# Patient Record
Sex: FEMALE | Race: WHITE | HISPANIC OR LATINO | Employment: UNEMPLOYED | URBAN - METROPOLITAN AREA
[De-identification: names, ages, dates, MRNs, and addresses within clinical notes are randomized per-mention and may not be internally consistent; named-entity substitution may affect disease eponyms.]

---

## 2017-12-07 ENCOUNTER — LAB VISIT (OUTPATIENT)
Dept: LAB | Facility: HOSPITAL | Age: 37
End: 2017-12-07
Attending: FAMILY MEDICINE
Payer: COMMERCIAL

## 2017-12-07 ENCOUNTER — OFFICE VISIT (OUTPATIENT)
Dept: FAMILY MEDICINE | Facility: CLINIC | Age: 37
End: 2017-12-07
Payer: COMMERCIAL

## 2017-12-07 VITALS
SYSTOLIC BLOOD PRESSURE: 122 MMHG | DIASTOLIC BLOOD PRESSURE: 78 MMHG | RESPIRATION RATE: 16 BRPM | BODY MASS INDEX: 23.65 KG/M2 | HEART RATE: 101 BPM | HEIGHT: 59 IN | WEIGHT: 117.31 LBS | OXYGEN SATURATION: 97 % | TEMPERATURE: 98 F

## 2017-12-07 DIAGNOSIS — Z87.19 HISTORY OF GASTRITIS: ICD-10-CM

## 2017-12-07 DIAGNOSIS — Z79.899 LONG-TERM USE OF PLAQUENIL: ICD-10-CM

## 2017-12-07 DIAGNOSIS — Z00.00 ENCOUNTER FOR ROUTINE HISTORY AND PHYSICAL EXAM IN FEMALE PATIENT: ICD-10-CM

## 2017-12-07 DIAGNOSIS — M12.9 ARTHRITIS, MULTIPLE JOINT INVOLVEMENT: ICD-10-CM

## 2017-12-07 DIAGNOSIS — L70.9 ACNE, MILD: ICD-10-CM

## 2017-12-07 DIAGNOSIS — G47.01 INSOMNIA DUE TO MEDICAL CONDITION: ICD-10-CM

## 2017-12-07 DIAGNOSIS — Z00.00 ENCOUNTER FOR ROUTINE HISTORY AND PHYSICAL EXAM IN FEMALE PATIENT: Primary | ICD-10-CM

## 2017-12-07 LAB
ALBUMIN SERPL BCP-MCNC: 4 G/DL
ALP SERPL-CCNC: 63 U/L
ALT SERPL W/O P-5'-P-CCNC: 24 U/L
ANION GAP SERPL CALC-SCNC: 8 MMOL/L
AST SERPL-CCNC: 20 U/L
BASOPHILS # BLD AUTO: 0.02 K/UL
BASOPHILS NFR BLD: 0.4 %
BILIRUB SERPL-MCNC: 0.3 MG/DL
BUN SERPL-MCNC: 9 MG/DL
CALCIUM SERPL-MCNC: 9.7 MG/DL
CHLORIDE SERPL-SCNC: 108 MMOL/L
CHOLEST SERPL-MCNC: 169 MG/DL
CHOLEST/HDLC SERPL: 3.3 {RATIO}
CO2 SERPL-SCNC: 26 MMOL/L
CREAT SERPL-MCNC: 0.8 MG/DL
DIFFERENTIAL METHOD: NORMAL
EOSINOPHIL # BLD AUTO: 0.1 K/UL
EOSINOPHIL NFR BLD: 1.6 %
ERYTHROCYTE [DISTWIDTH] IN BLOOD BY AUTOMATED COUNT: 12.3 %
EST. GFR  (AFRICAN AMERICAN): >60 ML/MIN/1.73 M^2
EST. GFR  (NON AFRICAN AMERICAN): >60 ML/MIN/1.73 M^2
GLUCOSE SERPL-MCNC: 81 MG/DL
HCT VFR BLD AUTO: 37.2 %
HDLC SERPL-MCNC: 51 MG/DL
HDLC SERPL: 30.2 %
HGB BLD-MCNC: 12.4 G/DL
LDLC SERPL CALC-MCNC: 105.6 MG/DL
LYMPHOCYTES # BLD AUTO: 1.6 K/UL
LYMPHOCYTES NFR BLD: 28.9 %
MCH RBC QN AUTO: 30.3 PG
MCHC RBC AUTO-ENTMCNC: 33.3 G/DL
MCV RBC AUTO: 91 FL
MONOCYTES # BLD AUTO: 0.3 K/UL
MONOCYTES NFR BLD: 4.9 %
NEUTROPHILS # BLD AUTO: 3.6 K/UL
NEUTROPHILS NFR BLD: 64 %
NONHDLC SERPL-MCNC: 118 MG/DL
PLATELET # BLD AUTO: 331 K/UL
PMV BLD AUTO: 10.4 FL
POTASSIUM SERPL-SCNC: 4.9 MMOL/L
PROT SERPL-MCNC: 7.8 G/DL
RBC # BLD AUTO: 4.09 M/UL
SODIUM SERPL-SCNC: 142 MMOL/L
TRIGL SERPL-MCNC: 62 MG/DL
TSH SERPL DL<=0.005 MIU/L-ACNC: 2.24 UIU/ML
WBC # BLD AUTO: 5.54 K/UL

## 2017-12-07 PROCEDURE — 80053 COMPREHEN METABOLIC PANEL: CPT

## 2017-12-07 PROCEDURE — 99204 OFFICE O/P NEW MOD 45 MIN: CPT | Mod: S$GLB,,, | Performed by: FAMILY MEDICINE

## 2017-12-07 PROCEDURE — 85025 COMPLETE CBC W/AUTO DIFF WBC: CPT

## 2017-12-07 PROCEDURE — 80061 LIPID PANEL: CPT

## 2017-12-07 PROCEDURE — 99999 PR PBB SHADOW E&M-NEW PATIENT-LVL V: CPT | Mod: PBBFAC,,, | Performed by: FAMILY MEDICINE

## 2017-12-07 PROCEDURE — 84443 ASSAY THYROID STIM HORMONE: CPT

## 2017-12-07 PROCEDURE — 36415 COLL VENOUS BLD VENIPUNCTURE: CPT | Mod: PN

## 2017-12-07 RX ORDER — MELOXICAM 7.5 MG/1
7.5 TABLET ORAL DAILY
COMMUNITY
End: 2017-12-07 | Stop reason: SDUPTHER

## 2017-12-07 RX ORDER — ZOLPIDEM TARTRATE 10 MG/1
10 TABLET ORAL NIGHTLY PRN
Qty: 30 TABLET | Refills: 1 | Status: SHIPPED | OUTPATIENT
Start: 2017-12-07 | End: 2018-05-30 | Stop reason: SDUPTHER

## 2017-12-07 RX ORDER — ZOLPIDEM TARTRATE 10 MG/1
TABLET ORAL
COMMUNITY
Start: 2017-12-01 | End: 2017-12-07 | Stop reason: SDUPTHER

## 2017-12-07 RX ORDER — TRAMADOL HYDROCHLORIDE 50 MG/1
TABLET ORAL
COMMUNITY
Start: 2017-12-01 | End: 2018-04-02

## 2017-12-07 RX ORDER — LEVONORGESTREL AND ETHINYL ESTRADIOL 0.1-0.02MG
1 KIT ORAL DAILY
COMMUNITY
End: 2018-07-27

## 2017-12-07 RX ORDER — AMITRIPTYLINE HYDROCHLORIDE 10 MG/1
TABLET, FILM COATED ORAL
COMMUNITY
Start: 2017-12-04 | End: 2017-12-20 | Stop reason: SDUPTHER

## 2017-12-07 RX ORDER — HYDROXYCHLOROQUINE SULFATE 200 MG/1
1 TABLET, FILM COATED ORAL 2 TIMES DAILY
COMMUNITY
Start: 2017-12-01 | End: 2017-12-20 | Stop reason: SDUPTHER

## 2017-12-07 RX ORDER — MELOXICAM 7.5 MG/1
7.5 TABLET ORAL 2 TIMES DAILY PRN
Qty: 60 TABLET | Refills: 2 | Status: SHIPPED | OUTPATIENT
Start: 2017-12-07 | End: 2018-03-10 | Stop reason: SDUPTHER

## 2017-12-07 NOTE — PATIENT INSTRUCTIONS
Treating Insomnia     Learning to relax before bedtime can improve your sleep.     Good sleeping habits are a key part of treatment. If needed, some medications may help you sleep better at first. Making healthy lifestyle changes and learning to relax can improve your sleep. Treating insomnia takes commitment, but trust that your efforts will pay off. Talk to your health care provider before taking any medication.  Healthy lifestyle  Your lifestyle affects your health and your sleep. Here are some healthy habits:  · Keep a regular sleep schedule. Go to bed and get up at the same time each day.  · Exercise regularly. It may help you reduce stress. Avoid strenuous exercise for 2 to 4 hours before bedtime.  · Avoid or limit naps, especially in the late afternoon.  · Use your bed only for sleep and sex.  · Dont spend too much time in bed trying to fall asleep. If you cant fall asleep, get up and do something (no electronics) until you become tired and drowsy.  · Avoid or limit caffeine and nicotine for up to 6 hours before bedtime. They can keep you awake at night.   · Also avoid alcohol for at least 4 to 6 hours before bedtime. It may help you fall asleep at first, but you will have more awakenings throughout the night, and your sleep will not be restful.  Before bedtime  To sleep better every night, try these tips:  · Have a bedtime routine to let your body and mind know when its time to sleep.  · Think of going to bed as relaxing and enjoyable. Sleep will come sooner.  · If your worries dont let you sleep, write them down in a diary. Then close it, and go to bed.  · Make sure the room is not too hot or too cold. If its not dark enough, an eye mask can help. If its noisy, try using earplugs.  Learn to relax  Stress, anxiety, and body tension may keep you awake at night. To unwind before bedtime, try a warm bath, meditation, or yoga. Also, try the following:  · Deep breathing. Sit or lie back in a chair. Take a  slow, deep breath. Hold it for 5 counts. Then breathe out slowly through your mouth. Keep doing this until you feel relaxed.  · Progressive muscle relaxation. Tense and then relax the muscles in your body as you breathe deeply. Start with your feet and work up your body to your neck and face.  Date Last Reviewed: 7/18/2015  © 3287-8142 [employer]. [address] [place] [address], [place] [id number]. All rights reserved. This information is not intended as a substitute for professional medical care. Always follow your healthcare professional's instructions.

## 2017-12-07 NOTE — PROGRESS NOTES
"  Chief Complaint   Patient presents with    Annual Exam       HPI    Roopa Rivas is 37 y.o. female. The primary encounter diagnosis was Encounter for routine history and physical exam in female patient. Diagnoses of Arthritis, multiple joint involvement, Insomnia due to medical condition, History of gastritis, Acne, mild, and Long-term use of Plaquenil were also pertinent to this visit.     37 year old female with diffuse Osteoarthritis comes to clinic to establish care.  Patient reports relocating from [place] 3 months ago.     She reports being seen by Rheumatology in [place] for arthritis.  She was placed on Plaquenil and Tramadol for her arthritis. She denies rheumatoid arthritis but only recalls being told it was "M19." She requests follow up.    Patient also requests referral to Dermatology.  Since being on Plaquenil she has had normal retinal exam but reports increase in acne lesions.  Patient also admits to insomnia due to pain.  She currently uses Ambien for this issue.      She denies continued issues with Gastritis.  She reports simple dietary changes that resolved her symptoms.      Review of Systems   Constitutional: Negative for activity change.   HENT: Negative for congestion.    Respiratory: Negative for shortness of breath.    Cardiovascular: Negative for chest pain.   Gastrointestinal: Negative for abdominal pain.   Genitourinary: Negative for dysuria.   Musculoskeletal: Negative for gait problem.   Skin: Positive for rash. Negative for wound.   Neurological: Negative for dizziness.   Psychiatric/Behavioral: Negative for suicidal ideas.       Past Medical History:   Diagnosis Date    Arthritis     Gastroenteritis      History reviewed. No pertinent surgical history.  Family History   Problem Relation Age of Onset    Arthritis Mother     No Known Problems Father     Arthritis Maternal Grandmother     Arthritis Maternal Grandfather     Cancer Paternal Grandfather       reports that she has " "never smoked. She has never used smokeless tobacco. She reports that she does not drink alcohol or use drugs.  Review of patient's allergies indicates:  No Known Allergies  July 6, 2017 - up to date    Current Outpatient Prescriptions:     amitriptyline (ELAVIL) 10 MG tablet, , Disp: , Rfl:     hydroxychloroquine (PLAQUENIL) 200 mg tablet, Take 1 tablet by mouth 2 (two) times daily., Disp: , Rfl:     levonorgestrel-ethinyl estradiol (AUBRA) 0.1-20 mg-mcg per tablet, Take 1 tablet by mouth once daily., Disp: , Rfl:     meloxicam (MOBIC) 7.5 MG tablet, Take 1 tablet (7.5 mg total) by mouth 2 (two) times daily as needed for Pain., Disp: 60 tablet, Rfl: 2    traMADol (ULTRAM) 50 mg tablet, , Disp: , Rfl:     zolpidem (AMBIEN) 10 mg Tab, Take 1 tablet (10 mg total) by mouth nightly as needed., Disp: 30 tablet, Rfl: 1        Blood pressure 122/78, pulse 101, temperature 98 °F (36.7 °C), temperature source Oral, resp. rate 16, height 4' 11" (1.499 m), weight 53.2 kg (117 lb 4.6 oz), SpO2 97 %.    Physical Exam   Constitutional: She is oriented to person, place, and time. Vital signs are normal. She appears well-developed.   HENT:   Right Ear: Hearing normal.   Left Ear: Hearing normal.   Mouth/Throat: Normal dentition.   Cardiovascular: Normal heart sounds and intact distal pulses.    Pulmonary/Chest: Effort normal and breath sounds normal.   Abdominal: Soft. Bowel sounds are normal. There is no tenderness.   Musculoskeletal:   Normal gait. No decreased ROM at all 4 major joints.   Neurological: She is oriented to person, place, and time. She has normal strength. No sensory deficit.   Skin: Skin is intact. Lesion (multiple closed comedones over the cheeks and chin bilaterally) noted. No rash noted.   Psychiatric: She has a normal mood and affect. Her speech is normal.       Lab Visit on 12/07/2017   Component Date Value Ref Range Status    WBC 12/07/2017 5.54  3.90 - 12.70 K/uL Final    RBC 12/07/2017 4.09  4.00 - " 5.40 M/uL Final    Hemoglobin 12/07/2017 12.4  12.0 - 16.0 g/dL Final    Hematocrit 12/07/2017 37.2  37.0 - 48.5 % Final    MCV 12/07/2017 91  82 - 98 fL Final    MCH 12/07/2017 30.3  27.0 - 31.0 pg Final    MCHC 12/07/2017 33.3  32.0 - 36.0 g/dL Final    RDW 12/07/2017 12.3  11.5 - 14.5 % Final    Platelets 12/07/2017 331  150 - 350 K/uL Final    MPV 12/07/2017 10.4  9.2 - 12.9 fL Final    Gran # 12/07/2017 3.6  1.8 - 7.7 K/uL Final    Lymph # 12/07/2017 1.6  1.0 - 4.8 K/uL Final    Mono # 12/07/2017 0.3  0.3 - 1.0 K/uL Final    Eos # 12/07/2017 0.1  0.0 - 0.5 K/uL Final    Baso # 12/07/2017 0.02  0.00 - 0.20 K/uL Final    Gran% 12/07/2017 64.0  38.0 - 73.0 % Final    Lymph% 12/07/2017 28.9  18.0 - 48.0 % Final    Mono% 12/07/2017 4.9  4.0 - 15.0 % Final    Eosinophil% 12/07/2017 1.6  0.0 - 8.0 % Final    Basophil% 12/07/2017 0.4  0.0 - 1.9 % Final    Differential Method 12/07/2017 Automated   Final    Sodium 12/07/2017 142  136 - 145 mmol/L Final    Potassium 12/07/2017 4.9  3.5 - 5.1 mmol/L Final    Chloride 12/07/2017 108  95 - 110 mmol/L Final    CO2 12/07/2017 26  23 - 29 mmol/L Final    Glucose 12/07/2017 81  70 - 110 mg/dL Final    BUN, Bld 12/07/2017 9  6 - 20 mg/dL Final    Creatinine 12/07/2017 0.8  0.5 - 1.4 mg/dL Final    Calcium 12/07/2017 9.7  8.7 - 10.5 mg/dL Final    Total Protein 12/07/2017 7.8  6.0 - 8.4 g/dL Final    Albumin 12/07/2017 4.0  3.5 - 5.2 g/dL Final    Total Bilirubin 12/07/2017 0.3  0.1 - 1.0 mg/dL Final    Alkaline Phosphatase 12/07/2017 63  55 - 135 U/L Final    AST 12/07/2017 20  10 - 40 U/L Final    ALT 12/07/2017 24  10 - 44 U/L Final    Anion Gap 12/07/2017 8  8 - 16 mmol/L Final    eGFR if African American 12/07/2017 >60  >60 mL/min/1.73 m^2 Final    eGFR if non African American 12/07/2017 >60  >60 mL/min/1.73 m^2 Final    Cholesterol 12/07/2017 169  120 - 199 mg/dL Final    Triglycerides 12/07/2017 62  30 - 150 mg/dL Final    HDL  12/07/2017 51  40 - 75 mg/dL Final    LDL Cholesterol 12/07/2017 105.6  63.0 - 159.0 mg/dL Final    HDL/Chol Ratio 12/07/2017 30.2  20.0 - 50.0 % Final    Total Cholesterol/HDL Ratio 12/07/2017 3.3  2.0 - 5.0 Final    Non-HDL Cholesterol 12/07/2017 118  mg/dL Final    TSH 12/07/2017 2.237  0.400 - 4.000 uIU/mL Final   ]    ASSESSMENT:    1. Encounter for routine history and physical exam in female patient    2. Arthritis, multiple joint involvement    3. Insomnia due to medical condition    4. History of gastritis    5. Acne, mild    6. Long-term use of Plaquenil        Roopa was seen today for annual exam.    Diagnoses and all orders for this visit:    Encounter for routine history and physical exam in female patient  -     CBC auto differential; Future  -     Comprehensive metabolic panel; Future  -     Lipid panel; Future  -     TSH; Future  -     Urinalysis  - Annual exam completed today.  Annual labs obtained. Risk of narcotics discussed.  Vaccinations reviewed.    Arthritis, multiple joint involvement  -     Ambulatory referral to Rheumatology  -     meloxicam (MOBIC) 7.5 MG tablet; Take 1 tablet (7.5 mg total) by mouth 2 (two) times daily as needed for Pain.  - Stable. Referral to Rheumatology placed. ASHLEE signed to obtain records of diagnosis and treatment plan from previous Rheumatologist.    Insomnia due to medical condition  -     zolpidem (AMBIEN) 10 mg Tab; Take 1 tablet (10 mg total) by mouth nightly as needed.  - Stable. Medication refilled.    History of gastritis   -Stable. Maintain dietary changes.  If symptoms worsen review diet. Okay to use Zantac.    Acne, mild  -     Ambulatory referral to Dermatology  - New problem. Referral placed to Dermatology for acne causes by pharmacotherapy.    Long-term use of Plaquenil  -     Ambulatory referral to Dermatology  - Stable. Discussed regular follow up with Rheumatology and Ophthalmology.          FOLLOW UP: Return in about 1 year (around 12/7/2018)  for Physical.

## 2017-12-13 ENCOUNTER — TELEPHONE (OUTPATIENT)
Dept: FAMILY MEDICINE | Facility: CLINIC | Age: 37
End: 2017-12-13

## 2017-12-13 NOTE — LETTER
December 13, 2017    Roopa Rivas  [address] [address]             [facility]  [address] [place] [address]  Phone: [phone]  Fax: [phone] Dear Mrs. Rivas:    Sorry we were unable to contact you to schedule your Rheumatology and Dermatology appointment. Please give the referral department a call at [phone].      If you have any questions or concerns, please don't hesitate to call.    Sincerely,        Gilbert Sherman MA

## 2017-12-15 ENCOUNTER — TELEPHONE (OUTPATIENT)
Dept: FAMILY MEDICINE | Facility: CLINIC | Age: 37
End: 2017-12-15

## 2017-12-15 NOTE — PROGRESS NOTES
Please contact patient and inform that I have reviewed the lab results. Blood counts were all normal.  Testing indicates normal thyroid, kidney and liver function.  Electrolyte levels are within normal ranges.  Cholesterol levels are also normal.

## 2017-12-15 NOTE — TELEPHONE ENCOUNTER
----- Message from Louisa Gonzalez MD sent at 12/15/2017  5:02 AM CST -----  Please contact patient and inform that I have reviewed the lab results. Blood counts were all normal.  Testing indicates normal thyroid, kidney and liver function.  Electrolyte levels are within normal ranges.  Cholesterol levels are also normal.

## 2017-12-20 ENCOUNTER — INITIAL CONSULT (OUTPATIENT)
Dept: RHEUMATOLOGY | Facility: CLINIC | Age: 37
End: 2017-12-20
Payer: COMMERCIAL

## 2017-12-20 VITALS
DIASTOLIC BLOOD PRESSURE: 78 MMHG | WEIGHT: 115.19 LBS | HEIGHT: 59 IN | HEART RATE: 89 BPM | BODY MASS INDEX: 23.22 KG/M2 | SYSTOLIC BLOOD PRESSURE: 113 MMHG

## 2017-12-20 DIAGNOSIS — Z55.9 EDUCATIONAL CIRCUMSTANCES: ICD-10-CM

## 2017-12-20 DIAGNOSIS — M12.9 ARTHRITIS, MULTIPLE JOINT INVOLVEMENT: ICD-10-CM

## 2017-12-20 DIAGNOSIS — Z79.899 LONG-TERM USE OF PLAQUENIL: ICD-10-CM

## 2017-12-20 DIAGNOSIS — M79.7 FIBROMYALGIA: Primary | ICD-10-CM

## 2017-12-20 PROCEDURE — 99205 OFFICE O/P NEW HI 60 MIN: CPT | Mod: S$GLB,,, | Performed by: INTERNAL MEDICINE

## 2017-12-20 PROCEDURE — 99999 PR PBB SHADOW E&M-EST. PATIENT-LVL IV: CPT | Mod: PBBFAC,,, | Performed by: INTERNAL MEDICINE

## 2017-12-20 RX ORDER — HYDROXYCHLOROQUINE SULFATE 200 MG/1
300 TABLET, FILM COATED ORAL DAILY
Qty: 135 TABLET | Refills: 3 | Status: SHIPPED | OUTPATIENT
Start: 2017-12-20 | End: 2018-04-18 | Stop reason: SDUPTHER

## 2017-12-20 RX ORDER — AMITRIPTYLINE HYDROCHLORIDE 25 MG/1
25 TABLET, FILM COATED ORAL NIGHTLY
Qty: 30 TABLET | Refills: 3 | Status: SHIPPED | OUTPATIENT
Start: 2017-12-20 | End: 2017-12-27 | Stop reason: SDUPTHER

## 2017-12-20 SDOH — SOCIAL DETERMINANTS OF HEALTH (SDOH): PROBLEMS RELATED TO EDUCATION AND LITERACY, UNSPECIFIED: Z55.9

## 2017-12-20 ASSESSMENT — ROUTINE ASSESSMENT OF PATIENT INDEX DATA (RAPID3)
AM STIFFNESS SCORE: 1, YES
PATIENT GLOBAL ASSESSMENT SCORE: 4.5
MDHAQ FUNCTION SCORE: .2
FATIGUE SCORE: 9.5
TOTAL RAPID3 SCORE: 3.06
PAIN SCORE: 4
PSYCHOLOGICAL DISTRESS SCORE: 6.6
WHEN YOU AWAKENED IN THE MORNING OVER THE LAST WEEK, PLEASE INDICATE THE AMOUNT OF TIME IT TAKES UNTIL YOU ARE AS LIMBER AS YOU WILL BE FOR THE DAY: 20 MINS

## 2017-12-20 NOTE — PROGRESS NOTES
Subjective:       Patient ID: Roopa Rivas is a 37 y.o. female.    Chief Complaint: Disease Management    HPI   Pt is a 38 yo with h/o gastritis, irritable bowel syndrome presenting to [facility]. Pt relocated from [place] Nov 2017 and was followed by rheumatology for a possible inflammatory arthritis per the patient. She was last seen by her rheumatologist, Dr. Sana Belle in Sept. Pt sts she developed diffuse joint pains in the hands, arms, shoulders, and knees in Feb or March 2016. She sts that after starting a combination of  mg/d, Tramadol 50 mg bid, Elavil 10 mg/d, mobic 7.5 mg bid her symptoms improved. She is unsure of her diagnosis at this time. Pt reports 30 minutes of am stiffness, intermittent joint swelling/pain, and severe fatigue. She is no longer working due to her fatigue. Pt recounts getting a massage in the past and crying due to being diffusely tender. She denies fevers, chills, Raynaud's, photosensitivity, rashes, alopecia, mucosal ulcers, pleurisy, vision changes, . Pt does report an increase in acne. Pt has poor sleep and uses Ambien.   No known autoimmune family history.   Pt denies a personal or family history of psoriasis.   No blood clots or miscarriages.        Widespread pain index 11    Note the areas which the patient has had pain over the last week:                   Shoulder-girdle, left 1               Shoulder-girdle, right1                         Upper arm left1                       Upper arm right1                         Lower arm left1                       Lower arm right1    Hip (buttock, trochanter) left  Hip (buttock, trochanter) right                           Upper leg, left                         Upper leg, right                           Lower leg, left1                         Lower leg, right1                                     Jaw, left                                   Jaw, right1                                         Chest                                  Abdomen                               Upper back1                              Lower back1                                        Neck  Score will be from 0-19:                                         Symptom severity score7  Fatigue 3  Waking Unrefreshed3  Cognitive Symptoms   0 = no problem, 1=slight or mild problem 2= moderate; considerable problems often present and/or at a moderate level, 3 = severe, pervasive, continuous, life disturbing problem  For each of the 3 symptoms, indicate the level of severity over the past week using the Scale.  The symptom severity score is the sum of the severity of the 3 symptoms (fatigue, waking unrefreshed, and cognitive symptoms) plus the number of the following symptoms occurring during the previous 6 months:   Headaches 1   Pain or cramps in the lower abdomen  Depression  The final score is between 0 and 12                                          Criteria  Patient has fibromyalgia if the following 3 conditions are met:  1.  Widespread pain index greater than or equal to 7 and symptom severity score greater than or equal to 5 or widespread pain index between 3- 6, and symptom severity score greater than or equal to 9.    2.  Symptoms have been present in a similar level for at least 3 months  3.  The patient does not have a disorder that would otherwise sufficiently explain the pain    Review of Systems   Constitutional: Positive for activity change and fatigue. Negative for appetite change, chills and fever.   HENT: Negative for mouth sores, nosebleeds, sore throat and trouble swallowing.         Dry mouth     Eyes: Negative for pain, redness and visual disturbance.   Respiratory: Negative for cough, shortness of breath and wheezing.    Cardiovascular: Negative for chest pain, palpitations and leg swelling.   Gastrointestinal: Positive for constipation. Negative for diarrhea, nausea and vomiting.   Genitourinary: Negative for  "difficulty urinating.   Musculoskeletal: Positive for arthralgias, back pain, joint swelling and neck pain.   Skin: Positive for rash (acne).   Neurological: Positive for headaches. Negative for tremors, weakness, light-headedness and numbness.   Hematological: Does not bruise/bleed easily.   Psychiatric/Behavioral: Positive for sleep disturbance.         Objective:   /78   Pulse 89   Ht 4' 11" (1.499 m)   Wt 52.3 kg (115 lb 3.2 oz)   BMI 23.27 kg/m²      Physical Exam   Constitutional: She is oriented to person, place, and time and well-developed, well-nourished, and in no distress.   HENT:   Mouth/Throat: Oropharynx is clear and moist.   No ulcers     Eyes: No scleral icterus.   Neck: Normal range of motion. Neck supple.   Cardiovascular: Normal rate, regular rhythm, normal heart sounds and intact distal pulses.    No murmur heard.  Pulmonary/Chest: Effort normal. No respiratory distress. She has no wheezes. She exhibits no tenderness.   Abdominal: Soft. Bowel sounds are normal.       Right Side Rheumatological Exam     Examination finds the shoulder, elbow, wrist, knee, 1st PIP, 1st MCP, 2nd PIP, 2nd MCP, 3rd PIP, 3rd MCP, 4th PIP, 4th MCP, 5th PIP and 5th MCP normal.    Joint Exam Comments   Knee: Crepitus        Muscle Strength (0-5 scale):  Neck Flexion:  5  Neck Extension: 5  Deltoid:  5  Biceps: 5/5   Triceps:  5  : 5/5   Iliopsoas: 5  Quadriceps:  5   Distal Lower Extremity: 5    Left Side Rheumatological Exam     Examination finds the shoulder, elbow, wrist, knee, 1st PIP, 1st MCP, 2nd PIP, 2nd MCP, 3rd PIP, 3rd MCP, 4th PIP, 4th MCP, 5th PIP and 5th MCP normal.    Joint Exam Comments   Knee: Crepitus        Muscle Strength (0-5 scale):  Neck Flexion:  5  Neck Extension: 5  Deltoid:  5  Biceps: 5/5   Triceps:  5  :  5/5   Iliopsoas: 5  Quadriceps:  5   Distal Lower Extremity: 5      Lymphadenopathy:     She has no cervical adenopathy.   Neurological: She is alert and oriented to person, " place, and time. Gait normal.   Skin: Skin is warm and dry. No rash noted. No erythema. No pallor.     Mild upper extremity livedo reticularis    Musculoskeletal: Normal range of motion. She exhibits deformity (prominent CMC squaring on the right hand-- s/p trauma). She exhibits no edema or tenderness.   Normal lumbar flexion and extension.  Hips normal ROM           Results for orders placed or performed in visit on 12/07/17   CBC auto differential   Result Value Ref Range    WBC 5.54 3.90 - 12.70 K/uL    RBC 4.09 4.00 - 5.40 M/uL    Hemoglobin 12.4 12.0 - 16.0 g/dL    Hematocrit 37.2 37.0 - 48.5 %    MCV 91 82 - 98 fL    MCH 30.3 27.0 - 31.0 pg    MCHC 33.3 32.0 - 36.0 g/dL    RDW 12.3 11.5 - 14.5 %    Platelets 331 150 - 350 K/uL    MPV 10.4 9.2 - 12.9 fL    Gran # 3.6 1.8 - 7.7 K/uL    Lymph # 1.6 1.0 - 4.8 K/uL    Mono # 0.3 0.3 - 1.0 K/uL    Eos # 0.1 0.0 - 0.5 K/uL    Baso # 0.02 0.00 - 0.20 K/uL    Gran% 64.0 38.0 - 73.0 %    Lymph% 28.9 18.0 - 48.0 %    Mono% 4.9 4.0 - 15.0 %    Eosinophil% 1.6 0.0 - 8.0 %    Basophil% 0.4 0.0 - 1.9 %    Differential Method Automated      Results for orders placed or performed in visit on 12/07/17   Comprehensive metabolic panel   Result Value Ref Range    Sodium 142 136 - 145 mmol/L    Potassium 4.9 3.5 - 5.1 mmol/L    Chloride 108 95 - 110 mmol/L    CO2 26 23 - 29 mmol/L    Glucose 81 70 - 110 mg/dL    BUN, Bld 9 6 - 20 mg/dL    Creatinine 0.8 0.5 - 1.4 mg/dL    Calcium 9.7 8.7 - 10.5 mg/dL    Total Protein 7.8 6.0 - 8.4 g/dL    Albumin 4.0 3.5 - 5.2 g/dL    Total Bilirubin 0.3 0.1 - 1.0 mg/dL    Alkaline Phosphatase 63 55 - 135 U/L    AST 20 10 - 40 U/L    ALT 24 10 - 44 U/L    Anion Gap 8 8 - 16 mmol/L    eGFR if African American >60 >60 mL/min/1.73 m^2    eGFR if non African American >60 >60 mL/min/1.73 m^2          Assessment:       1. Fibromyalgia    2. Arthritis, multiple joint involvement    3. Long-term use of Plaquenil    4. Educational circumstances        Pt is  a 36 yo with h/o gastritis, irritable bowel syndrome presenting to [facility].   Based on exam and WPI/SSS and history pt has fibromyalgia which fits treatment except for the HCQ  She does not have records of diagnosis and if she had an previous inflammatory arthritis.   Would like to wean tramadol as it is a narcotic.   Discussed fibromyalgia and aerobic exercise.   Up to date on HCQ eye exam.  Plan:   - Consent signed, will Call Dr. Terry Belle to get records and xrays  - Labs today: sed rate, CRP, RF, CCP ab, APL ab, EDUARDO, complements.   - Increase Elavil to 25 mg q hs for fibromyalgia and sleep. Wean tramadol to 50 mg daily then taper off.   - Decrease Plaquenil to 300 mg daily. Refill given. Continue yearly eye exams.   - Future therapy: maximize Elavil and she may need Duloxetine.  - Fibromyalgia handout given to patient.    - Pt will notify me of tramadol wean and increased Elavil dose.   - RTC in 3 months    Signed,  Ariel Stoner  [id number]

## 2017-12-27 DIAGNOSIS — M12.9 ARTHRITIS, MULTIPLE JOINT INVOLVEMENT: ICD-10-CM

## 2017-12-27 RX ORDER — AMITRIPTYLINE HYDROCHLORIDE 25 MG/1
25 TABLET, FILM COATED ORAL NIGHTLY
Qty: 90 TABLET | Refills: 2 | Status: SHIPPED | OUTPATIENT
Start: 2017-12-27 | End: 2017-12-29 | Stop reason: SDUPTHER

## 2017-12-29 DIAGNOSIS — M12.9 ARTHRITIS, MULTIPLE JOINT INVOLVEMENT: ICD-10-CM

## 2017-12-29 RX ORDER — AMITRIPTYLINE HYDROCHLORIDE 25 MG/1
25 TABLET, FILM COATED ORAL NIGHTLY
Qty: 90 TABLET | Refills: 3 | Status: SHIPPED | OUTPATIENT
Start: 2017-12-29 | End: 2018-04-02

## 2018-01-03 ENCOUNTER — PATIENT MESSAGE (OUTPATIENT)
Dept: RHEUMATOLOGY | Facility: CLINIC | Age: 38
End: 2018-01-03

## 2018-01-17 ENCOUNTER — PATIENT MESSAGE (OUTPATIENT)
Dept: RHEUMATOLOGY | Facility: CLINIC | Age: 38
End: 2018-01-17

## 2018-03-10 DIAGNOSIS — M12.9 ARTHRITIS, MULTIPLE JOINT INVOLVEMENT: ICD-10-CM

## 2018-03-11 RX ORDER — MELOXICAM 7.5 MG/1
TABLET ORAL
Qty: 60 TABLET | Refills: 0 | Status: SHIPPED | OUTPATIENT
Start: 2018-03-11 | End: 2018-04-14 | Stop reason: SDUPTHER

## 2018-04-02 ENCOUNTER — OFFICE VISIT (OUTPATIENT)
Dept: RHEUMATOLOGY | Facility: CLINIC | Age: 38
End: 2018-04-02
Payer: COMMERCIAL

## 2018-04-02 VITALS
WEIGHT: 114 LBS | DIASTOLIC BLOOD PRESSURE: 75 MMHG | HEART RATE: 89 BPM | HEIGHT: 59 IN | SYSTOLIC BLOOD PRESSURE: 102 MMHG | BODY MASS INDEX: 22.98 KG/M2

## 2018-04-02 DIAGNOSIS — M25.542 ARTHRALGIA OF HANDS, BILATERAL: Primary | ICD-10-CM

## 2018-04-02 DIAGNOSIS — M79.7 FIBROMYALGIA: ICD-10-CM

## 2018-04-02 DIAGNOSIS — M25.541 ARTHRALGIA OF HANDS, BILATERAL: Primary | ICD-10-CM

## 2018-04-02 PROCEDURE — 99214 OFFICE O/P EST MOD 30 MIN: CPT | Mod: S$GLB,,, | Performed by: INTERNAL MEDICINE

## 2018-04-02 PROCEDURE — 99999 PR PBB SHADOW E&M-EST. PATIENT-LVL III: CPT | Mod: PBBFAC,,, | Performed by: INTERNAL MEDICINE

## 2018-04-02 RX ORDER — DULOXETIN HYDROCHLORIDE 30 MG/1
CAPSULE, DELAYED RELEASE ORAL
Qty: 60 CAPSULE | Refills: 3 | Status: SHIPPED | OUTPATIENT
Start: 2018-04-02 | End: 2018-04-02 | Stop reason: SDUPTHER

## 2018-04-02 RX ORDER — DULOXETIN HYDROCHLORIDE 30 MG/1
CAPSULE, DELAYED RELEASE ORAL
Qty: 30 CAPSULE | Refills: 3 | Status: SHIPPED | OUTPATIENT
Start: 2018-04-02 | End: 2018-04-03 | Stop reason: SDUPTHER

## 2018-04-02 ASSESSMENT — ROUTINE ASSESSMENT OF PATIENT INDEX DATA (RAPID3)
WHEN YOU AWAKENED IN THE MORNING OVER THE LAST WEEK, PLEASE INDICATE THE AMOUNT OF TIME IT TAKES UNTIL YOU ARE AS LIMBER AS YOU WILL BE FOR THE DAY: 35 MINUTES
MDHAQ FUNCTION SCORE: .3
PSYCHOLOGICAL DISTRESS SCORE: .7
FATIGUE SCORE: 10
AM STIFFNESS SCORE: 1, YES
PATIENT GLOBAL ASSESSMENT SCORE: 8.5
PAIN SCORE: 9.5
TOTAL RAPID3 SCORE: 6.33

## 2018-04-02 NOTE — PROGRESS NOTES
Chief Complaint   Patient presents with    Appointment       Patient with a diagnosis of fibromyalgia syndrome for a follow up    History of presenting illness    We did the autoimmune panel    Normal CBC,CMP  Normal lipid panel  Normal TSH  Normal RF,CCP  EDUARDO positive,1: 160 homogenous,titre negative  Normal complements  Normal ESR,CRP  Negative APLAS panel    Symptoms today :     Uses ambien and yet cant sleep  Off tramadol  Increased amitryptilline to 50 mg  Cant take amitryptilline 50 mg : can only take 25 mg : feel hungry a lot  Takes meloxicam 7.5 mg daily  Takes prn tramadol    Anxiety + worse than before   Used to see psychiatry in [place] : took many meds,she had side effects  Will see psychiatry soon    Cant concentrate  Cant remember certain things    Dry eyes and mouth+  Opthalmology : gave tear drops    Hair fall got better with biotin    Ear aches  Windy ear pain gets worse  Cant wear earrings,gets infections  Inside of the ears hurt and throb    Fatigue +    Allergies have gotten worse    Used to exercise 6 times a week,cant do it anymore    Pt is a 37 yo with h/o gastritis, irritable bowel syndrome    She relocated from [place] November 2017    She was followed by rheumatology for a possible inflammatory arthritis per the patient     She was last seen by her rheumatologist, Dr. Sana Belle sep 2017  We still dont have records    She says US hands showed changes on inflammatory arthritis  She was offered NSAIDs like nalfon and sulindac and she didn't do well    She has seen us in December 2017,we didn't have any data to suggest inflammatory arthritis    We diagnosed her as fibromyalgia syndrome    Her complaints : diffuse pains in the hands,arms,shoulders and knees  Started February/march 2016  She states that after starting a combination of  mg/d, Tramadol 50 mg bid, Elavil 10 mg/d, mobic 7.5 mg bid her symptoms improved. She is unsure of her diagnosis at this time. Pt reports 30 minutes of  am stiffness, intermittent joint swelling/pain, and severe fatigue. She is no longer working due to her fatigue. Pt recounts getting a massage in the past and crying due to being diffusely tender. She denies fevers, chills, Raynaud's, photosensitivity, rashes, alopecia, mucosal ulcers, pleurisy, vision changes, . Pt does report an increase in acne.     No known autoimmune family history    Pt denies a personal or family history of psoriasis.     No blood clots or miscarriages.         Widespread pain index 11     Note the areas which the patient has had pain over the last week:                    Shoulder-girdle, left 1               Shoulder-girdle, right1                         Upper arm left1                       Upper arm right1                         Lower arm left1                       Lower arm right1    Hip (buttock, trochanter) left  Hip (buttock, trochanter) right                           Upper leg, left                         Upper leg, right                           Lower leg, left1                         Lower leg, right1                                     Jaw, left                                   Jaw, right1                                        Chest                                  Abdomen                               Upper back1                              Lower back1                                        Neck  Score will be from 0-19:                                          Symptom severity score7  Fatigue 3  Waking Unrefreshed3  Cognitive Symptoms     0 = no problem, 1=slight or mild problem 2= moderate; considerable problems often present and/or at a moderate level, 3 = severe, pervasive, continuous, life disturbing problem  For each of the 3 symptoms, indicate the level of severity over the past week using the Scale.  The symptom severity score is the sum of the severity of the 3 symptoms (fatigue, waking unrefreshed, and cognitive symptoms) plus the number of the following  symptoms occurring during the previous 6 months:   Headaches 1   Pain or cramps in the lower abdomen  Depression  The final score is between 0 and 12     Review of Systems   Constitutional: Positive for activity change and fatigue. Negative for appetite change, chills and fever.   HENT: Negative for mouth sores, nosebleeds, sore throat and trouble swallowing.         Dry mouth     Eyes: Negative for pain, redness and visual disturbance.   Respiratory: Negative for cough, shortness of breath and wheezing.    Cardiovascular: Negative for chest pain, palpitations and leg swelling.   Gastrointestinal: Positive for constipation. Negative for diarrhea, nausea and vomiting.   Genitourinary: Negative for difficulty urinating.   Musculoskeletal: Positive for arthralgias, back pain, joint swelling and neck pain.   Skin: Positive for rash (acne).   Neurological: Positive for headaches. Negative for tremors, weakness, light-headedness and numbness.   Hematological: Does not bruise/bleed easily.   Psychiatric/Behavioral: Positive for sleep disturbance.      Objective:     Physical Exam     Constitutional: She is oriented to person, place, and time and well-developed, well-nourished, and in no distress.   HENT:   Mouth/Throat: Oropharynx is clear and moist.   No ulcers  Eyes: No scleral icterus.   Neck: Normal range of motion. Neck supple.   Cardiovascular: Normal rate, regular rhythm, normal heart sounds and intact distal pulses.    No murmur heard.  Pulmonary/Chest: Effort normal. No respiratory distress. She has no wheezes. She exhibits no tenderness.   Abdominal: Soft. Bowel sounds are normal.         Right Side Rheumatological Exam     Examination finds the shoulder, elbow, wrist, knee, 1st PIP, 1st MCP, 2nd PIP, 2nd MCP, 3rd PIP, 3rd MCP, 4th PIP, 4th MCP, 5th PIP and 5th MCP normal.     Joint Exam Comments   Knee: Crepitus     Muscle Strength (0-5 scale):  Neck Flexion:  5  Neck Extension: 5  Deltoid:  5  Biceps: 5/5    Triceps:  5  : 5/5   Iliopsoas: 5  Quadriceps:  5   Distal Lower Extremity: 5     Left Side Rheumatological Exam     Examination finds the shoulder, elbow, wrist, knee, 1st PIP, 1st MCP, 2nd PIP, 2nd MCP, 3rd PIP, 3rd MCP, 4th PIP, 4th MCP, 5th PIP and 5th MCP normal.     Joint Exam Comments   Knee: Crepitus     Muscle Strength (0-5 scale):  Neck Flexion:  5  Neck Extension: 5  Deltoid:  5  Biceps: 5/5   Triceps:  5  :  5/5   Iliopsoas: 5  Quadriceps:  5   Distal Lower Extremity: 5    Lymphadenopathy:     She has no cervical adenopathy.   Neurological: She is alert and oriented to person, place, and time. Gait normal.   Skin: Skin is warm and dry. No rash noted. No erythema. No pallor.     Mild upper extremity livedo reticularis    Musculoskeletal: Normal range of motion. She exhibits deformity (prominent CMC squaring on the right hand-- s/p trauma). She exhibits no edema or tenderness.   Normal lumbar flexion and extension.  Hips normal ROM         Review of Systems   As above    Physical Exam     As above    Physical Exam  As above    Assessment     Pt is a 37 yo with h/o gastritis, irritable bowel syndrome being followed here for fibromyalgia    We diagnosed her with FMS since she met the criteria     1.  Widespread pain index greater than or equal to 7 and symptom severity score greater than or equal to 5 or widespread pain index between 3- 6, and symptom severity score greater than or equal to 9  2.  Symptoms have been present in a similar level for at least 3 months  3.  The patient does not have a disorder that would otherwise sufficiently explain the pain    She does not have records of diagnosis and if she had an previous inflammatory arthritis.   She claims that she had an ultrasound showing evidence of inflammatory arthritis   We have tried to obtain records and we have been unsuccessful    She also mentions getting better by combination of tramadol + meloxicam  She cannot take higher dose of  amitryptilline  She is anxious and cannot sleep with ambien  She is home bound and doesn't exercise    She also mentions sicca symptoms but she thinks it might be s/p ambien use but definitely prior to initiation of amitryptilline     1. Arthralgia of hands, bilateral    2. Fibromyalgia        Plan    Suggested MRI both hands to make sure she has no inflammatory arthritis    Suggested lip biopsy and corneal staining at some point to see if she has Sjogren's  Not sure if her sicca symptoms are drug induced yet,but they are definitely bothersome    Management of sicca symptoms    -preservative free artifical tears 3 to 4 times a day  Lubricating ointments at night  Wraparound sun glasses/moisture shields which attach to glasses help  Opthalmology evaluation,management : she will need split lamp,schirmer's test and ocular surface staining    -biotene and ACT preparations  Dental evaluations  Periodic cleaning  Use fluoride products  Brush and floss teeth regularly especially after meals     Avoid sugar containing foods and drinks    Use of vaginal lubricants/estrogen creams   Seeing Gyn    Use hypoallergenic soaps/lotions for the skin  Avoid drafts from air conditioners/heaters/radiators  Avoid detergents,deodorant soaps,very hot water  Use humidifiers    She would like to go off amitryptilline and initiate cymbalta  Suggested to start at 30 mg daily,titrate to 120 mg if necessary    Continue plaquenil 300 mg daily   Once we rule out autoimmune disease,we can stop the medication    Water aerobics  Yoga and aleah chi    See psychiatry/counsellor  Sleep clinic evaluation    Roopa was seen today for appointment.    Diagnoses and all orders for this visit:    Arthralgia of hands, bilateral  -     MRI Hand Fingers W WO Contrast Left; Future  -     MRI Hand Fingers W WO Contrast Right; Future    Fibromyalgia    Other orders  -     DULoxetine (CYMBALTA) 30 MG capsule; Start with 30 mg daily,titrate to 60 mg or 90 mg or 120 mg  if necessary

## 2018-04-02 NOTE — LETTER
April 2, 2018      Louisa Gonzalez MD  [address]  [place] [address]           [facility]  [address]  [place] [address]  Phone: [phone]  Fax: [phone]          Patient: Roopa Rivas   MR Number: [id number]   YOB: 1980   Date of Visit: 4/2/2018       Dear Dr. Louisa Gonzalez:    Thank you for referring Roopa Rivas to me for evaluation. Attached you will find relevant portions of my assessment and plan of care.    If you have questions, please do not hesitate to call me. I look forward to following Roopa Rivas along with you.    Sincerely,    Khang Moss MD    Enclosure  CC:  No Recipients    If you would like to receive this communication electronically, please contact [email or web address] or [phone] to request more information on [facility] access.    For providers and/or their staff who would like to refer a patient to [facility], please contact us through our one-stop-shop provider referral line, [facility], at [phone].    If you feel you have received this communication in error or would no longer like to receive these types of communications, please e-mail [email or web address]

## 2018-04-03 RX ORDER — DULOXETIN HYDROCHLORIDE 30 MG/1
CAPSULE, DELAYED RELEASE ORAL
Qty: 90 CAPSULE | Refills: 0 | Status: SHIPPED | OUTPATIENT
Start: 2018-04-03 | End: 2018-04-18 | Stop reason: SDUPTHER

## 2018-04-13 ENCOUNTER — HOSPITAL ENCOUNTER (OUTPATIENT)
Dept: RADIOLOGY | Facility: HOSPITAL | Age: 38
Discharge: HOME | End: 2018-04-13
Attending: INTERNAL MEDICINE
Payer: COMMERCIAL

## 2018-04-13 DIAGNOSIS — M25.542 ARTHRALGIA OF HANDS, BILATERAL: ICD-10-CM

## 2018-04-13 DIAGNOSIS — M25.541 ARTHRALGIA OF HANDS, BILATERAL: ICD-10-CM

## 2018-04-13 PROCEDURE — 73221 MRI JOINT UPR EXTREM W/O DYE: CPT | Mod: TC,LT

## 2018-04-13 PROCEDURE — 73220 MRI UPPR EXTREMITY W/O&W/DYE: CPT | Mod: TC,RT

## 2018-04-13 PROCEDURE — 73220 MRI UPPR EXTREMITY W/O&W/DYE: CPT | Mod: 26,LT,, | Performed by: RADIOLOGY

## 2018-04-13 PROCEDURE — A9585 GADOBUTROL INJECTION: HCPCS | Performed by: INTERNAL MEDICINE

## 2018-04-13 PROCEDURE — 25500020 PHARM REV CODE 255: Performed by: INTERNAL MEDICINE

## 2018-04-13 PROCEDURE — 73220 MRI UPPR EXTREMITY W/O&W/DYE: CPT | Mod: 26,RT,, | Performed by: RADIOLOGY

## 2018-04-13 RX ORDER — GADOBUTROL 604.72 MG/ML
5 INJECTION INTRAVENOUS
Status: COMPLETED | OUTPATIENT
Start: 2018-04-13 | End: 2018-04-13

## 2018-04-13 RX ADMIN — GADOBUTROL 5 ML: 604.72 INJECTION INTRAVENOUS at 05:04

## 2018-04-14 DIAGNOSIS — M12.9 ARTHRITIS, MULTIPLE JOINT INVOLVEMENT: ICD-10-CM

## 2018-04-15 RX ORDER — MELOXICAM 7.5 MG/1
TABLET ORAL
Qty: 60 TABLET | Refills: 0 | Status: SHIPPED | OUTPATIENT
Start: 2018-04-15 | End: 2018-04-18 | Stop reason: SDUPTHER

## 2018-04-18 ENCOUNTER — OFFICE VISIT (OUTPATIENT)
Dept: RHEUMATOLOGY | Facility: CLINIC | Age: 38
End: 2018-04-18
Payer: COMMERCIAL

## 2018-04-18 VITALS
WEIGHT: 114.31 LBS | SYSTOLIC BLOOD PRESSURE: 117 MMHG | DIASTOLIC BLOOD PRESSURE: 82 MMHG | BODY MASS INDEX: 23.09 KG/M2 | HEART RATE: 112 BPM | RESPIRATION RATE: 18 BRPM

## 2018-04-18 DIAGNOSIS — M79.7 FIBROMYALGIA: Primary | ICD-10-CM

## 2018-04-18 DIAGNOSIS — M12.9 ARTHRITIS, MULTIPLE JOINT INVOLVEMENT: ICD-10-CM

## 2018-04-18 DIAGNOSIS — Z87.39 HISTORY OF INFLAMMATORY ARTHRITIS: ICD-10-CM

## 2018-04-18 PROCEDURE — 99214 OFFICE O/P EST MOD 30 MIN: CPT | Mod: S$GLB,,, | Performed by: INTERNAL MEDICINE

## 2018-04-18 PROCEDURE — 99999 PR PBB SHADOW E&M-EST. PATIENT-LVL III: CPT | Mod: PBBFAC,,, | Performed by: INTERNAL MEDICINE

## 2018-04-18 RX ORDER — HYDROXYCHLOROQUINE SULFATE 200 MG/1
300 TABLET, FILM COATED ORAL DAILY
Qty: 135 TABLET | Refills: 3 | Status: SHIPPED | OUTPATIENT
Start: 2018-04-18 | End: 2018-07-17

## 2018-04-18 RX ORDER — MELOXICAM 7.5 MG/1
7.5 TABLET ORAL DAILY
Qty: 90 TABLET | Refills: 2 | Status: SHIPPED | OUTPATIENT
Start: 2018-04-18 | End: 2018-07-17

## 2018-04-18 RX ORDER — DULOXETIN HYDROCHLORIDE 60 MG/1
60 CAPSULE, DELAYED RELEASE ORAL DAILY
Qty: 90 CAPSULE | Refills: 3 | Status: SHIPPED | OUTPATIENT
Start: 2018-04-18 | End: 2018-07-27 | Stop reason: SDUPTHER

## 2018-04-18 NOTE — PROGRESS NOTES
Chief Complaint   Patient presents with    Disease Management       Patient with a diagnosis of fibromyalgia syndrome for a follow up    History of presenting illness    We did the autoimmune panel    Normal CBC,CMP  Normal lipid panel  Normal TSH  Normal RF,CCP  EDUARDO positive,1: 160 homogenous,titre negative  Normal complements  Normal ESR,CRP  Negative APLAS panel    MRI both hands no inflammatory arthritis     She started cymbalta last visit and she seems to really like the medication  Her pain seems to be better  She has more energy  She is active and feels good overall    We had d/clara her amitryptilline since it made her hungry a lot and didn't help with the pain    She takes prn meloxicam  She takes prn tramadol     Pt is a 37 yo with h/o gastritis, irritable bowel syndrome    Initial complaints     Poor sleep despite taking ambien  Amitryptilline not helping  Bothersome anxiety    Cant concentrate  Cant remember certain things    Dry eyes and mouth+  Opthalmology : gave tear drops    Hair fall got better with biotin    Ear aches  Windy ear pain gets worse  Cant wear earrings,gets infections  Inside of the ears hurt and throb    Fatigue +    Allergies have gotten worse    Used to exercise 6 times a week,cant do it anymore    She relocated from [place] November 2017    She was followed by rheumatology for a possible inflammatory arthritis per the patient     She was last seen by her rheumatologist, Dr. Sana Belle sep 2017  We still dont have records    She says US hands showed changes on inflammatory arthritis  She was offered NSAIDs like nalfon and sulindac and she didn't do well    She has seen us in December 2017,we didn't have any data to suggest inflammatory arthritis    We diagnosed her as fibromyalgia syndrome    Her complaints : diffuse pains in the hands,arms,shoulders and knees  Started February/march 2016  She states that after starting a combination of  mg/d, Tramadol 50 mg bid, Elavil  10 mg/d, mobic 7.5 mg bid her symptoms improved. She is unsure of her diagnosis at this time. Pt reports 30 minutes of am stiffness, intermittent joint swelling/pain, and severe fatigue. She is no longer working due to her fatigue. Pt recounts getting a massage in the past and crying due to being diffusely tender. She denies fevers, chills, Raynaud's, photosensitivity, rashes, alopecia, mucosal ulcers, pleurisy, vision changes, . Pt does report an increase in acne.     No known autoimmune family history    Pt denies a personal or family history of psoriasis.     No blood clots or miscarriages.         Widespread pain index 11     Note the areas which the patient has had pain over the last week:                    Shoulder-girdle, left 1               Shoulder-girdle, right1                         Upper arm left1                       Upper arm right1                         Lower arm left1                       Lower arm right1    Hip (buttock, trochanter) left  Hip (buttock, trochanter) right                           Upper leg, left                         Upper leg, right                           Lower leg, left1                         Lower leg, right1                                     Jaw, left                                   Jaw, right1                                        Chest                                  Abdomen                               Upper back1                              Lower back1                                        Neck  Score will be from 0-19:                                          Symptom severity score7  Fatigue 3  Waking Unrefreshed3  Cognitive Symptoms     0 = no problem, 1=slight or mild problem 2= moderate; considerable problems often present and/or at a moderate level, 3 = severe, pervasive, continuous, life disturbing problem  For each of the 3 symptoms, indicate the level of severity over the past week using the Scale.  The symptom severity score is the sum of the  severity of the 3 symptoms (fatigue, waking unrefreshed, and cognitive symptoms) plus the number of the following symptoms occurring during the previous 6 months:   Headaches 1   Pain or cramps in the lower abdomen  Depression  The final score is between 0 and 12     Review of Systems   Constitutional: Positive for activity change and fatigue. Negative for appetite change, chills and fever.   HENT: Negative for mouth sores, nosebleeds, sore throat and trouble swallowing.         Dry mouth     Eyes: Negative for pain, redness and visual disturbance.   Respiratory: Negative for cough, shortness of breath and wheezing.    Cardiovascular: Negative for chest pain, palpitations and leg swelling.   Gastrointestinal: Positive for constipation. Negative for diarrhea, nausea and vomiting.   Genitourinary: Negative for difficulty urinating.   Musculoskeletal: Positive for arthralgias, back pain, joint swelling and neck pain.   Skin: Positive for rash (acne).   Neurological: Positive for headaches. Negative for tremors, weakness, light-headedness and numbness.   Hematological: Does not bruise/bleed easily.   Psychiatric/Behavioral: Positive for sleep disturbance.      Objective:     Physical Exam     Constitutional: She is oriented to person, place, and time and well-developed, well-nourished, and in no distress.   HENT:   Mouth/Throat: Oropharynx is clear and moist.   No ulcers  Eyes: No scleral icterus.   Neck: Normal range of motion. Neck supple.   Cardiovascular: Normal rate, regular rhythm, normal heart sounds and intact distal pulses.    No murmur heard.  Pulmonary/Chest: Effort normal. No respiratory distress. She has no wheezes. She exhibits no tenderness.   Abdominal: Soft. Bowel sounds are normal.         Right Side Rheumatological Exam     Examination finds the shoulder, elbow, wrist, knee, 1st PIP, 1st MCP, 2nd PIP, 2nd MCP, 3rd PIP, 3rd MCP, 4th PIP, 4th MCP, 5th PIP and 5th MCP normal.     Joint Exam Comments    Knee: Crepitus     Muscle Strength (0-5 scale):  Neck Flexion:  5  Neck Extension: 5  Deltoid:  5  Biceps: 5/5   Triceps:  5  : 5/5   Iliopsoas: 5  Quadriceps:  5   Distal Lower Extremity: 5     Left Side Rheumatological Exam     Examination finds the shoulder, elbow, wrist, knee, 1st PIP, 1st MCP, 2nd PIP, 2nd MCP, 3rd PIP, 3rd MCP, 4th PIP, 4th MCP, 5th PIP and 5th MCP normal.     Joint Exam Comments   Knee: Crepitus     Muscle Strength (0-5 scale):  Neck Flexion:  5  Neck Extension: 5  Deltoid:  5  Biceps: 5/5   Triceps:  5  :  5/5   Iliopsoas: 5  Quadriceps:  5   Distal Lower Extremity: 5    Lymphadenopathy:     She has no cervical adenopathy.   Neurological: She is alert and oriented to person, place, and time. Gait normal.   Skin: Skin is warm and dry. No rash noted. No erythema. No pallor.     Mild upper extremity livedo reticularis    Musculoskeletal: Normal range of motion. She exhibits deformity (prominent CMC squaring on the right hand-- s/p trauma). She exhibits no edema or tenderness.   Normal lumbar flexion and extension.  Hips normal ROM         Review of Systems   Constitutional: Negative for fever and unexpected weight change.   HENT: Negative for trouble swallowing.    Eyes: Negative for redness.   Respiratory: Positive for shortness of breath. Negative for cough.    Cardiovascular: Negative for chest pain.   Gastrointestinal: Positive for constipation. Negative for diarrhea.   Genitourinary: Negative for dysuria and genital sores.   Skin: Negative for rash.   Neurological: Positive for headaches.   Hematological: Does not bruise/bleed easily.      As above    Physical Exam     SHEPPARD-28 tender joint count: 0  SHEPPARD-28 swollen joint count: 0       As above    Physical Exam  As above    Assessment     Pt is a 39 yo with h/o gastritis, irritable bowel syndrome being followed here for fibromyalgia    We diagnosed her with FMS since she met the criteria     1.  Widespread pain index greater than  or equal to 7 and symptom severity score greater than or equal to 5 or widespread pain index between 3- 6, and symptom severity score greater than or equal to 9  2.  Symptoms have been present in a similar level for at least 3 months  3.  The patient does not have a disorder that would otherwise sufficiently explain the pain    She does not have records of diagnosis and if she had an previous inflammatory arthritis.   She claims that she had an ultrasound showing evidence of inflammatory arthritis   We have tried to obtain records and we have been unsuccessful  She brought some print outs on the portal and there is mention of inflammatory arthritis and I see NSAIDs and plaquenil on the prescriptions    We did our own hand MRIs  She has cystic lesions b/l which might be indicative of burned out erosions  It is possible that she had an old inflammatory arthritis which self resolved    She also mentions getting better by combination of tramadol + meloxicam  She cannot take higher dose of amitryptilline,she likes the cymbalta 30 to 60 mg better     She also mentions sicca symptoms but she thinks it might be s/p ambien use but definitely prior to initiation of amitryptilline     So for now we will treat her for fibromyalgia  We will keep the plaquenil given the evidence of inflammatory arthritis on prior US and current MRI revealing old erosions     1. Fibromyalgia    2. Arthritis, multiple joint involvement    3. History of inflammatory arthritis        Plan    Continue cymbalta,can titrate upto 120 mg if needed    Suggested lip biopsy and corneal staining at some point to see if she has Sjogren's  Not sure if her sicca symptoms are drug induced yet,but they are definitely bothersome    Management of sicca symptoms    -preservative free artifical tears 3 to 4 times a day  Lubricating ointments at night  Wraparound sun glasses/moisture shields which attach to glasses help  Opthalmology evaluation,management : she will  need split lamp,schirmer's test and ocular surface staining    -biotene and ACT preparations  Dental evaluations  Periodic cleaning  Use fluoride products  Brush and floss teeth regularly especially after meals     Avoid sugar containing foods and drinks    Use of vaginal lubricants/estrogen creams   Seeing Gyn    Use hypoallergenic soaps/lotions for the skin  Avoid drafts from air conditioners/heaters/radiators  Avoid detergents,deodorant soaps,very hot water  Use humidifiers    Continue plaquenil 300 mg daily     Water aerobics  Yoga and aleah chi    See psychiatry/counsellor  Sleep clinic evaluation    Roopa was seen today for disease management.    Diagnoses and all orders for this visit:    Fibromyalgia    Arthritis, multiple joint involvement  -     hydroxychloroquine (PLAQUENIL) 200 mg tablet; Take 1.5 tablets (300 mg total) by mouth once daily.  -     meloxicam (MOBIC) 7.5 MG tablet; Take 1 tablet (7.5 mg total) by mouth once daily.  -     bo-aloe vera-men-euc oil 10 % Lnmt; Apply 2 mLs topically once daily.    History of inflammatory arthritis    Other orders  -     DULoxetine (CYMBALTA) 60 MG capsule; Take 1 capsule (60 mg total) by mouth once daily.

## 2018-04-23 ENCOUNTER — TELEPHONE (OUTPATIENT)
Dept: RHEUMATOLOGY | Facility: CLINIC | Age: 38
End: 2018-04-23

## 2018-04-23 NOTE — TELEPHONE ENCOUNTER
----- Message from Sue Matthews sent at 4/23/2018 10:33 AM CDT -----  Contact: Erin from [facility] [email or web address] can reached [phone]  Erin is calling about the electronic script is a compound    M.-andrea- aloe- vera- men- euc oil  10% lmnp      Please contact Erin.      Thank you!

## 2018-05-23 ENCOUNTER — OFFICE VISIT (OUTPATIENT)
Dept: FAMILY MEDICINE | Facility: CLINIC | Age: 38
End: 2018-05-23
Payer: COMMERCIAL

## 2018-05-23 VITALS
DIASTOLIC BLOOD PRESSURE: 78 MMHG | HEART RATE: 109 BPM | SYSTOLIC BLOOD PRESSURE: 110 MMHG | HEIGHT: 59 IN | WEIGHT: 115.75 LBS | OXYGEN SATURATION: 96 % | BODY MASS INDEX: 23.33 KG/M2 | TEMPERATURE: 98 F

## 2018-05-23 DIAGNOSIS — J30.9 ALLERGIC RHINITIS WITH POSTNASAL DRIP: ICD-10-CM

## 2018-05-23 DIAGNOSIS — J02.9 ACUTE PHARYNGITIS, UNSPECIFIED ETIOLOGY: Primary | ICD-10-CM

## 2018-05-23 DIAGNOSIS — R09.82 ALLERGIC RHINITIS WITH POSTNASAL DRIP: ICD-10-CM

## 2018-05-23 PROCEDURE — 3008F BODY MASS INDEX DOCD: CPT | Mod: CPTII,S$GLB,, | Performed by: FAMILY MEDICINE

## 2018-05-23 PROCEDURE — 99999 PR PBB SHADOW E&M-EST. PATIENT-LVL III: CPT | Mod: PBBFAC,,, | Performed by: FAMILY MEDICINE

## 2018-05-23 PROCEDURE — 99214 OFFICE O/P EST MOD 30 MIN: CPT | Mod: S$GLB,,, | Performed by: FAMILY MEDICINE

## 2018-05-23 RX ORDER — FLUTICASONE PROPIONATE 50 MCG
SPRAY, SUSPENSION (ML) NASAL
Qty: 48 ML | Refills: 0 | OUTPATIENT
Start: 2018-05-23

## 2018-05-23 RX ORDER — FLUTICASONE PROPIONATE 50 MCG
2 SPRAY, SUSPENSION (ML) NASAL DAILY
Qty: 16 G | Refills: 0 | Status: SHIPPED | OUTPATIENT
Start: 2018-05-23 | End: 2018-05-30

## 2018-05-23 RX ORDER — IBUPROFEN 800 MG/1
800 TABLET ORAL EVERY 8 HOURS PRN
Qty: 30 TABLET | Refills: 0 | Status: SHIPPED | OUTPATIENT
Start: 2018-05-23 | End: 2018-07-27

## 2018-05-23 NOTE — PROGRESS NOTES
"Chief Complaint   Patient presents with    Sore Throat       HPI    Roopa Rivas is 38 y.o. female. The primary encounter diagnosis was Acute pharyngitis, unspecified etiology. A diagnosis of Allergic rhinitis with postnasal drip was also pertinent to this visit.    38 year old female comes to clinic for sore throat pain.  Patient reports initial symptoms began about 3-4 days ago.  She developed an ulcer or "canker sore" under the right side of her tongue.  She then began to develop burning in her throat with swallowing of secretions and fluids.  She reports development of mild dry cough and right ear pain.      Two days ago she noticed white spots and greenish mucous on the right side of her throat.  She has tried Zyrtec, Tylenol, and cough lozenges without any significant improvement.  Patient admits to worsening of her allergies and a chronic postnasal drip.    Review of Systems   Constitutional: Negative for activity change, chills, diaphoresis, fatigue and fever.   HENT: Positive for congestion, ear pain, postnasal drip, sinus pressure and sore throat. Negative for rhinorrhea.    Respiratory: Positive for cough. Negative for chest tightness, shortness of breath and wheezing.    Cardiovascular: Negative for chest pain.   Musculoskeletal: Negative for gait problem.   Psychiatric/Behavioral: Negative for suicidal ideas.           Current Outpatient Prescriptions:     DULoxetine (CYMBALTA) 60 MG capsule, Take 1 capsule (60 mg total) by mouth once daily., Disp: 90 capsule, Rfl: 3    hydroxychloroquine (PLAQUENIL) 200 mg tablet, Take 1.5 tablets (300 mg total) by mouth once daily., Disp: 135 tablet, Rfl: 3    levonorgestrel-ethinyl estradiol (AUBRA) 0.1-20 mg-mcg per tablet, Take 1 tablet by mouth once daily., Disp: , Rfl:     meloxicam (MOBIC) 7.5 MG tablet, Take 1 tablet (7.5 mg total) by mouth once daily., Disp: 90 tablet, Rfl: 2    zolpidem (AMBIEN) 10 mg Tab, Take 1 tablet (10 mg total) by mouth nightly " "as needed., Disp: 30 tablet, Rfl: 1    ALOE VERA EXTRACT/ALLANTOIN (COATS ALOE TOP), Apply topically., Disp: , Rfl:     fluticasone (FLONASE) 50 mcg/actuation nasal spray, 2 sprays (100 mcg total) by Each Nare route once daily., Disp: 16 g, Rfl: 0    ibuprofen (ADVIL,MOTRIN) 800 MG tablet, Take 1 tablet (800 mg total) by mouth every 8 (eight) hours as needed (throat pain)., Disp: 30 tablet, Rfl: 0      Blood pressure 110/78, pulse 109, temperature 98.2 °F (36.8 °C), height 4' 11" (1.499 m), weight 52.5 kg (115 lb 11.9 oz), last menstrual period 04/25/2018, SpO2 96 %.    Physical Exam   Constitutional: Vital signs are normal. She appears well-developed.   HENT:   Right Ear: Ear canal normal.   Left Ear: Ear canal normal. Tympanic membrane is bulging.   Mouth/Throat: Normal dentition. Oropharyngeal exudate and posterior oropharyngeal erythema present. No posterior oropharyngeal edema. No tonsillar exudate.   Neck: Trachea normal. No thyromegaly present.   Cardiovascular: Normal rate, regular rhythm and intact distal pulses.    No murmur heard.  Pulmonary/Chest: Effort normal. She has no decreased breath sounds. She has no wheezes. She exhibits no deformity.   Musculoskeletal:   Normal gait. No decreased range of motion of major joints.   Neurological: She is not disoriented.   Skin: Skin is intact. Capillary refill takes less than 2 seconds.   Psychiatric: Her speech is normal and behavior is normal. Her mood appears not anxious. She does not exhibit a depressed mood.       No visits with results within 3 Month(s) from this visit.   Latest known visit with results is:   Lab Visit on 12/20/2017   Component Date Value Ref Range Status    Rheumatoid Factor 12/20/2017 <10.0  0.0 - 15.0 IU/mL Final    CCP Antibodies 12/20/2017 <0.5  <5.0 U/mL Final    EDUARDO Screen 12/20/2017 Positive* Negative <1:160 Final    Complement (C-3) 12/20/2017 120  50 - 180 mg/dL Final    Complement (C-4) 12/20/2017 20  11 - 44 mg/dL Final "    Sed Rate 12/20/2017 10  0 - 20 mm/Hr Final    CRP 12/20/2017 4.7  0.0 - 8.2 mg/L Final    Beta-2 Glyco 1 IgG 12/20/2017 <9  <=20 SGU Final    Beta-2 Glyco 1 IgM 12/20/2017 <9  <=20 [facility]U Final    Beta-2 Glyco 1 IgA 12/20/2017 <9  <=20 RONY Final    APA Isotype IgG 12/20/2017 <9.40  0.00 - 14.99 GPL Final    APA Isotype IgM 12/20/2017 <9.40  0.00 - 12.49 MPL Final    DRVVT, Lupus Anticoagulant 12/20/2017 Negative  Negative Final    EDUARDO HEP-2 Titer 12/20/2017 Positive 1:160 Homogeneous   Final    Anti Sm Antibody 12/20/2017 0.00  0.00 - 19.99 EU Final    Anti-Sm Interpretation 12/20/2017 Negative  Negative Final    Anti-SSA Antibody 12/20/2017 1.95  0.00 - 19.99 EU Final    Anti-SSA Interpretation 12/20/2017 Negative  Negative Final    Anti-SSB Antibody 12/20/2017 0.00  0.00 - 19.99 EU Final    Anti-SSB Interpretation 12/20/2017 Negative  Negative Final    ds DNA Ab 12/20/2017 Negative 1:10  Negative 1:10 Final    Anti Sm/RNP Antibody 12/20/2017 0.00  0.00 - 19.99 EU Final    Anti-Sm/RNP Interpretation 12/20/2017 Negative  Negative Final   ]    Assessment:    1. Acute pharyngitis, unspecified etiology    2. Allergic rhinitis with postnasal drip          Roopa was seen today for sore throat.    Diagnoses and all orders for this visit:    Acute pharyngitis, unspecified etiology  -     POCT Rapid Strep A  -     Throat culture; Future  -     ibuprofen (ADVIL,MOTRIN) 800 MG tablet; Take 1 tablet (800 mg total) by mouth every 8 (eight) hours as needed (throat pain).  - New problem.  POCT negative. NSAID prescribed for throat pain. Obtain culture to rule out other causes.  - Suspect symptoms related to allergic rhinitis.    Allergic rhinitis with postnasal drip  -     fluticasone (FLONASE) 50 mcg/actuation nasal spray; 2 sprays (100 mcg total) by Each Nare route once daily.  - New problem. Unstable. Nasal steroid prescribed to use with oral antihistamine to reduce postnasal drip.          FOLLOW UP:  Follow-up in about 1 week (around 5/30/2018), or if symptoms worsen or fail to improve.

## 2018-05-23 NOTE — PATIENT INSTRUCTIONS

## 2018-05-30 DIAGNOSIS — G47.01 INSOMNIA DUE TO MEDICAL CONDITION: ICD-10-CM

## 2018-05-30 RX ORDER — ZOLPIDEM TARTRATE 10 MG/1
10 TABLET ORAL NIGHTLY PRN
Qty: 30 TABLET | Refills: 1 | Status: SHIPPED | OUTPATIENT
Start: 2018-05-30 | End: 2018-07-27 | Stop reason: SDUPTHER

## 2018-07-27 ENCOUNTER — OFFICE VISIT (OUTPATIENT)
Dept: RHEUMATOLOGY | Facility: CLINIC | Age: 38
End: 2018-07-27
Payer: COMMERCIAL

## 2018-07-27 ENCOUNTER — LAB VISIT (OUTPATIENT)
Dept: LAB | Facility: HOSPITAL | Age: 38
End: 2018-07-27
Attending: INTERNAL MEDICINE
Payer: COMMERCIAL

## 2018-07-27 VITALS
DIASTOLIC BLOOD PRESSURE: 73 MMHG | SYSTOLIC BLOOD PRESSURE: 111 MMHG | HEART RATE: 110 BPM | WEIGHT: 112 LBS | HEIGHT: 59 IN | BODY MASS INDEX: 22.58 KG/M2

## 2018-07-27 DIAGNOSIS — M79.7 FIBROMYALGIA: ICD-10-CM

## 2018-07-27 DIAGNOSIS — R76.8 ANA POSITIVE: Primary | ICD-10-CM

## 2018-07-27 DIAGNOSIS — G47.01 INSOMNIA DUE TO MEDICAL CONDITION: ICD-10-CM

## 2018-07-27 DIAGNOSIS — R76.8 ANA POSITIVE: ICD-10-CM

## 2018-07-27 LAB
ALBUMIN SERPL BCP-MCNC: 4.2 G/DL
ALP SERPL-CCNC: 71 U/L
ALT SERPL W/O P-5'-P-CCNC: 27 U/L
ANION GAP SERPL CALC-SCNC: 9 MMOL/L
AST SERPL-CCNC: 23 U/L
BASOPHILS # BLD AUTO: 0.03 K/UL
BASOPHILS NFR BLD: 0.5 %
BILIRUB SERPL-MCNC: 0.4 MG/DL
BUN SERPL-MCNC: 9 MG/DL
CALCIUM SERPL-MCNC: 9.7 MG/DL
CHLORIDE SERPL-SCNC: 104 MMOL/L
CO2 SERPL-SCNC: 24 MMOL/L
CREAT SERPL-MCNC: 0.8 MG/DL
DIFFERENTIAL METHOD: ABNORMAL
EOSINOPHIL # BLD AUTO: 0 K/UL
EOSINOPHIL NFR BLD: 0.7 %
ERYTHROCYTE [DISTWIDTH] IN BLOOD BY AUTOMATED COUNT: 12 %
ERYTHROCYTE [SEDIMENTATION RATE] IN BLOOD BY WESTERGREN METHOD: 8 MM/HR
EST. GFR  (AFRICAN AMERICAN): >60 ML/MIN/1.73 M^2
EST. GFR  (NON AFRICAN AMERICAN): >60 ML/MIN/1.73 M^2
GLUCOSE SERPL-MCNC: 86 MG/DL
HCT VFR BLD AUTO: 34.1 %
HGB BLD-MCNC: 11.8 G/DL
IMM GRANULOCYTES # BLD AUTO: 0.02 K/UL
IMM GRANULOCYTES NFR BLD AUTO: 0.3 %
LYMPHOCYTES # BLD AUTO: 1.4 K/UL
LYMPHOCYTES NFR BLD: 24.6 %
MCH RBC QN AUTO: 31.5 PG
MCHC RBC AUTO-ENTMCNC: 34.6 G/DL
MCV RBC AUTO: 91 FL
MONOCYTES # BLD AUTO: 0.3 K/UL
MONOCYTES NFR BLD: 5.8 %
NEUTROPHILS # BLD AUTO: 3.9 K/UL
NEUTROPHILS NFR BLD: 68.1 %
NRBC BLD-RTO: 0 /100 WBC
PLATELET # BLD AUTO: 275 K/UL
PMV BLD AUTO: 9.3 FL
POTASSIUM SERPL-SCNC: 4.3 MMOL/L
PROT SERPL-MCNC: 7.1 G/DL
RBC # BLD AUTO: 3.75 M/UL
SODIUM SERPL-SCNC: 137 MMOL/L
WBC # BLD AUTO: 5.73 K/UL

## 2018-07-27 PROCEDURE — 85651 RBC SED RATE NONAUTOMATED: CPT

## 2018-07-27 PROCEDURE — 36415 COLL VENOUS BLD VENIPUNCTURE: CPT

## 2018-07-27 PROCEDURE — 99999 PR PBB SHADOW E&M-EST. PATIENT-LVL III: CPT | Mod: PBBFAC,,, | Performed by: INTERNAL MEDICINE

## 2018-07-27 PROCEDURE — 85025 COMPLETE CBC W/AUTO DIFF WBC: CPT

## 2018-07-27 PROCEDURE — 3008F BODY MASS INDEX DOCD: CPT | Mod: CPTII,S$GLB,, | Performed by: INTERNAL MEDICINE

## 2018-07-27 PROCEDURE — 80053 COMPREHEN METABOLIC PANEL: CPT

## 2018-07-27 PROCEDURE — 99214 OFFICE O/P EST MOD 30 MIN: CPT | Mod: S$GLB,,, | Performed by: INTERNAL MEDICINE

## 2018-07-27 RX ORDER — HYDROXYCHLOROQUINE SULFATE 200 MG/1
300 TABLET, FILM COATED ORAL DAILY
Qty: 45 TABLET | Refills: 5 | Status: SHIPPED | OUTPATIENT
Start: 2018-07-27 | End: 2018-08-26

## 2018-07-27 RX ORDER — DULOXETIN HYDROCHLORIDE 60 MG/1
60 CAPSULE, DELAYED RELEASE ORAL DAILY
Qty: 90 CAPSULE | Refills: 2 | Status: SHIPPED | OUTPATIENT
Start: 2018-07-27 | End: 2019-06-23 | Stop reason: SDUPTHER

## 2018-07-27 RX ORDER — ZOLPIDEM TARTRATE 10 MG/1
TABLET ORAL
Qty: 30 TABLET | Refills: 0 | OUTPATIENT
Start: 2018-07-27

## 2018-07-27 RX ORDER — MELOXICAM 7.5 MG/1
7.5 TABLET ORAL DAILY
Qty: 90 TABLET | Refills: 5 | Status: SHIPPED | OUTPATIENT
Start: 2018-07-27 | End: 2018-10-25

## 2018-07-27 RX ORDER — ZOLPIDEM TARTRATE 10 MG/1
TABLET ORAL
Qty: 30 TABLET | Refills: 0 | Status: SHIPPED | OUTPATIENT
Start: 2018-07-27 | End: 2018-08-22 | Stop reason: SDUPTHER

## 2018-07-27 NOTE — PROGRESS NOTES
Chief Complaint   Patient presents with    Pain     fibromyalgia       Patient with a diagnosis of fibromyalgia syndrome for a follow up    History of presenting illness     is a 38 year old female : we are treating for fibromyalgia :  She has done really well on cymbalta 60 mg  She has no pain,she doesn't feel anxious,she feels active and overall better      We had d/clara her amitryptilline since it made her hungry a lot and didn't help with the pain    She takes prn meloxicam  She takes prn tramadol     Pt is a 37 yo with h/o gastritis, irritable bowel syndrome    Initial complaints     Poor sleep despite taking ambien  Amitryptilline not helping  Bothersome anxiety    Cant concentrate  Cant remember certain things    Dry eyes and mouth+  Opthalmology : gave tear drops    Hair fall got better with biotin    Ear aches  Windy ear pain gets worse  Cant wear earrings,gets infections  Inside of the ears hurt and throb    Fatigue +    Allergies have gotten worse    Used to exercise 6 times a week,cant do it anymore    She relocated from [place] November 2017    She was followed by rheumatology for a possible inflammatory arthritis per the patient     She was last seen by her rheumatologist, Dr. Sana Belle sep 2017  We still dont have records    She says US hands showed changes on inflammatory arthritis  She was offered NSAIDs like nalfon and sulindac and she didn't do well    She had seen us in December 2017,we didn't have any data to suggest inflammatory arthritis    We diagnosed her as fibromyalgia syndrome    We did the autoimmune panel    Normal CBC,CMP  Normal lipid panel  Normal TSH  Normal RF,CCP  EDUARDO positive,1: 160 homogenous,titre negative  Normal complements  Normal ESR,CRP  Negative APLAS panel    MRI both hands no inflammatory arthritis     Her complaints : diffuse pains in the hands,arms,shoulders and knees  Started February/march 2016     She states that after starting a combination of HCQ  400 mg/d, Tramadol 50 mg bid, Elavil 10 mg/d, mobic 7.5 mg bid her symptoms improved. She is unsure of her diagnosis at this time. Pt reports 30 minutes of am stiffness, intermittent joint swelling/pain, and severe fatigue. She is no longer working due to her fatigue. Pt recounts getting a massage in the past and crying due to being diffusely tender. She denies fevers, chills, Raynaud's, photosensitivity, rashes, alopecia, mucosal ulcers, pleurisy, vision changes, . Pt does report an increase in acne.     No known autoimmune family history    Pt denies a personal or family history of psoriasis.     No blood clots or miscarriages.      Review of Systems   Constitutional: Negative for activity change, appetite change, chills, diaphoresis, fatigue, fever and unexpected weight change.   HENT: Negative for congestion, dental problem, drooling, ear discharge, ear pain, facial swelling, hearing loss, mouth sores, nosebleeds, postnasal drip, rhinorrhea, sinus pain, sinus pressure, sneezing, sore throat, tinnitus, trouble swallowing and voice change.    Eyes: Negative for photophobia, pain, discharge, redness, itching and visual disturbance.   Respiratory: Positive for shortness of breath. Negative for apnea, cough, choking, chest tightness, wheezing and stridor.    Cardiovascular: Negative for chest pain, palpitations and leg swelling.   Gastrointestinal: Positive for constipation. Negative for abdominal distention, abdominal pain, anal bleeding, blood in stool, diarrhea, nausea, rectal pain and vomiting.   Endocrine: Negative for cold intolerance, heat intolerance, polydipsia, polyphagia and polyuria.   Genitourinary: Negative for decreased urine volume, difficulty urinating, dysuria, enuresis, flank pain, frequency, genital sores, hematuria and urgency.   Musculoskeletal: Negative for arthralgias, back pain, gait problem, joint swelling, myalgias, neck pain and neck stiffness.   Skin: Negative for color change, pallor,  rash and wound.   Allergic/Immunologic: Negative for environmental allergies, food allergies and immunocompromised state.   Neurological: Positive for headaches. Negative for dizziness, tremors, seizures, syncope, facial asymmetry, speech difficulty, weakness, light-headedness and numbness.   Hematological: Negative for adenopathy. Does not bruise/bleed easily.   Psychiatric/Behavioral: Negative for agitation, behavioral problems, confusion, decreased concentration, dysphoric mood, hallucinations, self-injury, sleep disturbance and suicidal ideas. The patient is not nervous/anxious and is not hyperactive.         Physical Exam     SHEPPARD-28 tender joint count: 0  SHEPPARD-28 swollen joint count: 0       Physical Exam   Constitutional: She is oriented to person, place, and time and well-developed, well-nourished, and in no distress. No distress.   HENT:   Head: Normocephalic.   Mouth/Throat: Oropharynx is clear and moist.   Eyes: Conjunctivae are normal. Pupils are equal, round, and reactive to light. Right eye exhibits no discharge. Left eye exhibits no discharge. No scleral icterus.   Neck: Normal range of motion. No thyromegaly present.   Cardiovascular: Normal rate, regular rhythm, normal heart sounds and intact distal pulses.    Pulmonary/Chest: Effort normal and breath sounds normal. No stridor.   Abdominal: Soft. Bowel sounds are normal.   Lymphadenopathy:     She has no cervical adenopathy.   Neurological: She is alert and oriented to person, place, and time.   Skin: Skin is warm. No rash noted. She is not diaphoretic.     Psychiatric: Affect and judgment normal.   Musculoskeletal: Normal range of motion.         Assessment     Pt is a 37 yo with h/o gastritis, irritable bowel syndrome being followed here for fibromyalgia    We diagnosed her with FMS since she met the criteria     1.  Widespread pain index greater than or equal to 7 and symptom severity score greater than or equal to 5 or widespread pain index  between 3- 6, and symptom severity score greater than or equal to 9  2.  Symptoms have been present in a similar level for at least 3 months  3.  The patient does not have a disorder that would otherwise sufficiently explain the pain    She does not have records of diagnosis and if she had an previous inflammatory arthritis.   She claims that she had an ultrasound showing evidence of inflammatory arthritis   We have tried to obtain records and we have been unsuccessful  She brought some print outs on the portal and there is mention of inflammatory arthritis and I see NSAIDs and plaquenil on the prescriptions    We did our own hand MRIs  She has cystic lesions b/l which might be indicative of burned out erosions  It is possible that she had an old inflammatory arthritis which self resolved    She also mentions getting better by combination of tramadol + meloxicam  She cannot take higher dose of amitryptilline,she likes the cymbalta 60 mg better     She also mentions sicca symptoms but she thinks it might be s/p ambien use but definitely prior to initiation of amitryptilline     So for now we will treat her for fibromyalgia  We will keep the plaquenil given the evidence of inflammatory arthritis on prior US and current MRI revealing old erosions     1. EDUARDO positive    2. Insomnia due to medical condition    3. Fibromyalgia        Plan    Continue cymbalta,can titrate upto 120 mg if needed    Suggested lip biopsy and corneal staining at some point to see if she has Sjogren's  Not sure if her sicca symptoms are drug induced yet,but they are definitely bothersome    Management of sicca symptoms    -preservative free artifical tears 3 to 4 times a day  Lubricating ointments at night  Wraparound sun glasses/moisture shields which attach to glasses help  Opthalmology evaluation,management : she will need split lamp,schirmer's test and ocular surface staining    -biotene and ACT preparations  Dental evaluations  Periodic  cleaning  Use fluoride products  Brush and floss teeth regularly especially after meals     Avoid sugar containing foods and drinks    Use of vaginal lubricants/estrogen creams   Seeing Gyn    Use hypoallergenic soaps/lotions for the skin  Avoid drafts from air conditioners/heaters/radiators  Avoid detergents,deodorant soaps,very hot water  Use humidifiers    Continue plaquenil 300 mg daily     Water aerobics  Yoga and aleah chi    See psychiatry/counsellor  Sleep clinic evaluation    Every 6 monthly lupus labs     Roopa was seen today for pain.    Diagnoses and all orders for this visit:    EDUARDO positive  -     CBC auto differential; Future  -     Comprehensive metabolic panel; Future  -     Sedimentation rate; Future  -     C-reactive protein; Standing  -     C3 complement; Standing  -     C4 complement; Standing  -     Anti-DNA antibody, double-stranded; Standing  -     Protein / creatinine ratio, urine; Standing  -     Urinalysis; Future    Insomnia due to medical condition    Fibromyalgia    Other orders  -     DULoxetine (CYMBALTA) 60 MG capsule; Take 1 capsule (60 mg total) by mouth once daily.  -     meloxicam (MOBIC) 7.5 MG tablet; Take 1 tablet (7.5 mg total) by mouth once daily.  -     hydroxychloroquine (PLAQUENIL) 200 mg tablet; Take 1.5 tablets (300 mg total) by mouth once daily.      rtc in 6 months

## 2018-08-14 ENCOUNTER — OFFICE VISIT (OUTPATIENT)
Dept: OBSTETRICS AND GYNECOLOGY | Facility: CLINIC | Age: 38
End: 2018-08-14
Payer: COMMERCIAL

## 2018-08-14 VITALS
DIASTOLIC BLOOD PRESSURE: 64 MMHG | BODY MASS INDEX: 22.67 KG/M2 | SYSTOLIC BLOOD PRESSURE: 112 MMHG | WEIGHT: 112.44 LBS | HEIGHT: 59 IN

## 2018-08-14 DIAGNOSIS — Z12.4 ENCOUNTER FOR PAPANICOLAOU SMEAR FOR CERVICAL CANCER SCREENING: ICD-10-CM

## 2018-08-14 DIAGNOSIS — Z30.09 ENCOUNTER FOR GENERAL COUNSELING AND ADVICE ON CONTRACEPTIVE MANAGEMENT: ICD-10-CM

## 2018-08-14 DIAGNOSIS — Z01.419 ENCOUNTER FOR WELL WOMAN EXAM WITH ROUTINE GYNECOLOGICAL EXAM: Primary | ICD-10-CM

## 2018-08-14 PROCEDURE — 87624 HPV HI-RISK TYP POOLED RSLT: CPT

## 2018-08-14 PROCEDURE — 88175 CYTOPATH C/V AUTO FLUID REDO: CPT

## 2018-08-14 PROCEDURE — 99999 PR PBB SHADOW E&M-EST. PATIENT-LVL III: CPT | Mod: PBBFAC,,, | Performed by: OBSTETRICS & GYNECOLOGY

## 2018-08-14 PROCEDURE — 99385 PREV VISIT NEW AGE 18-39: CPT | Mod: S$GLB,,, | Performed by: OBSTETRICS & GYNECOLOGY

## 2018-08-14 RX ORDER — LEVONORGESTREL AND ETHINYL ESTRADIOL 0.1-0.02MG
1 KIT ORAL DAILY
Qty: 28 TABLET | Refills: 11 | Status: SHIPPED | OUTPATIENT
Start: 2018-08-14 | End: 2019-07-01 | Stop reason: SDUPTHER

## 2018-08-14 NOTE — LETTER
August 14, 2018      Louisa Gonzalez MD  [address] [facility] [address]           [facility] - OB/ GYN  [address] [facility] [address]  [facility] [address]  Phone: [phone]          Patient: Roopa Rivas   MR Number: [id number]   YOB: 1980   Date of Visit: 8/14/2018       Dear Dr. Louisa Gonzalez:    Thank you for referring Roopa Rivas to me for evaluation. Attached you will find relevant portions of my assessment and plan of care.    If you have questions, please do not hesitate to call me. I look forward to following Roopa Rivas along with you.    Sincerely,    Ollie Mccray MD    Enclosure  CC:  No Recipients    If you would like to receive this communication electronically, please contact [email or web address] or [phone] to request more information on [facility] Link access.    For providers and/or their staff who would like to refer a patient to [facility], please contact us through our one-stop-shop provider referral line, [facility] , at [phone].    If you feel you have received this communication in error or would no longer like to receive these types of communications, please e-mail [email or web address]

## 2018-08-14 NOTE — PROGRESS NOTES
SUBJECTIVE:   Roopa Rivas is a 38 y.o. female   for annual well woman exam. Patient's last menstrual period was 2018 (exact date)..  She has no unusual complaints.      Contraception: none, would like to go back on OCP    Past Medical History:   Diagnosis Date    Arthritis     Fibromyalgia     Gastroenteritis      History reviewed. No pertinent surgical history.  Social History     Socioeconomic History    Marital status:      Spouse name: Not on file    Number of children: Not on file    Years of education: Not on file    Highest education level: Not on file   Social Needs    Financial resource strain: Not on file    Food insecurity - worry: Not on file    Food insecurity - inability: Not on file    Transportation needs - medical: Not on file    Transportation needs - non-medical: Not on file   Occupational History    Not on file   Tobacco Use    Smoking status: Never Smoker    Smokeless tobacco: Never Used   Substance and Sexual Activity    Alcohol use: No    Drug use: No    Sexual activity: Yes     Partners: Male     Birth control/protection: Condom   Other Topics Concern    Not on file   Social History Narrative    Not on file     Family History   Problem Relation Age of Onset    Arthritis Mother     No Known Problems Father     Arthritis Maternal Grandmother     Arthritis Maternal Grandfather     Cancer Paternal Grandfather      OB History    Para Term  AB Living   4 4 4     4   SAB TAB Ectopic Multiple Live Births           4      # Outcome Date GA Lbr Bhanu/2nd Weight Sex Delivery Anes PTL Lv   4 Term            3 Term            2 Term            1 Term               Obstetric Comments    x 4   \Gynhx: reg/5.    H/o chlamydia in , s/p treatment   Denies abnl pap, last pap  NEG         Current Outpatient Medications   Medication Sig Dispense Refill    DULoxetine (CYMBALTA) 60 MG capsule Take 1 capsule (60 mg total) by mouth once daily. 90  "capsule 2    hydroxychloroquine (PLAQUENIL) 200 mg tablet Take 1.5 tablets (300 mg total) by mouth once daily. 45 tablet 5    meloxicam (MOBIC) 7.5 MG tablet Take 1 tablet (7.5 mg total) by mouth once daily. 90 tablet 5    zolpidem (AMBIEN) 10 mg Tab TAKE 1 TABLET(10 MG) BY MOUTH EVERY NIGHT AS NEEDED 30 tablet 0     No current facility-administered medications for this visit.      Allergies: Patient has no known allergies.       ROS:  GENERAL: Denies weight gain or weight loss. Feeling well overall.   SKIN: Denies rash or lesions.   HEAD: Denies head injury or headache.   NODES: Denies enlarged lymph nodes.   CHEST: Denies chest pain or shortness of breath.   CARDIOVASCULAR: Denies palpitations or left sided chest pain.   ABDOMEN: No abdominal pain, constipation, diarrhea, nausea, vomiting or rectal bleeding.   URINARY: No frequency, dysuria, hematuria, or burning on urination.  REPRODUCTIVE: Denies vaginal discharge, abnormal vaginal bleeding, lesions, pelvic pain  BREASTS: The patient performs breast self-examination and denies pain, lumps, or nipple discharge.   HEMATOLOGIC: No easy bruisability or excessive bleeding.  MUSCULOSKELETAL: Denies joint pain or swelling.   NEUROLOGIC: Denies syncope or weakness.   PSYCHIATRIC: Denies depression, anxiety or mood swings.      OBJECTIVE:   /64   Ht 4' 11" (1.499 m)   Wt 51 kg (112 lb 7 oz)   LMP 07/24/2018 (Exact Date)   BMI 22.71 kg/m²   The patient appears well, alert, oriented x 3, in no distress.  PSYCH:  Normal, full range of affect  NECK: negative, no thyromegaly, trachea midline  SKIN: normal, good color, good turgor and no acne, striae, hirsutism  BREAST EXAM: breasts appear normal, no suspicious masses, no skin or nipple changes or axillary nodes  ABDOMEN: soft, non-tender; bowel sounds normal; no masses,  no organomegaly and no hernias, masses, or hepatosplenomegaly  GENITALIA: normal external genitalia, no erythema, no discharge  BLADDER: " soft  URETHRA: normal appearing urethra with no masses, tenderness or lesions and normal urethra, normal urethral meatus  VAGINA: Normal  CERVIX: no lesions or cervical motion tenderness  UTERUS: normal size, contour, position, consistency, mobility, non-tender  ADNEXA: no mass, fullness, tenderness      ASSESSMENT:   .Roopa was seen today for well woman.    Diagnoses and all orders for this visit:    Encounter for well woman exam with routine gynecological exam    Encounter for Papanicolaou smear for cervical cancer screening    Other orders  -     Liquid-based pap smear, screening  -     HPV High Risk Genotypes, PCR        1. Health maintenance  -pap done. Discussed ASCCP guideline screening every 3 - 5 years.   -counseled on exercise and healthy diet  -bone health:  Discussed Vitamin D and Calcium supplementation, weight bearing exercises  2.  Contraceptive counseling: no contraindication. rx for Aubra

## 2018-08-17 LAB
HPV HR 12 DNA CVX QL NAA+PROBE: NEGATIVE
HPV16 AG SPEC QL: NEGATIVE
HPV18 DNA SPEC QL NAA+PROBE: NEGATIVE

## 2018-08-22 DIAGNOSIS — G47.01 INSOMNIA DUE TO MEDICAL CONDITION: ICD-10-CM

## 2018-08-22 RX ORDER — ZOLPIDEM TARTRATE 10 MG/1
TABLET ORAL
Qty: 30 TABLET | Refills: 0 | Status: SHIPPED | OUTPATIENT
Start: 2018-08-22 | End: 2018-09-21 | Stop reason: SDUPTHER

## 2018-09-21 DIAGNOSIS — G47.01 INSOMNIA DUE TO MEDICAL CONDITION: ICD-10-CM

## 2018-09-21 RX ORDER — ZOLPIDEM TARTRATE 10 MG/1
TABLET ORAL
Qty: 30 TABLET | Refills: 0 | Status: SHIPPED | OUTPATIENT
Start: 2018-09-21 | End: 2018-10-26 | Stop reason: SDUPTHER

## 2018-09-21 RX ORDER — ZOLPIDEM TARTRATE 10 MG/1
TABLET ORAL
Qty: 30 TABLET | Refills: 0 | OUTPATIENT
Start: 2018-09-21

## 2018-10-26 DIAGNOSIS — G47.01 INSOMNIA DUE TO MEDICAL CONDITION: ICD-10-CM

## 2018-10-26 RX ORDER — ZOLPIDEM TARTRATE 10 MG/1
TABLET ORAL
Qty: 30 TABLET | Refills: 0 | Status: CANCELLED | OUTPATIENT
Start: 2018-10-26

## 2018-10-26 RX ORDER — ZOLPIDEM TARTRATE 10 MG/1
TABLET ORAL
Qty: 30 TABLET | Refills: 0 | OUTPATIENT
Start: 2018-10-26

## 2018-10-26 RX ORDER — ZOLPIDEM TARTRATE 10 MG/1
TABLET ORAL
Qty: 30 TABLET | Refills: 0 | Status: SHIPPED | OUTPATIENT
Start: 2018-10-26 | End: 2018-11-23 | Stop reason: SDUPTHER

## 2018-11-08 ENCOUNTER — OFFICE VISIT (OUTPATIENT)
Dept: FAMILY MEDICINE | Facility: CLINIC | Age: 38
End: 2018-11-08
Payer: COMMERCIAL

## 2018-11-08 VITALS
RESPIRATION RATE: 14 BRPM | WEIGHT: 122.38 LBS | OXYGEN SATURATION: 100 % | BODY MASS INDEX: 24.67 KG/M2 | DIASTOLIC BLOOD PRESSURE: 75 MMHG | TEMPERATURE: 98 F | SYSTOLIC BLOOD PRESSURE: 115 MMHG | HEIGHT: 59 IN | HEART RATE: 96 BPM

## 2018-11-08 DIAGNOSIS — J01.90 ACUTE BACTERIAL SINUSITIS: Primary | ICD-10-CM

## 2018-11-08 DIAGNOSIS — B96.89 ACUTE BACTERIAL SINUSITIS: Primary | ICD-10-CM

## 2018-11-08 PROCEDURE — 99214 OFFICE O/P EST MOD 30 MIN: CPT | Mod: 25,S$GLB,, | Performed by: FAMILY MEDICINE

## 2018-11-08 PROCEDURE — 99999 PR PBB SHADOW E&M-EST. PATIENT-LVL IV: CPT | Mod: PBBFAC,,, | Performed by: FAMILY MEDICINE

## 2018-11-08 PROCEDURE — 3008F BODY MASS INDEX DOCD: CPT | Mod: CPTII,S$GLB,, | Performed by: FAMILY MEDICINE

## 2018-11-08 PROCEDURE — 96372 THER/PROPH/DIAG INJ SC/IM: CPT | Mod: S$GLB,,, | Performed by: FAMILY MEDICINE

## 2018-11-08 RX ORDER — DOXYCYCLINE 100 MG/1
100 CAPSULE ORAL EVERY 12 HOURS
Qty: 20 CAPSULE | Refills: 0 | Status: SHIPPED | OUTPATIENT
Start: 2018-11-08 | End: 2018-11-18

## 2018-11-08 RX ORDER — METHYLPREDNISOLONE SOD SUCC 125 MG
125 VIAL (EA) INJECTION ONCE
Status: COMPLETED | OUTPATIENT
Start: 2018-11-08 | End: 2018-11-08

## 2018-11-08 RX ADMIN — Medication 125 MG: at 03:11

## 2018-11-08 NOTE — PATIENT INSTRUCTIONS

## 2018-11-08 NOTE — PROGRESS NOTES
"Chief Complaint   Patient presents with    Otalgia    Sinusitis       HPI    Roopa Rivas is 38 y.o. female. The encounter diagnosis was Acute bacterial sinusitis.    38 year old female comes to clinic with complaint of URI symptoms.  Patient reports ear pain began about 4 weeks ago.  Patient reports muffled hearing.  She notes some sinus congestion.  She reports using Flonase, decongestant, and antihistamine.  She reports some reduction in her symptoms but return immediately after medication wears off.  She reports headaches and shooting ear pain at night.    Review of Systems   HENT: Positive for ear pain, hearing loss, rhinorrhea and sore throat. Negative for ear discharge.    Respiratory: Negative for cough.    Gastrointestinal: Negative for abdominal pain, diarrhea and vomiting.   Musculoskeletal: Negative for neck pain.   Skin: Negative for rash.   Neurological: Positive for headaches.           Current Outpatient Medications:     DULoxetine (CYMBALTA) 60 MG capsule, Take 1 capsule (60 mg total) by mouth once daily., Disp: 90 capsule, Rfl: 2    levonorgestrel-ethinyl estradiol (AVIANE,ALESSE,LESSINA) 0.1-20 mg-mcg per tablet, Take 1 tablet by mouth once daily., Disp: 28 tablet, Rfl: 11    zolpidem (AMBIEN) 10 mg Tab, TAKE 1 TABLET(10 MG) BY MOUTH EVERY NIGHT AS NEEDED, Disp: 30 tablet, Rfl: 0      Blood pressure 115/75, pulse 96, temperature 98.2 °F (36.8 °C), resp. rate 14, height 4' 11" (1.499 m), weight 55.5 kg (122 lb 5.7 oz), last menstrual period 10/15/2018, SpO2 100 %.    Physical Exam   Constitutional: Vital signs are normal. She appears well-developed. No distress.   HENT:   Head: Head is without right periorbital erythema and without left periorbital erythema.   Right Ear: Tympanic membrane is bulging. A middle ear effusion is present.   Left Ear: Tympanic membrane is bulging. A middle ear effusion is present.   Cardiovascular: Normal heart sounds.   No murmur heard.  Pulmonary/Chest: Effort " normal and breath sounds normal.   Psychiatric: She has a normal mood and affect. Her behavior is normal.       Office Visit on 08/14/2018   Component Date Value Ref Range Status    HPV High Risk type 16, PCR 08/14/2018 Negative  Negative Final    HPV High Risk type 18, PCR 08/14/2018 Negative  Negative Final    HPV other High Risk types, PCR 08/14/2018 Negative  Negative Final   ]    Assessment:    1. Acute bacterial sinusitis          Roopa was seen today for otalgia and sinusitis.    Diagnoses and all orders for this visit:    Acute bacterial sinusitis  -     doxycycline (VIBRAMYCIN) 100 MG Cap; Take 1 capsule (100 mg total) by mouth every 12 (twelve) hours. for 10 days  -     methylPREDNISolone sodium succinate injection 125 mg  - New problem. OTC medications recommended.  Treat with antibiotics as documented above. Steroid injection administered in clinic.          FOLLOW UP: Follow-up in about 1 week (around 11/15/2018), or if symptoms worsen or fail to improve.    Answers for HPI/ROS submitted by the patient on 11/7/2018   Ear pain  Affected ear: both  Chronicity: chronic  Onset: 1 to 4 weeks ago  Progression since onset: waxing and waning  Frequency: constantly  Fever: no fever  Pain - numeric: 4/10  drainage: Yes  Treatments tried: NSAIDs, ear drops  Improvement on treatment: no relief  Pain severity: moderate  chronic ear infection: No  hearing loss: No  tympanostomy tube: No

## 2018-11-23 DIAGNOSIS — G47.01 INSOMNIA DUE TO MEDICAL CONDITION: ICD-10-CM

## 2018-11-24 RX ORDER — ZOLPIDEM TARTRATE 10 MG/1
TABLET ORAL
Qty: 30 TABLET | Refills: 0 | Status: SHIPPED | OUTPATIENT
Start: 2018-11-24 | End: 2018-12-28 | Stop reason: SDUPTHER

## 2018-11-26 DIAGNOSIS — G47.01 INSOMNIA DUE TO MEDICAL CONDITION: ICD-10-CM

## 2018-11-26 RX ORDER — ZOLPIDEM TARTRATE 10 MG/1
TABLET ORAL
Qty: 30 TABLET | Refills: 0 | Status: CANCELLED | OUTPATIENT
Start: 2018-11-26

## 2018-12-28 DIAGNOSIS — G47.01 INSOMNIA DUE TO MEDICAL CONDITION: ICD-10-CM

## 2018-12-28 RX ORDER — ZOLPIDEM TARTRATE 10 MG/1
10 TABLET ORAL NIGHTLY PRN
Qty: 30 TABLET | Refills: 0 | Status: SHIPPED | OUTPATIENT
Start: 2018-12-28 | End: 2019-01-28 | Stop reason: SDUPTHER

## 2019-01-28 DIAGNOSIS — G47.01 INSOMNIA DUE TO MEDICAL CONDITION: ICD-10-CM

## 2019-01-28 RX ORDER — ZOLPIDEM TARTRATE 10 MG/1
10 TABLET ORAL NIGHTLY PRN
Qty: 30 TABLET | Refills: 0 | Status: SHIPPED | OUTPATIENT
Start: 2019-01-28 | End: 2019-02-27 | Stop reason: SDUPTHER

## 2019-02-08 ENCOUNTER — OFFICE VISIT (OUTPATIENT)
Dept: FAMILY MEDICINE | Facility: CLINIC | Age: 39
End: 2019-02-08
Payer: COMMERCIAL

## 2019-02-08 ENCOUNTER — LAB VISIT (OUTPATIENT)
Dept: LAB | Facility: HOSPITAL | Age: 39
End: 2019-02-08
Attending: FAMILY MEDICINE
Payer: COMMERCIAL

## 2019-02-08 VITALS
SYSTOLIC BLOOD PRESSURE: 113 MMHG | TEMPERATURE: 98 F | HEIGHT: 59 IN | DIASTOLIC BLOOD PRESSURE: 79 MMHG | BODY MASS INDEX: 24.93 KG/M2 | WEIGHT: 123.69 LBS | HEART RATE: 86 BPM | OXYGEN SATURATION: 95 %

## 2019-02-08 DIAGNOSIS — Z23 NEED FOR TETANUS BOOSTER: ICD-10-CM

## 2019-02-08 DIAGNOSIS — M12.9 ARTHRITIS, MULTIPLE JOINT INVOLVEMENT: ICD-10-CM

## 2019-02-08 DIAGNOSIS — Z00.00 ENCOUNTER FOR ROUTINE HISTORY AND PHYSICAL EXAM IN FEMALE PATIENT: ICD-10-CM

## 2019-02-08 DIAGNOSIS — G47.01 INSOMNIA DUE TO MEDICAL CONDITION: ICD-10-CM

## 2019-02-08 DIAGNOSIS — Z00.00 ENCOUNTER FOR ROUTINE HISTORY AND PHYSICAL EXAM IN FEMALE PATIENT: Primary | ICD-10-CM

## 2019-02-08 DIAGNOSIS — Z23 FLU VACCINE NEED: ICD-10-CM

## 2019-02-08 LAB
ALBUMIN SERPL BCP-MCNC: 4.1 G/DL
ALP SERPL-CCNC: 84 U/L
ALT SERPL W/O P-5'-P-CCNC: 31 U/L
ANION GAP SERPL CALC-SCNC: 9 MMOL/L
AST SERPL-CCNC: 22 U/L
BILIRUB SERPL-MCNC: 0.3 MG/DL
BUN SERPL-MCNC: 11 MG/DL
CALCIUM SERPL-MCNC: 9.7 MG/DL
CHLORIDE SERPL-SCNC: 106 MMOL/L
CHOLEST SERPL-MCNC: 191 MG/DL
CHOLEST/HDLC SERPL: 3.2 {RATIO}
CO2 SERPL-SCNC: 26 MMOL/L
CREAT SERPL-MCNC: 0.8 MG/DL
EST. GFR  (AFRICAN AMERICAN): >60 ML/MIN/1.73 M^2
EST. GFR  (NON AFRICAN AMERICAN): >60 ML/MIN/1.73 M^2
GLUCOSE SERPL-MCNC: 80 MG/DL
HDLC SERPL-MCNC: 59 MG/DL
HDLC SERPL: 30.9 %
LDLC SERPL CALC-MCNC: 114.8 MG/DL
NONHDLC SERPL-MCNC: 132 MG/DL
POTASSIUM SERPL-SCNC: 3.7 MMOL/L
PROT SERPL-MCNC: 7.7 G/DL
SODIUM SERPL-SCNC: 141 MMOL/L
T4 FREE SERPL-MCNC: 1.27 NG/DL
TRIGL SERPL-MCNC: 86 MG/DL
TSH SERPL DL<=0.005 MIU/L-ACNC: 4.37 UIU/ML

## 2019-02-08 PROCEDURE — 99395 PR PREVENTIVE VISIT,EST,18-39: ICD-10-PCS | Mod: 25,S$GLB,, | Performed by: FAMILY MEDICINE

## 2019-02-08 PROCEDURE — 90715 TDAP VACCINE GREATER THAN OR EQUAL TO 7YO IM: ICD-10-PCS | Mod: S$GLB,,, | Performed by: FAMILY MEDICINE

## 2019-02-08 PROCEDURE — 90471 IMMUNIZATION ADMIN: CPT | Mod: S$GLB,,, | Performed by: FAMILY MEDICINE

## 2019-02-08 PROCEDURE — 90471 FLU VACCINE (QUAD) GREATER THAN OR EQUAL TO 3YO PRESERVATIVE FREE IM: ICD-10-PCS | Mod: S$GLB,,, | Performed by: FAMILY MEDICINE

## 2019-02-08 PROCEDURE — 80061 LIPID PANEL: CPT

## 2019-02-08 PROCEDURE — 83036 HEMOGLOBIN GLYCOSYLATED A1C: CPT

## 2019-02-08 PROCEDURE — 84439 ASSAY OF FREE THYROXINE: CPT

## 2019-02-08 PROCEDURE — 90686 FLU VACCINE (QUAD) GREATER THAN OR EQUAL TO 3YO PRESERVATIVE FREE IM: ICD-10-PCS | Mod: S$GLB,,, | Performed by: FAMILY MEDICINE

## 2019-02-08 PROCEDURE — 80053 COMPREHEN METABOLIC PANEL: CPT

## 2019-02-08 PROCEDURE — 90715 TDAP VACCINE 7 YRS/> IM: CPT | Mod: S$GLB,,, | Performed by: FAMILY MEDICINE

## 2019-02-08 PROCEDURE — 84443 ASSAY THYROID STIM HORMONE: CPT

## 2019-02-08 PROCEDURE — 99999 PR PBB SHADOW E&M-EST. PATIENT-LVL IV: ICD-10-PCS | Mod: PBBFAC,,, | Performed by: FAMILY MEDICINE

## 2019-02-08 PROCEDURE — 99395 PREV VISIT EST AGE 18-39: CPT | Mod: 25,S$GLB,, | Performed by: FAMILY MEDICINE

## 2019-02-08 PROCEDURE — 36415 COLL VENOUS BLD VENIPUNCTURE: CPT | Mod: PN

## 2019-02-08 PROCEDURE — 82306 VITAMIN D 25 HYDROXY: CPT

## 2019-02-08 PROCEDURE — 99999 PR PBB SHADOW E&M-EST. PATIENT-LVL IV: CPT | Mod: PBBFAC,,, | Performed by: FAMILY MEDICINE

## 2019-02-08 PROCEDURE — 90472 IMMUNIZATION ADMIN EACH ADD: CPT | Mod: S$GLB,,, | Performed by: FAMILY MEDICINE

## 2019-02-08 PROCEDURE — 90472 TDAP VACCINE GREATER THAN OR EQUAL TO 7YO IM: ICD-10-PCS | Mod: S$GLB,,, | Performed by: FAMILY MEDICINE

## 2019-02-08 PROCEDURE — 90686 IIV4 VACC NO PRSV 0.5 ML IM: CPT | Mod: S$GLB,,, | Performed by: FAMILY MEDICINE

## 2019-02-08 RX ORDER — DULOXETIN HYDROCHLORIDE 60 MG/1
CAPSULE, DELAYED RELEASE ORAL
Refills: 2 | COMMUNITY
Start: 2018-12-22 | End: 2019-08-05

## 2019-02-08 NOTE — PATIENT INSTRUCTIONS
Prevention Guidelines, Women Ages 18 to 39  Screening tests and vaccines are an important part of managing your health. Health counseling is essential, too. Below are guidelines for these, for women ages 18 to 39. Talk with your healthcare provider to make sure youre up-to-date on what you need.  Screening Who needs it How often   Alcohol misuse All women in this age group At routine exams   Blood pressure All women in this age group Every 2 years if your blood pressure is less than 120/80 mm Hg; yearly if your systolic blood pressure is 120 to 139 mm Hg, or your diastolic blood pressure reading is 80 to 89 mm Hg   Breast cancer All women in this age group should talk with their healthcare providers about the need for clinical breast exams (CBE)1 Clinical breast exam every 3 years1   Cervical cancer Women ages 21 and older Women between ages 21 and 29 should have a Pap test every 3 years; women between ages 30 and 65 are advised to have a Pap test plus an HPV test every 5 years   Chlamydia Sexually active women ages 24 and younger, and women at increased risk for infection Every 3 years if you're at risk or have symptoms   Depression All women in this age group At routine exams   Diabetes mellitus, type 2 Adults with no symptoms who are overweight or obese and have 1 or more other risk factors for diabetes At least every 3 years   Gonorrhea Sexually active women at increased risk for infection At routine exams   Hepatitis C Anyone at increased risk At routine exams   HIV All women At routine exams   Obesity All women in this age group At routine exams   Syphilis Women at increased risk for infection - talk with your healthcare provider At routine exams   Tuberculosis Women at increased risk for infection - talk with your healthcare provider Ask your healthcare provider   Vision All women in this age group At least 1 complete exam in your 20s, and 2 in your 30s   Vaccine2 Who needs it How often   Chickenpox  (varicella) All women in this age group who have no record of this infection or vaccine 2 doses; the second dose should be given 4 to 8 weeks after the first dose   Hepatitis A Women at increased risk for infection - talk with your healthcare provider 2 doses given at least 6 months apart   Hepatitis B Women at increased risk for infection - talk with your healthcare provider 3 doses over 6 months; second dose should be given 1 month after the first dose; the third dose should be given at least 2 months after the second dose and at least 4 months after the first dose   Haemophilus influenzae Type B (HIB) Women at increased risk for infection - talk with your healthcare provider 1 to 3 doses   Human papillomavirus (HPV) All women in this age group up to age 26 3 doses; the second dose should be given 1 to 2 months after the first dose and the third dose given 6 months after the first dose   Influenza (flu) All women in this age group Once a year   Measles, mumps, rubella (MMR) All women in this age group who have no record of these infections or vaccines 1 or 2 doses   Meningococcal Women at increased risk for infection - talk with your healthcare provider 1 or more doses   Pneumococcal conjugate vaccine (PCV13) and pneumococcal polysaccharide vaccine (PPSV23) Women at increased risk for infection - talk with your healthcare provider PCV13: 1 dose ages 19 to 65 (protects against 13 types of pneumococcal bacteria)  PPSV23: 1 to 2 doses through age 64, or 1 dose at 65 or older (protects against 23 types of pneumococcal bacteria)      Tetanus/diphtheria/pertussis (Td/Tdap) booster All women in this age group Td every 10 years, or a one-time dose of Tdap instead of a Td booster after age 18, then Td every 10 years   Counseling Who needs it How often   BRCA gene mutation testing for breast and ovarian cancer susceptibility Women with increased risk for having gene mutation When your risk is known   Breast cancer and  chemoprevention Women at high risk for breast cancer When your risk is known   Diet and exercise Women who are overweight or obese When diagnosed, and then at routine exams   Domestic violence Women at the age in which they are able to have children At routine exams   Sexually transmitted infection prevention Women who are sexually active At routine exams   Skin cancer Prevention of skin cancer in fair-skinned adults through age 24 At routine exams   Use of tobacco and the health effects it can cause All women in this age group Every visit   1According to the ACS, women ages 20 to 39 years should have a clinical breast exam (CBE) as part of their routine health exam every 3 years. Breast self-exams are an option for women starting in their 20s. But the  USPSTF does not recommend CBE.  2Those who are 18 years old and not up-to-date on their childhood vaccines should get all appropriate catch-up vaccines recommended by the CDC.  Date Last Reviewed: 8/27/2015  © 1743-3461 [employer]. [address] [place] [address], [place] [address]. All rights reserved. This information is not intended as a substitute for professional medical care. Always follow your healthcare professional's instructions.

## 2019-02-08 NOTE — PROGRESS NOTES
Chief Complaint   Patient presents with    Annual Exam       HPI    Roopa Rivas is 38 y.o. female. The primary encounter diagnosis was Encounter for routine history and physical exam in female patient. Diagnoses of Arthritis, multiple joint involvement, Insomnia due to medical condition, Need for tetanus booster, and Flu vaccine need were also pertinent to this visit.    38 year old female with Arthritis and Insomnia comes to clinic for annual exam.  Patient reports no issues since her last office visit.     Arthritis - patient reports that she was weaned off of Plaquenil.  She reports now taking Cymbalta with few side effects including lightheadedness if taken without food.  She uses OTC Ibuprofen for pain reduction.  Insomnia - patient reports that she continues to use Ambien for her symptoms.  She reports that she continues to have some difficulty with sleep.    Review of Systems   Constitutional: Positive for unexpected weight change. Negative for activity change.   HENT: Negative for hearing loss, rhinorrhea and trouble swallowing.    Eyes: Negative for discharge and visual disturbance.   Respiratory: Negative for chest tightness and wheezing.    Cardiovascular: Negative for chest pain and palpitations.   Gastrointestinal: Negative for blood in stool, constipation, diarrhea and vomiting.   Endocrine: Negative for polydipsia and polyuria.   Genitourinary: Negative for difficulty urinating, dysuria, hematuria and menstrual problem.   Musculoskeletal: Positive for arthralgias and joint swelling. Negative for neck pain.   Neurological: Positive for headaches. Negative for weakness.   Psychiatric/Behavioral: Negative for confusion and dysphoric mood.           Current Outpatient Medications:     DULoxetine (CYMBALTA) 60 MG capsule, TK ONE C PO ONCE D, Disp: , Rfl: 2    levonorgestrel-ethinyl estradiol (AVIANE,ALESSE,LESSINA) 0.1-20 mg-mcg per tablet, Take 1 tablet by mouth once daily., Disp: 28 tablet, Rfl:  "11    zolpidem (AMBIEN) 10 mg Tab, Take 1 tablet (10 mg total) by mouth nightly as needed (insomnia)., Disp: 30 tablet, Rfl: 0      Blood pressure 113/79, pulse 86, temperature 98.4 °F (36.9 °C), temperature source Oral, height 4' 11" (1.499 m), weight 56.1 kg (123 lb 10.9 oz), last menstrual period 02/03/2019, SpO2 95 %.    Physical Exam   Constitutional: She is oriented to person, place, and time. Vital signs are normal. She appears well-developed.   HENT:   Right Ear: Hearing normal.   Left Ear: Hearing normal.   Mouth/Throat: Normal dentition.   Cardiovascular: Normal heart sounds and intact distal pulses.   Pulmonary/Chest: Effort normal and breath sounds normal.   Abdominal: Soft. Bowel sounds are normal. There is no tenderness.   Musculoskeletal:   Normal gait. No decreased ROM at all 4 major joints.   Neurological: She is oriented to person, place, and time. She has normal strength. No sensory deficit.   Skin: Skin is intact. No rash noted.   Psychiatric: She has a normal mood and affect. Her speech is normal.       Lab Visit on 02/08/2019   Component Date Value Ref Range Status    Sodium 02/08/2019 141  136 - 145 mmol/L Final    Potassium 02/08/2019 3.7  3.5 - 5.1 mmol/L Final    Chloride 02/08/2019 106  95 - 110 mmol/L Final    CO2 02/08/2019 26  23 - 29 mmol/L Final    Glucose 02/08/2019 80  70 - 110 mg/dL Final    BUN, Bld 02/08/2019 11  6 - 20 mg/dL Final    Creatinine 02/08/2019 0.8  0.5 - 1.4 mg/dL Final    Calcium 02/08/2019 9.7  8.7 - 10.5 mg/dL Final    Total Protein 02/08/2019 7.7  6.0 - 8.4 g/dL Final    Albumin 02/08/2019 4.1  3.5 - 5.2 g/dL Final    Total Bilirubin 02/08/2019 0.3  0.1 - 1.0 mg/dL Final    Alkaline Phosphatase 02/08/2019 84  55 - 135 U/L Final    AST 02/08/2019 22  10 - 40 U/L Final    ALT 02/08/2019 31  10 - 44 U/L Final    Anion Gap 02/08/2019 9  8 - 16 mmol/L Final    eGFR if African American 02/08/2019 >60  >60 mL/min/1.73 m^2 Final    eGFR if non African " American 02/08/2019 >60  >60 mL/min/1.73 m^2 Final    Cholesterol 02/08/2019 191  120 - 199 mg/dL Final    Triglycerides 02/08/2019 86  30 - 150 mg/dL Final    HDL 02/08/2019 59  40 - 75 mg/dL Final    LDL Cholesterol 02/08/2019 114.8  63.0 - 159.0 mg/dL Final    HDL/Chol Ratio 02/08/2019 30.9  20.0 - 50.0 % Final    Total Cholesterol/HDL Ratio 02/08/2019 3.2  2.0 - 5.0 Final    Non-HDL Cholesterol 02/08/2019 132  mg/dL Final   ]    Assessment:    1. Encounter for routine history and physical exam in female patient    2. Arthritis, multiple joint involvement    3. Insomnia due to medical condition    4. Need for tetanus booster    5. Flu vaccine need          Roopa was seen today for annual exam.    Diagnoses and all orders for this visit:    Encounter for routine history and physical exam in female patient  -     Comprehensive metabolic panel; Future  -     Lipid panel; Future  -     TSH; Future  -     Hemoglobin A1c; Future  -     Vitamin D; Future  - Health maintenance reviewed with patient. Injections ordered. Obtain labs.    Arthritis, multiple joint involvement  -     Comprehensive metabolic panel; Future  -     Vitamin D; Future  - Stable. Obtain labs for screening for comorbid conditions.    Insomnia due to medical condition  -     TSH; Future  - Stable. Patient reports consistent sleep but waning restfulness. Discussed dependence and benzo use. Consider transition to alternative sleep medication.    Need for tetanus booster  -     (In Office Administered) Tdap Vaccine    Flu vaccine need  -     Influenza - Quadrivalent (3 years & older) (PF)    Other orders  -     Cancel: Influenza - Quadrivalent (6-35 months) (PF)          FOLLOW UP: Follow-up in about 1 year (around 2/8/2020) for Annual exam.

## 2019-02-08 NOTE — PROGRESS NOTES
Patient tolerated vaccination(s) well. VIS given to patient. Patient instructed to wait 15 min before leaving. Patient verbalized understanding.

## 2019-02-09 LAB
25(OH)D3+25(OH)D2 SERPL-MCNC: 7 NG/ML
ESTIMATED AVG GLUCOSE: 97 MG/DL
HBA1C MFR BLD HPLC: 5 %

## 2019-02-11 DIAGNOSIS — E55.9 VITAMIN D DEFICIENCY: ICD-10-CM

## 2019-02-11 RX ORDER — ERGOCALCIFEROL 1.25 MG/1
50000 CAPSULE ORAL
Qty: 12 CAPSULE | Refills: 0 | Status: SHIPPED | OUTPATIENT
Start: 2019-02-11 | End: 2019-03-13

## 2019-02-18 ENCOUNTER — OFFICE VISIT (OUTPATIENT)
Dept: RHEUMATOLOGY | Facility: CLINIC | Age: 39
End: 2019-02-18
Payer: COMMERCIAL

## 2019-02-18 ENCOUNTER — LAB VISIT (OUTPATIENT)
Dept: LAB | Facility: HOSPITAL | Age: 39
End: 2019-02-18
Attending: INTERNAL MEDICINE
Payer: COMMERCIAL

## 2019-02-18 VITALS
DIASTOLIC BLOOD PRESSURE: 81 MMHG | SYSTOLIC BLOOD PRESSURE: 130 MMHG | WEIGHT: 125.44 LBS | HEIGHT: 59 IN | BODY MASS INDEX: 25.29 KG/M2 | HEART RATE: 88 BPM

## 2019-02-18 DIAGNOSIS — R76.8 ANA POSITIVE: Primary | ICD-10-CM

## 2019-02-18 DIAGNOSIS — R76.8 ANA POSITIVE: ICD-10-CM

## 2019-02-18 DIAGNOSIS — M79.7 FIBROMYALGIA: ICD-10-CM

## 2019-02-18 LAB
ALBUMIN SERPL BCP-MCNC: 4.2 G/DL
ALP SERPL-CCNC: 73 U/L
ALT SERPL W/O P-5'-P-CCNC: 26 U/L
ANION GAP SERPL CALC-SCNC: 10 MMOL/L
AST SERPL-CCNC: 26 U/L
BASOPHILS # BLD AUTO: 0.06 K/UL
BASOPHILS NFR BLD: 0.9 %
BILIRUB SERPL-MCNC: 0.3 MG/DL
BUN SERPL-MCNC: 8 MG/DL
CALCIUM SERPL-MCNC: 10 MG/DL
CHLORIDE SERPL-SCNC: 104 MMOL/L
CO2 SERPL-SCNC: 23 MMOL/L
CREAT SERPL-MCNC: 0.7 MG/DL
CRP SERPL-MCNC: 3.8 MG/L
DIFFERENTIAL METHOD: ABNORMAL
EOSINOPHIL # BLD AUTO: 0.1 K/UL
EOSINOPHIL NFR BLD: 1.6 %
ERYTHROCYTE [DISTWIDTH] IN BLOOD BY AUTOMATED COUNT: 11.8 %
ERYTHROCYTE [SEDIMENTATION RATE] IN BLOOD BY WESTERGREN METHOD: 9 MM/HR
EST. GFR  (AFRICAN AMERICAN): >60 ML/MIN/1.73 M^2
EST. GFR  (NON AFRICAN AMERICAN): >60 ML/MIN/1.73 M^2
GLUCOSE SERPL-MCNC: 81 MG/DL
HCT VFR BLD AUTO: 36 %
HGB BLD-MCNC: 11.9 G/DL
LYMPHOCYTES # BLD AUTO: 2.4 K/UL
LYMPHOCYTES NFR BLD: 35.8 %
MCH RBC QN AUTO: 30.4 PG
MCHC RBC AUTO-ENTMCNC: 33.1 G/DL
MCV RBC AUTO: 92 FL
MONOCYTES # BLD AUTO: 0.3 K/UL
MONOCYTES NFR BLD: 5 %
NEUTROPHILS # BLD AUTO: 3.8 K/UL
NEUTROPHILS NFR BLD: 56.4 %
NRBC BLD-RTO: 0 /100 WBC
PLATELET # BLD AUTO: 302 K/UL
PMV BLD AUTO: 9.9 FL
POTASSIUM SERPL-SCNC: 4.2 MMOL/L
PROT SERPL-MCNC: 7.6 G/DL
RBC # BLD AUTO: 3.91 M/UL
SODIUM SERPL-SCNC: 137 MMOL/L
WBC # BLD AUTO: 6.79 K/UL

## 2019-02-18 PROCEDURE — 86225 DNA ANTIBODY NATIVE: CPT

## 2019-02-18 PROCEDURE — 99999 PR PBB SHADOW E&M-EST. PATIENT-LVL III: ICD-10-PCS | Mod: PBBFAC,,, | Performed by: INTERNAL MEDICINE

## 2019-02-18 PROCEDURE — 36415 COLL VENOUS BLD VENIPUNCTURE: CPT

## 2019-02-18 PROCEDURE — 99999 PR PBB SHADOW E&M-EST. PATIENT-LVL III: CPT | Mod: PBBFAC,,, | Performed by: INTERNAL MEDICINE

## 2019-02-18 PROCEDURE — 80053 COMPREHEN METABOLIC PANEL: CPT

## 2019-02-18 PROCEDURE — 99214 PR OFFICE/OUTPT VISIT, EST, LEVL IV, 30-39 MIN: ICD-10-PCS | Mod: S$GLB,,, | Performed by: INTERNAL MEDICINE

## 2019-02-18 PROCEDURE — 3008F BODY MASS INDEX DOCD: CPT | Mod: CPTII,S$GLB,, | Performed by: INTERNAL MEDICINE

## 2019-02-18 PROCEDURE — 85652 RBC SED RATE AUTOMATED: CPT

## 2019-02-18 PROCEDURE — 99214 OFFICE O/P EST MOD 30 MIN: CPT | Mod: S$GLB,,, | Performed by: INTERNAL MEDICINE

## 2019-02-18 PROCEDURE — 86160 COMPLEMENT ANTIGEN: CPT | Mod: 59

## 2019-02-18 PROCEDURE — 86140 C-REACTIVE PROTEIN: CPT

## 2019-02-18 PROCEDURE — 86160 COMPLEMENT ANTIGEN: CPT

## 2019-02-18 PROCEDURE — 85025 COMPLETE CBC W/AUTO DIFF WBC: CPT

## 2019-02-18 PROCEDURE — 3008F PR BODY MASS INDEX (BMI) DOCUMENTED: ICD-10-PCS | Mod: CPTII,S$GLB,, | Performed by: INTERNAL MEDICINE

## 2019-02-18 ASSESSMENT — ROUTINE ASSESSMENT OF PATIENT INDEX DATA (RAPID3)
MDHAQ FUNCTION SCORE: .2
PSYCHOLOGICAL DISTRESS SCORE: 2.2
PAIN SCORE: 5.5
PATIENT GLOBAL ASSESSMENT SCORE: 7
TOTAL RAPID3 SCORE: 4.39
AM STIFFNESS SCORE: 1, YES
FATIGUE SCORE: 10

## 2019-02-18 NOTE — PROGRESS NOTES
Chief Complaint   Patient presents with    Follow-up     EDUARDO issues and fibromyalgia       Patient with a diagnosis of fibromyalgia syndrome for a follow up    History of presenting illness     is a 38 year old female : we are treating for fibromyalgia :  She has done really well on cymbalta 60 mg  She has no pain,she doesn't feel anxious,she feels active and overall better      We had d/clara her amitryptilline since it made her hungry a lot and didn't help with the pain    She was on meloxicam but she stopped it since she had a rash with some medication    She was on tramadol and she stopped it    She takes OTC tylenol and ibuprofen    Pt is a 37 yo with h/o gastritis, irritable bowel syndrome    Initial complaints     Poor sleep despite taking ambien  Amitryptilline was not helping  Bothersome anxiety    Cant concentrate  Cant remember certain things    Dry eyes and mouth+  Opthalmology : gave tear drops    Hair fall got better with biotin    Ear aches  Windy ear pain gets worse  Cant wear earrings,gets infections  Inside of the ears hurt and throb    Fatigue +    Allergies have gotten worse    Used to exercise 6 times a week now trying to get there     She relocated from [place] November 2017    She was followed by rheumatology for a possible inflammatory arthritis per the patient     She was last seen by her rheumatologist, Dr. Sana Belle sep 2017  We still dont have records    She says US hands showed changes on inflammatory arthritis  She was offered NSAIDs like nalfon and sulindac and she didn't do well    She had seen us in December 2017,we didn't have any data to suggest inflammatory arthritis    We did have her on plaquenil 300 mg but she thinks it gave her a rash and allergic reaction    We diagnosed her as fibromyalgia syndrome    We did the autoimmune panel    Normal CBC,CMP  Normal lipid panel  Normal TSH  Normal RF,CCP  EDUARDO positive,1: 160 homogenous,titre negative  Normal  complements  Normal ESR,CRP  Negative APLAS panel    MRI both hands no inflammatory arthritis     Her complaints : diffuse pains in the hands,arms,shoulders and knees  Started February/march 2016     She states that after starting a combination of  mg/d, Tramadol 50 mg bid, Elavil 10 mg/d, mobic 7.5 mg bid her symptoms improved. She is unsure of her diagnosis at this time. Pt reports 30 minutes of am stiffness, intermittent joint swelling/pain, and severe fatigue. She is no longer working due to her fatigue. Pt recounts getting a massage in the past and crying due to being diffusely tender. She denies fevers, chills, Raynaud's, photosensitivity, rashes, alopecia, mucosal ulcers, pleurisy, vision changes, . Pt does report an increase in acne.     No known autoimmune family history    Pt denies a personal or family history of psoriasis.     No blood clots or miscarriages.      Review of Systems   Constitutional: Negative for activity change, appetite change, chills, diaphoresis, fatigue, fever and unexpected weight change.   HENT: Negative for congestion, dental problem, drooling, ear discharge, ear pain, facial swelling, hearing loss, mouth sores, nosebleeds, postnasal drip, rhinorrhea, sinus pressure, sinus pain, sneezing, sore throat, tinnitus, trouble swallowing and voice change.    Eyes: Negative for photophobia, pain, discharge, redness, itching and visual disturbance.   Respiratory: Positive for shortness of breath. Negative for apnea, cough, choking, chest tightness, wheezing and stridor.    Cardiovascular: Negative for chest pain, palpitations and leg swelling.   Gastrointestinal: Positive for constipation. Negative for abdominal distention, abdominal pain, anal bleeding, blood in stool, diarrhea, nausea, rectal pain and vomiting.   Endocrine: Negative for cold intolerance, heat intolerance, polydipsia, polyphagia and polyuria.   Genitourinary: Negative for decreased urine volume, difficulty urinating,  dysuria, enuresis, flank pain, frequency, genital sores, hematuria and urgency.   Musculoskeletal: Negative for arthralgias, back pain, gait problem, joint swelling, myalgias, neck pain and neck stiffness.   Skin: Negative for color change, pallor, rash and wound.   Allergic/Immunologic: Negative for environmental allergies, food allergies and immunocompromised state.   Neurological: Positive for headaches. Negative for dizziness, tremors, seizures, syncope, facial asymmetry, speech difficulty, weakness, light-headedness and numbness.   Hematological: Negative for adenopathy. Does not bruise/bleed easily.   Psychiatric/Behavioral: Negative for agitation, behavioral problems, confusion, decreased concentration, dysphoric mood, hallucinations, self-injury, sleep disturbance and suicidal ideas. The patient is not nervous/anxious and is not hyperactive.         Physical Exam     SHEPPARD-28 tender joint count: 0  SHEPPARD-28 swollen joint count: 0       Physical Exam   Constitutional: She is oriented to person, place, and time and well-developed, well-nourished, and in no distress. No distress.   HENT:   Head: Normocephalic.   Mouth/Throat: Oropharynx is clear and moist.   Eyes: Conjunctivae are normal. Pupils are equal, round, and reactive to light. Right eye exhibits no discharge. Left eye exhibits no discharge. No scleral icterus.   Neck: Normal range of motion. No thyromegaly present.   Cardiovascular: Normal rate, regular rhythm, normal heart sounds and intact distal pulses.    Pulmonary/Chest: Effort normal and breath sounds normal. No stridor.   Abdominal: Soft. Bowel sounds are normal.   Lymphadenopathy:     She has no cervical adenopathy.   Neurological: She is alert and oriented to person, place, and time.   Skin: Skin is warm. No rash noted. She is not diaphoretic.     Psychiatric: Affect and judgment normal.   Musculoskeletal: Normal range of motion.         Assessment     Pt is a 37 yo with h/o gastritis, irritable  bowel syndrome being followed here for fibromyalgia    We diagnosed her with FMS since she met the criteria     1.  Widespread pain index greater than or equal to 7 and symptom severity score greater than or equal to 5 or widespread pain index between 3- 6, and symptom severity score greater than or equal to 9  2.  Symptoms have been present in a similar level for at least 3 months  3.  The patient does not have a disorder that would otherwise sufficiently explain the pain    She does not have records of diagnosis and if she had an previous inflammatory arthritis.   She claims that she had an ultrasound showing evidence of inflammatory arthritis   We have tried to obtain records and we have been unsuccessful  She brought some print outs on the portal and there is mention of inflammatory arthritis and I see NSAIDs and plaquenil on the prescriptions    We did our own hand MRIs  She has cystic lesions b/l which might be indicative of burned out erosions  It is possible that she had an old inflammatory arthritis which self resolved    She also mentions getting better by combination of tramadol + meloxicam  She cannot take higher dose of amitryptilline,she likes the cymbalta 60 mg better     She also mentions sicca symptoms but she thinks it might be s/p ambien use but definitely prior to initiation of amitryptilline     We are treating her for fibromyalgia with cymbalta only     We had her on plaquenil given the evidence of inflammatory arthritis on prior US and current MRI revealing old erosions   But she had a rash and she attributed it to plaquenil  She made a lot of changes with shampoos and detergents but she didn't see a change so she went off plaquenil and meloxicam     1. EDUARDO positive    2. Fibromyalgia        Plan    Continue cymbalta,can titrate upto 120 mg if needed    Suggested lip biopsy and corneal staining at some point to see if she has Sjogren's  Not sure if her sicca symptoms are drug induced yet,but  they are definitely bothersome    Management of sicca symptoms    -preservative free artifical tears 3 to 4 times a day  Lubricating ointments at night  Wraparound sun glasses/moisture shields which attach to glasses help  Opthalmology evaluation,management : she will need split lamp,schirmer's test and ocular surface staining    -biotene and ACT preparations  Dental evaluations  Periodic cleaning  Use fluoride products  Brush and floss teeth regularly especially after meals     Avoid sugar containing foods and drinks    Use of vaginal lubricants/estrogen creams   Seeing Gyn    Use hypoallergenic soaps/lotions for the skin  Avoid drafts from air conditioners/heaters/radiators  Avoid detergents,deodorant soaps,very hot water  Use humidifiers    NO PLAQUENIL FOR NOW  IF SYMPTOMS CHANGE AND WE SEE INFLAMMATORY ARTHRITIS WE WILL OFFER METHOTREXATE    Water aerobics  Yoga and aleah chi    See psychiatry/counsellor  Sleep clinic evaluation    Every 6 monthly lupus labs   Labs today    Roopa was seen today for follow-up.    Diagnoses and all orders for this visit:    EDUARDO positive  -     CBC auto differential; Future  -     Comprehensive metabolic panel; Future  -     Sedimentation rate; Future  -     C-reactive protein; Standing  -     C3 complement; Future  -     C4 complement; Future  -     Anti-DNA antibody, double-stranded; Standing  -     Protein / creatinine ratio, urine; Future  -     Urinalysis; Future    Fibromyalgia      rtc in 6 months

## 2019-02-19 LAB
C3 SERPL-MCNC: 132 MG/DL
C4 SERPL-MCNC: 20 MG/DL
DSDNA AB SER-ACNC: NORMAL [IU]/ML

## 2019-02-24 ENCOUNTER — PATIENT MESSAGE (OUTPATIENT)
Dept: RHEUMATOLOGY | Facility: CLINIC | Age: 39
End: 2019-02-24

## 2019-02-25 ENCOUNTER — PATIENT MESSAGE (OUTPATIENT)
Dept: RHEUMATOLOGY | Facility: CLINIC | Age: 39
End: 2019-02-25

## 2019-02-27 DIAGNOSIS — G47.01 INSOMNIA DUE TO MEDICAL CONDITION: ICD-10-CM

## 2019-02-27 RX ORDER — ZOLPIDEM TARTRATE 10 MG/1
10 TABLET ORAL NIGHTLY PRN
Qty: 30 TABLET | Refills: 0 | Status: SHIPPED | OUTPATIENT
Start: 2019-02-27 | End: 2019-04-16 | Stop reason: SDUPTHER

## 2019-04-08 DIAGNOSIS — G47.01 INSOMNIA DUE TO MEDICAL CONDITION: ICD-10-CM

## 2019-04-08 RX ORDER — ZOLPIDEM TARTRATE 10 MG/1
10 TABLET ORAL NIGHTLY PRN
Qty: 30 TABLET | Refills: 0 | Status: CANCELLED | OUTPATIENT
Start: 2019-04-08

## 2019-04-08 NOTE — TELEPHONE ENCOUNTER
LVm for patient to contact the clinic regarding her refill request. Patient will need to est with a new pcp for further refills of a controlled substance.

## 2019-04-16 ENCOUNTER — OFFICE VISIT (OUTPATIENT)
Dept: FAMILY MEDICINE | Facility: CLINIC | Age: 39
End: 2019-04-16
Payer: COMMERCIAL

## 2019-04-16 VITALS
OXYGEN SATURATION: 96 % | HEIGHT: 59 IN | DIASTOLIC BLOOD PRESSURE: 80 MMHG | SYSTOLIC BLOOD PRESSURE: 120 MMHG | BODY MASS INDEX: 25.78 KG/M2 | HEART RATE: 78 BPM | WEIGHT: 127.88 LBS | RESPIRATION RATE: 17 BRPM | TEMPERATURE: 98 F

## 2019-04-16 DIAGNOSIS — G47.01 INSOMNIA DUE TO MEDICAL CONDITION: Primary | ICD-10-CM

## 2019-04-16 DIAGNOSIS — M79.7 FIBROMYALGIA: ICD-10-CM

## 2019-04-16 DIAGNOSIS — J30.2 SEASONAL ALLERGIES: ICD-10-CM

## 2019-04-16 PROBLEM — Z79.899 LONG-TERM USE OF PLAQUENIL: Status: RESOLVED | Noted: 2017-12-07 | Resolved: 2019-04-16

## 2019-04-16 PROCEDURE — 99999 PR PBB SHADOW E&M-EST. PATIENT-LVL III: ICD-10-PCS | Mod: PBBFAC,,, | Performed by: INTERNAL MEDICINE

## 2019-04-16 PROCEDURE — 99214 OFFICE O/P EST MOD 30 MIN: CPT | Mod: S$GLB,,, | Performed by: INTERNAL MEDICINE

## 2019-04-16 PROCEDURE — 99999 PR PBB SHADOW E&M-EST. PATIENT-LVL III: CPT | Mod: PBBFAC,,, | Performed by: INTERNAL MEDICINE

## 2019-04-16 PROCEDURE — 3008F PR BODY MASS INDEX (BMI) DOCUMENTED: ICD-10-PCS | Mod: CPTII,S$GLB,, | Performed by: INTERNAL MEDICINE

## 2019-04-16 PROCEDURE — 3008F BODY MASS INDEX DOCD: CPT | Mod: CPTII,S$GLB,, | Performed by: INTERNAL MEDICINE

## 2019-04-16 PROCEDURE — 99214 PR OFFICE/OUTPT VISIT, EST, LEVL IV, 30-39 MIN: ICD-10-PCS | Mod: S$GLB,,, | Performed by: INTERNAL MEDICINE

## 2019-04-16 RX ORDER — ZOLPIDEM TARTRATE 10 MG/1
10 TABLET ORAL NIGHTLY PRN
Qty: 30 TABLET | Refills: 0 | Status: SHIPPED | OUTPATIENT
Start: 2019-04-16 | End: 2019-05-13 | Stop reason: SDUPTHER

## 2019-04-16 NOTE — PROGRESS NOTES
HISTORY OF PRESENT ILLNESS:  Roopa Rivas is a 39 y.o. female who presents to the clinic today for Establish Care and Medication Refill    Fibromyalgia  Stopped Plaquenil and Mobic b/c of skin color changes, itching.  She has been seeing Dr. Moss.  She has been on Cymbalta for several months with fairly good control of her symptoms.  She supplements pain control with ibuprofen.  Takes wheatgrass supplement.  Reports having some mild pain today as did not sleep well last night.    Insomnia  Takes Ambien nightly for at least two years.  Did not take since last week and thus has not slept well.  She is requesting refill.    Seasonal allergies  Associated ear ache on right side. Since she moved one year ago.  Sinus congestion, mild hoarseness.  No fever. Occasional cough.  Taking Zyrtec, Sudafed, Flonase.      PAST MEDICAL HISTORY:  Past Medical History:   Diagnosis Date    Arthritis     Fibromyalgia     Gastroenteritis        PAST SURGICAL HISTORY:  History reviewed. No pertinent surgical history.    SOCIAL HISTORY:  Social History     Socioeconomic History    Marital status:      Spouse name: Not on file    Number of children: Not on file    Years of education: Not on file    Highest education level: Not on file   Occupational History    Not on file   Social Needs    Financial resource strain: Not hard at all    Food insecurity:     Worry: Never true     Inability: Never true    Transportation needs:     Medical: No     Non-medical: No   Tobacco Use    Smoking status: Never Smoker    Smokeless tobacco: Never Used   Substance and Sexual Activity    Alcohol use: No     Frequency: Never     Drinks per session: Patient refused     Binge frequency: Never    Drug use: No    Sexual activity: Yes     Partners: Male     Birth control/protection: Condom   Lifestyle    Physical activity:     Days per week: 1 day     Minutes per session: 20 min    Stress: Very much   Relationships    Social  connections:     Talks on phone: More than three times a week     Gets together: Never     Attends [employer] service: Not on file     Active member of club or organization: No     Attends meetings of clubs or organizations: Never     Relationship status:    Other Topics Concern    Not on file   Social History Narrative    Not on file       FAMILY HISTORY:  Family History   Problem Relation Age of Onset    Arthritis Mother     No Known Problems Father     Arthritis Maternal Grandmother     Arthritis Maternal Grandfather     Cancer Paternal Grandfather        ALLERGIES AND MEDICATIONS: updated and reviewed.  Review of patient's allergies indicates:  No Known Allergies  Medication List with Changes/Refills   Current Medications    DULOXETINE (CYMBALTA) 60 MG CAPSULE    TK ONE C PO ONCE D    LEVONORGESTREL-ETHINYL ESTRADIOL (AVIANE,ALESSE,LESSINA) 0.1-20 MG-MCG PER TABLET    Take 1 tablet by mouth once daily.    ZOLPIDEM (AMBIEN) 10 MG TAB    Take 1 tablet (10 mg total) by mouth nightly as needed (insomnia).          CARE TEAM:  Patient Care Team:  Justin Paz MD as PCP - General (Internal Medicine)         REVIEW OF SYSTEMS:  Review of Systems   Constitutional: Positive for activity change and unexpected weight change.   HENT: Positive for rhinorrhea. Negative for hearing loss and trouble swallowing.    Eyes: Negative for discharge and visual disturbance.   Respiratory: Negative for chest tightness and wheezing.    Cardiovascular: Negative for chest pain and palpitations.   Gastrointestinal: Positive for constipation. Negative for blood in stool, diarrhea and vomiting.   Endocrine: Negative for polydipsia and polyuria.   Genitourinary: Negative for difficulty urinating, dysuria, hematuria and menstrual problem.   Musculoskeletal: Negative for arthralgias, joint swelling and neck pain.   Neurological: Positive for headaches. Negative for weakness.   Psychiatric/Behavioral: Negative for confusion and  dysphoric mood.         PHYSICAL EXAM:   Vitals:    04/16/19 1014   BP: 120/80   Pulse: 78   Resp: 17   Temp: 98.3 °F (36.8 °C)             Body mass index is 25.83 kg/m².     General appearance - alert, well appearing, and in no distress and normal appearing weight  Mental status - normal mood, behavior, speech, dress, motor activity, and thought processes  Eyes - pupils equal and reactive, extraocular eye movements intact, sclera anicteric  Ears - bilateral TM's and external ear canals normal  Mouth - mucous membranes moist, pharynx normal without lesions  Chest - clear to auscultation, no wheezes, rales or rhonchi, symmetric air entry  Heart - normal rate, regular rhythm, normal S1, S2, no murmurs, rubs, clicks or gallops  Abdomen - soft, nontender, nondistended, no masses or organomegaly  no rebound tenderness noted  Extremities - peripheral pulses normal, no pedal edema, no clubbing or cyanosis      ASSESSMENT AND PLAN:  1. Insomnia due to medical condition  - zolpidem (AMBIEN) 10 mg Tab; Take 1 tablet (10 mg total) by mouth nightly as needed (insomnia).  Dispense: 30 tablet; Refill: 0    2. Fibromyalgia  - stable on Cymbalta    3. Seasonal allergies  - stable on current management - continue.       Follow up 6 months or sooner as needed.

## 2019-05-13 DIAGNOSIS — G47.01 INSOMNIA DUE TO MEDICAL CONDITION: ICD-10-CM

## 2019-05-13 RX ORDER — ZOLPIDEM TARTRATE 10 MG/1
10 TABLET ORAL NIGHTLY PRN
Qty: 30 TABLET | Refills: 0 | Status: SHIPPED | OUTPATIENT
Start: 2019-05-13 | End: 2019-06-11 | Stop reason: SDUPTHER

## 2019-06-11 DIAGNOSIS — G47.01 INSOMNIA DUE TO MEDICAL CONDITION: ICD-10-CM

## 2019-06-11 RX ORDER — ZOLPIDEM TARTRATE 10 MG/1
10 TABLET ORAL NIGHTLY PRN
Qty: 30 TABLET | Refills: 0 | Status: SHIPPED | OUTPATIENT
Start: 2019-06-11 | End: 2019-07-11 | Stop reason: SDUPTHER

## 2019-06-23 RX ORDER — DULOXETIN HYDROCHLORIDE 60 MG/1
CAPSULE, DELAYED RELEASE ORAL
Qty: 90 CAPSULE | Refills: 0 | Status: SHIPPED | OUTPATIENT
Start: 2019-06-23 | End: 2019-08-05

## 2019-07-02 RX ORDER — TIMOLOL MALEATE 5 MG/ML
SOLUTION/ DROPS OPHTHALMIC
Qty: 84 TABLET | Refills: 0 | Status: SHIPPED | OUTPATIENT
Start: 2019-07-02 | End: 2020-07-29 | Stop reason: ALTCHOICE

## 2019-07-02 RX ORDER — TIMOLOL MALEATE 5 MG/ML
SOLUTION/ DROPS OPHTHALMIC
Qty: 28 TABLET | Refills: 0 | Status: SHIPPED | OUTPATIENT
Start: 2019-07-02 | End: 2019-07-02 | Stop reason: SDUPTHER

## 2019-07-11 DIAGNOSIS — G47.01 INSOMNIA DUE TO MEDICAL CONDITION: ICD-10-CM

## 2019-07-15 RX ORDER — ZOLPIDEM TARTRATE 10 MG/1
10 TABLET ORAL NIGHTLY PRN
Qty: 30 TABLET | Refills: 0 | Status: SHIPPED | OUTPATIENT
Start: 2019-07-15 | End: 2019-08-12 | Stop reason: SDUPTHER

## 2019-08-05 ENCOUNTER — LAB VISIT (OUTPATIENT)
Dept: LAB | Facility: HOSPITAL | Age: 39
End: 2019-08-05
Attending: INTERNAL MEDICINE
Payer: COMMERCIAL

## 2019-08-05 ENCOUNTER — OFFICE VISIT (OUTPATIENT)
Dept: RHEUMATOLOGY | Facility: CLINIC | Age: 39
End: 2019-08-05
Payer: COMMERCIAL

## 2019-08-05 VITALS
WEIGHT: 131 LBS | BODY MASS INDEX: 26.41 KG/M2 | DIASTOLIC BLOOD PRESSURE: 77 MMHG | SYSTOLIC BLOOD PRESSURE: 113 MMHG | HEART RATE: 83 BPM | HEIGHT: 59 IN

## 2019-08-05 DIAGNOSIS — R76.8 ANA POSITIVE: Primary | ICD-10-CM

## 2019-08-05 DIAGNOSIS — M79.7 FIBROMYALGIA: ICD-10-CM

## 2019-08-05 DIAGNOSIS — R76.8 ANA POSITIVE: ICD-10-CM

## 2019-08-05 LAB
ALBUMIN SERPL BCP-MCNC: 4.2 G/DL (ref 3.5–5.2)
ALP SERPL-CCNC: 92 U/L (ref 55–135)
ALT SERPL W/O P-5'-P-CCNC: 28 U/L (ref 10–44)
ANION GAP SERPL CALC-SCNC: 9 MMOL/L (ref 8–16)
AST SERPL-CCNC: 19 U/L (ref 10–40)
BASOPHILS # BLD AUTO: 0.04 K/UL (ref 0–0.2)
BASOPHILS NFR BLD: 0.5 % (ref 0–1.9)
BILIRUB SERPL-MCNC: 0.2 MG/DL (ref 0.1–1)
BUN SERPL-MCNC: 11 MG/DL (ref 6–20)
C3 SERPL-MCNC: 130 MG/DL (ref 50–180)
C4 SERPL-MCNC: 21 MG/DL (ref 11–44)
CALCIUM SERPL-MCNC: 9.4 MG/DL (ref 8.7–10.5)
CHLORIDE SERPL-SCNC: 106 MMOL/L (ref 95–110)
CO2 SERPL-SCNC: 26 MMOL/L (ref 23–29)
CREAT SERPL-MCNC: 0.7 MG/DL (ref 0.5–1.4)
DIFFERENTIAL METHOD: ABNORMAL
EOSINOPHIL # BLD AUTO: 0.2 K/UL (ref 0–0.5)
EOSINOPHIL NFR BLD: 2.4 % (ref 0–8)
ERYTHROCYTE [DISTWIDTH] IN BLOOD BY AUTOMATED COUNT: 12.5 % (ref 11.5–14.5)
ERYTHROCYTE [SEDIMENTATION RATE] IN BLOOD BY WESTERGREN METHOD: 15 MM/HR (ref 0–36)
EST. GFR  (AFRICAN AMERICAN): >60 ML/MIN/1.73 M^2
EST. GFR  (NON AFRICAN AMERICAN): >60 ML/MIN/1.73 M^2
GLUCOSE SERPL-MCNC: 91 MG/DL (ref 70–110)
HCT VFR BLD AUTO: 37.4 % (ref 37–48.5)
HGB BLD-MCNC: 12.3 G/DL (ref 12–16)
LYMPHOCYTES # BLD AUTO: 2.8 K/UL (ref 1–4.8)
LYMPHOCYTES NFR BLD: 35 % (ref 18–48)
MCH RBC QN AUTO: 31.1 PG (ref 27–31)
MCHC RBC AUTO-ENTMCNC: 32.9 G/DL (ref 32–36)
MCV RBC AUTO: 94 FL (ref 82–98)
MONOCYTES # BLD AUTO: 0.6 K/UL (ref 0.3–1)
MONOCYTES NFR BLD: 7.2 % (ref 4–15)
NEUTROPHILS # BLD AUTO: 4.4 K/UL (ref 1.8–7.7)
NEUTROPHILS NFR BLD: 54.9 % (ref 38–73)
PLATELET # BLD AUTO: 312 K/UL (ref 150–350)
PMV BLD AUTO: 9.6 FL (ref 9.2–12.9)
POTASSIUM SERPL-SCNC: 4.6 MMOL/L (ref 3.5–5.1)
PROT SERPL-MCNC: 7.5 G/DL (ref 6–8.4)
RBC # BLD AUTO: 3.96 M/UL (ref 4–5.4)
SODIUM SERPL-SCNC: 141 MMOL/L (ref 136–145)
WBC # BLD AUTO: 8.02 K/UL (ref 3.9–12.7)

## 2019-08-05 PROCEDURE — 3008F BODY MASS INDEX DOCD: CPT | Mod: CPTII,S$GLB,, | Performed by: INTERNAL MEDICINE

## 2019-08-05 PROCEDURE — 3008F PR BODY MASS INDEX (BMI) DOCUMENTED: ICD-10-PCS | Mod: CPTII,S$GLB,, | Performed by: INTERNAL MEDICINE

## 2019-08-05 PROCEDURE — 86160 COMPLEMENT ANTIGEN: CPT

## 2019-08-05 PROCEDURE — 80053 COMPREHEN METABOLIC PANEL: CPT

## 2019-08-05 PROCEDURE — 86225 DNA ANTIBODY NATIVE: CPT

## 2019-08-05 PROCEDURE — 99214 PR OFFICE/OUTPT VISIT, EST, LEVL IV, 30-39 MIN: ICD-10-PCS | Mod: S$GLB,,, | Performed by: INTERNAL MEDICINE

## 2019-08-05 PROCEDURE — 86160 COMPLEMENT ANTIGEN: CPT | Mod: 59

## 2019-08-05 PROCEDURE — 99999 PR PBB SHADOW E&M-EST. PATIENT-LVL III: ICD-10-PCS | Mod: PBBFAC,,, | Performed by: INTERNAL MEDICINE

## 2019-08-05 PROCEDURE — 36415 COLL VENOUS BLD VENIPUNCTURE: CPT

## 2019-08-05 PROCEDURE — 99214 OFFICE O/P EST MOD 30 MIN: CPT | Mod: S$GLB,,, | Performed by: INTERNAL MEDICINE

## 2019-08-05 PROCEDURE — 85652 RBC SED RATE AUTOMATED: CPT

## 2019-08-05 PROCEDURE — 86140 C-REACTIVE PROTEIN: CPT

## 2019-08-05 PROCEDURE — 85025 COMPLETE CBC W/AUTO DIFF WBC: CPT

## 2019-08-05 PROCEDURE — 99999 PR PBB SHADOW E&M-EST. PATIENT-LVL III: CPT | Mod: PBBFAC,,, | Performed by: INTERNAL MEDICINE

## 2019-08-05 RX ORDER — DULOXETIN HYDROCHLORIDE 60 MG/1
60 CAPSULE, DELAYED RELEASE ORAL DAILY
Qty: 90 CAPSULE | Refills: 3 | Status: SHIPPED | OUTPATIENT
Start: 2019-08-05 | End: 2020-02-17

## 2019-08-05 ASSESSMENT — ROUTINE ASSESSMENT OF PATIENT INDEX DATA (RAPID3)
PSYCHOLOGICAL DISTRESS SCORE: 4.4
AM STIFFNESS SCORE: 1, YES
PATIENT GLOBAL ASSESSMENT SCORE: 6
TOTAL RAPID3 SCORE: 4.89
PAIN SCORE: 6
FATIGUE SCORE: 5.5
WHEN YOU AWAKENED IN THE MORNING OVER THE LAST WEEK, PLEASE INDICATE THE AMOUNT OF TIME IT TAKES UNTIL YOU ARE AS LIMBER AS YOU WILL BE FOR THE DAY: 1 HOUR
MDHAQ FUNCTION SCORE: .8

## 2019-08-05 NOTE — PROGRESS NOTES
Chief Complaint   Patient presents with    Disease Management     fibromyalgia       Patient with a diagnosis of fibromyalgia syndrome for a follow up    History of presenting illness     is a 39 year old female : we are treating for fibromyalgia :  She has done really well on cymbalta 60 mg    Only pain today : hips and knees    She has gained 15 pounds in 3 months  She has painful acne now    On ambien 10 mg daily    In the past :    We had d/clara her amitryptilline since it made her hungry a lot and didn't help with the pain  She was on meloxicam but she stopped it since she had a rash with some medication  She was on tramadol and she stopped it  She takes OTC tylenol and ibuprofen    Pt is a 40 yo with h/o gastritis, irritable bowel syndrome    Initial complaints     Poor sleep despite taking ambien  Amitryptilline was not helping  Bothersome anxiety    Cant concentrate  Cant remember certain things    Dry eyes and mouth+  Opthalmology : gave tear drops    Hair fall got better with biotin    Ear aches  Windy ear pain gets worse  Cant wear earrings,gets infections  Inside of the ears hurt and throb    Fatigue +    Allergies have gotten worse    Used to exercise 6 times a week now trying to get there     She relocated from [place] November 2017    She was followed by rheumatology for a possible inflammatory arthritis per the patient     She was last seen by her rheumatologist, Dr. Sana Belle sep 2017  We still dont have records    She says US hands showed changes on inflammatory arthritis  She was offered NSAIDs like nalfon and sulindac and she didn't do well    She had seen us in December 2017,we didn't have any data to suggest inflammatory arthritis    We did have her on plaquenil 300 mg but she thinks it gave her a rash and allergic reaction    We diagnosed her as fibromyalgia syndrome    We did the autoimmune panel    Normal CBC,CMP  Normal lipid panel  Normal TSH  Normal RF,CCP  EDUARDO positive,1:  160 homogenous,titre negative  Normal complements  Normal ESR,CRP  Negative APLAS panel    MRI both hands no inflammatory arthritis     Her complaints : diffuse pains in the hands,arms,shoulders and knees  Started February/march 2016     She states that after starting a combination of  mg/d, Tramadol 50 mg bid, Elavil 10 mg/d, mobic 7.5 mg bid her symptoms improved. She is unsure of her diagnosis at this time. Pt reports 30 minutes of am stiffness, intermittent joint swelling/pain, and severe fatigue. She is no longer working due to her fatigue. Pt recounts getting a massage in the past and crying due to being diffusely tender. She denies fevers, chills, Raynaud's, photosensitivity, rashes, alopecia, mucosal ulcers, pleurisy, vision changes, . Pt does report an increase in acne.     No known autoimmune family history    Pt denies a personal or family history of psoriasis.     No blood clots or miscarriages.      Review of Systems   Constitutional: Negative for activity change, appetite change, chills, diaphoresis, fatigue, fever and unexpected weight change.   HENT: Negative for congestion, dental problem, drooling, ear discharge, ear pain, facial swelling, hearing loss, mouth sores, nosebleeds, postnasal drip, rhinorrhea, sinus pressure, sinus pain, sneezing, sore throat, tinnitus, trouble swallowing and voice change.    Eyes: Negative for photophobia, pain, discharge, redness, itching and visual disturbance.   Respiratory: Positive for shortness of breath. Negative for apnea, cough, choking, chest tightness, wheezing and stridor.    Cardiovascular: Negative for chest pain, palpitations and leg swelling.   Gastrointestinal: Positive for constipation. Negative for abdominal distention, abdominal pain, anal bleeding, blood in stool, diarrhea, nausea, rectal pain and vomiting.   Endocrine: Negative for cold intolerance, heat intolerance, polydipsia, polyphagia and polyuria.   Genitourinary: Negative for  decreased urine volume, difficulty urinating, dysuria, enuresis, flank pain, frequency, genital sores, hematuria and urgency.   Musculoskeletal: Negative for arthralgias, back pain, gait problem, joint swelling, myalgias, neck pain and neck stiffness.   Skin: Negative for color change, pallor, rash and wound.   Allergic/Immunologic: Negative for environmental allergies, food allergies and immunocompromised state.   Neurological: Positive for headaches. Negative for dizziness, tremors, seizures, syncope, facial asymmetry, speech difficulty, weakness, light-headedness and numbness.   Hematological: Negative for adenopathy. Does not bruise/bleed easily.   Psychiatric/Behavioral: Negative for agitation, behavioral problems, confusion, decreased concentration, dysphoric mood, hallucinations, self-injury, sleep disturbance and suicidal ideas. The patient is not nervous/anxious and is not hyperactive.         Physical Exam     SHEPPARD-28 tender joint count: 0  SHEPPARD-28 swollen joint count: 0       Physical Exam   Constitutional: She is oriented to person, place, and time and well-developed, well-nourished, and in no distress. No distress.   HENT:   Head: Normocephalic.   Mouth/Throat: Oropharynx is clear and moist.   Eyes: Conjunctivae are normal. Pupils are equal, round, and reactive to light. Right eye exhibits no discharge. Left eye exhibits no discharge. No scleral icterus.   Neck: Normal range of motion. No thyromegaly present.   Cardiovascular: Normal rate, regular rhythm, normal heart sounds and intact distal pulses.    Pulmonary/Chest: Effort normal and breath sounds normal. No stridor.   Abdominal: Soft. Bowel sounds are normal.   Lymphadenopathy:     She has no cervical adenopathy.   Neurological: She is alert and oriented to person, place, and time.   Skin: Skin is warm. No rash noted. She is not diaphoretic.     Psychiatric: Affect and judgment normal.   Musculoskeletal: Normal range of motion.         Assessment      Pt is a 40 yo with h/o gastritis, irritable bowel syndrome being followed here for fibromyalgia    We diagnosed her with FMS since she met the criteria     1.  Widespread pain index greater than or equal to 7 and symptom severity score greater than or equal to 5 or widespread pain index between 3- 6, and symptom severity score greater than or equal to 9  2.  Symptoms have been present in a similar level for at least 3 months  3.  The patient does not have a disorder that would otherwise sufficiently explain the pain    She does not have records of diagnosis and if she had an previous inflammatory arthritis.   She claims that she had an ultrasound showing evidence of inflammatory arthritis   We have tried to obtain records and we have been unsuccessful  She brought some print outs on the portal and there is mention of inflammatory arthritis and I see NSAIDs and plaquenil on the prescriptions    We did our own hand MRIs  She has cystic lesions b/l which might be indicative of burned out erosions  It is possible that she had an old inflammatory arthritis which self resolved    She also mentions getting better by combination of tramadol + meloxicam  She cannot take higher dose of amitryptilline,she likes the cymbalta 60 mg better     She also mentions sicca symptoms but she thinks it might be s/p ambien use but definitely prior to initiation of amitryptilline     We are treating her for fibromyalgia with cymbalta only     We had her on plaquenil given the evidence of inflammatory arthritis on prior US and current MRI revealing old erosions   But she had a rash and she attributed it to plaquenil  She made a lot of changes with shampoos and detergents but she didn't see a change so she went off plaquenil and meloxicam     Today her concerns are  -rapid weight gain and severe acne     1. EDUARDO positive    2. Fibromyalgia        Plan    See OB and make sure there are no hormonal issues,r/o PCOS     Continue  cymbalta    Exercise regularly    Suggested lip biopsy and corneal staining at some point to see if she has Sjogren's  Not sure if her sicca symptoms are drug induced yet,but they are definitely bothersome    Management of sicca symptoms    -preservative free artifical tears 3 to 4 times a day  Lubricating ointments at night  Wraparound sun glasses/moisture shields which attach to glasses help  Opthalmology evaluation,management : she will need split lamp,schirmer's test and ocular surface staining    -biotene and ACT preparations  Dental evaluations  Periodic cleaning  Use fluoride products  Brush and floss teeth regularly especially after meals     Avoid sugar containing foods and drinks    Use of vaginal lubricants/estrogen creams   Seeing Gyn    Use hypoallergenic soaps/lotions for the skin  Avoid drafts from air conditioners/heaters/radiators  Avoid detergents,deodorant soaps,very hot water  Use humidifiers    NO PLAQUENIL FOR NOW  IF SYMPTOMS CHANGE AND WE SEE INFLAMMATORY ARTHRITIS WE WILL OFFER METHOTREXATE    Water aerobics  Yoga and aleah chi    See psychiatry/counsellor  Sleep clinic evaluation    Every 6 monthly lupus labs   Labs today    Roopa was seen today for disease management.    Diagnoses and all orders for this visit:    EDUARDO positive  -     CBC auto differential; Future  -     Comprehensive metabolic panel; Future  -     C-reactive protein; Future  -     Sedimentation rate; Future  -     Anti-DNA antibody, double-stranded; Future  -     C3 complement; Future  -     C4 complement; Future  -     Protein / creatinine ratio, urine; Future  -     Urinalysis; Future    Fibromyalgia    Other orders  -     DULoxetine (CYMBALTA) 60 MG capsule; Take 1 capsule (60 mg total) by mouth once daily.      rtc in 6 months

## 2019-08-05 NOTE — PROGRESS NOTES
Rapid3 Question Responses and Scores 8/4/2019   MDHAQ Score 0.8   Psychologic Score 4.4   Pain Score 6   When you awakened in the morning OVER THE LAST WEEK, did you feel stiff? Yes   If Yes, please indicate the number of hours until you are as limber as you will be for the day 1   Fatigue Score 5.5   Global Health Score 6   RAPID3 Score 4.88       Answers for HPI/ROS submitted by the patient on 8/4/2019   fever: No  eye redness: No  headaches: Yes  shortness of breath: No  chest pain: No  trouble swallowing: No  diarrhea: No  constipation: No  unexpected weight change: Yes  genital sore: No  dysuria: No  During the last 3 days, have you had a skin rash?: No  Bruises or bleeds easily: No  cough: No

## 2019-08-06 LAB
CRP SERPL-MCNC: 2.3 MG/L (ref 0–8.2)
DSDNA AB SER-ACNC: NORMAL [IU]/ML

## 2019-08-12 DIAGNOSIS — G47.01 INSOMNIA DUE TO MEDICAL CONDITION: ICD-10-CM

## 2019-08-12 RX ORDER — ZOLPIDEM TARTRATE 10 MG/1
10 TABLET ORAL NIGHTLY PRN
Qty: 30 TABLET | Refills: 0 | Status: SHIPPED | OUTPATIENT
Start: 2019-08-12 | End: 2019-09-10 | Stop reason: SDUPTHER

## 2019-08-28 ENCOUNTER — OFFICE VISIT (OUTPATIENT)
Dept: OBSTETRICS AND GYNECOLOGY | Facility: CLINIC | Age: 39
End: 2019-08-28
Payer: COMMERCIAL

## 2019-08-28 ENCOUNTER — LAB VISIT (OUTPATIENT)
Dept: LAB | Facility: HOSPITAL | Age: 39
End: 2019-08-28
Attending: OBSTETRICS & GYNECOLOGY
Payer: COMMERCIAL

## 2019-08-28 VITALS
BODY MASS INDEX: 27.42 KG/M2 | SYSTOLIC BLOOD PRESSURE: 100 MMHG | DIASTOLIC BLOOD PRESSURE: 78 MMHG | HEIGHT: 59 IN | WEIGHT: 136 LBS

## 2019-08-28 DIAGNOSIS — L70.0 CYSTIC ACNE: ICD-10-CM

## 2019-08-28 DIAGNOSIS — N92.6 IRREGULAR PERIODS: ICD-10-CM

## 2019-08-28 DIAGNOSIS — N64.4 MASTALGIA IN FEMALE: Primary | ICD-10-CM

## 2019-08-28 LAB
PROLACTIN SERPL IA-MCNC: 4 NG/ML (ref 5.2–26.5)
T4 FREE SERPL-MCNC: 1.08 NG/DL (ref 0.71–1.51)
TSH SERPL DL<=0.005 MIU/L-ACNC: 4.61 UIU/ML (ref 0.4–4)

## 2019-08-28 PROCEDURE — 99999 PR PBB SHADOW E&M-EST. PATIENT-LVL III: CPT | Mod: PBBFAC,,, | Performed by: OBSTETRICS & GYNECOLOGY

## 2019-08-28 PROCEDURE — 84146 ASSAY OF PROLACTIN: CPT

## 2019-08-28 PROCEDURE — 36415 COLL VENOUS BLD VENIPUNCTURE: CPT

## 2019-08-28 PROCEDURE — 84439 ASSAY OF FREE THYROXINE: CPT

## 2019-08-28 PROCEDURE — 84402 ASSAY OF FREE TESTOSTERONE: CPT

## 2019-08-28 PROCEDURE — 83498 ASY HYDROXYPROGESTERONE 17-D: CPT

## 2019-08-28 PROCEDURE — 3008F PR BODY MASS INDEX (BMI) DOCUMENTED: ICD-10-PCS | Mod: CPTII,S$GLB,, | Performed by: OBSTETRICS & GYNECOLOGY

## 2019-08-28 PROCEDURE — 3008F BODY MASS INDEX DOCD: CPT | Mod: CPTII,S$GLB,, | Performed by: OBSTETRICS & GYNECOLOGY

## 2019-08-28 PROCEDURE — 99214 PR OFFICE/OUTPT VISIT, EST, LEVL IV, 30-39 MIN: ICD-10-PCS | Mod: S$GLB,,, | Performed by: OBSTETRICS & GYNECOLOGY

## 2019-08-28 PROCEDURE — 84443 ASSAY THYROID STIM HORMONE: CPT

## 2019-08-28 PROCEDURE — 99214 OFFICE O/P EST MOD 30 MIN: CPT | Mod: S$GLB,,, | Performed by: OBSTETRICS & GYNECOLOGY

## 2019-08-28 PROCEDURE — 84403 ASSAY OF TOTAL TESTOSTERONE: CPT

## 2019-08-28 PROCEDURE — 99999 PR PBB SHADOW E&M-EST. PATIENT-LVL III: ICD-10-PCS | Mod: PBBFAC,,, | Performed by: OBSTETRICS & GYNECOLOGY

## 2019-08-28 RX ORDER — DROSPIRENONE AND ETHINYL ESTRADIOL 0.02-3(28)
1 KIT ORAL DAILY
Qty: 28 TABLET | Refills: 5 | Status: SHIPPED | OUTPATIENT
Start: 2019-08-28 | End: 2020-01-29

## 2019-08-28 NOTE — PROGRESS NOTES
SUBJECTIVE:   39 y.o. female   for was referred for pcos w/u with a few concerns    Patient's last menstrual period was 2019 (exact date)..      1. Pt reported she had regular period before but now reported menstrual cycles are irregular every 35-60 days since age 12.     has been on ocp for menstrual cycle regulation  She was placed on loestrin last year for contraception - has been having acne /break out since then.   Never had problem with cystic acne before.   Now also had a few hair growing on her chin, darker hair on right chin then left  Also has 20 lbs weight gain over the past year, she was also on steroids for joints pain  2.  She was also found to have hematuria - pt now remembered that she was told she had kidney stones in the past - but has been asymptomatic  3.  C/o b/l breast pain x 2 months. Constant, denies nipple discharge/skin changes.  She drinks decaf coffee daily    OB History    Para Term  AB Living   4 4 4     4   SAB TAB Ectopic Multiple Live Births           4      # Outcome Date GA Lbr Bhanu/2nd Weight Sex Delivery Anes PTL Lv   4 Term            3 Term            2 Term            1 Term               Obstetric Comments    x 4   Gynhx/ irregular every 35-60/5.    On ocp   H/o chlamydia in , s/p treatment   Denies abnl pap, last pap  NEG     Past Medical History:   Diagnosis Date    Arthritis     Fibromyalgia     Gastroenteritis     Long-term use of Plaquenil 2017     History reviewed. No pertinent surgical history.  Social History     Socioeconomic History    Marital status:      Spouse name: Not on file    Number of children: Not on file    Years of education: Not on file    Highest education level: Not on file   Occupational History    Not on file   Social Needs    Financial resource strain: Not hard at all    Food insecurity:     Worry: Never true     Inability: Never true    Transportation needs:     Medical: No      Non-medical: No   Tobacco Use    Smoking status: Never Smoker    Smokeless tobacco: Never Used   Substance and Sexual Activity    Alcohol use: No     Frequency: Never     Drinks per session: Patient refused     Binge frequency: Never    Drug use: No    Sexual activity: Yes     Partners: Male     Birth control/protection: Condom   Lifestyle    Physical activity:     Days per week: 1 day     Minutes per session: 20 min    Stress: Very much   Relationships    Social connections:     Talks on phone: More than three times a week     Gets together: Never     Attends Sikh service: Not on file     Active member of club or organization: No     Attends meetings of clubs or organizations: Never     Relationship status:    Other Topics Concern    Not on file   Social History Narrative    Not on file     Family History   Problem Relation Age of Onset    Arthritis Mother     No Known Problems Father     Arthritis Maternal Grandmother     Arthritis Maternal Grandfather     Cancer Paternal Grandfather          Current Outpatient Medications   Medication Sig Dispense Refill    DULoxetine (CYMBALTA) 60 MG capsule Take 1 capsule (60 mg total) by mouth once daily. 90 capsule 3    VIENVA 0.1-20 mg-mcg per tablet TAKE 1 TABLET BY MOUTH EVERY DAY 84 tablet 0    zolpidem (AMBIEN) 10 mg Tab Take 1 tablet (10 mg total) by mouth nightly as needed (insomnia). 30 tablet 0     No current facility-administered medications for this visit.      Allergies: Patient has no known allergies.       ROS:  GENERAL: Denies weight gain or weight loss. Feeling well overall.   SKIN: Denies rash or lesions.   HEAD: Denies head injury or headache.   NODES: Denies enlarged lymph nodes.   CHEST: Denies chest pain or shortness of breath.   CARDIOVASCULAR: Denies palpitations or left sided chest pain.   ABDOMEN: No abdominal pain, constipation, diarrhea, nausea, vomiting or rectal bleeding.   URINARY: No frequency, dysuria, hematuria,  "or burning on urination.  REPRODUCTIVE: Denies vaginal discharge, abnormal vaginal bleeding, lesions, pelvic pain  BREASTS: The patient performs breast self-examination and denies pain, lumps, or nipple discharge.   HEMATOLOGIC: No easy bruisability or excessive bleeding.  MUSCULOSKELETAL: Denies joint pain or swelling.   NEUROLOGIC: Denies syncope or weakness.   PSYCHIATRIC: Denies depression, anxiety or mood swings.      OBJECTIVE:   /78 (BP Location: Right arm, Patient Position: Sitting, BP Method: Medium (Manual))   Ht 4' 11" (1.499 m)   Wt 61.7 kg (136 lb 0.4 oz)   LMP 08/08/2019 (Exact Date)   BMI 27.47 kg/m²   The patient appears well, alert, oriented x 3, in no distress.  NECK: negative, no thyromegaly, trachea midline  SKIN: cystic acne on face, skin good color, good turgor otherwise  BREAST EXAM: breasts appear normal, no suspicious masses, no skin or nipple changes or axillary nodes  ABDOMEN: soft, non-tender; bowel sounds normal; no masses,  no organomegaly and no hernias, masses, or hepatosplenomegaly        ASSESSMENT:   1.  Mastalgia:  Check breast Us.  avoid caffeine products, supportive bras and Vit E supplements.  2.  Irregular period with cystic acne:  Will check labs and US for pcos, will switch ocp to [facility] (discussed higher risk of DVT/VTE with [facility] compares to other ocps)  3.  Hematuria:  Pt to f/u with pcp  4.  F/u in 3 months for wwe  "

## 2019-08-29 ENCOUNTER — HOSPITAL ENCOUNTER (OUTPATIENT)
Dept: RADIOLOGY | Facility: CLINIC | Age: 39
Discharge: HOME | End: 2019-08-29
Attending: OBSTETRICS & GYNECOLOGY
Payer: COMMERCIAL

## 2019-08-29 DIAGNOSIS — N92.6 IRREGULAR PERIODS: ICD-10-CM

## 2019-08-29 DIAGNOSIS — L70.0 CYSTIC ACNE: ICD-10-CM

## 2019-08-29 LAB — TESTOST SERPL-MCNC: 14 NG/DL (ref 5–73)

## 2019-08-29 PROCEDURE — 76830 US PELVIS COMP WITH TRANSVAG NON-OB (XPD): ICD-10-PCS | Mod: 26,,, | Performed by: RADIOLOGY

## 2019-08-29 PROCEDURE — 76856 US PELVIS COMP WITH TRANSVAG NON-OB (XPD): ICD-10-PCS | Mod: 26,,, | Performed by: RADIOLOGY

## 2019-08-29 PROCEDURE — 76830 TRANSVAGINAL US NON-OB: CPT | Mod: TC,PO

## 2019-08-29 PROCEDURE — 76856 US EXAM PELVIC COMPLETE: CPT | Mod: 26,,, | Performed by: RADIOLOGY

## 2019-08-29 PROCEDURE — 76830 TRANSVAGINAL US NON-OB: CPT | Mod: 26,,, | Performed by: RADIOLOGY

## 2019-08-30 LAB
17OHP SERPL-MCNC: 55 NG/DL (ref 35–413)
TESTOST FREE SERPL-MCNC: 0.9 PG/ML

## 2019-09-03 ENCOUNTER — HOSPITAL ENCOUNTER (OUTPATIENT)
Dept: RADIOLOGY | Facility: HOSPITAL | Age: 39
Discharge: HOME | End: 2019-09-03
Attending: OBSTETRICS & GYNECOLOGY
Payer: COMMERCIAL

## 2019-09-03 DIAGNOSIS — N64.4 MASTALGIA IN FEMALE: ICD-10-CM

## 2019-09-10 DIAGNOSIS — G47.01 INSOMNIA DUE TO MEDICAL CONDITION: ICD-10-CM

## 2019-09-10 RX ORDER — ZOLPIDEM TARTRATE 10 MG/1
10 TABLET ORAL NIGHTLY PRN
Qty: 30 TABLET | Refills: 0 | Status: SHIPPED | OUTPATIENT
Start: 2019-09-10 | End: 2019-10-10 | Stop reason: SDUPTHER

## 2019-10-10 DIAGNOSIS — G47.01 INSOMNIA DUE TO MEDICAL CONDITION: ICD-10-CM

## 2019-10-10 RX ORDER — ZOLPIDEM TARTRATE 10 MG/1
10 TABLET ORAL NIGHTLY PRN
Qty: 30 TABLET | Refills: 0 | Status: SHIPPED | OUTPATIENT
Start: 2019-10-10 | End: 2019-11-05 | Stop reason: SDUPTHER

## 2019-10-21 RX ORDER — DULOXETIN HYDROCHLORIDE 60 MG/1
CAPSULE, DELAYED RELEASE ORAL
Qty: 90 CAPSULE | Refills: 0 | Status: SHIPPED | OUTPATIENT
Start: 2019-10-21 | End: 2020-08-03

## 2019-10-29 ENCOUNTER — OFFICE VISIT (OUTPATIENT)
Dept: FAMILY MEDICINE | Facility: CLINIC | Age: 39
End: 2019-10-29
Payer: COMMERCIAL

## 2019-10-29 VITALS
BODY MASS INDEX: 27.29 KG/M2 | WEIGHT: 135.38 LBS | SYSTOLIC BLOOD PRESSURE: 120 MMHG | DIASTOLIC BLOOD PRESSURE: 80 MMHG | HEIGHT: 59 IN | OXYGEN SATURATION: 97 % | RESPIRATION RATE: 17 BRPM | TEMPERATURE: 99 F | HEART RATE: 78 BPM

## 2019-10-29 DIAGNOSIS — Z23 NEED FOR INFLUENZA VACCINATION: Primary | ICD-10-CM

## 2019-10-29 DIAGNOSIS — L70.9 ACNE, UNSPECIFIED ACNE TYPE: ICD-10-CM

## 2019-10-29 DIAGNOSIS — B35.9 TINEA: ICD-10-CM

## 2019-10-29 DIAGNOSIS — Z00.00 HEALTHCARE MAINTENANCE: ICD-10-CM

## 2019-10-29 PROCEDURE — 90686 IIV4 VACC NO PRSV 0.5 ML IM: CPT | Mod: S$GLB,,, | Performed by: INTERNAL MEDICINE

## 2019-10-29 PROCEDURE — 99999 PR PBB SHADOW E&M-EST. PATIENT-LVL IV: ICD-10-PCS | Mod: PBBFAC,,, | Performed by: INTERNAL MEDICINE

## 2019-10-29 PROCEDURE — 3008F PR BODY MASS INDEX (BMI) DOCUMENTED: ICD-10-PCS | Mod: CPTII,S$GLB,, | Performed by: INTERNAL MEDICINE

## 2019-10-29 PROCEDURE — 90471 IMMUNIZATION ADMIN: CPT | Mod: S$GLB,,, | Performed by: INTERNAL MEDICINE

## 2019-10-29 PROCEDURE — 3008F BODY MASS INDEX DOCD: CPT | Mod: CPTII,S$GLB,, | Performed by: INTERNAL MEDICINE

## 2019-10-29 PROCEDURE — 90686 FLU VACCINE (QUAD) GREATER THAN OR EQUAL TO 3YO PRESERVATIVE FREE IM: ICD-10-PCS | Mod: S$GLB,,, | Performed by: INTERNAL MEDICINE

## 2019-10-29 PROCEDURE — 90471 FLU VACCINE (QUAD) GREATER THAN OR EQUAL TO 3YO PRESERVATIVE FREE IM: ICD-10-PCS | Mod: S$GLB,,, | Performed by: INTERNAL MEDICINE

## 2019-10-29 PROCEDURE — 99214 PR OFFICE/OUTPT VISIT, EST, LEVL IV, 30-39 MIN: ICD-10-PCS | Mod: 25,S$GLB,, | Performed by: INTERNAL MEDICINE

## 2019-10-29 PROCEDURE — 99999 PR PBB SHADOW E&M-EST. PATIENT-LVL IV: CPT | Mod: PBBFAC,,, | Performed by: INTERNAL MEDICINE

## 2019-10-29 PROCEDURE — 99214 OFFICE O/P EST MOD 30 MIN: CPT | Mod: 25,S$GLB,, | Performed by: INTERNAL MEDICINE

## 2019-10-29 RX ORDER — CLOTRIMAZOLE AND BETAMETHASONE DIPROPIONATE 10; .64 MG/G; MG/G
CREAM TOPICAL 2 TIMES DAILY
Qty: 15 G | Refills: 0 | Status: SHIPPED | OUTPATIENT
Start: 2019-10-29

## 2019-10-29 NOTE — PROGRESS NOTES
HISTORY OF PRESENT ILLNESS:  Roopa Rivas is a 39 y.o. female who presents to the clinic today for Follow-up (6 months)    LOV 4/16/19.    Recently changed to Mone birth control  Notes increased acne in the last year.  Used an AcneFree wash and caused dryness and burning to the skin.  Used Murad. But did not work.    Started taking a magnesium supplement and feels like it has caused her trouble with sleep even when she takes Ambien.    Continuing with Cymbalta for fibromyalgia pain.    PAST MEDICAL HISTORY:  Past Medical History:   Diagnosis Date    Arthritis     Fibromyalgia     Gastroenteritis     Long-term use of Plaquenil 12/7/2017       PAST SURGICAL HISTORY:  No past surgical history on file.    SOCIAL HISTORY:  Social History     Socioeconomic History    Marital status:      Spouse name: Not on file    Number of children: Not on file    Years of education: Not on file    Highest education level: Not on file   Occupational History    Not on file   Social Needs    Financial resource strain: Not hard at all    Food insecurity:     Worry: Never true     Inability: Never true    Transportation needs:     Medical: No     Non-medical: No   Tobacco Use    Smoking status: Never Smoker    Smokeless tobacco: Never Used   Substance and Sexual Activity    Alcohol use: No     Frequency: Never     Drinks per session: Patient refused     Binge frequency: Never    Drug use: No    Sexual activity: Yes     Partners: Male     Birth control/protection: Condom   Lifestyle    Physical activity:     Days per week: 1 day     Minutes per session: 20 min    Stress: Very much   Relationships    Social connections:     Talks on phone: More than three times a week     Gets together: Never     Attends Scientologist service: Not on file     Active member of club or organization: No     Attends meetings of clubs or organizations: Never     Relationship status:    Other Topics Concern    Not on file   Social  History Narrative    Not on file       FAMILY HISTORY:  Family History   Problem Relation Age of Onset    Arthritis Mother     No Known Problems Father     Arthritis Maternal Grandmother     Arthritis Maternal Grandfather     Cancer Paternal Grandfather        ALLERGIES AND MEDICATIONS: updated and reviewed.  Review of patient's allergies indicates:  No Known Allergies  Medication List with Changes/Refills   Current Medications    DROSPIRENONE-ETHINYL ESTRADIOL (SARAH) 3-0.02 MG PER TABLET    Take 1 tablet by mouth once daily.    DULOXETINE (CYMBALTA) 60 MG CAPSULE    Take 1 capsule (60 mg total) by mouth once daily.    DULOXETINE (CYMBALTA) 60 MG CAPSULE    TAKE ONE CAPSULE BY MOUTH ONCE DAILY    VIENVA 0.1-20 MG-MCG PER TABLET    TAKE 1 TABLET BY MOUTH EVERY DAY    ZOLPIDEM (AMBIEN) 10 MG TAB    Take 1 tablet (10 mg total) by mouth nightly as needed (insomnia).          CARE TEAM:  Patient Care Team:  Justin Paz MD as PCP - General (Internal Medicine)  Angeles Herron MA as Care Coordinator         REVIEW OF SYSTEMS:  Review of Systems   Constitutional: Positive for unexpected weight change. Negative for activity change.   HENT: Negative for hearing loss, rhinorrhea and trouble swallowing.    Eyes: Negative for discharge and visual disturbance.   Respiratory: Negative for chest tightness and wheezing.    Cardiovascular: Negative for chest pain and palpitations.   Gastrointestinal: Negative for blood in stool, constipation, diarrhea and vomiting.   Endocrine: Negative for polydipsia and polyuria.   Genitourinary: Negative for difficulty urinating, dysuria, hematuria and menstrual problem.   Musculoskeletal: Negative for arthralgias, joint swelling and neck pain.   Neurological: Negative for weakness and headaches.   Psychiatric/Behavioral: Negative for confusion and dysphoric mood.         PHYSICAL EXAM:   Vitals:    10/29/19 1100   BP: 120/80   Pulse: 78   Resp: 17   Temp: 98.6 °F (37 °C)              Body mass index is 27.34 kg/m².     General appearance - alert, well appearing, and in no distress and overweight  Mental status - normal mood, behavior, speech, dress, motor activity, and thought processes  Eyes - sclera anicteric, left eye normal, right eye normal  Ears - bilateral TM's and external ear canals normal  Chest - clear to auscultation, no wheezes, rales or rhonchi, symmetric air entry  Heart - normal rate and regular rhythm  Neurological - alert, oriented, normal speech, no focal findings or movement disorder noted, motor and sensory grossly normal bilaterally  Extremities - no pedal edema noted, intact peripheral pulses      ASSESSMENT AND PLAN:  1. Need for influenza vaccination  - Influenza - Quadrivalent (6 months+) (PF)    2. Healthcare maintenance  - Hemoglobin A1c; Future  - Comprehensive metabolic panel; Future  - Lipid panel; Future  - CBC auto differential; Future  - TSH; Future  - Vitamin D; Future  - T4, free; Future    3. Tinea  - clotrimazole-betamethasone 1-0.05% (LOTRISONE) cream; Apply topically 2 (two) times daily.  Dispense: 15 g; Refill: 0    4. Acne, unspecified acne type  - Ambulatory Referral to Dermatology         Follow up with labs and visit in Feb 2020 or sooner as needed.

## 2019-10-29 NOTE — PATIENT INSTRUCTIONS
Fibromyalgia  Fibromyalgia is a chronic condition. It causes pain and tenderness in connective tissues. This causes muscle pain. Often, there are also many tender areas throughout the body. Symptoms may also include stiffness and feelings of numbness and tingling. Symptoms may be worse upon waking up. They may increase with poor sleep, heavy activity, cold or damp weather, anxiety, or stress.  People with fibromyalgia often feel tired. They may have trouble sleeping. Other symptoms include morning stiffness, headaches, and painful menstrual periods. Some people have problems with thinking clearly and changes in memory.  The cause of fibromyalgia is not known. Symptoms are similar to that of other diseases. These include rheumatoid arthritis, low thyroid, chronic fatigue syndrome, and Lyme disease. In some cases, these diseases may occur together.  Fibromyalgia is often treated with medicines. You and your healthcare provider can discuss the medicine that may work best for you. You may have to try more than one medicine or combination of medicines before you find what works for you.  Home care  · If your healthcare provider has prescribed or recommended medicines, take them as directed.  · Rest as needed. Try to get enough sleep. If you have trouble sleeping, discuss this with your healthcare provider.   · Be active. Regular exercise can help manage symptoms. Some options include walking, swimming, and biking. Strengthening exercises may also be helpful. Talk to your healthcare provider about the best ways to be active.  · Follow a healthy diet. Limit caffeine and alcohol. If you smoke, ask your healthcare provider for help to stop.  · Notice how your body reacts to stress. Learn to listen to your body signals. This will help you take action before the stress becomes severe.  · Learn relaxation techniques. Also consider joining a stress reduction program or class.  · Talk to your healthcare provider about trying  complementary treatments. These include acupuncture, hypnosis, and biofeedback. Yoga and aleah chi may be helpful.  · Ask your healthcare provider about cognitive behavioral therapy (CBT). This type of counseling can help people with fibromyalgia cope better with their illness.  Follow-up care  Follow up with your healthcare provider or as advised by our staff. In many cases, fibromyalgia is best treated with a team approach.  This may involve your primary care provider, a rheumatologist, a physical therapist, and a mental health professional.   For more information: National [place] of Arthritis and Musculoskeletal and Skin Diseases (NIAMS)  www.niams.nih.gov 272-601-3351  When to seek medical advice  Contact your healthcare provider right away if any of these occur:  · Symptoms getting worse or new symptoms developing  · You feel hopeless, helpless, or lose interest in day-to-day life  Date Last Reviewed: 3/1/2017  © 1321-5876 [employer]. [address] [place] [address]. All rights reserved. This information is not intended as a substitute for professional medical care. Always follow your healthcare professional's instructions.        Managing Fibromyalgia    Fibromyalgia is a chronic illness that causes pain in specific points on the body, stiffness, and fatigue. But it doesnt have to keep you from doing what you enjoy. You can feel better. You and your healthcare provider can work together to develop a plan.  Take medications as directed  You may be given medications to help reduce pain and improve sleep.  Several medications are approved to treat fibromyalgia. Two were originally designed to treat depression. They are duloxetine, and milnacipran. A third, called pregaballin, was developed to treat nerve pain. Other medications include pain relievers such as acetaminophen or stronger narcotics. These may be prescribed short term.  NSAIDs (non-steroidal anti-inflammatory drugs) include  aspirin, ibuprofen and naproxen are used to relieve pain.  Get exercise  Gentle exercise can help lessen your pain. Try these tips:  · Choose activities that are gentle on your joints, such as walking, biking, and swimming or other water exercises.  · Dont push yourself too hard. Build up your strength and endurance slowly, over time.  · Stick to it. For the most relief, exercise should become part of your daily life.  Get a good nights rest  To help you get more sleep, try the tips below:  · Sleep only in a bed, not on a couch or chair.  · Dont watch TV, read, or work in bed.  · Go to bed and get up at the same time each day.  · Try to avoid naps.  · Avoid alcohol, caffeine, and tobacco for at least 3 hours before going to bed.  · Avoid fluids in the evening to avoid having to get up to urinate.  Other things you can do  No one knows what causes fibromyalgia. But stress, poor eating habits, and extra weight can make it worse. These tips may help you feel better:  · Eat a balanced diet with plenty of fruits and vegetables, whole grains, lean protein, and low-fat or nonfat dairy products.  · Maintain a healthy weight.  · Learn ways to reduce or manage the stress in your life.  · Ask your healthcare provider for resources to help you make changes. Or check out the resources below.  Resources  · Arthritis Foundation  www.arthritis.org  · National Fibromyalgia Association  www.fmaware.org  · American Fibromyalgia Syndrome Association  www.afsafund.org  Date Last Reviewed: 2/14/2016 © 2000-2017 [employer]. [address] [place] [address], [place] [address]. All rights reserved. This information is not intended as a substitute for professional medical care. Always follow your healthcare professional's instructions.        Understanding Fibromyalgia     People with fibromyalgia tend to have at least 11 of the 18 tender points shown above.     Fibromyalgia is a condition that causes pain at specific points on the  body, stiffness, and fatigue.  What are the symptoms of fibromyalgia?  In most cases, you will have tender points on your body. These points are very sore, especially when touched. Finding several of these points helps your healthcare provider diagnose fibromyalgia.  Along with the tender points, you may have some or all of the following symptoms:  · Constant tiredness (fatigue), even after a full nights sleep (non-restorative sleep)  · A burning or throbbing pain in many parts of the body (this pain may vary during the day)  · Stiffness or aching all over your body  · Numbness or tingling in your arms and legs  · Trouble sleeping  · Bowel problems (bloating, diarrhea, constipation)  · Headaches  · Depression  How is fibromyalgia diagnosed?  There is no lab test to diagnose fibromyalgia. Instead, your healthcare provider will take your health history and examine your joints and muscles. Certain criteria are used when diagnosing fibromyalgia. Symptoms need to be present for at least 3 months. Tests may be done to rule out other conditions with similar symptoms. Then, together you can design a plan to help you manage your symptoms.   How is fibromyalgia treated?  Several medications are approved to treat fibromyalgia. Two were originally made to treat depression. They are duloxetine, and milnacipran. A third, called pregaballin, was developed to treat nerve pain. Certain medicines used to treat depression have been helpful with fibromyalgia. Other medications include pain relievers such as acetaminophen or stronger narcotics. These may be prescribed short term.  NSAIDs (nonsteroidal anti-inflammatory drugs) including aspirin, ibuprofen, and naproxen are used to relieve pain.  Getting enough sleep, regular exercise, and eating a healthy diet can help manage symptoms. Cognitive behavioral therapy (a form of psychotherapy) can be helpful for fibromyalgia. Some people find alternative treatments such as massage,  chiropractic treatments, biofeedback, or acupuncture also help with symptoms.  Date Last Reviewed: 2/14/2016  © 8400-0961 [employer]. [address] [place] [address], [place] [address]. All rights reserved. This information is not intended as a substitute for professional medical care. Always follow your healthcare professional's instructions.

## 2019-11-05 DIAGNOSIS — G47.01 INSOMNIA DUE TO MEDICAL CONDITION: ICD-10-CM

## 2019-11-05 RX ORDER — ZOLPIDEM TARTRATE 10 MG/1
10 TABLET ORAL NIGHTLY PRN
Qty: 30 TABLET | Refills: 2 | Status: SHIPPED | OUTPATIENT
Start: 2019-11-05 | End: 2019-12-09 | Stop reason: SDUPTHER

## 2019-11-20 ENCOUNTER — INITIAL CONSULT (OUTPATIENT)
Dept: DERMATOLOGY | Facility: CLINIC | Age: 39
End: 2019-11-20
Payer: COMMERCIAL

## 2019-11-20 VITALS — HEIGHT: 59 IN | WEIGHT: 135 LBS | BODY MASS INDEX: 27.21 KG/M2

## 2019-11-20 DIAGNOSIS — L70.9 ADULT ACNE: Primary | ICD-10-CM

## 2019-11-20 DIAGNOSIS — L70.9 ADULT ACNE: ICD-10-CM

## 2019-11-20 PROCEDURE — 99999 PR PBB SHADOW E&M-EST. PATIENT-LVL III: CPT | Mod: PBBFAC,,, | Performed by: DERMATOLOGY

## 2019-11-20 PROCEDURE — 99203 PR OFFICE/OUTPT VISIT, NEW, LEVL III, 30-44 MIN: ICD-10-PCS | Mod: S$GLB,,, | Performed by: DERMATOLOGY

## 2019-11-20 PROCEDURE — 3008F PR BODY MASS INDEX (BMI) DOCUMENTED: ICD-10-PCS | Mod: CPTII,S$GLB,, | Performed by: DERMATOLOGY

## 2019-11-20 PROCEDURE — 99203 OFFICE O/P NEW LOW 30 MIN: CPT | Mod: S$GLB,,, | Performed by: DERMATOLOGY

## 2019-11-20 PROCEDURE — 99999 PR PBB SHADOW E&M-EST. PATIENT-LVL III: ICD-10-PCS | Mod: PBBFAC,,, | Performed by: DERMATOLOGY

## 2019-11-20 PROCEDURE — 3008F BODY MASS INDEX DOCD: CPT | Mod: CPTII,S$GLB,, | Performed by: DERMATOLOGY

## 2019-11-20 RX ORDER — SPIRONOLACTONE 25 MG/1
TABLET ORAL
Qty: 90 TABLET | Refills: 0 | OUTPATIENT
Start: 2019-11-20

## 2019-11-20 RX ORDER — SULFACETAMIDE SODIUM 100 MG/ML
LOTION TOPICAL
Qty: 118 ML | Refills: 1 | Status: SHIPPED | OUTPATIENT
Start: 2019-11-20 | End: 2021-09-10 | Stop reason: SDUPTHER

## 2019-11-20 RX ORDER — SPIRONOLACTONE 25 MG/1
25 TABLET ORAL DAILY
Qty: 30 TABLET | Refills: 0 | Status: SHIPPED | OUTPATIENT
Start: 2019-11-20 | End: 2019-12-19 | Stop reason: SDUPTHER

## 2019-11-20 NOTE — PROGRESS NOTES
Subjective:       Patient ID:  Roopa Rivas is a 39 y.o. female who presents for   Chief Complaint   Patient presents with    Acne     face     HPI    New patient presents today with acne on the face, x 1 year, getting worse and spreading, has tried the murand line OTC, and acne free line, has also been on differin gel.    Review of Systems   Constitutional: Negative for fever, chills and fatigue.   HENT: Negative for congestion and sore throat.    Musculoskeletal: Negative for joint swelling and arthralgias.   Skin: Positive for daily sunscreen use, activity-related sunscreen use and wears hat.   Hematologic/Lymphatic: Does not bruise/bleed easily.        Objective:    Physical Exam   Constitutional: She appears well-developed and well-nourished. No distress.   Eyes: No conjunctival no injection.   Neurological: She is alert and oriented to person, place, and time. She is not disoriented.   Psychiatric: She has a normal mood and affect.   Skin:   Areas Examined (abnormalities noted in diagram):   Scalp / Hair Palpated and Inspected  Head / Face Inspection Performed  Neck Inspection Performed  Chest / Axilla Inspection Performed  Back Inspection Performed  RUE Inspected  LUE Inspection Performed  Nails and Digits Inspection Performed                   Diagram Legend     Erythematous scaling macule/papule c/w actinic keratosis       Vascular papule c/w angioma      Pigmented verrucoid papule/plaque c/w seborrheic keratosis      Yellow umbilicated papule c/w sebaceous hyperplasia      Irregularly shaped tan macule c/w lentigo     1-2 mm smooth white papules consistent with Milia      Movable subcutaneous cyst with punctum c/w epidermal inclusion cyst      Subcutaneous movable cyst c/w pilar cyst      Firm pink to brown papule c/w dermatofibroma      Pedunculated fleshy papule(s) c/w skin tag(s)      Evenly pigmented macule c/w junctional nevus     Mildly variegated pigmented, slightly irregular-bordered macule  c/w mildly atypical nevus      Flesh colored to evenly pigmented papule c/w intradermal nevus       Pink pearly papule/plaque c/w basal cell carcinoma      Erythematous hyperkeratotic cursted plaque c/w SCC      Surgical scar with no sign of skin cancer recurrence      Open and closed comedones      Inflammatory papules and pustules      Verrucoid papule consistent consistent with wart     Erythematous eczematous patches and plaques     Dystrophic onycholytic nail with subungual debris c/w onychomycosis     Umbilicated papule    Erythematous-base heme-crusted tan verrucoid plaque consistent with inflamed seborrheic keratosis     Erythematous Silvery Scaling Plaque c/w Psoriasis     See annotation      Assessment / Plan:        Adult acne/female adult  -     sulfacetamide sodium, acne, (KLARON) 10 % Susp; Bid face  Dispense: 118 mL; Refill: 1  -     spironolactone (ALDACTONE) 25 MG tablet; Take 1 tablet (25 mg total) by mouth once daily.  Dispense: 30 tablet; Refill: 0  Discussed with patient the etiology and pathogenesis of the disease or skin lesion(s) and possible treatments and aggravators.    Reviewed with patient different treatment options and associated risks.  Long term and slow treatment treatment required for acne discussed today.  Gentle skin care with avoidance of hot water and usage of oil free products discussed.  Avoidance of lotions and make up discussed.  Compliance with prescribed regimen discussed.  No picking or squeezing of acne lesions discussed.  Offered po abx and topicals vs accutane vs aldactone.  Pt wants aldactone.  Reviewed with patient different treatment options and associated risks.  Proper application of medications and or care for affected area(s) and condition(s) reviewed.  Patient to start using sulfur bar soap for the face 1-2 x day.  For scalp usage lather from the soap to be applied to the roots of the scalp and allow to sit for 3-5 minutes regularly.  Discussed benefits and  risks of therapy including but not limited to breakthrough bleeding, breast tenderness, and elevated potassium levels which may give symptoms of fatigue, palpitations, and nausea. Patient should limit potassium intake - avoid potassium supplements or salt substitutes, limit bananas and citrus fruits. Pregnancy must be avoided while taking spironolactone.           Follow up in about 4 weeks (around 12/18/2019).

## 2019-11-20 NOTE — LETTER
November 20, 2019      Justin Paz MD  [address]  [facility] [address]           [address], [address]  [facility] [address]  Phone: [phone]          Patient: Roopa Rivas   MR Number: [id number]   YOB: 1980   Date of Visit: 11/20/2019       Dear Dr. Justin Paz:    Thank you for referring Roopa Rivas to me for evaluation. Attached you will find relevant portions of my assessment and plan of care.    If you have questions, please do not hesitate to call me. I look forward to following Roopa Rivas along with you.    Sincerely,    Grady Hernandes MD    Enclosure  CC:  No Recipients    If you would like to receive this communication electronically, please contact [email or web address] or [phone] to request more information on [employer] access.    For providers and/or their staff who would like to refer a patient to [facility], please contact us through our one-stop-shop provider referral line, [facility] , at [phone].    If you feel you have received this communication in error or would no longer like to receive these types of communications, please e-mail [email or web address]

## 2019-12-09 DIAGNOSIS — G47.01 INSOMNIA DUE TO MEDICAL CONDITION: ICD-10-CM

## 2019-12-09 RX ORDER — ZOLPIDEM TARTRATE 10 MG/1
10 TABLET ORAL NIGHTLY PRN
Qty: 30 TABLET | Refills: 2 | Status: SHIPPED | OUTPATIENT
Start: 2019-12-09 | End: 2020-01-06 | Stop reason: SDUPTHER

## 2019-12-19 ENCOUNTER — OFFICE VISIT (OUTPATIENT)
Dept: DERMATOLOGY | Facility: CLINIC | Age: 39
End: 2019-12-19
Payer: COMMERCIAL

## 2019-12-19 VITALS — WEIGHT: 135 LBS | HEIGHT: 59 IN | BODY MASS INDEX: 27.21 KG/M2

## 2019-12-19 DIAGNOSIS — L81.0 POST-INFLAMMATORY HYPERPIGMENTATION: ICD-10-CM

## 2019-12-19 DIAGNOSIS — L70.9 ADULT ACNE: Primary | ICD-10-CM

## 2019-12-19 DIAGNOSIS — Z51.81 MEDICATION MONITORING ENCOUNTER: ICD-10-CM

## 2019-12-19 PROCEDURE — 3008F BODY MASS INDEX DOCD: CPT | Mod: CPTII,S$GLB,, | Performed by: DERMATOLOGY

## 2019-12-19 PROCEDURE — 99999 PR PBB SHADOW E&M-EST. PATIENT-LVL III: ICD-10-PCS | Mod: PBBFAC,,, | Performed by: DERMATOLOGY

## 2019-12-19 PROCEDURE — 3008F PR BODY MASS INDEX (BMI) DOCUMENTED: ICD-10-PCS | Mod: CPTII,S$GLB,, | Performed by: DERMATOLOGY

## 2019-12-19 PROCEDURE — 99999 PR PBB SHADOW E&M-EST. PATIENT-LVL III: CPT | Mod: PBBFAC,,, | Performed by: DERMATOLOGY

## 2019-12-19 PROCEDURE — 99213 PR OFFICE/OUTPT VISIT, EST, LEVL III, 20-29 MIN: ICD-10-PCS | Mod: S$GLB,,, | Performed by: DERMATOLOGY

## 2019-12-19 PROCEDURE — 99213 OFFICE O/P EST LOW 20 MIN: CPT | Mod: S$GLB,,, | Performed by: DERMATOLOGY

## 2019-12-19 RX ORDER — SPIRONOLACTONE 50 MG/1
50 TABLET, FILM COATED ORAL DAILY
Qty: 30 TABLET | Refills: 0 | Status: SHIPPED | OUTPATIENT
Start: 2019-12-19 | End: 2020-01-20 | Stop reason: SDUPTHER

## 2019-12-19 NOTE — PROGRESS NOTES
Subjective:       Patient ID:  Roopa Rivas is a 39 y.o. female who presents for   Chief Complaint   Patient presents with    Acne     1 month follow up     HPI    Last o/v 11/20/2019  Adult acne/female adult  -     sulfacetamide sodium, acne, (KLARON) 10 % Susp; Bid face  Dispense: 118 mL; Refill: 1  -     spironolactone (ALDACTONE) 25 MG tablet; Take 1 tablet (25 mg total) by mouth once daily.  Dispense: 30 tablet; Refill: 0  Discussed with patient the etiology and pathogenesis of the disease or skin lesion(s) and possible treatments and aggravators.    Reviewed with patient different treatment options and associated risks.  Long term and slow treatment treatment required for acne discussed today.  Gentle skin care with avoidance of hot water and usage of oil free products discussed.  Avoidance of lotions and make up discussed.  Compliance with prescribed regimen discussed.  No picking or squeezing of acne lesions discussed.  Offered po abx and topicals vs accutane vs aldactone.  Pt wants aldactone.  Reviewed with patient different treatment options and associated risks.  Proper application of medications and or care for affected area(s) and condition(s) reviewed.  Patient to start using sulfur bar soap for the face 1-2 x day.  For scalp usage lather from the soap to be applied to the roots of the scalp and allow to sit for 3-5 minutes regularly.  Discussed benefits and risks of therapy including but not limited to breakthrough bleeding, breast tenderness, and elevated potassium levels which may give symptoms of fatigue, palpitations, and nausea. Patient should limit potassium intake - avoid potassium supplements or salt substitutes, limit bananas and citrus fruits. Pregnancy must be avoided while taking spironolactone.      Patient presents today for acne follow up.  Using klaron lotion twice daily, aldactone daily and sulfur bar soap daily.Doing better, some scaring.      Review of Systems   Constitutional:  Positive for fatigue. Negative for fever and chills.   HENT: Negative for congestion, sore throat and headaches.    Musculoskeletal: Negative for joint swelling and arthralgias.   Skin: Positive for daily sunscreen use, activity-related sunscreen use and wears hat.   Neurological: Negative for lightheadedness with standing and headaches.   Hematologic/Lymphatic: Does not bruise/bleed easily.        Objective:    Physical Exam   Constitutional: She appears well-developed and well-nourished. No distress.   HENT:   Head: Normocephalic.   Eyes: No conjunctival no injection.   Neurological: She is alert and oriented to person, place, and time. She is not disoriented.   Psychiatric: She has a normal mood and affect.   Skin:   Areas Examined (abnormalities noted in diagram):   Head / Face Inspection Performed  Neck Inspection Performed  Nails and Digits Inspection Performed              Diagram Legend     Erythematous scaling macule/papule c/w actinic keratosis       Vascular papule c/w angioma      Pigmented verrucoid papule/plaque c/w seborrheic keratosis      Yellow umbilicated papule c/w sebaceous hyperplasia      Irregularly shaped tan macule c/w lentigo     1-2 mm smooth white papules consistent with Milia      Movable subcutaneous cyst with punctum c/w epidermal inclusion cyst      Subcutaneous movable cyst c/w pilar cyst      Firm pink to brown papule c/w dermatofibroma      Pedunculated fleshy papule(s) c/w skin tag(s)      Evenly pigmented macule c/w junctional nevus     Mildly variegated pigmented, slightly irregular-bordered macule c/w mildly atypical nevus      Flesh colored to evenly pigmented papule c/w intradermal nevus       Pink pearly papule/plaque c/w basal cell carcinoma      Erythematous hyperkeratotic cursted plaque c/w SCC      Surgical scar with no sign of skin cancer recurrence      Open and closed comedones      Inflammatory papules and pustules      Verrucoid papule consistent consistent with  wart     Erythematous eczematous patches and plaques     Dystrophic onycholytic nail with subungual debris c/w onychomycosis     Umbilicated papule    Erythematous-base heme-crusted tan verrucoid plaque consistent with inflamed seborrheic keratosis     Erythematous Silvery Scaling Plaque c/w Psoriasis     See annotation      Assessment / Plan:        Adult acne  -     spironolactone (ALDACTONE) 50 MG tablet; Take 1 tablet (50 mg total) by mouth once daily.  Dispense: 30 tablet; Refill: 0  Improving.  Increase aldactone.  Cont klaron and sulfur soap.  Patient to start using sulfur bar soap for the face 1-2 x day.  For scalp usage lather from the soap to be applied to the roots of the scalp and allow to sit for 3-5 minutes regularly.  Reviewed with patient different treatment options and associated risks.  Proper application of medications and or care for affected area(s) and condition(s) reviewed.  Patient to watch for recurrence or flares or worsening and to call the clinic for a follow up appointment for such.    Medication monitoring encounter  -     UREA NITROGEN (BUN); Future; Expected date: 12/19/2019  -     ELECTROLYTE PANEL; Future; Expected date: 12/19/2019  -     CREATININE, SERUM; Future; Expected date: 12/19/2019  Discussed with patient the need for laboratory work up for further evaluation.  Discussed that such investigation may not be helpful.    Post-inflammatory hyperpigmentation  Chronic nature of this condition discussed with patient.  Discussed with the patient the risk of color scars or hyperpigmentation that could take months to resolve.             Follow up in about 4 weeks (around 1/16/2020).

## 2020-01-06 DIAGNOSIS — G47.01 INSOMNIA DUE TO MEDICAL CONDITION: ICD-10-CM

## 2020-01-06 RX ORDER — ZOLPIDEM TARTRATE 10 MG/1
10 TABLET ORAL NIGHTLY PRN
Qty: 30 TABLET | Refills: 2 | Status: SHIPPED | OUTPATIENT
Start: 2020-01-06 | End: 2020-06-06 | Stop reason: SDUPTHER

## 2020-01-20 ENCOUNTER — LAB VISIT (OUTPATIENT)
Dept: LAB | Facility: HOSPITAL | Age: 40
End: 2020-01-20
Attending: DERMATOLOGY
Payer: COMMERCIAL

## 2020-01-20 ENCOUNTER — OFFICE VISIT (OUTPATIENT)
Dept: DERMATOLOGY | Facility: CLINIC | Age: 40
End: 2020-01-20
Payer: COMMERCIAL

## 2020-01-20 VITALS — BODY MASS INDEX: 27.21 KG/M2 | WEIGHT: 135 LBS | HEIGHT: 59 IN

## 2020-01-20 DIAGNOSIS — L81.0 POST-INFLAMMATORY HYPERPIGMENTATION: ICD-10-CM

## 2020-01-20 DIAGNOSIS — L70.9 ADULT ACNE: Primary | ICD-10-CM

## 2020-01-20 DIAGNOSIS — L85.8 KP (KERATOSIS PILARIS): ICD-10-CM

## 2020-01-20 DIAGNOSIS — Z51.81 MEDICATION MONITORING ENCOUNTER: ICD-10-CM

## 2020-01-20 LAB
ANION GAP SERPL CALC-SCNC: 8 MMOL/L (ref 8–16)
BUN SERPL-MCNC: 9 MG/DL (ref 6–20)
CHLORIDE SERPL-SCNC: 105 MMOL/L (ref 95–110)
CO2 SERPL-SCNC: 27 MMOL/L (ref 23–29)
CREAT SERPL-MCNC: 0.7 MG/DL (ref 0.5–1.4)
EST. GFR  (AFRICAN AMERICAN): >60 ML/MIN/1.73 M^2
EST. GFR  (NON AFRICAN AMERICAN): >60 ML/MIN/1.73 M^2
POTASSIUM SERPL-SCNC: 4.4 MMOL/L (ref 3.5–5.1)
SODIUM SERPL-SCNC: 140 MMOL/L (ref 136–145)

## 2020-01-20 PROCEDURE — 36415 COLL VENOUS BLD VENIPUNCTURE: CPT

## 2020-01-20 PROCEDURE — 84520 ASSAY OF UREA NITROGEN: CPT

## 2020-01-20 PROCEDURE — 99213 OFFICE O/P EST LOW 20 MIN: CPT | Mod: S$GLB,,, | Performed by: DERMATOLOGY

## 2020-01-20 PROCEDURE — 3008F BODY MASS INDEX DOCD: CPT | Mod: CPTII,S$GLB,, | Performed by: DERMATOLOGY

## 2020-01-20 PROCEDURE — 99999 PR PBB SHADOW E&M-EST. PATIENT-LVL III: CPT | Mod: PBBFAC,,, | Performed by: DERMATOLOGY

## 2020-01-20 PROCEDURE — 80051 ELECTROLYTE PANEL: CPT

## 2020-01-20 PROCEDURE — 82565 ASSAY OF CREATININE: CPT

## 2020-01-20 PROCEDURE — 99999 PR PBB SHADOW E&M-EST. PATIENT-LVL III: ICD-10-PCS | Mod: PBBFAC,,, | Performed by: DERMATOLOGY

## 2020-01-20 PROCEDURE — 3008F PR BODY MASS INDEX (BMI) DOCUMENTED: ICD-10-PCS | Mod: CPTII,S$GLB,, | Performed by: DERMATOLOGY

## 2020-01-20 PROCEDURE — 99213 PR OFFICE/OUTPT VISIT, EST, LEVL III, 20-29 MIN: ICD-10-PCS | Mod: S$GLB,,, | Performed by: DERMATOLOGY

## 2020-01-20 RX ORDER — SPIRONOLACTONE 50 MG/1
50 TABLET, FILM COATED ORAL DAILY
Qty: 30 TABLET | Refills: 2 | Status: SHIPPED | OUTPATIENT
Start: 2020-01-20 | End: 2020-04-20 | Stop reason: SDUPTHER

## 2020-01-20 NOTE — PROGRESS NOTES
Subjective:       Patient ID:  Roopa Rivas is a 39 y.o. female who presents for   Chief Complaint   Patient presents with    Acne     HPI    Last o/v 12/19/2019  Adult acne  -     spironolactone (ALDACTONE) 50 MG tablet; Take 1 tablet (50 mg total) by mouth once daily.  Dispense: 30 tablet; Refill: 0  Improving.  Increase aldactone.  Cont klaron and sulfur soap.  Patient to start using sulfur bar soap for the face 1-2 x day.  For scalp usage lather from the soap to be applied to the roots of the scalp and allow to sit for 3-5 minutes regularly.  Reviewed with patient different treatment options and associated risks.  Proper application of medications and or care for affected area(s) and condition(s) reviewed.  Patient to watch for recurrence or flares or worsening and to call the clinic for a follow up appointment for such.     Medication monitoring encounter  -     UREA NITROGEN (BUN); Future; Expected date: 12/19/2019  -     ELECTROLYTE PANEL; Future; Expected date: 12/19/2019  -     CREATININE, SERUM; Future; Expected date: 12/19/2019  Discussed with patient the need for laboratory work up for further evaluation.  Discussed that such investigation may not be helpful.     Post-inflammatory hyperpigmentation  Chronic nature of this condition discussed with patient.  Discussed with the patient the risk of color scars or hyperpigmentation that could take months to resolve.       Patient presents today for acne follow up.  Patient is currently on aldactone 50mg daily with klaron 2 times daily with sulfur bar soap.      Review of Systems   Constitutional: Positive for fatigue. Negative for fever and chills.   HENT: Positive for headaches. Negative for congestion and sore throat.    Musculoskeletal: Negative for joint swelling and arthralgias.   Skin: Positive for daily sunscreen use, activity-related sunscreen use and wears hat.   Neurological: Positive for headaches. Negative for lightheadedness with standing.    Hematologic/Lymphatic: Does not bruise/bleed easily.        Objective:    Physical Exam   Constitutional: She appears well-developed and well-nourished. No distress.   Eyes: No conjunctival no injection.   Neurological: She is alert and oriented to person, place, and time. She is not disoriented.   Psychiatric: She has a normal mood and affect.   Skin:   Areas Examined (abnormalities noted in diagram):   Head / Face Inspection Performed  Neck Inspection Performed  Nails and Digits Inspection Performed              Diagram Legend     Erythematous scaling macule/papule c/w actinic keratosis       Vascular papule c/w angioma      Pigmented verrucoid papule/plaque c/w seborrheic keratosis      Yellow umbilicated papule c/w sebaceous hyperplasia      Irregularly shaped tan macule c/w lentigo     1-2 mm smooth white papules consistent with Milia      Movable subcutaneous cyst with punctum c/w epidermal inclusion cyst      Subcutaneous movable cyst c/w pilar cyst      Firm pink to brown papule c/w dermatofibroma      Pedunculated fleshy papule(s) c/w skin tag(s)      Evenly pigmented macule c/w junctional nevus     Mildly variegated pigmented, slightly irregular-bordered macule c/w mildly atypical nevus      Flesh colored to evenly pigmented papule c/w intradermal nevus       Pink pearly papule/plaque c/w basal cell carcinoma      Erythematous hyperkeratotic cursted plaque c/w SCC      Surgical scar with no sign of skin cancer recurrence      Open and closed comedones      Inflammatory papules and pustules      Verrucoid papule consistent consistent with wart     Erythematous eczematous patches and plaques     Dystrophic onycholytic nail with subungual debris c/w onychomycosis     Umbilicated papule    Erythematous-base heme-crusted tan verrucoid plaque consistent with inflamed seborrheic keratosis     Erythematous Silvery Scaling Plaque c/w Psoriasis     See annotation      Assessment / Plan:        Adult acne  -      spironolactone (ALDACTONE) 50 MG tablet; Take 1 tablet (50 mg total) by mouth once daily.  Dispense: 30 tablet; Refill: 2  Condition is stable.  We will continue present management.  Patient to watch for recurrence or flares or worsening and to call the clinic for a follow up appointment for such.  Patient and or guardian to monitor this area/lesion or these areas/lesions for changes or worsening.  Patient and or guardian to contact us if any changes are noted for such.  Patient to start using sulfur bar soap for the face 1-2 x day.  For scalp usage lather from the soap to be applied to the roots of the scalp and allow to sit for 3-5 minutes regularly.    Diet with increased fiber, avoidance of sugars, avoidance of dairy, avoidance of wheat and white flour (gluten) all discussed.  Avoid caffeine and alcohol.  Consider cutting back on animal protein consumption.    Medication monitoring encounter  Discussed with patient the need for laboratory work up for further evaluation.  Discussed that such investigation may not be helpful.    Post-inflammatory hyperpigmentation  Condition is stable.  We will continue present management.  Improving.  Patient instructed in importance in daily sun protection. Sun avoidance and topical protection discussed.     Patient encouraged to wear hat for all outdoor exposure.     Also discussed sun protective clothing.    KP (keratosis pilaris)  Etiology of keratosis pilaris discussed and explained the dry skin plugging of the pores.  Recommended salicylic acid washes for now with avoidance of excessively hot water bathing on affected areas.  Can consider moisturizers and natural oils later.  Patient to watch for recurrence or flares or worsening and to call the clinic for a follow up appointment for such.  May need otc differin if not better.           Follow up in about 3 months (around 4/20/2020).

## 2020-01-29 RX ORDER — DROSPIRENONE AND ETHINYL ESTRADIOL 0.02-3(28)
KIT ORAL
Qty: 28 TABLET | Refills: 5 | Status: SHIPPED | OUTPATIENT
Start: 2020-01-29 | End: 2020-07-06

## 2020-02-03 PROBLEM — Z00.00 HEALTHCARE MAINTENANCE: Status: RESOLVED | Noted: 2019-10-29 | Resolved: 2020-02-03

## 2020-02-17 ENCOUNTER — PATIENT MESSAGE (OUTPATIENT)
Dept: RHEUMATOLOGY | Facility: CLINIC | Age: 40
End: 2020-02-17

## 2020-02-17 ENCOUNTER — LAB VISIT (OUTPATIENT)
Dept: LAB | Facility: HOSPITAL | Age: 40
End: 2020-02-17
Attending: INTERNAL MEDICINE
Payer: COMMERCIAL

## 2020-02-17 ENCOUNTER — OFFICE VISIT (OUTPATIENT)
Dept: RHEUMATOLOGY | Facility: CLINIC | Age: 40
End: 2020-02-17
Payer: COMMERCIAL

## 2020-02-17 VITALS
SYSTOLIC BLOOD PRESSURE: 130 MMHG | DIASTOLIC BLOOD PRESSURE: 88 MMHG | BODY MASS INDEX: 27.78 KG/M2 | WEIGHT: 137.81 LBS | HEART RATE: 96 BPM | HEIGHT: 59 IN

## 2020-02-17 DIAGNOSIS — R76.8 ANA POSITIVE: Primary | ICD-10-CM

## 2020-02-17 DIAGNOSIS — R76.8 ANA POSITIVE: ICD-10-CM

## 2020-02-17 LAB
ALBUMIN SERPL BCP-MCNC: 4.1 G/DL (ref 3.5–5.2)
ALP SERPL-CCNC: 92 U/L (ref 55–135)
ALT SERPL W/O P-5'-P-CCNC: 20 U/L (ref 10–44)
ANION GAP SERPL CALC-SCNC: 10 MMOL/L (ref 8–16)
AST SERPL-CCNC: 15 U/L (ref 10–40)
BASOPHILS # BLD AUTO: 0.06 K/UL (ref 0–0.2)
BASOPHILS NFR BLD: 0.7 % (ref 0–1.9)
BILIRUB SERPL-MCNC: 0.2 MG/DL (ref 0.1–1)
BUN SERPL-MCNC: 11 MG/DL (ref 6–20)
C3 SERPL-MCNC: 175 MG/DL (ref 50–180)
C4 SERPL-MCNC: 26 MG/DL (ref 11–44)
CALCIUM SERPL-MCNC: 9.7 MG/DL (ref 8.7–10.5)
CHLORIDE SERPL-SCNC: 104 MMOL/L (ref 95–110)
CO2 SERPL-SCNC: 24 MMOL/L (ref 23–29)
CREAT SERPL-MCNC: 0.8 MG/DL (ref 0.5–1.4)
CRP SERPL-MCNC: 4.5 MG/L (ref 0–8.2)
DIFFERENTIAL METHOD: ABNORMAL
EOSINOPHIL # BLD AUTO: 0.2 K/UL (ref 0–0.5)
EOSINOPHIL NFR BLD: 2.6 % (ref 0–8)
ERYTHROCYTE [DISTWIDTH] IN BLOOD BY AUTOMATED COUNT: 12.2 % (ref 11.5–14.5)
ERYTHROCYTE [SEDIMENTATION RATE] IN BLOOD BY WESTERGREN METHOD: 16 MM/HR (ref 0–36)
EST. GFR  (AFRICAN AMERICAN): >60 ML/MIN/1.73 M^2
EST. GFR  (NON AFRICAN AMERICAN): >60 ML/MIN/1.73 M^2
GLUCOSE SERPL-MCNC: 93 MG/DL (ref 70–110)
HCT VFR BLD AUTO: 38.2 % (ref 37–48.5)
HGB BLD-MCNC: 12 G/DL (ref 12–16)
IMM GRANULOCYTES # BLD AUTO: 0.02 K/UL (ref 0–0.04)
IMM GRANULOCYTES NFR BLD AUTO: 0.2 % (ref 0–0.5)
LYMPHOCYTES # BLD AUTO: 3.9 K/UL (ref 1–4.8)
LYMPHOCYTES NFR BLD: 46.7 % (ref 18–48)
MCH RBC QN AUTO: 30.3 PG (ref 27–31)
MCHC RBC AUTO-ENTMCNC: 31.4 G/DL (ref 32–36)
MCV RBC AUTO: 97 FL (ref 82–98)
MONOCYTES # BLD AUTO: 0.5 K/UL (ref 0.3–1)
MONOCYTES NFR BLD: 5.8 % (ref 4–15)
NEUTROPHILS # BLD AUTO: 3.7 K/UL (ref 1.8–7.7)
NEUTROPHILS NFR BLD: 44 % (ref 38–73)
NRBC BLD-RTO: 0 /100 WBC
PLATELET # BLD AUTO: 375 K/UL (ref 150–350)
PMV BLD AUTO: 9.8 FL (ref 9.2–12.9)
POTASSIUM SERPL-SCNC: 4.4 MMOL/L (ref 3.5–5.1)
PROT SERPL-MCNC: 8.1 G/DL (ref 6–8.4)
RBC # BLD AUTO: 3.96 M/UL (ref 4–5.4)
SODIUM SERPL-SCNC: 138 MMOL/L (ref 136–145)
WBC # BLD AUTO: 8.44 K/UL (ref 3.9–12.7)

## 2020-02-17 PROCEDURE — 85652 RBC SED RATE AUTOMATED: CPT

## 2020-02-17 PROCEDURE — 99214 PR OFFICE/OUTPT VISIT, EST, LEVL IV, 30-39 MIN: ICD-10-PCS | Mod: S$GLB,,, | Performed by: INTERNAL MEDICINE

## 2020-02-17 PROCEDURE — 3008F BODY MASS INDEX DOCD: CPT | Mod: CPTII,S$GLB,, | Performed by: INTERNAL MEDICINE

## 2020-02-17 PROCEDURE — 86140 C-REACTIVE PROTEIN: CPT

## 2020-02-17 PROCEDURE — 86225 DNA ANTIBODY NATIVE: CPT

## 2020-02-17 PROCEDURE — 3008F PR BODY MASS INDEX (BMI) DOCUMENTED: ICD-10-PCS | Mod: CPTII,S$GLB,, | Performed by: INTERNAL MEDICINE

## 2020-02-17 PROCEDURE — 86160 COMPLEMENT ANTIGEN: CPT | Mod: 59

## 2020-02-17 PROCEDURE — 99999 PR PBB SHADOW E&M-EST. PATIENT-LVL III: CPT | Mod: PBBFAC,,, | Performed by: INTERNAL MEDICINE

## 2020-02-17 PROCEDURE — 85025 COMPLETE CBC W/AUTO DIFF WBC: CPT

## 2020-02-17 PROCEDURE — 86160 COMPLEMENT ANTIGEN: CPT

## 2020-02-17 PROCEDURE — 99999 PR PBB SHADOW E&M-EST. PATIENT-LVL III: ICD-10-PCS | Mod: PBBFAC,,, | Performed by: INTERNAL MEDICINE

## 2020-02-17 PROCEDURE — 99214 OFFICE O/P EST MOD 30 MIN: CPT | Mod: S$GLB,,, | Performed by: INTERNAL MEDICINE

## 2020-02-17 PROCEDURE — 36415 COLL VENOUS BLD VENIPUNCTURE: CPT

## 2020-02-17 PROCEDURE — 80053 COMPREHEN METABOLIC PANEL: CPT

## 2020-02-17 RX ORDER — IBUPROFEN 400 MG/1
400 TABLET ORAL EVERY 6 HOURS PRN
COMMUNITY

## 2020-02-17 RX ORDER — MAGNESIUM 30 MG
TABLET ORAL ONCE
COMMUNITY

## 2020-02-17 RX ORDER — MULTIVITAMIN
1 TABLET ORAL DAILY
COMMUNITY
End: 2020-08-03

## 2020-02-17 RX ORDER — CETIRIZINE HYDROCHLORIDE 10 MG/1
10 TABLET ORAL DAILY
COMMUNITY
End: 2020-12-01

## 2020-02-17 ASSESSMENT — ROUTINE ASSESSMENT OF PATIENT INDEX DATA (RAPID3)
MDHAQ FUNCTION SCORE: .5
PSYCHOLOGICAL DISTRESS SCORE: 4.4
PAIN SCORE: 6.5
FATIGUE SCORE: 8
TOTAL RAPID3 SCORE: 5.39
AM STIFFNESS SCORE: 1, YES
PATIENT GLOBAL ASSESSMENT SCORE: 8

## 2020-02-17 NOTE — PROGRESS NOTES
Chief Complaint   Patient presents with    Disease Management       Patient with a diagnosis of fibromyalgia syndrome for a follow up    History of presenting illness     is a 39 year old female : we are treating for fibromyalgia :    On ambien 10 mg daily    In the past :    We had d/clara her amitryptilline since it made her hungry a lot and didn't help with the pain    She was on meloxicam but she stopped it since she had a rash with some medication    She was on tramadol and she stopped it    She takes OTC tylenol and ibuprofen    We started her on cymbalta 60 mg daily  Absolutely no improvement at all    Initially worked for few months    She has pulsating pain in the right ear  Her face is cold  Her memory is off    Pt is a 40 yo with h/o gastritis, irritable bowel syndrome    Initial complaints     Poor sleep despite taking ambien  Amitryptilline was not helping  Bothersome anxiety    Cant concentrate  Cant remember certain things    Dry eyes and mouth+  Opthalmology : gave tear drops    Hair fall got better with biotin    Ear aches  Windy ear pain gets worse  Cant wear earrings,gets infections  Inside of the ears hurt and throb    Fatigue +    Allergies have gotten worse    Used to exercise 6 times a week now trying to get there     She relocated from [place] November 2017    She was followed by rheumatology for a possible inflammatory arthritis per the patient     She was last seen by her rheumatologist, Dr. Sana Belle sep 2017  We still dont have records    She says US hands showed changes on inflammatory arthritis  She was offered NSAIDs like nalfon and sulindac and she didn't do well    She had seen us in December 2017,we didn't have any data to suggest inflammatory arthritis    We did have her on plaquenil 300 mg but she thinks it gave her a rash and allergic reaction    We diagnosed her as fibromyalgia syndrome    We did the autoimmune panel    Normal CBC,CMP  Normal lipid panel  Normal  TSH  Normal RF,CCP  EDUARDO positive,1: 160 homogenous,titre negative  Normal complements  Normal ESR,CRP  Negative APLAS panel    MRI both hands no inflammatory arthritis     Her complaints : diffuse pains in the hands,arms,shoulders and knees  Started February/march 2016     She states that after starting a combination of  mg/d, Tramadol 50 mg bid, Elavil 10 mg/d, mobic 7.5 mg bid her symptoms improved. She is unsure of her diagnosis at this time. Pt reports 30 minutes of am stiffness, intermittent joint swelling/pain, and severe fatigue. She is no longer working due to her fatigue. Pt recounts getting a massage in the past and crying due to being diffusely tender. She denies fevers, chills, Raynaud's, photosensitivity, rashes, alopecia, mucosal ulcers, pleurisy, vision changes, . Pt does report an increase in acne.     No known autoimmune family history    Pt denies a personal or family history of psoriasis.     No blood clots or miscarriages.      Review of Systems   Constitutional: Negative for activity change, appetite change, chills, diaphoresis, fatigue, fever and unexpected weight change.   HENT: Negative for congestion, dental problem, drooling, ear discharge, ear pain, facial swelling, hearing loss, mouth sores, nosebleeds, postnasal drip, rhinorrhea, sinus pressure, sinus pain, sneezing, sore throat, tinnitus, trouble swallowing and voice change.    Eyes: Negative for photophobia, pain, discharge, redness, itching and visual disturbance.   Respiratory: Positive for shortness of breath. Negative for apnea, cough, choking, chest tightness, wheezing and stridor.    Cardiovascular: Negative for chest pain, palpitations and leg swelling.   Gastrointestinal: Positive for constipation. Negative for abdominal distention, abdominal pain, anal bleeding, blood in stool, diarrhea, nausea, rectal pain and vomiting.   Endocrine: Negative for cold intolerance, heat intolerance, polydipsia, polyphagia and polyuria.    Genitourinary: Negative for decreased urine volume, difficulty urinating, dysuria, enuresis, flank pain, frequency, genital sores, hematuria and urgency.   Musculoskeletal: Negative for arthralgias, back pain, gait problem, joint swelling, myalgias, neck pain and neck stiffness.   Skin: Negative for color change, pallor, rash and wound.   Allergic/Immunologic: Negative for environmental allergies, food allergies and immunocompromised state.   Neurological: Positive for headaches. Negative for dizziness, tremors, seizures, syncope, facial asymmetry, speech difficulty, weakness, light-headedness and numbness.   Hematological: Negative for adenopathy. Does not bruise/bleed easily.   Psychiatric/Behavioral: Negative for agitation, behavioral problems, confusion, decreased concentration, dysphoric mood, hallucinations, self-injury, sleep disturbance and suicidal ideas. The patient is not nervous/anxious and is not hyperactive.         Physical Exam     SHEPPARD-28 tender joint count: 0  SHEPPARD-28 swollen joint count: 0       Physical Exam   Constitutional: She is oriented to person, place, and time and well-developed, well-nourished, and in no distress. No distress.   HENT:   Head: Normocephalic.   Mouth/Throat: Oropharynx is clear and moist.   Eyes: Conjunctivae are normal. Pupils are equal, round, and reactive to light. Right eye exhibits no discharge. Left eye exhibits no discharge. No scleral icterus.   Neck: Normal range of motion. No thyromegaly present.   Cardiovascular: Normal rate, regular rhythm, normal heart sounds and intact distal pulses.    Pulmonary/Chest: Effort normal and breath sounds normal. No stridor.   Abdominal: Soft. Bowel sounds are normal.   Lymphadenopathy:     She has no cervical adenopathy.   Neurological: She is alert and oriented to person, place, and time.   Skin: Skin is warm. No rash noted. She is not diaphoretic.     Psychiatric: Affect and judgment normal.   Musculoskeletal: Normal range of  motion.         Assessment     Pt is a 38 yo with h/o gastritis, irritable bowel syndrome being followed here for fibromyalgia    We diagnosed her with FMS since she met the criteria     1.  Widespread pain index greater than or equal to 7 and symptom severity score greater than or equal to 5 or widespread pain index between 3- 6, and symptom severity score greater than or equal to 9  2.  Symptoms have been present in a similar level for at least 3 months  3.  The patient does not have a disorder that would otherwise sufficiently explain the pain    She does not have records of diagnosis and if she had an previous inflammatory arthritis.   She claims that she had an ultrasound showing evidence of inflammatory arthritis   We have tried to obtain records and we have been unsuccessful  She brought some print outs on the portal and there is mention of inflammatory arthritis and I see NSAIDs and plaquenil on the prescriptions    We did our own hand MRIs    She has cystic lesions b/l which might be indicative of burned out erosions  It is possible that she had an old inflammatory arthritis which self resolved    She mentioned that combination of tramadol + meloxicam helped : we d/clara because of itch     She cannot take higher dose of amitryptilline  She was liking cymbalta and she now doesn't     She also mentions sicca symptoms but she thinks it might be s/p ambien use but definitely prior to initiation of amitryptilline     We are treating her for fibromyalgia with cymbalta only     We had her on plaquenil given the evidence of inflammatory arthritis on prior US and current MRI revealing old erosions   But she had a rash and she attributed it to plaquenil  She made a lot of changes with shampoos and detergents but she didn't see a change so she went off plaquenil and meloxicam     Today her concerns are    Cymbalta is not helping with the pain  Memory is bad    OB said everything is ok    1. EDUARDO positive         Plan    Lupus monitoring labs today    D/c cymbalta    Initiate savella 12.5 mg and then titration to 100 mg bid     Continue ambien    Exercise regularly    Suggested lip biopsy and corneal staining at some point to see if she has Sjogren's  Not sure if her sicca symptoms are drug induced yet,but they are definitely bothersome    Management of sicca symptoms    -preservative free artifical tears 3 to 4 times a day  Lubricating ointments at night  Wraparound sun glasses/moisture shields which attach to glasses help  Opthalmology evaluation,management : she will need split lamp,schirmer's test and ocular surface staining    -biotene and ACT preparations  Dental evaluations  Periodic cleaning  Use fluoride products  Brush and floss teeth regularly especially after meals     Avoid sugar containing foods and drinks    Use of vaginal lubricants/estrogen creams   Seeing Gyn    Use hypoallergenic soaps/lotions for the skin  Avoid drafts from air conditioners/heaters/radiators  Avoid detergents,deodorant soaps,very hot water  Use humidifiers    NO PLAQUENIL FOR NOW  IF SYMPTOMS CHANGE AND WE SEE INFLAMMATORY ARTHRITIS WE WILL OFFER METHOTREXATE    Water aerobics  Yoga and aleah chi    See psychiatry/counsellor  Sleep clinic evaluation  Memory testing  CBT     Every 6 monthly lupus labs       Roopa was seen today for disease management.    Diagnoses and all orders for this visit:    EDUARDO positive  -     CBC auto differential; Future  -     Comprehensive metabolic panel; Future  -     Sedimentation rate; Future  -     C-reactive protein; Future  -     Anti-DNA antibody, double-stranded; Future  -     C3 complement; Future  -     C4 complement; Future  -     Protein / creatinine ratio, urine; Future  -     Urinalysis; Future    Other orders  -     milnacipran (SAVELLA) 12.5 mg Tab tablet; 12.5 mg once on day 1, then 12.5 mg twice daily on days 2 to 3, 25 mg twice daily on days 4 to 7, then 50 mg twice daily thereafter.  Dose may be increased to 100 mg twice daily  -     milnacipran (SAVELLA) 50 mg Tab; 50 mg twice daily after titration,Dose may be increased to 100 mg twice daily      rtc in 6 months

## 2020-02-17 NOTE — PROGRESS NOTES
Rapid3 Question Responses and Scores 2/16/2020   MDHAQ Score 0.5   Psychologic Score 4.4   Pain Score 6.5   When you awakened in the morning OVER THE LAST WEEK, did you feel stiff? Yes   If Yes, please indicate the number of hours until you are as limber as you will be for the day 1   Fatigue Score 8   Global Health Score 8   RAPID3 Score 5.38       Answers for HPI/ROS submitted by the patient on 2/16/2020   fever: No  eye redness: No  headaches: Yes  shortness of breath: Yes  chest pain: No  trouble swallowing: Yes  diarrhea: Yes  constipation: No  unexpected weight change: Yes  genital sore: No  dysuria: No  During the last 3 days, have you had a skin rash?: No  Bruises or bleeds easily: Yes  cough: No

## 2020-02-18 LAB — DSDNA AB SER-ACNC: NORMAL [IU]/ML

## 2020-02-20 ENCOUNTER — TELEPHONE (OUTPATIENT)
Dept: RHEUMATOLOGY | Facility: CLINIC | Age: 40
End: 2020-02-20

## 2020-02-20 NOTE — TELEPHONE ENCOUNTER
----- Message from Rachel Patel sent at 2/20/2020  9:34 AM CST -----  Contact: [facility]  Need prior auth for the milnacipran (SAVELLA) 12.5 mg Tab tablet fax to number:  on 2/18-19/2020 wll re-fax to number:  now ask for a call    Contact [facility]  [phone] [facility] [place]   [phone] Fax

## 2020-02-21 ENCOUNTER — TELEPHONE (OUTPATIENT)
Dept: PHARMACY | Facility: CLINIC | Age: 40
End: 2020-02-21

## 2020-02-24 ENCOUNTER — TELEPHONE (OUTPATIENT)
Dept: PHARMACY | Facility: CLINIC | Age: 40
End: 2020-02-24

## 2020-02-26 ENCOUNTER — LAB VISIT (OUTPATIENT)
Dept: LAB | Facility: HOSPITAL | Age: 40
End: 2020-02-26
Attending: INTERNAL MEDICINE
Payer: COMMERCIAL

## 2020-02-26 DIAGNOSIS — Z00.00 HEALTHCARE MAINTENANCE: ICD-10-CM

## 2020-02-26 LAB
25(OH)D3+25(OH)D2 SERPL-MCNC: 34 NG/ML (ref 30–96)
ALBUMIN SERPL BCP-MCNC: 3.7 G/DL (ref 3.5–5.2)
ALP SERPL-CCNC: 79 U/L (ref 55–135)
ALT SERPL W/O P-5'-P-CCNC: 18 U/L (ref 10–44)
ANION GAP SERPL CALC-SCNC: 9 MMOL/L (ref 8–16)
AST SERPL-CCNC: 17 U/L (ref 10–40)
BASOPHILS # BLD AUTO: 0.01 K/UL (ref 0–0.2)
BASOPHILS NFR BLD: 0.1 % (ref 0–1.9)
BILIRUB SERPL-MCNC: 0.2 MG/DL (ref 0.1–1)
BUN SERPL-MCNC: 7 MG/DL (ref 6–20)
CALCIUM SERPL-MCNC: 9.3 MG/DL (ref 8.7–10.5)
CHLORIDE SERPL-SCNC: 102 MMOL/L (ref 95–110)
CHOLEST SERPL-MCNC: 211 MG/DL (ref 120–199)
CHOLEST/HDLC SERPL: 4 {RATIO} (ref 2–5)
CO2 SERPL-SCNC: 24 MMOL/L (ref 23–29)
CREAT SERPL-MCNC: 0.8 MG/DL (ref 0.5–1.4)
DIFFERENTIAL METHOD: ABNORMAL
EOSINOPHIL # BLD AUTO: 0.2 K/UL (ref 0–0.5)
EOSINOPHIL NFR BLD: 2.2 % (ref 0–8)
ERYTHROCYTE [DISTWIDTH] IN BLOOD BY AUTOMATED COUNT: 12.1 % (ref 11.5–14.5)
EST. GFR  (AFRICAN AMERICAN): >60 ML/MIN/1.73 M^2
EST. GFR  (NON AFRICAN AMERICAN): >60 ML/MIN/1.73 M^2
ESTIMATED AVG GLUCOSE: 103 MG/DL (ref 68–131)
GLUCOSE SERPL-MCNC: 82 MG/DL (ref 70–110)
HBA1C MFR BLD HPLC: 5.2 % (ref 4–5.6)
HCT VFR BLD AUTO: 34.3 % (ref 37–48.5)
HDLC SERPL-MCNC: 53 MG/DL (ref 40–75)
HDLC SERPL: 25.1 % (ref 20–50)
HGB BLD-MCNC: 11.2 G/DL (ref 12–16)
IMM GRANULOCYTES # BLD AUTO: ABNORMAL K/UL (ref 0–0.04)
IMM GRANULOCYTES NFR BLD AUTO: ABNORMAL % (ref 0–0.5)
LDLC SERPL CALC-MCNC: 125.6 MG/DL (ref 63–159)
LYMPHOCYTES # BLD AUTO: 2.6 K/UL (ref 1–4.8)
LYMPHOCYTES NFR BLD: 36.4 % (ref 18–48)
MCH RBC QN AUTO: 30 PG (ref 27–31)
MCHC RBC AUTO-ENTMCNC: 32.7 G/DL (ref 32–36)
MCV RBC AUTO: 92 FL (ref 82–98)
MONOCYTES # BLD AUTO: 0.4 K/UL (ref 0.3–1)
MONOCYTES NFR BLD: 6 % (ref 4–15)
NEUTROPHILS # BLD AUTO: 4 K/UL (ref 1.8–7.7)
NEUTROPHILS NFR BLD: 55.3 % (ref 38–73)
NONHDLC SERPL-MCNC: 158 MG/DL
NRBC BLD-RTO: ABNORMAL /100 WBC
PLATELET # BLD AUTO: 359 K/UL (ref 150–350)
PMV BLD AUTO: 9 FL (ref 9.2–12.9)
POTASSIUM SERPL-SCNC: 4.5 MMOL/L (ref 3.5–5.1)
PROT SERPL-MCNC: 7.5 G/DL (ref 6–8.4)
RBC # BLD AUTO: 3.73 M/UL (ref 4–5.4)
SODIUM SERPL-SCNC: 135 MMOL/L (ref 136–145)
T4 FREE SERPL-MCNC: 1.1 NG/DL (ref 0.71–1.51)
TRIGL SERPL-MCNC: 162 MG/DL (ref 30–150)
TSH SERPL DL<=0.005 MIU/L-ACNC: 6.91 UIU/ML (ref 0.4–4)
WBC # BLD AUTO: 7.2 K/UL (ref 3.9–12.7)

## 2020-02-26 PROCEDURE — 36415 COLL VENOUS BLD VENIPUNCTURE: CPT | Mod: PO

## 2020-02-26 PROCEDURE — 82306 VITAMIN D 25 HYDROXY: CPT

## 2020-02-26 PROCEDURE — 83036 HEMOGLOBIN GLYCOSYLATED A1C: CPT

## 2020-02-26 PROCEDURE — 80053 COMPREHEN METABOLIC PANEL: CPT

## 2020-02-26 PROCEDURE — 80061 LIPID PANEL: CPT

## 2020-02-26 PROCEDURE — 84439 ASSAY OF FREE THYROXINE: CPT

## 2020-02-26 PROCEDURE — 85025 COMPLETE CBC W/AUTO DIFF WBC: CPT

## 2020-02-26 PROCEDURE — 84443 ASSAY THYROID STIM HORMONE: CPT

## 2020-03-02 ENCOUNTER — OFFICE VISIT (OUTPATIENT)
Dept: FAMILY MEDICINE | Facility: CLINIC | Age: 40
End: 2020-03-02
Payer: COMMERCIAL

## 2020-03-02 VITALS
HEART RATE: 107 BPM | BODY MASS INDEX: 27.21 KG/M2 | RESPIRATION RATE: 20 BRPM | HEIGHT: 59 IN | WEIGHT: 135 LBS | DIASTOLIC BLOOD PRESSURE: 70 MMHG | SYSTOLIC BLOOD PRESSURE: 102 MMHG | TEMPERATURE: 98 F

## 2020-03-02 DIAGNOSIS — Z00.00 HEALTHCARE MAINTENANCE: ICD-10-CM

## 2020-03-02 DIAGNOSIS — Z12.39 BREAST CANCER SCREENING: ICD-10-CM

## 2020-03-02 DIAGNOSIS — R41.3 MEMORY CHANGE: ICD-10-CM

## 2020-03-02 DIAGNOSIS — E03.8 SUBCLINICAL HYPOTHYROIDISM: Primary | ICD-10-CM

## 2020-03-02 DIAGNOSIS — M79.7 FIBROMYALGIA: ICD-10-CM

## 2020-03-02 PROCEDURE — 99999 PR PBB SHADOW E&M-EST. PATIENT-LVL IV: ICD-10-PCS | Mod: PBBFAC,,, | Performed by: INTERNAL MEDICINE

## 2020-03-02 PROCEDURE — 99999 PR PBB SHADOW E&M-EST. PATIENT-LVL IV: CPT | Mod: PBBFAC,,, | Performed by: INTERNAL MEDICINE

## 2020-03-02 PROCEDURE — 3008F PR BODY MASS INDEX (BMI) DOCUMENTED: ICD-10-PCS | Mod: CPTII,S$GLB,, | Performed by: INTERNAL MEDICINE

## 2020-03-02 PROCEDURE — 99214 PR OFFICE/OUTPT VISIT, EST, LEVL IV, 30-39 MIN: ICD-10-PCS | Mod: S$GLB,,, | Performed by: INTERNAL MEDICINE

## 2020-03-02 PROCEDURE — 99214 OFFICE O/P EST MOD 30 MIN: CPT | Mod: S$GLB,,, | Performed by: INTERNAL MEDICINE

## 2020-03-02 PROCEDURE — 3008F BODY MASS INDEX DOCD: CPT | Mod: CPTII,S$GLB,, | Performed by: INTERNAL MEDICINE

## 2020-03-02 NOTE — PROGRESS NOTES
HISTORY OF PRESENT ILLNESS:  Roopa Rivas is a 40 y.o. female with fibromyalgia who presents to the clinic today for Follow-up    LOV 10/29/19.    Taking Sudafed and Zyrtec for seasonal allergies.    She was seen by rheumatology on 2/17/2020.  Plan was to try doing Savella instead of Cymbalta but issues are with insurance coverage.    Feels like she is having trouble with short term cooking.  Feels like having some trouble with long term memory.  Reports she might have had ADHD and bipolar medicine at some point.  Saw a psychiatrist 2-3 years ago.    The 10-year ASCVD risk score (Camilo KRAFT Jr., et al., 2013) is: 0.7%    Values used to calculate the score:      Age: 40 years      Sex: Female      Is Non- : No      Diabetic: No      Tobacco smoker: No      Systolic Blood Pressure: 130 mmHg      Is BP treated: No      HDL Cholesterol: 53 mg/dL      Total Cholesterol: 211 mg/dL    PAST MEDICAL HISTORY:  Past Medical History:   Diagnosis Date    Arthritis     Fibromyalgia     Gastroenteritis     Long-term use of Plaquenil 12/7/2017       PAST SURGICAL HISTORY:  No past surgical history on file.    SOCIAL HISTORY:  Social History     Socioeconomic History    Marital status:      Spouse name: Not on file    Number of children: Not on file    Years of education: Not on file    Highest education level: Not on file   Occupational History    Not on file   Social Needs    Financial resource strain: Not hard at all    Food insecurity:     Worry: Never true     Inability: Never true    Transportation needs:     Medical: No     Non-medical: No   Tobacco Use    Smoking status: Never Smoker    Smokeless tobacco: Never Used   Substance and Sexual Activity    Alcohol use: No     Frequency: Never     Drinks per session: Patient refused     Binge frequency: Never    Drug use: No    Sexual activity: Yes     Partners: Male     Birth control/protection: Condom   Lifestyle    Physical  activity:     Days per week: 1 day     Minutes per session: 20 min    Stress: Very much   Relationships    Social connections:     Talks on phone: More than three times a week     Gets together: Never     Attends Congregational service: Not on file     Active member of club or organization: No     Attends meetings of clubs or organizations: Never     Relationship status:    Other Topics Concern    Not on file   Social History Narrative    Not on file       FAMILY HISTORY:  Family History   Problem Relation Age of Onset    Arthritis Mother     No Known Problems Father     Arthritis Maternal Grandmother     Arthritis Maternal Grandfather     Cancer Paternal Grandfather        ALLERGIES AND MEDICATIONS: updated and reviewed.  Review of patient's allergies indicates:  No Known Allergies  Medication List with Changes/Refills   Current Medications    CETIRIZINE (ZYRTEC) 10 MG TABLET    Take 10 mg by mouth once daily.    CLOTRIMAZOLE-BETAMETHASONE 1-0.05% (LOTRISONE) CREAM    Apply topically 2 (two) times daily.    DROSPIRENONE-ETHINYL ESTRADIOL (SARAH) 3-0.02 MG PER TABLET    TAKE 1 TABLET BY MOUTH EVERY DAY    DULOXETINE (CYMBALTA) 60 MG CAPSULE    TAKE ONE CAPSULE BY MOUTH ONCE DAILY    IBUPROFEN (ADVIL,MOTRIN) 400 MG TABLET    Take 400 mg by mouth every 6 (six) hours as needed for Other.    MAGNESIUM 30 MG TAB    Take by mouth once.    MILNACIPRAN (SAVELLA) 12.5 MG TAB TABLET    Take 1 tablet(12.5 mg) by mouth once daily on day 1, then 1 tablet (12.5 mg) twice daily on days 2 and 3, then 2 tablets (25 mg) twice daily on days 4 through 7, then 4 tablets (50 mg) twice daily thereafter. Dose may be increased to 100 mg twice daily    MILNACIPRAN (SAVELLA) 50 MG TAB    Take 1 tablet (50 mg) by mouth twice daily after titration. Dose may be increased to 2 tablets (100 mg) twice daily.    MULTIVITAMIN (THERAGRAN) PER TABLET    Take 1 tablet by mouth once daily.    SPIRONOLACTONE (ALDACTONE) 50 MG TABLET    Take 1  tablet (50 mg total) by mouth once daily.    SULFACETAMIDE SODIUM, ACNE, (KLARON) 10 % SUSP    Bid face    VIENVA 0.1-20 MG-MCG PER TABLET    TAKE 1 TABLET BY MOUTH EVERY DAY    ZOLPIDEM (AMBIEN) 10 MG TAB    Take 1 tablet (10 mg total) by mouth nightly as needed (insomnia).          CARE TEAM:  Patient Care Team:  Justin Paz MD as PCP - General (Internal Medicine)  Angeles Herron MA as Care Coordinator         REVIEW OF SYSTEMS:  Review of Systems   Constitutional: Positive for fatigue and unexpected weight change.   HENT: Positive for rhinorrhea. Negative for trouble swallowing.    Eyes: Negative for discharge and visual disturbance.   Respiratory: Negative for chest tightness and wheezing.    Cardiovascular: Negative for chest pain and palpitations.   Gastrointestinal: Positive for diarrhea. Negative for blood in stool, constipation and vomiting.   Endocrine: Negative for polydipsia and polyuria.   Genitourinary: Negative for difficulty urinating, dysuria, hematuria and menstrual problem.   Musculoskeletal: Positive for arthralgias and joint swelling. Negative for neck pain.   Neurological: Positive for weakness and headaches.   Psychiatric/Behavioral: Positive for confusion. Negative for dysphoric mood. The patient is nervous/anxious.          PHYSICAL EXAM:   Vitals:    03/02/20 1359   BP: 102/70   Pulse: 107   Resp: 20   Temp: 98.2 °F (36.8 °C)             Body mass index is 27.27 kg/m².     General appearance - alert, well appearing, and in no distress  Mental status - normal mood, behavior, speech, dress, motor activity, and thought processes  Eyes - sclera anicteric, left eye normal, right eye normal  Chest - clear to auscultation, no wheezes, rales or rhonchi, symmetric air entry  Heart - normal rate and regular rhythm, no murmurs noted  Neurological - alert, oriented, normal speech, no focal findings or movement disorder noted  Extremities - peripheral pulses normal, no pedal edema, no clubbing  or cyanosis      ASSESSMENT AND PLAN:  Subclinical hypothyroidism  -     TSH; Future; Expected date: 03/02/2020  -     T4, free; Future; Expected date: 03/02/2020  -     Thyroid peroxidase antibody; Future; Expected date: 03/02/2020    Healthcare maintenance  -     Mammo Digital Screening Bilat; Future; Expected date: 03/02/2020  -     HIV 1/2 Ag/Ab (4th Gen); Future; Expected date: 03/02/2020  -     CBC auto differential; Future; Expected date: 03/02/2020    Breast cancer screening  -     Mammo Digital Screening Bilat; Future; Expected date: 03/02/2020    Fibromyalgia  -     Ambulatory referral/consult to Pharmacy Assistance; Future; Expected date: 03/09/2020    Memory change  -     Ambulatory referral/consult to Neuropsychology; Future; Expected date: 03/09/2020  -     Ambulatory referral/consult to Psychiatry; Future; Expected date: 03/09/2020             Follow up 6 months or sooner as needed.

## 2020-03-06 ENCOUNTER — HOSPITAL ENCOUNTER (OUTPATIENT)
Dept: RADIOLOGY | Facility: CLINIC | Age: 40
Discharge: HOME | End: 2020-03-06
Attending: INTERNAL MEDICINE
Payer: COMMERCIAL

## 2020-03-06 DIAGNOSIS — Z00.00 HEALTHCARE MAINTENANCE: ICD-10-CM

## 2020-03-06 DIAGNOSIS — Z12.39 BREAST CANCER SCREENING: ICD-10-CM

## 2020-03-06 PROCEDURE — 77067 SCR MAMMO BI INCL CAD: CPT | Mod: 26,,, | Performed by: RADIOLOGY

## 2020-03-06 PROCEDURE — 77067 MAMMO DIGITAL SCREENING BILAT WITH TOMOSYNTHESIS_CAD: ICD-10-PCS | Mod: 26,,, | Performed by: RADIOLOGY

## 2020-03-06 PROCEDURE — 77063 MAMMO DIGITAL SCREENING BILAT WITH TOMOSYNTHESIS_CAD: ICD-10-PCS | Mod: 26,,, | Performed by: RADIOLOGY

## 2020-03-06 PROCEDURE — 77063 BREAST TOMOSYNTHESIS BI: CPT | Mod: 26,,, | Performed by: RADIOLOGY

## 2020-03-06 PROCEDURE — 77067 SCR MAMMO BI INCL CAD: CPT | Mod: TC,PO

## 2020-03-12 ENCOUNTER — TELEPHONE (OUTPATIENT)
Dept: PHARMACY | Facility: AMBULARY SURGERY CENTER | Age: 40
End: 2020-03-12

## 2020-04-03 ENCOUNTER — LAB VISIT (OUTPATIENT)
Dept: LAB | Facility: HOSPITAL | Age: 40
End: 2020-04-03
Attending: INTERNAL MEDICINE
Payer: COMMERCIAL

## 2020-04-03 DIAGNOSIS — E03.8 SUBCLINICAL HYPOTHYROIDISM: ICD-10-CM

## 2020-04-03 DIAGNOSIS — Z00.00 HEALTHCARE MAINTENANCE: ICD-10-CM

## 2020-04-03 LAB
BASOPHILS # BLD AUTO: 0.06 K/UL (ref 0–0.2)
BASOPHILS NFR BLD: 0.7 % (ref 0–1.9)
DIFFERENTIAL METHOD: ABNORMAL
EOSINOPHIL # BLD AUTO: 0.1 K/UL (ref 0–0.5)
EOSINOPHIL NFR BLD: 1.6 % (ref 0–8)
ERYTHROCYTE [DISTWIDTH] IN BLOOD BY AUTOMATED COUNT: 12.2 % (ref 11.5–14.5)
HCT VFR BLD AUTO: 35.2 % (ref 37–48.5)
HGB BLD-MCNC: 11.3 G/DL (ref 12–16)
HIV 1+2 AB+HIV1 P24 AG SERPL QL IA: NEGATIVE
IMM GRANULOCYTES # BLD AUTO: 0.01 K/UL (ref 0–0.04)
IMM GRANULOCYTES NFR BLD AUTO: 0.1 % (ref 0–0.5)
LYMPHOCYTES # BLD AUTO: 2.7 K/UL (ref 1–4.8)
LYMPHOCYTES NFR BLD: 32.4 % (ref 18–48)
MCH RBC QN AUTO: 29.9 PG (ref 27–31)
MCHC RBC AUTO-ENTMCNC: 32.1 G/DL (ref 32–36)
MCV RBC AUTO: 93 FL (ref 82–98)
MONOCYTES # BLD AUTO: 0.4 K/UL (ref 0.3–1)
MONOCYTES NFR BLD: 4.8 % (ref 4–15)
NEUTROPHILS # BLD AUTO: 5 K/UL (ref 1.8–7.7)
NEUTROPHILS NFR BLD: 60.4 % (ref 38–73)
NRBC BLD-RTO: 0 /100 WBC
PLATELET # BLD AUTO: 343 K/UL (ref 150–350)
PMV BLD AUTO: 9.5 FL (ref 9.2–12.9)
RBC # BLD AUTO: 3.78 M/UL (ref 4–5.4)
T4 FREE SERPL-MCNC: 0.98 NG/DL (ref 0.71–1.51)
THYROPEROXIDASE IGG SERPL-ACNC: <6 IU/ML
TSH SERPL DL<=0.005 MIU/L-ACNC: 5.49 UIU/ML (ref 0.4–4)
WBC # BLD AUTO: 8.26 K/UL (ref 3.9–12.7)

## 2020-04-03 PROCEDURE — 84439 ASSAY OF FREE THYROXINE: CPT

## 2020-04-03 PROCEDURE — 36415 COLL VENOUS BLD VENIPUNCTURE: CPT

## 2020-04-03 PROCEDURE — 85025 COMPLETE CBC W/AUTO DIFF WBC: CPT

## 2020-04-03 PROCEDURE — 84443 ASSAY THYROID STIM HORMONE: CPT

## 2020-04-03 PROCEDURE — 86376 MICROSOMAL ANTIBODY EACH: CPT

## 2020-04-03 PROCEDURE — 86703 HIV-1/HIV-2 1 RESULT ANTBDY: CPT

## 2020-04-06 ENCOUNTER — PATIENT MESSAGE (OUTPATIENT)
Dept: RHEUMATOLOGY | Facility: CLINIC | Age: 40
End: 2020-04-06

## 2020-04-06 ENCOUNTER — PATIENT MESSAGE (OUTPATIENT)
Dept: FAMILY MEDICINE | Facility: CLINIC | Age: 40
End: 2020-04-06

## 2020-04-06 DIAGNOSIS — E03.8 SUBCLINICAL HYPOTHYROIDISM: Primary | ICD-10-CM

## 2020-04-20 ENCOUNTER — OFFICE VISIT (OUTPATIENT)
Dept: DERMATOLOGY | Facility: CLINIC | Age: 40
End: 2020-04-20
Payer: COMMERCIAL

## 2020-04-20 DIAGNOSIS — L81.0 POST-INFLAMMATORY HYPERPIGMENTATION: ICD-10-CM

## 2020-04-20 DIAGNOSIS — L70.9 ADULT ACNE: Primary | ICD-10-CM

## 2020-04-20 DIAGNOSIS — L91.0 KELOID: ICD-10-CM

## 2020-04-20 DIAGNOSIS — L85.8 KP (KERATOSIS PILARIS): ICD-10-CM

## 2020-04-20 PROCEDURE — 99211 OFF/OP EST MAY X REQ PHY/QHP: CPT | Mod: 95,,, | Performed by: DERMATOLOGY

## 2020-04-20 PROCEDURE — 99211 PR OFFICE/OUTPT VISIT, EST, LEVL I: ICD-10-PCS | Mod: 95,,, | Performed by: DERMATOLOGY

## 2020-04-20 RX ORDER — SPIRONOLACTONE 50 MG/1
50 TABLET, FILM COATED ORAL DAILY
Qty: 30 TABLET | Refills: 2 | Status: SHIPPED | OUTPATIENT
Start: 2020-04-20 | End: 2020-07-09 | Stop reason: SDUPTHER

## 2020-04-20 NOTE — PROGRESS NOTES
The patient location is: home  The chief complaint leading to consultation is: acne fu  Visit type: audiovisual  Total time spent with patient: 6 min  Each patient to whom he or she provides medical services by telemedicine is:  (1) informed of the relationship between the physician and patient and the respective role of any other health care provider with respect to management of the patient; and (2) notified that he or she may decline to receive medical services by telemedicine and may withdraw from such care at any time.    Notes: below    Subjective:       Patient ID:  Roopa Rivas is a 40 y.o. female who presents for No chief complaint on file.    Pt fu for acne of the face.  Doing great wo breakouts.  Taking aldactone 50 mg qd.    Also has bump on left cheek for a month or so.    Got really irritated with sal acid wash.    Getting brown spots on the face.      Review of Systems   Constitutional: Negative for fever and chills.   HENT: Positive for rhinorrhea (allergies). Negative for sore throat and headaches.    Eyes: Negative for eye irritation.   Respiratory: Negative for cough.    Musculoskeletal: Positive for arthralgias (normal).   Skin: Positive for dry skin.   Neurological: Negative for headaches.        Objective:    Physical Exam   Constitutional: She appears well-developed and well-nourished.   HENT:   Mouth/Throat: Lips normal.    Eyes: No conjunctival no injection.   Neurological: She is alert and oriented to person, place, and time.   Psychiatric: She has a normal mood and affect.   Skin:   Areas Examined (abnormalities noted in diagram):   Head / Face Inspection Performed  Neck Inspection Performed              Diagram Legend     Erythematous scaling macule/papule c/w actinic keratosis       Vascular papule c/w angioma      Pigmented verrucoid papule/plaque c/w seborrheic keratosis      Yellow umbilicated papule c/w sebaceous hyperplasia      Irregularly shaped tan macule c/w lentigo     1-2 mm  smooth white papules consistent with Milia      Movable subcutaneous cyst with punctum c/w epidermal inclusion cyst      Subcutaneous movable cyst c/w pilar cyst      Firm pink to brown papule c/w dermatofibroma      Pedunculated fleshy papule(s) c/w skin tag(s)      Evenly pigmented macule c/w junctional nevus     Mildly variegated pigmented, slightly irregular-bordered macule c/w mildly atypical nevus      Flesh colored to evenly pigmented papule c/w intradermal nevus       Pink pearly papule/plaque c/w basal cell carcinoma      Erythematous hyperkeratotic cursted plaque c/w SCC      Surgical scar with no sign of skin cancer recurrence      Open and closed comedones      Inflammatory papules and pustules      Verrucoid papule consistent consistent with wart     Erythematous eczematous patches and plaques     Dystrophic onycholytic nail with subungual debris c/w onychomycosis     Umbilicated papule    Erythematous-base heme-crusted tan verrucoid plaque consistent with inflamed seborrheic keratosis     Erythematous Silvery Scaling Plaque c/w Psoriasis     See annotation      Assessment / Plan:        Adult acne  -     spironolactone (ALDACTONE) 50 MG tablet; Take 1 tablet (50 mg total) by mouth once daily.  Dispense: 30 tablet; Refill: 2  Doing great.  Labs last time good.  Reviewed with patient different treatment options and associated risks.  Patient and or guardian to monitor this area/lesion or these areas/lesions for changes or worsening.  Patient and or guardian to contact us if any changes are noted for such.  Cont sulfur soap.    Post-inflammatory hyperpigmentation  Discussed with patient the etiology and pathogenesis of the disease or skin lesion(s) and possible treatments and aggravators.    Patient instructed in importance in daily sun protection. Sun avoidance and topical protection discussed.     Patient encouraged to wear hat for all outdoor exposure.     Also discussed sun protective  clothing.  Patient can try lemon juice focally and carefully to avoid irritation or dryness in effort to lighten dark color.    KP (keratosis pilaris)  Condition is stable.  We will continue present management.  Patient and or guardian to monitor this area/lesion or these areas/lesions for changes or worsening.  Patient and or guardian to contact us if any changes are noted for such.  Pt cannot tolerate sal acid soap.    Keloid  Discussed with patient the etiology and pathogenesis of the disease or skin lesion(s) and possible treatments and aggravators.    We will recheck the l cheek at our follow up appointment.             Follow up in about 6 months (around 10/20/2020).

## 2020-05-06 ENCOUNTER — PATIENT MESSAGE (OUTPATIENT)
Dept: RHEUMATOLOGY | Facility: CLINIC | Age: 40
End: 2020-05-06

## 2020-05-07 RX ORDER — DULOXETIN HYDROCHLORIDE 60 MG/1
60 CAPSULE, DELAYED RELEASE ORAL DAILY
Qty: 90 CAPSULE | Refills: 0 | Status: CANCELLED | OUTPATIENT
Start: 2020-05-07

## 2020-05-08 ENCOUNTER — TELEPHONE (OUTPATIENT)
Dept: NEUROLOGY | Facility: CLINIC | Age: 40
End: 2020-05-08

## 2020-05-15 ENCOUNTER — TELEPHONE (OUTPATIENT)
Dept: NEUROLOGY | Facility: CLINIC | Age: 40
End: 2020-05-15

## 2020-05-20 ENCOUNTER — OFFICE VISIT (OUTPATIENT)
Dept: NEUROLOGY | Facility: CLINIC | Age: 40
End: 2020-05-20
Payer: COMMERCIAL

## 2020-05-20 DIAGNOSIS — F31.32 BIPOLAR AFFECTIVE DISORDER, CURRENTLY DEPRESSED, MODERATE: ICD-10-CM

## 2020-05-20 DIAGNOSIS — R41.3 MEMORY CHANGE: ICD-10-CM

## 2020-05-20 PROCEDURE — 90791 PR PSYCHIATRIC DIAGNOSTIC EVALUATION: ICD-10-PCS | Mod: 95,,, | Performed by: PSYCHIATRY & NEUROLOGY

## 2020-05-20 PROCEDURE — 99499 NO LOS: ICD-10-PCS | Mod: 95,,, | Performed by: PSYCHIATRY & NEUROLOGY

## 2020-05-20 PROCEDURE — 99499 UNLISTED E&M SERVICE: CPT | Mod: 95,,, | Performed by: PSYCHIATRY & NEUROLOGY

## 2020-05-20 PROCEDURE — 90791 PSYCH DIAGNOSTIC EVALUATION: CPT | Mod: 95,,, | Performed by: PSYCHIATRY & NEUROLOGY

## 2020-05-20 NOTE — PROGRESS NOTES
NEUROPSYCHOLOGY CONSULT (TELEHEALTH)    Referral Information  Name: Roopa Rivas  MRN: [id number]  : 1980  Age: 40 y.o.  Race: White  Gender: female  Referring Provider: Justin Paz Md  [id number] [place] [address]  Billing: See below for details as coding/billing has changed   Telemedicine:   The patient location is: [place], [place]  The provider location is: [place], [place]  The chief complaint leading to consultation/medical necessity is: Mood and cognitive concerns and in-person visit restrictions due to COVID-19  Visit type: Virtual visit with synchronous audio and video  Total time spent with patient: 60 minutes  Each patient to whom he or she provides medical services by telemedicine is:  (1) informed of the relationship between the physician and patient and the respective role of any other health care provider with respect to management of the patient; and (2) notified that he or she may decline to receive medical services by telemedicine and may withdraw from such care at any time.  Consent/Emergency Plan: The patient expressed an understanding of the purpose of the evaluation and consented to all procedures. I informed the patient of limits to confidentiality and discussed an emergency plan.      SUMMARY/TREATMENT PLAN   Results from the interview indicate the following diagnoses and treatment plan recommendations. The patient has the ability to follow a treatment plan without help from family.    Diagnoses/Plan:  Problem List Items Addressed This Visit        Neuro    Memory change    Overview     Ms. Rivas reported memory and word-finding difficulties beginning in 2019, in the context of changes in medication to treat fibromyalgia. This was similar to an episode she experienced in  and resolved when her medication was adjusted. Available information suggested this change was likely related to this medication adjustment and other physical and mood symptoms rather than a  separate cognitive disorder diagnosis.         Current Assessment & Plan     · Medical Follow-up: Continued follow-up with medical providers to treat ongoing medical/physical symptoms.    · Neuropsychology Follow-up: Neuropsychological assessment recommended in 6 months if cognitive symptoms persist once mood symptoms and sleep are adequately treated.    · Recommendations for Ms. Rivas:  a. Attention: Remember that inattention and lack of focus are major culprits to forgetting information so be sure and practice paying attention for adequate learning of information. If you rely on passive attention to remembering something (e.g., yeah, uh-huh approach), youll find you cannot recall it later. I recommend the following to improve attention, which may aid in later recall:   i. Reduce distractions in the area as much as possible.  ii. Look at the person as they are speaking to you.   iii. Paraphrase as they are speaking  iv. Write down important pieces of information   v. Ask them to repeat if you zone out.   vi. Have them simplify and reduce information that you need to attend to during conversation.   vii. Have visual cues to remind you if you need to do something later.  b. Storing Information: Use the below strategies to help you further enhance how information is stored.  i. Rehearse - Immediately after seeing/hearing something, try to recall it.  Wait a few minutes, then check again.  Gradually lengthen the intervals between rehearsals.  ii. Repetition of learned material is critical to ensure storage of information to be learned. Self-test at home to ensure learning.  iii. Write down important information to improve your attention and focus and to have something to look back on when you need to recall it.  iv. Make sure the person doesnt rattle off, but presents in a clear, logical, and unhurried manner.   c. Recalling Information:  i. Jog your memory - Lose something?  Think back to when you last had it.   What did you do next?  And after that?  Mentally walk yourself through each activity that followed.  Prodding your memory this way may enable you to recall the location of the missing item.  ii. Use a cue - Symbolic reminders (the proverbial string around the finger) are helpful.  So too are memos, timers, calendar notes, etc.--keep them in visible, appropriate places.  iii. Get organized - Have fixed locations for all important papers, key phone numbers, medications, keys, wallet, glasses, tools, etc.  i. Develop routines - Routines can anchor memories so they do not drift away.    d. Sleep Hygiene: Poor sleep has a negative effect on cognition. Several strategies have been shown to improve sleep:   i. Caffeine intake in the afternoon and evening, as well as stuffing oneself at supper, can decrease the quality of restful sleep throughout the night.   ii. Bedtime and wake-up times should be consistent every night and morning so the body becomes used to a single routine, even on the weekends.  iii. Engage in daily physical activity, but not 2-3 hours before bedtime.   iv. No technology use (television, computer, iPad) 1-2 hours before bed.   v. Have a wind down routine (e.g., soft lights in the house, bath before bed, reduced fluid intake, songs, reading, less noise) to promote sleep readiness.   vi. Visit the www.sleepfoundation.org for more strategies.     · Practice good cognitive/brain health hygiene:  · Engage in regular exercise, which increases alertness and arousal and can improve attention and focus.  Consider lower impact exercises, such as yoga or light walking.  · Get a good nights sleep, as this can enhance alertness and cognition.  · Eat healthy foods and balanced meals. It is notable that research indicates certain nutrients may aid in brain function, such as B vitamins (especially B6, B12, and folic acid), antioxidants (such as vitamins C and E, and beta carotene), and Omega-3 fatty acids. Talk  with your physician or nutritionist about whats right for you.   · Keep your brain active. Find activities to stay mentally active, such as reading, games (cards, checkers), puzzles (crosswords, Sudoku, jig saw), crafts (models, woodworking), gardening, or participating in activities in the community.  · Stay socially engaged. Continue staying active with your family and friends.            Psychiatric    Bipolar affective disorder, currently depressed, moderate    Overview     Ms. Rivas reported a history of symptoms of bipolar disorder that have been responsive to treatment in the past. She is not currently engaged in medication management or therapy and reported symptoms of depressed mood, anxiety, and sleep difficulty.         Current Assessment & Plan     · Behavioral/Neuropsychiatric Symptoms:   · Medication Management:   · Consultation with psychiatry or a medical psychologist is suggested for a medication evaluation to treat bipolar disorder and current symptoms of depressed mood and anxiety.    · Psychology/Therapy: Consultation with a therapist to address current symptoms and sleep difficulty.                Thank you for allowing me to participate in Ms. Rivas's care.  If you have any questions, please contact me at [phone].Lelia Jerome, Ph.D., ABPPBoard Certified in Clinical NeuropsychologyOchs[facility] Health  Department of Neurology    HISTORY OF PRESENT ILLNESS AND CURRENT SYMPTOMS     Ms. Rivas has active problems noted below. During a primary care visit in March 2020, she reported trouble with short term memory when cooking and with long term memory. She was referred to neuropsychology and psychiatry due to a history of taking medication to treat attention difficulty and bipolar disorder.    During the interview, Ms. Rivas reported that she started having difficulty with memory and word-finding in December 2019. This was in the context of taking Cymbalta, and she discontinued it briefly  but started again due to an increase in pain and irritability.     Cognitive Symptoms:   Type/Examples: Ms. Rivas reported that she was having difficulty remembering the events of her first marriage and some of her childhood. She also had difficulty cooking because she would forget items on the stove. Her daughter supervises, and she leaves physical reminders (e.g., putting a chair in front of the stove if cooking).She also reported word-finding and word-production issues and losing her train of thought in the middle of conversations.     Onset: Gradual in onset in late December 2019. She had the same type of problem in 2014 when she was prescribed a medication to treat symptoms of bipolar disorder; she did not recall the name. Symptoms resolved over several months.   Course: Stable    Current Functional Status/Needs:  ADLs  Self-Care Eating Safety Other   Independent Independent Independent      Instrumental IADLs:   Driving Medications/Health Household Finances   Independent Independent She is supervised when cooking because she was forgetting items on the stove. Independent     IADL 5/20/2020   Ability to Use Telephone Operates telephone on own initiative, looks up and dials numbers   Shopping Takes care of all shopping needs independently   Food Preparation Prepares adequate meals if supplied with ingredients   Housekeeping Maintains house alone with occasional assistance (heavy work)   Laundry Does personal laundry completely   Mode of Transportation Travels independently on public tranportation or drives own car   Responsibility for Own Medications Is responsible for taking medication in correct dosages at correct time   Ability to Handle Finances Manages day-to-day purchases, but needs help with banking, major purchases, etc     Psychiatric/Behavioral Symptoms:  Mood:  Depression/Dysphoria Anxiety/Fearfulness Irritability   Ms. Rivas said she felt low, guilty, and not like herself recently. This has  "improved somewhat with Cymbalta. She reported ongoing anxiety. Ms. Rivas reported feeling very irritable and out of control before she started taking Cymbalta again. She described a history of anger in the past.     PHQ9 5/20/2020   Total Score 21   Ms. Rivas's responses suggested severe reported depression.    GAD7 5/20/2020   1. Feeling nervous, anxious, or on edge? 3   2. Not being able to stop or control worrying? 3   3. Worrying too much about different things? 3   4. Trouble relaxing? 3   5. Being so restless that it is hard to sit still? 2   6. Becoming easily annoyed or irritable? 1   7. Feeling afraid as if something awful might happen? 3   8. If you checked off any problems, how difficult have these problems made it for you to do your work, take care of things at home, or get along with other people? 1   RUPINDER-7 Score 18   Ms. Rivas's responses suggested severe reported anxiety.    Behavior:  Agitation/Resistance Delusions/Paranoia Hallucinations   She reported frustration when in pain. None reported. None reported.     Apathy/Motivation Repetitive/Restlessness Other   None reported. None reported. She has a history of over-spending and risk-taking behaviors.     Neurovegetative:  Sleep/Nighttime  Appetite Energy   She takes Ambien at night to sleep since 2014 and sleeps less than five hours per night. Without Ambien, her brain "won't shut off." She has tried many behavioral strategies.  She reported reduced appetite with Cymbalta. She tries to exercise but feels sore for a long period after exercise.     Suicidal/Homicidal Ideation: She reported suicidal ideation in her early 20s and once in 2017 but not recently. No homicidal ideation.    Physical Symptoms: Ms. Rivas reported chronic pain, temperature dysregulation, and gastrointestinal symptoms since she was young. She also reported frequent negative side-effects of medication. Over the last couple of weeks, pain was "unbearable" when she stopped " "taking Cymbalta. Last week, when she started taking Cymbalta again, it improved slightly.     PERTINENT BACKGROUND INFORMATION   SOCIAL HISTORY    · Family Status: , four children (two from a previous marriage)  · Current Living Situation: Lives with , younger children, and one older child  · Primary Source of Support: family  · Daily Activities: caring for her plants and children; managing the household  · Stressors: chronic pain, recent move  · Other Factors:  · Educational Level: Reported daydreaming and being labeled a "troublemaker" due to intervening when others were being bullied. She repeated 9th grade and withdrew from school at age 16 during 9th grade. She completed a GED at age 22. She completed 3 years of post-high school education in community college and a university.  · Occupational Status and History: She worked as a  for [employer] (3 years); she withdrew from work in 2016 due to medical/physical concerns  · Other:    Family History   Problem Relation Age of Onset    Arthritis Mother     No Known Problems Father     Arthritis Maternal Grandmother     Arthritis Maternal Grandfather     Cancer Paternal Grandfather      Family Neurologic History: Negative for heritable risk factors  Family Psychiatric History: Negative for heritable risk factors    MEDICAL STATUS  Patient Active Problem List   Diagnosis    Arthritis, multiple joint involvement    History of gastritis    Insomnia due to medical condition    Acne, mild    Vitamin D deficiency    Fibromyalgia    Seasonal allergies    Need for influenza vaccination    Tinea    Acne    Bipolar affective disorder, currently depressed, moderate    Memory change     Past Medical History:   Diagnosis Date    Arthritis     Fibromyalgia     Gastroenteritis     Long-term use of Plaquenil 12/7/2017     No past surgical history on file.    Updated/Relevant Neurologic History:  · TBI: She reported several " blow to the head as a child. She did not recall any cognitive sequelae.  · Seizures: None reported  · Stroke: None reported  · Movement Concerns: None reported  · Referral Diagnosis: Memory change    Recent Labs and Imaging  No results found for: ONUBWTQP24  No results found for: RPR  No results found for: FOLATE  Lab Results   Component Value Date    TSH 5.493 (H) 04/03/2020     Lab Results   Component Value Date    HGBA1C 5.2 02/26/2020     Lab Results   Component Value Date    EMV54XTBO Negative 04/03/2020     No imaging.    Current Outpatient Medications:     cetirizine (ZYRTEC) 10 MG tablet, Take 10 mg by mouth once daily., Disp: , Rfl:     clotrimazole-betamethasone 1-0.05% (LOTRISONE) cream, Apply topically 2 (two) times daily., Disp: 15 g, Rfl: 0    drospirenone-ethinyl estradiol (SARAH) 3-0.02 mg per tablet, TAKE 1 TABLET BY MOUTH EVERY DAY, Disp: 28 tablet, Rfl: 5    DULoxetine (CYMBALTA) 60 MG capsule, TAKE ONE CAPSULE BY MOUTH ONCE DAILY, Disp: 90 capsule, Rfl: 0    ibuprofen (ADVIL,MOTRIN) 400 MG tablet, Take 400 mg by mouth every 6 (six) hours as needed for Other., Disp: , Rfl:     magnesium 30 mg Tab, Take by mouth once., Disp: , Rfl:     milnacipran (SAVELLA) 12.5 mg Tab tablet, Take 1 tablet(12.5 mg) by mouth once daily on day 1, then 1 tablet (12.5 mg) twice daily on days 2 and 3, then 2 tablets (25 mg) twice daily on days 4 through 7, then 4 tablets (50 mg) twice daily thereafter. Dose may be increased to 100 mg twice daily, Disp: 30 tablet, Rfl: 0    milnacipran (SAVELLA) 50 mg Tab, Take 1 tablet (50 mg) by mouth twice daily after titration. Dose may be increased to 2 tablets (100 mg) twice daily., Disp: 60 tablet, Rfl: 11    multivitamin (THERAGRAN) per tablet, Take 1 tablet by mouth once daily., Disp: , Rfl:     spironolactone (ALDACTONE) 50 MG tablet, Take 1 tablet (50 mg total) by mouth once daily., Disp: 30 tablet, Rfl: 2    sulfacetamide sodium, acne, (KLARON) 10 % Susp, Bid face,  "Disp: 118 mL, Rfl: 1    VIENVA 0.1-20 mg-mcg per tablet, TAKE 1 TABLET BY MOUTH EVERY DAY, Disp: 84 tablet, Rfl: 0    zolpidem (AMBIEN) 10 mg Tab, Take 1 tablet (10 mg total) by mouth nightly as needed (insomnia)., Disp: 30 tablet, Rfl: 2    Updated/Relevant Psychiatric History: Ms. Rivas denied a history of trauma; she noted limited parental oversight after her parents . Symptoms started when she was in her 20s and included limited sleep (30 minutes in a week), highs and lows in mood, over-spending, and distractibility. She has been engaged in treatment but engagement in therapy was limited by work/stigma considerations.     Substance Use: No problematic use reported.    MENTAL STATUS AND OBSERVATIONS:  APPEARANCE: Casually dressed and adequate grooming/hygiene.   ALERTNESS/ORIENTATION: Attentive and alert. Fully oriented (x5) to time and place  GAIT: Not assessed  MOTOR MOVEMENTS/MANNERISMS: Not assessed  SPEECH/LANGUAGE: Normal in rate, rhythm, tone, and volume. No significant word finding difficulty noted. Expressive and receptive language was normal.  STATED MOOD/AFFECT: The patients stated mood was "pretty good." Affect was congruent with stated mood.   INTERPERSONAL BEHAVIOR: Rapport was quickly and easily established   SUICIDALITY/HOMICIDALITY: Denied  HALLUCINATIONS/DELUSIONS: None evidenced or endorsed  THOUGHT PROCESSES/INSIGHT: Thoughts seemed logical and goal-directed.     QUESTIONNAIRE RESPONSES    Questionnaires were completed via telephone with a psychometrist and reviewed and incorporated by the neuropsychologist.    BILLING  Service Description CPT Code Minutes Units   Psychiatric diagnostic evaluation by physician [phone]                 "

## 2020-05-22 ENCOUNTER — PATIENT MESSAGE (OUTPATIENT)
Dept: RHEUMATOLOGY | Facility: CLINIC | Age: 40
End: 2020-05-22

## 2020-05-24 PROBLEM — R41.3 MEMORY CHANGE: Status: ACTIVE | Noted: 2020-05-24

## 2020-05-24 PROBLEM — F31.32 BIPOLAR AFFECTIVE DISORDER, CURRENTLY DEPRESSED, MODERATE: Status: ACTIVE | Noted: 2020-05-24

## 2020-05-24 NOTE — PATIENT INSTRUCTIONS
Recommendations:   · Medical Follow-up: Continued follow-up with medical providers to treat ongoing medical/physical symptoms.     · Behavioral/Neuropsychiatric Symptoms (contact [facility] at [phone]):   · Medication Management:   · Consultation with psychiatry or a medical psychologist is suggested for a medication evaluation to treat bipolar disorder and current symptoms of depressed mood and anxiety.    · Psychology/Therapy: Consultation with a therapist to address current symptoms and sleep difficulty.     · Neuropsychology Follow-up: Neuropsychological assessment recommended in 6 months if cognitive symptoms persist once mood symptoms and sleep are adequately treated.    · Recommendations for Ms. Rivas:  a. Attention: Remember that inattention and lack of focus are major culprits to forgetting information so be sure and practice paying attention for adequate learning of information. If you rely on passive attention to remembering something (e.g., yeah, uh-huh approach), youll find you cannot recall it later. I recommend the following to improve attention, which may aid in later recall:   i. Reduce distractions in the area as much as possible.  ii. Look at the person as they are speaking to you.   iii. Paraphrase as they are speaking  iv. Write down important pieces of information   v. Ask them to repeat if you zone out.   vi. Have them simplify and reduce information that you need to attend to during conversation.   vii. Have visual cues to remind you if you need to do something later.  b. Storing Information: Use the below strategies to help you further enhance how information is stored.  i. Rehearse - Immediately after seeing/hearing something, try to recall it.  Wait a few minutes, then check again.  Gradually lengthen the intervals between rehearsals.  ii. Repetition of learned material is critical to ensure storage of information to be learned. Self-test at home to ensure  learning.  iii. Write down important information to improve your attention and focus and to have something to look back on when you need to recall it.  iv. Make sure the person doesnt rattle off, but presents in a clear, logical, and unhurried manner.   c. Recalling Information:  i. Jog your memory - Lose something?  Think back to when you last had it.  What did you do next?  And after that?  Mentally walk yourself through each activity that followed.  Prodding your memory this way may enable you to recall the location of the missing item.  ii. Use a cue - Symbolic reminders (the proverbial string around the finger) are helpful.  So too are memos, timers, calendar notes, etc.--keep them in visible, appropriate places.  iii. Get organized - Have fixed locations for all important papers, key phone numbers, medications, keys, wallet, glasses, tools, etc.  i. Develop routines - Routines can anchor memories so they do not drift away.    d. Sleep Hygiene: Poor sleep has a negative effect on cognition. Several strategies have been shown to improve sleep:   i. Caffeine intake in the afternoon and evening, as well as stuffing oneself at supper, can decrease the quality of restful sleep throughout the night.   ii. Bedtime and wake-up times should be consistent every night and morning so the body becomes used to a single routine, even on the weekends.  iii. Engage in daily physical activity, but not 2-3 hours before bedtime.   iv. No technology use (television, computer, iPad) 1-2 hours before bed.   v. Have a wind down routine (e.g., soft lights in the house, bath before bed, reduced fluid intake, songs, reading, less noise) to promote sleep readiness.   vi. Visit the www.sleepfoundation.org for more strategies.     · Practice good cognitive/brain health hygiene:  · Engage in regular exercise, which increases alertness and arousal and can improve attention and focus.  Consider lower impact exercises, such as yoga or light  walking.  · Get a good nights sleep, as this can enhance alertness and cognition.  · Eat healthy foods and balanced meals. It is notable that research indicates certain nutrients may aid in brain function, such as B vitamins (especially B6, B12, and folic acid), antioxidants (such as vitamins C and E, and beta carotene), and Omega-3 fatty acids. Talk with your physician or nutritionist about whats right for you.   · Keep your brain active. Find activities to stay mentally active, such as reading, games (cards, checkers), puzzles (crosswords, Sudoku, jig saw), crafts (models, woodworking), gardening, or participating in activities in the community.  · Stay socially engaged. Continue staying active with your family and friends.    COVID-19 Recommendations    Stay healthy:    Stay home as much as possible, avoid groups or crowds, and keep six feet away from other people   Wash your hands frequently with soap and water   Understand when and how to use a mask who.int/emergencies/diseases/novel-coronavirus-2019/advice-for-public/when-and-how-to-use-masks   Ask a neighbor, friend, or relative to help run errands for you    Ask your pharmacy if they will deliver medications or find a pharmacy with a drive-thru window   Try a new budget friendly recipe chooseConmioplate.gov/eathealthy/budget/budget-recipes     Stay resourceful   Consider using a bidet (handheld bottle or toilet seat) as an alternative to toilet paper   Consider dry disposable washcloths with no-rinse cleanser or watered down baby shampoo as an alternative to pre-moistened wipes     Stay active:    Try dancing around the house to your favorite music   A pedal exerciser can be ordered online and offers an economical way to do some 'indoor bicycling'   Sit and Be Fit exercise video clips are available free on [employer]ThandsomexcutivetNeuroQuest.com/user/SitandBeFitTVSHOW     Stay engaged:    Consider arranging a call or video chat schedule with friends and family. Ask  relatives to send cards or letters by mail. Consider a television schedule of programs the person prefers (cooking shows, Orthodox services, music programs, old shows/movies).    [employer] program offers free telephone-based educational programs and activities [employer]/services/[employer]/   onegx1mik0bokoz.com/ offers some free activity ideas     Please don't hesitate to stay in touch with us and let us know how you are doing ([phone]).

## 2020-05-24 NOTE — ASSESSMENT & PLAN NOTE
· Medical Follow-up: Continued follow-up with medical providers to treat ongoing medical/physical symptoms.    · Neuropsychology Follow-up: Neuropsychological assessment recommended in 6 months if cognitive symptoms persist once mood symptoms and sleep are adequately treated.    · Recommendations for Ms. Rivas:  a. Attention: Remember that inattention and lack of focus are major culprits to forgetting information so be sure and practice paying attention for adequate learning of information. If you rely on passive attention to remembering something (e.g., yeah, uh-huh approach), youll find you cannot recall it later. I recommend the following to improve attention, which may aid in later recall:   i. Reduce distractions in the area as much as possible.  ii. Look at the person as they are speaking to you.   iii. Paraphrase as they are speaking  iv. Write down important pieces of information   v. Ask them to repeat if you zone out.   vi. Have them simplify and reduce information that you need to attend to during conversation.   vii. Have visual cues to remind you if you need to do something later.  b. Storing Information: Use the below strategies to help you further enhance how information is stored.  i. Rehearse - Immediately after seeing/hearing something, try to recall it.  Wait a few minutes, then check again.  Gradually lengthen the intervals between rehearsals.  ii. Repetition of learned material is critical to ensure storage of information to be learned. Self-test at home to ensure learning.  iii. Write down important information to improve your attention and focus and to have something to look back on when you need to recall it.  iv. Make sure the person doesnt rattle off, but presents in a clear, logical, and unhurried manner.   c. Recalling Information:  i. Jog your memory - Lose something?  Think back to when you last had it.  What did you do next?  And after that?  Mentally walk yourself through each  activity that followed.  Prodding your memory this way may enable you to recall the location of the missing item.  ii. Use a cue - Symbolic reminders (the proverbial string around the finger) are helpful.  So too are memos, timers, calendar notes, etc.--keep them in visible, appropriate places.  iii. Get organized - Have fixed locations for all important papers, key phone numbers, medications, keys, wallet, glasses, tools, etc.  i. Develop routines - Routines can anchor memories so they do not drift away.    d. Sleep Hygiene: Poor sleep has a negative effect on cognition. Several strategies have been shown to improve sleep:   i. Caffeine intake in the afternoon and evening, as well as stuffing oneself at supper, can decrease the quality of restful sleep throughout the night.   ii. Bedtime and wake-up times should be consistent every night and morning so the body becomes used to a single routine, even on the weekends.  iii. Engage in daily physical activity, but not 2-3 hours before bedtime.   iv. No technology use (television, computer, iPad) 1-2 hours before bed.   v. Have a wind down routine (e.g., soft lights in the house, bath before bed, reduced fluid intake, songs, reading, less noise) to promote sleep readiness.   vi. Visit the www.sleepfoundation.org for more strategies.     · Practice good cognitive/brain health hygiene:  · Engage in regular exercise, which increases alertness and arousal and can improve attention and focus.  Consider lower impact exercises, such as yoga or light walking.  · Get a good nights sleep, as this can enhance alertness and cognition.  · Eat healthy foods and balanced meals. It is notable that research indicates certain nutrients may aid in brain function, such as B vitamins (especially B6, B12, and folic acid), antioxidants (such as vitamins C and E, and beta carotene), and Omega-3 fatty acids. Talk with your physician or nutritionist about whats right for you.   · Keep your  brain active. Find activities to stay mentally active, such as reading, games (cards, checkers), puzzles (crosswords, Sudoku, jig saw), crafts (models, woodworking), gardening, or participating in activities in the community.  · Stay socially engaged. Continue staying active with your family and friends.

## 2020-05-24 NOTE — ASSESSMENT & PLAN NOTE
· Behavioral/Neuropsychiatric Symptoms:   · Medication Management:   · Consultation with psychiatry or a medical psychologist is suggested for a medication evaluation to treat bipolar disorder and current symptoms of depressed mood and anxiety.    · Psychology/Therapy: Consultation with a therapist to address current symptoms and sleep difficulty.

## 2020-06-08 DIAGNOSIS — G47.01 INSOMNIA DUE TO MEDICAL CONDITION: ICD-10-CM

## 2020-06-08 RX ORDER — ZOLPIDEM TARTRATE 10 MG/1
10 TABLET ORAL NIGHTLY PRN
Qty: 30 TABLET | Refills: 2 | OUTPATIENT
Start: 2020-06-08 | End: 2020-09-06

## 2020-07-06 ENCOUNTER — OFFICE VISIT (OUTPATIENT)
Dept: PSYCHIATRY | Facility: CLINIC | Age: 40
End: 2020-07-06
Payer: COMMERCIAL

## 2020-07-06 DIAGNOSIS — F41.9 ANXIETY: ICD-10-CM

## 2020-07-06 DIAGNOSIS — R41.3 MEMORY CHANGE: ICD-10-CM

## 2020-07-06 DIAGNOSIS — G47.01 INSOMNIA DUE TO MEDICAL CONDITION: ICD-10-CM

## 2020-07-06 DIAGNOSIS — F31.32 BIPOLAR AFFECTIVE DISORDER, CURRENTLY DEPRESSED, MODERATE: Primary | ICD-10-CM

## 2020-07-06 PROBLEM — G47.9 SLEEP DISTURBANCE: Status: ACTIVE | Noted: 2020-07-06

## 2020-07-06 PROCEDURE — 90791 PR PSYCHIATRIC DIAGNOSTIC EVALUATION: ICD-10-PCS | Mod: S$GLB,,, | Performed by: SOCIAL WORKER

## 2020-07-06 PROCEDURE — 99999 PR PBB SHADOW E&M-EST. PATIENT-LVL III: ICD-10-PCS | Mod: PBBFAC,,,

## 2020-07-06 PROCEDURE — 99999 PR PBB SHADOW E&M-EST. PATIENT-LVL III: CPT | Mod: PBBFAC,,,

## 2020-07-06 PROCEDURE — 90791 PSYCH DIAGNOSTIC EVALUATION: CPT | Mod: S$GLB,,, | Performed by: SOCIAL WORKER

## 2020-07-06 RX ORDER — DROSPIRENONE AND ETHINYL ESTRADIOL 0.02-3(28)
KIT ORAL
Qty: 28 TABLET | Refills: 1 | Status: SHIPPED | OUTPATIENT
Start: 2020-07-06 | End: 2020-09-02 | Stop reason: SDUPTHER

## 2020-07-06 NOTE — PATIENT INSTRUCTIONS
Treating Anxiety Disorders with Therapy    If you have an anxiety disorder, you dont have to suffer anymore. Treatment is available. Therapy (also called counseling) is often a helpful treatment for anxiety disorders. With therapy, a specially trained professional (therapist) helps you face and learn to manage your anxiety. Therapy can be short-term or long-term depending on your needs. In some cases, medicine may also be prescribed with therapy. It may take time before you notice how much therapy is helping, but stick with it. With therapy, you can feel better.  Cognitive behavioral therapy (CBT)  Cognitive behavioral therapy (CBT) teaches you to manage anxiety. It does this by helping you understand how you think and act when youre anxious. Research has shown CBT to be a very effective treatment for anxiety disorders. How CBT is run is almost like a class. It involves homework and activities to build skills that teach you to cope with anxiety step by step. It can be done in a group or one-on-one, and often takes place for a set number of sessions. CBT has two main parts:  · Cognitive therapy helps you identify the negative, irrational thoughts that occur with your anxiety. Youll learn to replace these with more positive, realistic thoughts.  · Behavioral therapy helps you change how you react to anxiety. Youll learn coping skills and methods for relaxing to help you better deal with anxiety.  Other forms of therapy  Other therapy methods may work better for you than CBT. Or, you may move from CBT to another form of therapy as your treatment needs change. This may mean meeting with a therapist by yourself or in a group. Therapy can also help you work through problems in your life, such as drug or alcohol dependence, that may be making your anxiety worse.  Getting better takes time  Therapy will help you feel better and teach you skills to help manage anxiety long term. But change doesnt happen right away. It  takes a commitment from you. And treatment only works if you learn to face the causes of your anxiety. So, you might feel worse before you feel better. This can sometimes make it hard to stick with it. But remember: Therapy is a very effective treatment. The results will be well worth it.  Helping yourself  If anxiety is wearing you down, here are some things you can do to cope:  · Check with your doctor and rule out any physical problems that may be causing the anxiety symptoms.  · If an anxiety disorder is diagnosed, seek mental healthcare. This is an illness and it can respond to treatment. Most types of anxiety disorders will respond to talk therapy and medicine.  · Educate yourself about anxiety disorders. Keep track of helpful online resources and books you can use during stressful periods.  · Try stress management techniques such as meditation.  · Consider online or in-person support groups.  · Dont fight your feelings. Anxiety feeds itself. The more you worry about it, the worse it gets. Instead, try to identify what might have triggered your anxiety. Then try to put this threat in perspective.  · Keep in mind that you cant control everything about a situation. Change what you can and let the rest take its course.  · Exercise -- its a great way to relieve tension and help your body feel relaxed.  · Examine your life for stress, and try to find ways to reduce it.  · Avoid caffeine and nicotine, which can make anxiety symptoms worse.  · Fight the temptation to turn to alcohol or unprescribed drugs for relief. They only make things worse in the long run.   Date Last Reviewed: 1/1/2017  © 3201-5201 [employer]. [address] [place] [address], [place] [address]. All rights reserved. This information is not intended as a substitute for professional medical care. Always follow your healthcare professional's instructions.        Treating Bipolar Disorder    Bipolar disorder results in extreme mood swings  that can greatly disrupt your life. These symptoms may cause you distress. But with treatment, you can lead a more normal life.  Medicines  Bipolar disorder is often treated with medicines that stabilize moods. They help you feel better by keeping your moods more even, and help prevent future mood swings. Sometimes you may also be prescribed medicines that treat depression. All medicines can have side effects. If youre troubled by side effects, tell your healthcare provider. Changing the dose or type of your medicine may help. But dont stop taking medicines until your healthcare provider tells you. If you do, your symptoms will likely come back.  Talk therapy (psychotherapy)  Talking to a therapist or counselor may be part of your treatment. Having bipolar disorder can make it hard to hold a job or go to school. It can create stress for both you and your loved ones. A therapist can teach you how to cope with bipolar disorder. This can help you lessen manic or depressive episodes, or even prevent them. Your therapist can help you work out problems and heal relationships. He or she can also provide support when you need it most.  Friends and family  Those closest to you may also need support. There are many groups for families of people with bipolar disorder. Learning more about this disorder can help your loved ones cope. It can also help them take an active role in your care.  Looking ahead  People with bipolar disorder have periods with no symptoms. But it is a chronic illness that requires lifetime care. Just as with heart conditions or diabetes, bipolar symptoms can return or treatments many need to be changed. Ongoing professional support is key to effective long-term management. Much research is being done on bipolar disorder. This research may lead to improved treatments and hope for a better future.  Resources  · National [place] of Mental Health  838.862.4666  www.nimh.nih.gov  · National [place] on  Mental Illness  983.148.1825  www.ned.org  · [facility] [place]  723.233.7633  www.[facility].org  · National Suicide Prevention Lifeline 677-792-CPPF (930-953-8406) www.suicidepreventionlifeline.org   Date Last Reviewed: 5/1/2017  © 2791-5964 [employer]. [address] [place] [address]. All rights reserved. This information is not intended as a substitute for professional medical care. Always follow your healthcare professional's instructions.        Understanding Bipolar Disorder  Bipolar disorder is a serious disorder of the brain. It may severely disrupt your life. At times, it may cause you and your loved ones great pain. But there is hope. Although there is no cure, treatment can help control your symptoms. Talk with your healthcare provider or a mental health professional. He or she can offer guidance and support.    What causes bipolar disorder?  The exact causes of bipolar disorder arent known. It is known that the disease runs in families. Genes that affect nerve cells in the brain may be inherited, but as yet these genes have not been found.  Who does it affect?  Over 5 million adults in this country have bipolar disorder. Most often, it strikes young adults. It can affect children and older adults as well. Bipolar disorder affects both men and women. It can strike people of all races, cultures, and incomes.  Ups and downs  Bipolar disorder used to be called manic-depressive illness. That is because it causes extreme mood swings. At times the person may feel almost too happy. These times are often followed by great despair. In some cases, both extremes may occur at once. More often, moods shift back and forth. These mood swings may occur just once in a while. Or they may happen 4 or more times a year. Without treatment, they will likely recur throughout life.  Manic episodes  During manic episodes of bipolar disorder, you feel like youre on top of the world. Even the worst news  cant bring you down. Youll likely feel as if you can do anything. And sometimes you may try. You may take great risks, thinking you cant be hurt. You may also talk too fast, and your thoughts may race. You may go for days without sleeping. And you might be very active and do a lot of things in a short time. Manic episodes often end in a depression.  Depressive episodes  In depressive episodes, you feel intense sadness and depression. You may also feel worthless, tired, and helpless. Even the things you value most dont give you pleasure. At times you may want to die. You may even think about taking your own life.  Date Last Reviewed: 2/1/2017  © 1414-9967 [employer]. [address] [place] [address], [place] [address]. All rights reserved. This information is not intended as a substitute for professional medical care. Always follow your healthcare professional's instructions.        Insomnia  Insomnia is repeated difficulty going to sleep or staying asleep, or both. Whether you have insomnia is not defined by a specific amount of sleep. Different people need different amounts of sleep, and you may need more or less sleep at different times of your life.  There are 3 major types of insomnia:  short-term, chronic, and other.  Short-term, or acute insomnia lasts less than 3 months.  The symptoms are temporary and can be linked directly to a stressor, such as the death of a loved one, financial problems, or a new physical problem.  Short-term insomnia stops when the stressor resolves or the person adapts to its presence.  Chronic insomnia occurs at least 3 times a week and lasts longer than 3 months.  Chronic insomnia can occur when either the cause of the sleeping problem is not clear, or the insomnia does not get better when the stressor is resolved. A number of other criteria are also used to make the diagnosis of chronic insomnia.   Other insomnia is the third type of insomnia-related sleep disorders.   This description applies to people who have problems getting to sleep or staying asleep, but do not meet all of the factors that describe either short-term or chronic insomnia.    Many things cause insomnia. Different people may have different causes. It can be from an underlying medical or psychological condition, or lifestyle. It can also be primary insomnia, which means no cause can be found.  Causes of insomnia include:  · Chronic medical problems- heart disease, gastrointestinal problems, hormonal changes, breathing problems  · Anxiety  · Stress  · Depression  · Pain  · Work schedule  · Sleep apnea  · Illegal drugs  · Certain medicines  Many different medidcines can affect your sleep, such as stimulants, caffeine, alcohol, some decongestants, and diet pills. Other medicines may include some types of blood pressure pills, steroids, asthma medicines, antihistamines, antidepressants, seizure medicines and statins. Not all of these will affect your sleep, and they shouldnt be stopped without talking to your doctor.  Symptoms of insomnia can include:  · Lying awake for long periods at night before falling asleep  · Waking up several times during the night  · Waking up early in the morning and not being able to get back to sleep  · Feeling tired and not refreshed by sleep  · Not being able to function properly during the day and finding it hard to concentrate  · Irritability  · Tiredness and fatigue during the day  Home care  1. Review your medicines with your doctor or pharmacist to find out if they can cause insomnia. Not all medicines will affect your sleep, but they shouldn't be stopped without reviewing them with your doctor. There may be serious side effects and consequences from suddenly stopping your medicines. Not taking them may cause strokes, heart attacks, and many other problems.  2. Caffeine, smoking and alcohol also affect sleep. Limit your daily use and do not use these before bedtime. Alcohol may  make you sleepy at first, but as its effects wear off, you may awaken a few hours later and have trouble returning to sleep.  3. Do not exercise, eat or drink large amounts of liquid within 2 hours of your bedtime.  4. Improve your sleep habits. Have a fixed bed and wake-up time. Try to keep noise, light and heat in your bedroom at a comfortable level. Try using earplugs or eyeshades if needed.   5. Avoid watching TV in bed.  6. If you do not fall asleep within 30 minutes, try to relax by reading or listening to soft music.  7. Limit daytime napping to one 30 minute period, early in the day.  8. Get regular exercise. Find other ways to lessen your stress level.  9. If a medicine was prescribed to help reset your sleep patterns, take it as directed. Sleeping pills are intended for short-term use, only. If taken for too long, the effect wears off while the risk of physical addiction and psychological dependence increases.  Sleep diary  If the cause isnt obvious and it is not improving, try keeping a sleep diary for a couple of weeks. Include in it:  · The time you go to bed  · How long it takes to fall asleep  · How many times you wake up  · What time you wake up  · Your meal times and what you eat  · What time you drink alcohol  · Your exercise habits and times  Follow-up care  Follow up with your healthcare provider, or as advised. If X-rays or CT scans were done, you will be notified if there is a change in the reading, especially if it affects treatment.  Call 911  Call 911 if any of these occur:  · Trouble breathing  · Confusion or trouble waking  · Fainting or loss of consciousness  · Rapid heart rate  · New chest, arm, shoulder, neck or upper back pain  · Trouble with speech or vision, weakness of an arm or leg  · Trouble walking or talking, loss of balance, numbness or weakness in one side of your body, facial droop  When to seek medical advice  Call your healthcare provider right away if any of these  occur:  · Extreme restlessness or irritability  · Confusion or hallucinations (seeing or hearing things that are not there)  · Anxiety, depression  · Several days without sleeping  Date Last Reviewed: 11/19/2015  © 5377-2667 [employer]. [address] [place] [address], [place] [address]. All rights reserved. This information is not intended as a substitute for professional medical care. Always follow your healthcare professional's instructions.        Treating Insomnia     Learning to relax before bedtime can improve your sleep.     Good sleeping habits are a key part of treatment. If needed, some medications may help you sleep better at first. Making healthy lifestyle changes and learning to relax can improve your sleep. Treating insomnia takes commitment, but trust that your efforts will pay off. Talk to your health care provider before taking any medication.  Healthy lifestyle  Your lifestyle affects your health and your sleep. Here are some healthy habits:  · Keep a regular sleep schedule. Go to bed and get up at the same time each day.  · Exercise regularly. It may help you reduce stress. Avoid strenuous exercise for 2 to 4 hours before bedtime.  · Avoid or limit naps, especially in the late afternoon.  · Use your bed only for sleep and sex.  · Dont spend too much time in bed trying to fall asleep. If you cant fall asleep, get up and do something (no electronics) until you become tired and drowsy.  · Avoid or limit caffeine and nicotine for up to 6 hours before bedtime. They can keep you awake at night.   · Also avoid alcohol for at least 4 to 6 hours before bedtime. It may help you fall asleep at first, but you will have more awakenings throughout the night, and your sleep will not be restful.  Before bedtime  To sleep better every night, try these tips:  · Have a bedtime routine to let your body and mind know when its time to sleep.  · Think of going to bed as relaxing and enjoyable. Sleep will come  sooner.  · If your worries dont let you sleep, write them down in a diary. Then close it, and go to bed.  · Make sure the room is not too hot or too cold. If its not dark enough, an eye mask can help. If its noisy, try using earplugs.  Learn to relax  Stress, anxiety, and body tension may keep you awake at night. To unwind before bedtime, try a warm bath, meditation, or yoga. Also, try the following:  · Deep breathing. Sit or lie back in a chair. Take a slow, deep breath. Hold it for 5 counts. Then breathe out slowly through your mouth. Keep doing this until you feel relaxed.  · Progressive muscle relaxation. Tense and then relax the muscles in your body as you breathe deeply. Start with your feet and work up your body to your neck and face.  Date Last Reviewed: 7/18/2015  © 1333-8419 [employer]. [address] [place] [address], [place] [address]. All rights reserved. This information is not intended as a substitute for professional medical care. Always follow your healthcare professional's instructions.
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
## 2020-07-07 ENCOUNTER — LAB VISIT (OUTPATIENT)
Dept: LAB | Facility: HOSPITAL | Age: 40
End: 2020-07-07
Attending: INTERNAL MEDICINE
Payer: COMMERCIAL

## 2020-07-07 ENCOUNTER — OFFICE VISIT (OUTPATIENT)
Dept: ENDOCRINOLOGY | Facility: CLINIC | Age: 40
End: 2020-07-07
Payer: COMMERCIAL

## 2020-07-07 VITALS
TEMPERATURE: 99 F | BODY MASS INDEX: 26.31 KG/M2 | HEART RATE: 93 BPM | DIASTOLIC BLOOD PRESSURE: 70 MMHG | HEIGHT: 59 IN | WEIGHT: 130.5 LBS | SYSTOLIC BLOOD PRESSURE: 110 MMHG

## 2020-07-07 DIAGNOSIS — E03.8 SUBCLINICAL HYPOTHYROIDISM: ICD-10-CM

## 2020-07-07 DIAGNOSIS — E03.8 SUBCLINICAL HYPOTHYROIDISM: Primary | ICD-10-CM

## 2020-07-07 PROCEDURE — 84439 ASSAY OF FREE THYROXINE: CPT

## 2020-07-07 PROCEDURE — 84443 ASSAY THYROID STIM HORMONE: CPT

## 2020-07-07 PROCEDURE — 99203 PR OFFICE/OUTPT VISIT, NEW, LEVL III, 30-44 MIN: ICD-10-PCS | Mod: S$GLB,,, | Performed by: INTERNAL MEDICINE

## 2020-07-07 PROCEDURE — 86800 THYROGLOBULIN ANTIBODY: CPT

## 2020-07-07 PROCEDURE — 36415 COLL VENOUS BLD VENIPUNCTURE: CPT | Mod: PO

## 2020-07-07 PROCEDURE — 84480 ASSAY TRIIODOTHYRONINE (T3): CPT

## 2020-07-07 PROCEDURE — 99203 OFFICE O/P NEW LOW 30 MIN: CPT | Mod: S$GLB,,, | Performed by: INTERNAL MEDICINE

## 2020-07-07 PROCEDURE — 99999 PR PBB SHADOW E&M-EST. PATIENT-LVL V: ICD-10-PCS | Mod: PBBFAC,,, | Performed by: INTERNAL MEDICINE

## 2020-07-07 PROCEDURE — 99999 PR PBB SHADOW E&M-EST. PATIENT-LVL V: CPT | Mod: PBBFAC,,, | Performed by: INTERNAL MEDICINE

## 2020-07-07 NOTE — PATIENT INSTRUCTIONS
For the thyroid: Looks like subclinical hypothyroidism. Recheck once more today to be sure, but assuming it is similar, recommend monitor twice a year with a blood test (TSH, free t4) or sooner if new/changing symptoms.      Common Thyroid Problems    The thyroid is a gland in the neck. It makes thyroid hormone. A hormone is a chemical messenger for the body. If there is a problem with the thyroid, the level of thyroid hormone may change. This can lead to symptoms.  Hypothyroidism  This is when the thyroid gland doesnt make enough hormone. The most common cause of hypothyroidism is Hashimoto thyroiditis. This occurs when the bodys immune system attacks the thyroid gland. Hypothyroidism may also occur if theres a severe deficiency of iodine in the body. The thyroid needs iodine to make hormone. Problems with the pituitary gland can lead to not enough production of thyroid hormone. Hypothyroidism will also occur if the thyroid gland is removed during surgery.  Common symptoms include:  · Low energy and tiredness  · Depression  · Feeling cold  · Muscle pain  · Slowed thinking      · Constipation  · Heavier menstrual periods with prolonged bleeding   · Weight gain  · Dry and brittle skin, hair, nails  · Excessive sleepiness  Hyperthyroidism  This is when the thyroid gland makes too much hormone. The most common cause is Graves disease. This is due to the bodys immune system telling the thyroid to make too much hormone. Another cause is a small bump (nodule) in the thyroid gland. This can cause hyperthyroidism if the cells in the nodule make too much thyroid hormone and stop hormone production in the rest of the thyroid gland.  Common symptoms include:  · Shaking, nervousness, irritability  · Feeling hot  · A rapid, irregular heartbeat  · Muscle weakness, fatigue  · More frequent bowel movements  · [place], lighter menstrual periods  · Weight loss  · Hair loss  · Bulging eyes  Nodules  Nodules are lumps of tissue in  the thyroid gland. Most often, the cause of nodules isnt known. But they may be more common in people whove had medical radiation to the head or neck. Sometimes nodules can be felt on the outside of the neck. Most of the time, cause no symptoms and dont affect the amount of thyroid hormone. Most nodules are not cancer. But sometimes a nodule may be cancer.  What is a goiter?  A goiter is the enlargement of the thyroid gland. When the gland enlarges, you may see or feel a swelling on your neck. A goiter can develop in someone with a normal thyroid, overactive thyroid, or underactive thyroid.   Date Last Reviewed: 11/1/2016  © 7196-2765 [employer]. [address] [place] [address], [place] [address]. All rights reserved. This information is not intended as a substitute for professional medical care. Always follow your healthcare professional's instructions.

## 2020-07-07 NOTE — LETTER
July 8, 2020      Justin Paz MD  [address]  [facility] [address]           [facility]  [address]  [facility] [address]  Phone: [phone]          Patient: Roopa Rivas   MR Number: [id number]   YOB: 1980   Date of Visit: 7/7/2020       Dear Dr. Justin Paz:    Thank you for referring Roopa Rivas to me for evaluation. Attached you will find relevant portions of my assessment and plan of care.    If you have questions, please do not hesitate to call me. I look forward to following Roopa Rivas along with you.    Sincerely,    Heladio Eagle MD    Enclosure  CC:  No Recipients    If you would like to receive this communication electronically, please contact [email or web address] or [phone] to request more information on [employer] access.    For providers and/or their staff who would like to refer a patient to [facility], please contact us through our one-stop-shop provider referral line, [facility], at [phone].    If you feel you have received this communication in error or would no longer like to receive these types of communications, please e-mail [email or web address]

## 2020-07-07 NOTE — PROGRESS NOTES
Subjective:      Chief Complaint: Abnormal Lab    HPI: Roopa Rivas is a 40 y.o. female who is here for an initial evaluation for thyroid.    Had labs checked on routine follow-up. TSH was elevated, free T4 normal. TSH 4.3 last year. Highest was 6.9 in 2/2020. Free T4 stayed normal. TPO negative 4/2020.    Lab Results   Component Value Date    TSH 5.493 (H) 04/03/2020    FREET4 0.98 04/03/2020     Not taking biotin    Of note, pt reports hx fibromyalgia.    Today, pt reports fatigue, memory trouble. Occasional nausea. Cycles regular (on birth control). Rare lightheadedness, feels cold a lot. Decreased appetite. Has insomnia.     Reviewed past medical, family, social history and updated as appropriate.    Review of Systems   Constitutional: Positive for appetite change and fatigue. Negative for unexpected weight change (weight going up and down).   HENT: Negative for trouble swallowing.    Eyes: Negative for visual disturbance.   Respiratory: Negative for shortness of breath.    Cardiovascular: Negative for palpitations (sometimes feels/hears heartbeat more than others).   Gastrointestinal: Positive for nausea (sometimes in the morning). Negative for diarrhea.        Rare diarrhea   Endocrine: Positive for cold intolerance.        More often cold, rarely feels very hot   Genitourinary: Negative for menstrual problem.   Neurological: Negative for light-headedness (rarely, if getting up quickly).   Psychiatric/Behavioral: Positive for sleep disturbance.     Objective:     Vitals:    07/07/20 1316   BP: 110/70   Pulse: 93   Temp: 99.4 °F (37.4 °C)     BP Readings from Last 5 Encounters:   03/02/20 102/70   02/17/20 130/88   10/29/19 120/80   08/28/19 100/78   08/05/19 113/77     Physical Exam  Vitals signs reviewed.   Constitutional:       Appearance: She is well-developed.   HENT:      Head: Normocephalic and atraumatic.   Neck:      Musculoskeletal: Normal range of motion and neck supple.      Thyroid: No  thyromegaly.   Cardiovascular:      Rate and Rhythm: Normal rate and regular rhythm.      Heart sounds: No murmur.   Pulmonary:      Effort: Pulmonary effort is normal.      Breath sounds: Normal breath sounds.   Abdominal:      General: There is no distension.      Palpations: Abdomen is soft. There is no mass.      Tenderness: There is no abdominal tenderness.   Musculoskeletal: Normal range of motion.         General: No tenderness.   Neurological:      Mental Status: She is alert and oriented to person, place, and time.   Psychiatric:         Judgment: Judgment normal.         Wt Readings from Last 5 Encounters:   03/02/20 1359 61.2 kg (135 lb)   02/17/20 0820 62.5 kg (137 lb 12.6 oz)   01/20/20 1412 61.2 kg (135 lb)   12/19/19 1105 61.2 kg (135 lb)   11/20/19 1111 61.2 kg (135 lb)       Lab Results   Component Value Date    HGBA1C 5.2 02/26/2020    HGBA1C 5.0 02/08/2019     Lab Results   Component Value Date    CHOL 211 (H) 02/26/2020    CHOL 191 02/08/2019    HDL 53 02/26/2020    HDL 59 02/08/2019    LDLCALC 125.6 02/26/2020    LDLCALC 114.8 02/08/2019    TRIG 162 (H) 02/26/2020    TRIG 86 02/08/2019    CHOLHDL 25.1 02/26/2020    CHOLHDL 30.9 02/08/2019     Lab Results   Component Value Date     (L) 02/26/2020    K 4.5 02/26/2020     02/26/2020    CO2 24 02/26/2020    GLU 82 02/26/2020    BUN 7 02/26/2020    CREATININE 0.8 02/26/2020    CALCIUM 9.3 02/26/2020    PROT 7.5 02/26/2020    ALBUMIN 3.7 02/26/2020    BILITOT 0.2 02/26/2020    ALKPHOS 79 02/26/2020    AST 17 02/26/2020    ALT 18 02/26/2020    ANIONGAP 9 02/26/2020    ESTGFRAFRICA >60.0 02/26/2020    EGFRNONAA >60.0 02/26/2020    TSH 5.493 (H) 04/03/2020          Assessment/Plan:     Subclinical hypothyroidism  Reviewed patient's TSH trends.   - free T4 remained normal   - TPO negative.   - clinically, difficult to say about symptoms due to fibromyalgia having a lot of overlap.    - does have fatigue, feels cold. No constipation. Weight  goes up and down but not really anything consistent   - discussed with pt indications for treatment with thyroid medication (TSH over 10, pregnancy/trying to become pregnant if TSH over 2.5 or so). Pt not trying to have more kids at this time, TSH mildly elevated   - reviewed risks of iatrogenic hyperthyroidism   - recommend monitoring. 1-2x/year (or sooner if more symptoms develop)   - will recheck labs once more today to be sure, along with thyroglobulin antibody (though TPO is the most common antibody in hashimoto's, sometimes people have other antibodies instead)      Follow up in about 1 year (around 7/7/2021) for lab review, further monitoring.      Heladio Eagle MD  Endocrinology      UPDATE: Labs came back.    Lab Results   Component Value Date    TSH 1.656 07/07/2020    U0QPRLK 144 07/07/2020    FREET4 1.24 07/07/2020     Antibodies negative.    Thyroid actually normal now.  Recheck 1 year (or sooner if symptoms).  Portal message sent to patient with results, next steps, etc.

## 2020-07-08 PROBLEM — E03.8 SUBCLINICAL HYPOTHYROIDISM: Status: ACTIVE | Noted: 2020-07-08

## 2020-07-08 LAB
T3 SERPL-MCNC: 144 NG/DL (ref 60–180)
T4 FREE SERPL-MCNC: 1.24 NG/DL (ref 0.71–1.51)
THYROGLOB AB SERPL IA-ACNC: <4 IU/ML (ref 0–3.9)
TSH SERPL DL<=0.005 MIU/L-ACNC: 1.66 UIU/ML (ref 0.4–4)

## 2020-07-08 NOTE — ASSESSMENT & PLAN NOTE
Reviewed patient's TSH trends.   - free T4 remained normal   - TPO negative.   - clinically, difficult to say about symptoms due to fibromyalgia having a lot of overlap.    - does have fatigue, feels cold. No constipation. Weight goes up and down but not really anything consistent   - discussed with pt indications for treatment with thyroid medication (TSH over 10, pregnancy/trying to become pregnant if TSH over 2.5 or so). Pt not trying to have more kids at this time, TSH mildly elevated   - reviewed risks of iatrogenic hyperthyroidism   - recommend monitoring. 1-2x/year (or sooner if more symptoms develop)   - will recheck labs once more today to be sure, along with thyroglobulin antibody (though TPO is the most common antibody in hashimoto's, sometimes people have other antibodies instead)

## 2020-07-09 DIAGNOSIS — L70.9 ADULT ACNE: ICD-10-CM

## 2020-07-10 RX ORDER — SPIRONOLACTONE 50 MG/1
50 TABLET, FILM COATED ORAL DAILY
Qty: 30 TABLET | Refills: 2 | Status: SHIPPED | OUTPATIENT
Start: 2020-07-10 | End: 2020-07-10 | Stop reason: SDUPTHER

## 2020-07-20 ENCOUNTER — OFFICE VISIT (OUTPATIENT)
Dept: PSYCHIATRY | Facility: CLINIC | Age: 40
End: 2020-07-20
Payer: COMMERCIAL

## 2020-07-20 DIAGNOSIS — F41.9 ANXIETY: ICD-10-CM

## 2020-07-20 DIAGNOSIS — F31.32 BIPOLAR AFFECTIVE DISORDER, CURRENTLY DEPRESSED, MODERATE: Primary | ICD-10-CM

## 2020-07-20 DIAGNOSIS — G47.01 INSOMNIA DUE TO MEDICAL CONDITION: ICD-10-CM

## 2020-07-20 DIAGNOSIS — R41.3 MEMORY CHANGE: ICD-10-CM

## 2020-07-20 PROCEDURE — 90834 PR PSYCHOTHERAPY W/PATIENT, 45 MIN: ICD-10-PCS | Mod: 95,,, | Performed by: SOCIAL WORKER

## 2020-07-20 PROCEDURE — 90834 PSYTX W PT 45 MINUTES: CPT | Mod: 95,,, | Performed by: SOCIAL WORKER

## 2020-07-20 NOTE — PROGRESS NOTES
Individual Psychotherapy (PhD/LCSW)    7/20/2020    The patient location is: home in living room  The chief complaint leading to consultation is: anxiety, depression    Visit type: audiovisual    Face to Face time with patient: 45  55 minutes of total time spent on the encounter, which includes face to face time and non-face to face time preparing to see the patient (eg, review of tests), Obtaining and/or reviewing separately obtained history, Documenting clinical information in the electronic or other health record, Independently interpreting results (not separately reported) and communicating results to the patient/family/caregiver, or Care coordination (not separately reported).       Each patient to whom he or she provides medical services by telemedicine is:  (1) informed of the relationship between the physician and patient and the respective role of any other health care provider with respect to management of the patient; and (2) notified that he or she may decline to receive medical services by telemedicine and may withdraw from such care at any time.      Therapeutic Intervention: Met with patient.  Outpatient - Insight oriented psychotherapy 45 min - CPT code 43108, Outpatient - Behavior modifying psychotherapy 45 min - CPT code 62288, Outpatient - Supportive psychotherapy 45 min - CPT Code 78499 and Outpatient - Interactive psychotherapy 45 min - CPT code 65852    Chief complaint/reason for encounter: depression, anxiety, sleep and memory concerns     Interval history and content of current session: History of present illness: Pt is a 39 yo  female who presents today for f/u via telemedicine. Pt has a future scheduled appt to established with a psychiatrist at the end of this month. Pt presents as stable at this time and reports no SI/HI.  Pt is a moderate historian due to memory issues diagnosed through Neuro psych.     Pt states she has been feeling better on and off emotionally within the last  two weeks. Pt reports that her uncle unexpectedly passed away at his home in [place] due to COVID and that family was unable to attend a service. Pt reports coping adequately and stated she was more concerned that she was unable to physically support her mother who resides in [place].  Pt also reports to being unable to work on sleep health as spouse's schedule changed to nights during the past two weeks and family is attempting to adjust. Pt reports new work schedule has caused her much anxiety re: spouse as she fears he will not make it to work or back. Pt does report to journaling and states was able to cook a meal for her family last week without forgetting what she was doing. Pt states worked on focus and concentration and was able to complete the task. Pt was very positive and reports that it was  highlight of that day as it was something that she had felt whe was unable to do recently. Pt also reports to be reading the Bible and focusing on gardening. Both items pt identifies as very helpful to relieve anxiety and stress. LCSW engaged with pt on anxiety reduction techniques as well as supportive psychotherapy. Pt remains receptive of psychotherapy and LCSW recommends f/u in 2 weeks.       Treatment plan:  · Target symptoms: depression, anxiety   · Why chosen therapy is appropriate versus another modality: relevant to diagnosis, patient responds to this modality, evidence based practice  · Outcome monitoring methods: self-report, observation  · Therapeutic intervention type: insight oriented psychotherapy, behavior modifying psychotherapy, supportive psychotherapy, interactive psychotherapy    Risk parameters:  Patient reports no suicidal ideation  Patient reports no homicidal ideation  Patient reports no self-injurious behavior  Patient reports no violent behavior    Verbal deficits: None    Patient's response to intervention:  The patient's response to intervention is accepting.    Progress toward goals and other  mental status changes:    The patient's progress toward goals is good.       Diagnosis:     ICD-10-CM ICD-9-CM   1. Bipolar affective disorder, currently depressed, moderate  F31.32 296.52   2. Anxiety  F41.9 300.00   3. Memory change  R41.3 780.93   4. Insomnia due to medical condition  G47.01 327.01       Plan:  individual psychotherapy and f/u with psychiatrist for medication evaluation at end of month, continued work towards reduction of caffeine in afternoon in order to improve sleep health. Behavior modification practice. Journaling     Return to clinic: 2 weeks    Length of Service (minutes): 45

## 2020-07-29 ENCOUNTER — OFFICE VISIT (OUTPATIENT)
Dept: PSYCHIATRY | Facility: CLINIC | Age: 40
End: 2020-07-29
Payer: COMMERCIAL

## 2020-07-29 VITALS
WEIGHT: 131.38 LBS | SYSTOLIC BLOOD PRESSURE: 119 MMHG | HEIGHT: 59 IN | DIASTOLIC BLOOD PRESSURE: 78 MMHG | BODY MASS INDEX: 26.48 KG/M2 | HEART RATE: 104 BPM

## 2020-07-29 DIAGNOSIS — F31.9 BIPOLAR AFFECTIVE DISORDER, REMISSION STATUS UNSPECIFIED: ICD-10-CM

## 2020-07-29 DIAGNOSIS — G47.00 INSOMNIA, UNSPECIFIED TYPE: ICD-10-CM

## 2020-07-29 DIAGNOSIS — Z79.899 LONG-TERM USE OF HIGH-RISK MEDICATION: ICD-10-CM

## 2020-07-29 DIAGNOSIS — F41.1 GENERALIZED ANXIETY DISORDER: Primary | ICD-10-CM

## 2020-07-29 PROCEDURE — 90792 PR PSYCHIATRIC DIAGNOSTIC EVALUATION W/MEDICAL SERVICES: ICD-10-PCS | Mod: S$GLB,,, | Performed by: NURSE PRACTITIONER

## 2020-07-29 PROCEDURE — 99999 PR PBB SHADOW E&M-EST. PATIENT-LVL IV: CPT | Mod: PBBFAC,,, | Performed by: NURSE PRACTITIONER

## 2020-07-29 PROCEDURE — 99999 PR PBB SHADOW E&M-EST. PATIENT-LVL IV: ICD-10-PCS | Mod: PBBFAC,,, | Performed by: NURSE PRACTITIONER

## 2020-07-29 PROCEDURE — 90792 PSYCH DIAG EVAL W/MED SRVCS: CPT | Mod: S$GLB,,, | Performed by: NURSE PRACTITIONER

## 2020-07-29 RX ORDER — LURASIDONE HYDROCHLORIDE 20 MG/1
20 TABLET, FILM COATED ORAL DAILY
Qty: 30 TABLET | Refills: 1 | Status: SHIPPED | OUTPATIENT
Start: 2020-07-29 | End: 2020-08-18 | Stop reason: ALTCHOICE

## 2020-07-29 NOTE — PROGRESS NOTES
"7/29/2020 12:46 PM   Roopa Rivas   1980   [id number]           OUTPATIENT PSYCHIATRY INITIAL EVALUATION NOTE      Roopa Rivas, a 40 y.o. female, presenting for initial evaluation visit. Met with patient.    Reason for Encounter: self-referral. Patient complains of anxiety, depression        History of Present Illness:     Today, pt. Has history of memory loss, subclinical hypothyroidism (normal TSH, free T4, T3 in  Fibromyalgia, memory loss, history of bipolar disorder. Pt. 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 Reports some sexual urges currently, but not acting on them due to decreased sex drive due to Cymbalta. Sex "feels like a chore now." Pt. Reports "goes all out" in general, couldn't just buy a couple of things, had to buy everything. Pt. Reports all or none, black or white thinking in most aspects of life. Pt. Also reports extensive perfectionism to the point of "all things have to be squared off, a certain way, I have to have things in a certain order before bedtime." Pt. Reports has had several episodes of depression to the point of being suicidal, no attempts, and no hospitalizations, but "I've come close to hospitalization." Pt. Reports currently in a better place due to Cymbalta, but has had increase in memory loss (short-term and task-oriented, procedural memory) that has been attributed to administration of the Cymbalta. Pt. Virgil has not had MRI completed, but is being followed by neurology and was recommended for assessment and management of affective symptoms with follow up after 6 months with neurology. Pt. Has attempted to wean from the Cymbalta with little benefit, return of pain symptoms, increase in anxiety, and issues with mood instability.     Psychosocial stressors:  Rigidity in thinking, 4 " children    Psychiatric Review Of Systems - Is patient experiencing or having changes in:    Symptoms of Depression: Reports diminished mood or loss of interest/anhedonia, irritability, diminished energy, change in sleep, change in appetite and diminished concentration or cognition or indecisiveness Denies suicidal ideations -  Reports/Denies Passive/Active SI  Onset was approximately several years ago. Symptoms have been gradually worsening since that time. Were controlled with Cymbalta, and better controlled with Cymbalta.       Symptoms of RUPINDER: Reports excessive anxiety/worry/fear, more days than not, about numerous issues, difficult to control, with restlessness, fatigue, poor concentration, muscle tension, sleep disturbance and causes functionally impairing distress Denies irritability and muscle tension  Onset was approximately several years ago. Symptoms have been gradually improving since that time.      Symptoms of rufus or hypomania: No elevated, expansive, or irritable mood with increased energy or activity; with inflated self-esteem or grandiosity, Reports decreased need for sleep, increased rate of speech,  racing thoughts, distractibility, Reports increased goal directed activity or PMA, risky/disinhibited behavior - sex, spending  Onset was approximately several years ago. Symptoms have been gradually improving since that time.   Current symptoms include: spending    Symptoms of psychosis: No hallucinations, delusions, disorganized thinking, disorganized behavior or abnormal motor behavior, or negative symptoms (diminshed emotional expression, avolition, anhedonia, alogia, asociality     Sleep: poor sleep reported - 3 to 4 hours per night.     Risk Parameters:  Patient reports no suicidal ideation  Patient reports no homicidal ideation  Patient reports no self-injurious behavior  Patient reports no violent behavior    Other symptoms    Symptoms of Panic Disorder: No recurrent panic attacks,  precipitated or un-precipitated, source of worry and/or behavioral changes secondary; with or without agoraphobia    Symptoms of PTSD: No h/o trauma; re-experiencing/intrusive symptoms, avoidant behavior, negative alterations in cognition or mood, or hyperarousal symptoms; with or without dissociative symptoms     Symptoms of OCD: No obsessions, compulsions or ruminations. Reports extreme perfectionism and adherence to rules, lists, highly organized, won't delegate tasks    Symptoms of Eating Disorders: No anorexia, bulimia or binging    Symptoms of ADHD:No inattention or hyperactivity    Substance Use:   denies    Psychotropic Medication Review:  Previous Trials-  Seroquel - severe dry eyes, hurt to blink, no peripheral vision, memory loss  Lexapro - early 2000s, stopped taking due to insurance  Current meds- Cymbalta    Previous Psychiatric Hospitalizations:    HISTORY     Past Medical History:   Diagnosis Date    Arthritis     Fibromyalgia     Gastroenteritis     Long-term use of Plaquenil 12/7/2017         History of Seizures or TBI: denies    Past Surgical History:   Procedure Laterality Date    NO PAST SURGERIES         Family History   Problem Relation Age of Onset    Arthritis Mother     No Known Problems Father     Arthritis Maternal Grandmother     Arthritis Maternal Grandfather     Cancer Paternal Grandfather     Thyroid disease Maternal Aunt        Social History     Socioeconomic History    Marital status:      Spouse name: Not on file    Number of children: Not on file    Years of education: Not on file    Highest education level: Not on file   Occupational History    Not on file   Social Needs    Financial resource strain: Not hard at all    Food insecurity     Worry: Never true     Inability: Never true    Transportation needs     Medical: No     Non-medical: No   Tobacco Use    Smoking status: Never Smoker    Smokeless tobacco: Never Used   Substance and Sexual Activity  "   Alcohol use: No     Frequency: Never     Drinks per session: Patient refused     Binge frequency: Never    Drug use: No    Sexual activity: Yes     Partners: Male     Birth control/protection: Condom   Lifestyle    Physical activity     Days per week: 1 day     Minutes per session: 20 min    Stress: Very much   Relationships    Social connections     Talks on phone: More than three times a week     Gets together: Never     Attends Religion service: Not on file     Active member of club or organization: No     Attends meetings of clubs or organizations: Never     Relationship status:    Other Topics Concern    Not on file   Social History Narrative    Not on file       Additional Psychiatric Family History:    Father -    Mother - possible anxiety/depression/emotional reactive  Social History:  Born and raised in [place], GED after 10th grade, some college, 3 older brothers, no relationship, 1 sister younger, brother 4 years younger, no relationship. lived in [facility] [place] to [place],  to 1st  5 years, 2 children from that marriage, good relationship with wife of 1st . Has 4 children, 23 and 20 from 1st marriage, 10 y/o boy and 10 y/o. Reports supportive relationship with family members, but reports having difficulty with "empty nest" No ETOH until age 22. No rec. Drug use.          OBJECTIVE       Constitutional  Vitals:  Most recent vital signs, dated less than 90 days prior to this appointment, were reviewed.    Vitals:    07/29/20 1243   BP: 119/78   Pulse: 104   Weight: 59.6 kg (131 lb 6.3 oz)   Height: 4' 11" (1.499 m)            Laboratory Data: Reviewed most recent     Medications:  Outpatient Encounter Medications as of 7/29/2020   Medication Sig Dispense Refill    cetirizine (ZYRTEC) 10 MG tablet Take 10 mg by mouth once daily.      clotrimazole-betamethasone 1-0.05% (LOTRISONE) cream Apply topically 2 (two) times daily. 15 g 0    drospirenone-ethinyl " estradioL (SARAH) 3-0.02 mg per tablet TAKE 1 TABLET BY MOUTH EVERY DAY 28 tablet 1    DULoxetine (CYMBALTA) 60 MG capsule TAKE ONE CAPSULE BY MOUTH ONCE DAILY 90 capsule 0    ibuprofen (ADVIL,MOTRIN) 400 MG tablet Take 400 mg by mouth every 6 (six) hours as needed for Other.      magnesium 30 mg Tab Take by mouth once.      milnacipran (SAVELLA) 12.5 mg Tab tablet Take 1 tablet(12.5 mg) by mouth once daily on day 1, then 1 tablet (12.5 mg) twice daily on days 2 and 3, then 2 tablets (25 mg) twice daily on days 4 through 7, then 4 tablets (50 mg) twice daily thereafter. Dose may be increased to 100 mg twice daily (Patient not taking: Reported on 7/7/2020) 30 tablet 0    milnacipran (SAVELLA) 50 mg Tab Take 1 tablet (50 mg) by mouth twice daily after titration. Dose may be increased to 2 tablets (100 mg) twice daily. (Patient not taking: Reported on 7/7/2020) 60 tablet 11    multivitamin (THERAGRAN) per tablet Take 1 tablet by mouth once daily.      spironolactone (ALDACTONE) 50 MG tablet Take 1 tablet (50 mg total) by mouth once daily. 30 tablet 2    sulfacetamide sodium, acne, (KLARON) 10 % Susp Bid face 118 mL 1    VIENVA 0.1-20 mg-mcg per tablet TAKE 1 TABLET BY MOUTH EVERY DAY 84 tablet 0    zolpidem (AMBIEN) 10 mg Tab Take 1 tablet (10 mg total) by mouth nightly as needed (insomnia). 30 tablet 2     No facility-administered encounter medications on file as of 7/29/2020.        Allergy:  Review of patient's allergies indicates:  No Known Allergies    Nutritional Screening: Considering the patient's height and weight, medications, medical history and preferences, should a referral be made to the dietitian? no    Review of Systems:  General: unremarkable, age appropriate  Resp:  No shortness of breath, hyperventilation or cough  Cvs:  No tachycardia or chest pain  Gi:  No nausea, vomiting, pain, constipation or diarrhea  Musculoskeletal:  No pain or stiffness of the joints  Muscle Strength/Tone:not  "examined  Neurological:  No weakness, sensory changes, seizures, confusion, memory loss, tremor or other abnormal movements   Gait & Station:non-ataxic    AIMS:  n/a *    Mental Status Exam:  Appearance: unremarkable, age appropriate, casually dressed  Behavior/Cooperation:appropriate friendly and cooperative   Speech: appropriate rate, volume and tone spontaneous   Language: uses words appropriately; NO aphasia or dysarthria  Mood: "  "  Affect:  full, congruent with mood and appropriate to situation/content.  Thought Process:  normal and logical  Thought Content: normal, no suicidality, no homicidality, delusions, or paranoia  Sensorium:  Awake  Alert and Oriented: x3 grossly intact  Memory: Intact to conversation both recent and remote  Attention/concentration: appropriate for age/education.   Insight: Intact/Limited/None  Judgment:Intact/Limited/none      Strengths and Liabilities: Strength: Patient accepts guidance/feedback, Strength: Patient is expressive/articulate., Strength: Patient is intelligent., Strength: Patient is motivated for change., Strength: Patient is physically healthy., Strength: Patient has positive support network., Strength: Patient has reasonable judgment., Liability: Patient is impulsive., Liability: Patient has poor judgment, Liability: Patient has possible cognitive impairment., Liability: Patient lacks coping skills.    ASSESSMENT     Impression: Bipolar II Disorder with mixed features                        RUPINDER                        Cluster C Traits, rule out OCPD                        Rule out OCD    Treatment Goals:  Specify outcomes written in observable, behavioral terms:   Anxiety: acquiring relapse prevention skills, eliminating assumptions about dangerousness of anxiety, positive value of worry, or other assumptions (specify everything has to be perfect), reducing negative automatic thoughts, reducing physical symptoms of anxiety and reducing time spent worrying (<30 " minutes/day)  Depression: acquiring relapse prevention skills, increasing energy, increasing interest in usual activities, increasing motivation, increasing self-reward for positive behaviors (one/day), increasing self-reward for positive thoughts (one/day), reducing excessive guilt, reducing fatigue and reducing negative automatic thoughts   Ny/Impulsivity: reduction in impulsive risky behaviors, improvement in sleep patterns    TREATMENT PLAN     · Medication Management:   · Continue Cymbalta 60 mg by mouth once daily  · Start Latuda 20 mg by mouth once daily, take at bedtime with at least 350 calorie meal.   · Labs: reviewed most recent labs - Order B-12, B1, Prolactin level  · Consider MRI of brain to rule out organic causes  · The treatment plan and follow up plan were reviewed with the patient.  · Discussed with patient informed consent, risks vs. benefits, alternative treatments, side effect profile and the inherent unpredictability of individual responses to these treatments. The patient expresses understanding of the above and displays the capacity to agree with this current plan and had no other questions.  · Encouraged Patient to keep future appointments.   · Take medications as prescribed and abstain from substance abuse.   · In the event of an emergency patient was advised to go to the emergency room.  · Referral for further treatment to social work team for psychotherapy - continue with Priscilla Copeland    Return to Clinic:  1 month    > than 50% of total time spend on coordination of care and counseling   (which included pts differential diagnosis and prognosis for psychiatric conditions, risks, benefits of treatments, instructions and adherence to treatment plan, risk reduction, reviewing current psychiatric medication regimen, medical problems and social stressors. In addtion to possible discussion with other healthcare provider/s)    Add on Psychotherapy time: 30 min  Total Face to face time:  60mins    Abel Monroe, MSN, APRN, PMHNP-BC  [facility]   7/29/2020 12:46 PM

## 2020-07-29 NOTE — PATIENT INSTRUCTIONS
Lurasidone oral tablet  What is this medicine?  LURASIDONE (elizabetho GARRETT maloney done) is an antipsychotic. It is used to treat schizophrenia or bipolar disorder, also known as manic-depression.  How should I use this medicine?  Take this medicine by mouth with a glass of water. Follow the directions on the prescription label. Take this medicine with food. Take your medicine at regular intervals. Do not take it more often than directed. Do not stop taking except on your doctor's advice.  Talk to your pediatrician regarding the use of this medicine in children. Special care may be needed.  What side effects may I notice from receiving this medicine?  Side effects that you should report to your doctor or health care professional as soon as possible:  · allergic reactions like skin rash, itching or hives, swelling of the face, lips, or tongue  · confusion  · feeling faint or lightheaded, falls  · fever or chills, sore throat  · high fever  · inability to control muscle movements in the face, mouth, hands, arms, or legs  · increased hunger or thirst  · increased urination  · missed menstrual periods  · restlessness or need to keep moving  · seizures  · stiffness, spasms, trembling  · suicidal thoughts or other mood changes  · unusually weak or tired  Side effects that usually do not require medical attention (Report these to your doctor or health care professional if they continue or are bothersome.):  · blurred vision  · change in sex drive or performance  · diarrhea  · drowsiness  · nausea, vomiting  · weight gain  What may interact with this medicine?  Do not take this medicine with any of the following medications:  · dalfopristin; quinupristin  · delavirdine  · indinavir  · isoniazid  · itraconazole  · ketoconazole  · lumacaftor; ivacaftor  · metoclopramide  · rifampin  · ritonavir  · tipranavir  This medicine may also interact with the following medications:  · alcohol  · certain medicines for anxiety or  sleep  · diltiazem  · digoxin  · lithium  · medicines for blood pressure  What if I miss a dose?  If you miss a dose, take it as soon as you can. If it is almost time for your next dose, take only that dose. Do not take double or extra doses.  Where should I keep my medicine?  Keep out of the reach of children.  Store at room temperature between 15 and 30 degrees C (59 and 86 degrees F). Throw away any unused medicine after the expiration date.  What should I tell my health care provider before I take this medicine?  They need to know if you have any of these conditions:  · dementia  · diabetes  · difficulty swallowing  · heart disease  · history of breast cancer  · kidney disease  · liver disease  · low blood counts, like low white cell, platelet, or red cell counts  · low blood pressure  · Parkinson's disease  · seizures  · suicidal thoughts, plans, or attempt; a previous suicide attempt by you or a family member  · an unusual or allergic reaction to lurasidone, other medicines, foods, dyes, or preservatives  · pregnant or trying to get pregnant  · breast-feeding  What should I watch for while using this medicine?  Visit your doctor or health care professional for regular checks on your progress. It may be several weeks before you see the full effects of this medicine. Notify your doctor or health care professional if your symptoms get worse, if you have new symptoms, if you are having an unusual effect from this medicine, or if you feel out of control, very discouraged or think you might harm yourself or others.  Do not suddenly stop taking this medicine. You may need to gradually reduce the dose. Ask your doctor or health care professional for advice.  You may get dizzy or drowsy. Do not drive, use machinery, or do anything that needs mental alertness until you know how this medicine affects you. Do not stand or sit up quickly, especially if you are an older patient. This reduces the risk of dizzy or fainting  spells. Alcohol can increase dizziness and drowsiness. Avoid alcoholic drinks.  This medicine may cause dry eyes and blurred vision. If you wear contact lenses you may feel some discomfort. Lubricating drops may help. See your eye doctor if the problem does not go away or is severe.  This medicine can reduce the response of your body to heat or cold. Dress warm in cold weather and stay hydrated in hot weather. If possible, avoid extreme temperatures like saunas, hot tubs, very hot or cold showers, or activities that can cause dehydration such as vigorous exercise.  NOTE:This sheet is a summary. It may not cover all possible information. If you have questions about this medicine, talk to your doctor, pharmacist, or health care provider. Copyright© 2017 [employer]

## 2020-08-03 ENCOUNTER — OFFICE VISIT (OUTPATIENT)
Dept: PSYCHIATRY | Facility: CLINIC | Age: 40
End: 2020-08-03
Payer: COMMERCIAL

## 2020-08-03 ENCOUNTER — OFFICE VISIT (OUTPATIENT)
Dept: RHEUMATOLOGY | Facility: CLINIC | Age: 40
End: 2020-08-03
Payer: COMMERCIAL

## 2020-08-03 DIAGNOSIS — M79.7 FIBROMYALGIA: Primary | ICD-10-CM

## 2020-08-03 DIAGNOSIS — R41.3 MEMORY CHANGE: ICD-10-CM

## 2020-08-03 DIAGNOSIS — G47.01 INSOMNIA DUE TO MEDICAL CONDITION: ICD-10-CM

## 2020-08-03 DIAGNOSIS — F31.32 BIPOLAR AFFECTIVE DISORDER, CURRENTLY DEPRESSED, MODERATE: Primary | ICD-10-CM

## 2020-08-03 DIAGNOSIS — F31.32 BIPOLAR AFFECTIVE DISORDER, CURRENTLY DEPRESSED, MODERATE: ICD-10-CM

## 2020-08-03 DIAGNOSIS — M32.9 SYSTEMIC LUPUS ERYTHEMATOSUS, UNSPECIFIED SLE TYPE, UNSPECIFIED ORGAN INVOLVEMENT STATUS: ICD-10-CM

## 2020-08-03 DIAGNOSIS — F41.9 ANXIETY: ICD-10-CM

## 2020-08-03 PROCEDURE — 99214 PR OFFICE/OUTPT VISIT, EST, LEVL IV, 30-39 MIN: ICD-10-PCS | Mod: 95,,, | Performed by: INTERNAL MEDICINE

## 2020-08-03 PROCEDURE — 90834 PR PSYCHOTHERAPY W/PATIENT, 45 MIN: ICD-10-PCS | Mod: 95,,, | Performed by: SOCIAL WORKER

## 2020-08-03 PROCEDURE — 90834 PSYTX W PT 45 MINUTES: CPT | Mod: 95,,, | Performed by: SOCIAL WORKER

## 2020-08-03 PROCEDURE — 99214 OFFICE O/P EST MOD 30 MIN: CPT | Mod: 95,,, | Performed by: INTERNAL MEDICINE

## 2020-08-03 RX ORDER — DULOXETIN HYDROCHLORIDE 60 MG/1
60 CAPSULE, DELAYED RELEASE ORAL 2 TIMES DAILY
Qty: 60 CAPSULE | Refills: 5 | Status: SHIPPED | OUTPATIENT
Start: 2020-08-03 | End: 2020-08-08

## 2020-08-03 NOTE — PROGRESS NOTES
Individual Psychotherapy (PhD/LCSW)    8/3/2020    The patient location is: home in living room  The chief complaint leading to consultation is: anxiety, depression    Visit type: audiovisual    Face to Face time with patient: 45  55 minutes of total time spent on the encounter, which includes face to face time and non-face to face time preparing to see the patient (eg, review of tests), Obtaining and/or reviewing separately obtained history, Documenting clinical information in the electronic or other health record, Independently interpreting results (not separately reported) and communicating results to the patient/family/caregiver, or Care coordination (not separately reported).       Each patient to whom he or she provides medical services by telemedicine is:  (1) informed of the relationship between the physician and patient and the respective role of any other health care provider with respect to management of the patient; and (2) notified that he or she may decline to receive medical services by telemedicine and may withdraw from such care at any time.      Therapeutic Intervention: Met with patient.  Outpatient - Insight oriented psychotherapy 45 min - CPT code 96803, Outpatient - Behavior modifying psychotherapy 45 min - CPT code 31807, Outpatient - Supportive psychotherapy 45 min - CPT Code 34370 and Outpatient - Interactive psychotherapy 45 min - CPT code 94244    Chief complaint/reason for encounter: depression, anxiety, sleep and memory concerns     Interval history and content of current session: History of present illness: Pt is a 39 yo  female who presents today for f/u via telemedicine.  Pt presents as stable at this time and reports no SI/HI.  Pt is a moderate historian due to memory issues diagnosed through Neuro psych. Pt reports to recently establishing services with galina Miles N.P. who attempted to adjust medications, however reports insurance will not cover. Pt states relayed to  N.P.    Pt reports to feeling some slightly improved memory during this past week and being able to keep track/remember more items.  Pt reports being able to cook a few meals by trying to have better focus. Pt's daughter is also able to help remind her. Pt states family is still adjusting to spouse's new work schedule causing pt to having worsening sleep. Pt states is journaling and actively focusing on sleep health. Pt states anxiety has increased in the evening and has been compulsively checking all of the door through the night. Behavior modification applied through anxiety reduction techniques. Supportive, interactive, and self-oriented psychotherapy also provided. Pt remains receptive of psychotherapy and LCSW recommends f/u in 2 weeks.       Treatment plan:  · Target symptoms: depression, anxiety   · Why chosen therapy is appropriate versus another modality: relevant to diagnosis, patient responds to this modality, evidence based practice  · Outcome monitoring methods: self-report, observation  · Therapeutic intervention type: insight oriented psychotherapy, behavior modifying psychotherapy, supportive psychotherapy, interactive psychotherapy    Risk parameters:  Patient reports no suicidal ideation  Patient reports no homicidal ideation  Patient reports no self-injurious behavior  Patient reports no violent behavior    Verbal deficits: None    Patient's response to intervention:  The patient's response to intervention is accepting.    Progress toward goals and other mental status changes:    The patient's progress toward goals is good.       Diagnosis:     ICD-10-CM ICD-9-CM   1. Bipolar affective disorder, currently depressed, moderate  F31.32 296.52   2. Memory change  R41.3 780.93   3. Anxiety  F41.9 300.00   4. Insomnia due to medical condition  G47.01 327.01       Plan:  individual psychotherapy, working towards improved sleep health. Behavior modification practice. Journaling     Return to clinic: 2  weeks    Length of Service (minutes): 45

## 2020-08-03 NOTE — PROGRESS NOTES
Rapid3 Question Responses and Scores 8/3/2020   MDHAQ Score 0.1   Psychologic Score 5.5   Pain Score 6.5   When you awakened in the morning OVER THE LAST WEEK, did you feel stiff? Yes   If Yes, please indicate the number of hours until you are as limber as you will be for the day 1   Fatigue Score 10   Global Health Score 6.5   RAPID3 Score 4.44         Answers for HPI/ROS submitted by the patient on 8/3/2020   fever: No  eye redness: No  headaches: Yes  shortness of breath: Yes  chest pain: No  trouble swallowing: No  diarrhea: No  constipation: No  unexpected weight change: No  genital sore: No  dysuria: No  During the last 3 days, have you had a skin rash?: No  Bruises or bleeds easily: No  cough: No

## 2020-08-03 NOTE — PROGRESS NOTES
Chief Complaint   Patient presents with    Disease Management     chronic pain/fibromyalgia       Patient with a diagnosis of fibromyalgia syndrome for a follow up    The patient location is: home  The chief complaint leading to consultation is: fibromyalgia    Visit type: audiovisual    Face to Face time with patient: 30  minutes of total time spent on the encounter, which includes face to face time and non-face to face time preparing to see the patient (eg, review of tests), Obtaining and/or reviewing separately obtained history, Documenting clinical information in the electronic or other health record, Independently interpreting results (not separately reported) and communicating results to the patient/family/caregiver, or Care coordination (not separately reported).       Each patient to whom he or she provides medical services by telemedicine is:  (1) informed of the relationship between the physician and patient and the respective role of any other health care provider with respect to management of the patient; and (2) notified that he or she may decline to receive medical services by telemedicine and may withdraw from such care at any time.    Notes:       History of presenting illness     is a 40 year old female : we are treating for fibromyalgia :    On ambien 10 mg daily    In the past :    We had d/clara her amitryptilline since it made her hungry a lot and didn't help with the pain    She was on meloxicam but she stopped it since she had a rash with some medication    She was on tramadol and she stopped it    She takes OTC tylenol and ibuprofen    We started her on cymbalta 60 mg daily    She liked it and then didn't like it and now she is back on it    She says it works but not 100%    Savella not affordable so couldn't make the switch    LAtuda was offered and she cannot afford   She has bipolar d/o and she is not on any medications    Every 6 months lupus labs great  Last one 2/2020 nml    Pt  is a 39 yo with h/o gastritis, irritable bowel syndrome    Initial complaints     Poor sleep despite taking ambien  Amitryptilline was not helping  Bothersome anxiety    Cant concentrate  Cant remember certain things    Dry eyes and mouth+  Opthalmology : gave tear drops    Hair fall got better with biotin    Ear aches  Windy ear pain gets worse  Cant wear earrings,gets infections  Inside of the ears hurt and throb    Fatigue +    Allergies have gotten worse    Used to exercise 6 times a week now trying to get there     She relocated from [place] November 2017    She was followed by rheumatology for a possible inflammatory arthritis per the patient     She was last seen by her rheumatologist, Dr. Sana Belle sep 2017  We still dont have records    She says US hands showed changes on inflammatory arthritis  She was offered NSAIDs like nalfon and sulindac and she didn't do well    She had seen us in December 2017,we didn't have any data to suggest inflammatory arthritis    We did have her on plaquenil 300 mg but she thinks it gave her a rash and allergic reaction    We diagnosed her as fibromyalgia syndrome    We did the autoimmune panel    Normal CBC,CMP  Normal lipid panel  Normal TSH  Normal RF,CCP  EDUARDO positive,1: 160 homogenous,titre negative  Normal complements  Normal ESR,CRP  Negative APLAS panel    MRI both hands no inflammatory arthritis     Her complaints : diffuse pains in the hands,arms,shoulders and knees  Started February/march 2016     She states that after starting a combination of  mg/d, Tramadol 50 mg bid, Elavil 10 mg/d, mobic 7.5 mg bid her symptoms improved. She is unsure of her diagnosis at this time. Pt reports 30 minutes of am stiffness, intermittent joint swelling/pain, and severe fatigue. She is no longer working due to her fatigue. Pt recounts getting a massage in the past and crying due to being diffusely tender. She denies fevers, chills, Raynaud's, photosensitivity, rashes,  alopecia, mucosal ulcers, pleurisy, vision changes, . Pt does report an increase in acne.     No known autoimmune family history    Pt denies a personal or family history of psoriasis.     No blood clots or miscarriages.      Review of Systems   Constitutional: Negative for activity change, appetite change, chills, diaphoresis, fatigue, fever and unexpected weight change.   HENT: Negative for congestion, dental problem, drooling, ear discharge, ear pain, facial swelling, hearing loss, mouth sores, nosebleeds, postnasal drip, rhinorrhea, sinus pressure, sinus pain, sneezing, sore throat, tinnitus, trouble swallowing and voice change.    Eyes: Negative for photophobia, pain, discharge, redness, itching and visual disturbance.   Respiratory: Positive for shortness of breath. Negative for apnea, cough, choking, chest tightness, wheezing and stridor.    Cardiovascular: Negative for chest pain, palpitations and leg swelling.   Gastrointestinal: Positive for constipation. Negative for abdominal distention, abdominal pain, anal bleeding, blood in stool, diarrhea, nausea, rectal pain and vomiting.   Endocrine: Negative for cold intolerance, heat intolerance, polydipsia, polyphagia and polyuria.   Genitourinary: Negative for decreased urine volume, difficulty urinating, dysuria, enuresis, flank pain, frequency, genital sores, hematuria and urgency.   Musculoskeletal: Negative for arthralgias, back pain, gait problem, joint swelling, myalgias, neck pain and neck stiffness.   Skin: Negative for color change, pallor, rash and wound.   Allergic/Immunologic: Negative for environmental allergies, food allergies and immunocompromised state.   Neurological: Positive for headaches. Negative for dizziness, tremors, seizures, syncope, facial asymmetry, speech difficulty, weakness, light-headedness and numbness.   Hematological: Negative for adenopathy. Does not bruise/bleed easily.   Psychiatric/Behavioral: Negative for agitation,  behavioral problems, confusion, decreased concentration, dysphoric mood, hallucinations, self-injury, sleep disturbance and suicidal ideas. The patient is not nervous/anxious and is not hyperactive.         Physical Exam     SHEPPARD-28 tender joint count: 0  SHEPPARD-28 swollen joint count: 0       Physical Exam   Constitutional: She is oriented to person, place, and time and well-developed, well-nourished, and in no distress. No distress.   HENT:   Head: Normocephalic.   Mouth/Throat: Oropharynx is clear and moist.   Eyes: Conjunctivae are normal. Pupils are equal, round, and reactive to light. Right eye exhibits no discharge. Left eye exhibits no discharge. No scleral icterus.   Neck: Normal range of motion. No thyromegaly present.   Cardiovascular: Normal rate, regular rhythm, normal heart sounds and intact distal pulses.    Pulmonary/Chest: Effort normal and breath sounds normal. No stridor.   Abdominal: Soft. Bowel sounds are normal.   Lymphadenopathy:     She has no cervical adenopathy.   Neurological: She is alert and oriented to person, place, and time.   Skin: Skin is warm. No rash noted. She is not diaphoretic.     Psychiatric: Affect and judgment normal.   Musculoskeletal: Normal range of motion.         Assessment     Pt is a 41 yo with h/o gastritis, irritable bowel syndrome being followed here for fibromyalgia    We diagnosed her with FMS since she met the criteria     1.  Widespread pain index greater than or equal to 7 and symptom severity score greater than or equal to 5 or widespread pain index between 3- 6, and symptom severity score greater than or equal to 9  2.  Symptoms have been present in a similar level for at least 3 months  3.  The patient does not have a disorder that would otherwise sufficiently explain the pain    She does not have records of diagnosis and if she had an previous inflammatory arthritis.   She claims that she had an ultrasound showing evidence of inflammatory arthritis   We have  tried to obtain records and we have been unsuccessful  She brought some print outs on the portal and there is mention of inflammatory arthritis and I see NSAIDs and plaquenil on the prescriptions    We did our own hand MRIs    She has cystic lesions b/l which might be indicative of burned out erosions  It is possible that she had an old inflammatory arthritis which self resolved    She mentioned that combination of tramadol + meloxicam helped : we d/clara because of itch     She cannot take higher dose of amitryptilline  She was liking cymbalta and she now doesn't     She also mentions sicca symptoms but she thinks it might be s/p ambien use but definitely prior to initiation of amitryptilline     We are treating her for fibromyalgia with cymbalta only     We had her on plaquenil given the evidence of inflammatory arthritis on prior US and current MRI revealing old erosions   But she had a rash and she attributed it to plaquenil  She made a lot of changes with shampoos and detergents but she didn't see a change so she went off plaquenil and meloxicam     Today her concerns are    Cymbalta is not helping with the pain  Memory is bad     Savella not affordable     OB said everything is ok    1. Fibromyalgia    2. Insomnia due to medical condition    3. Bipolar affective disorder, currently depressed, moderate    4. Systemic lupus erythematosus, unspecified SLE type, unspecified organ involvement status        Plan    Lupus monitoring labs next time     She prefers to increase cymbalta to 60 mg bid    D/c savella     Continue ambien    Exercise regularly    FRP program     She will contact her psychiatrist for the bipolar medications     Suggested lip biopsy and corneal staining at some point to see if she has Sjogren's  Not sure if her sicca symptoms are drug induced yet,but they are definitely bothersome    Management of sicca symptoms    -preservative free artifical tears 3 to 4 times a day  Lubricating ointments at  night  Wraparound sun glasses/moisture shields which attach to glasses help  Opthalmology evaluation,management : she will need split lamp,schirmer's test and ocular surface staining    -biotene and ACT preparations  Dental evaluations  Periodic cleaning  Use fluoride products  Brush and floss teeth regularly especially after meals     Avoid sugar containing foods and drinks    Use of vaginal lubricants/estrogen creams   Seeing Gyn    Use hypoallergenic soaps/lotions for the skin  Avoid drafts from air conditioners/heaters/radiators  Avoid detergents,deodorant soaps,very hot water  Use humidifiers    NO PLAQUENIL FOR NOW  IF SYMPTOMS CHANGE AND WE SEE INFLAMMATORY ARTHRITIS WE WILL OFFER METHOTREXATE    Water aerobics  Yoga and aleah chi    See psychiatry/counsellor  Sleep clinic evaluation  Memory testing  CBT     Every 12 monthly lupus labs       Roopa was seen today for disease management.    Diagnoses and all orders for this visit:    Fibromyalgia  -     CBC auto differential; Future  -     Comprehensive metabolic panel; Future  -     Sedimentation rate; Future  -     Anti-DNA antibody, double-stranded; Future  -     C3 complement; Future  -     C4 complement; Future  -     Protein / creatinine ratio, urine; Future  -     Urinalysis; Future  -     C-Reactive Protein; Future    Insomnia due to medical condition    Bipolar affective disorder, currently depressed, moderate    Systemic lupus erythematosus, unspecified SLE type, unspecified organ involvement status  -     CBC auto differential; Future  -     Comprehensive metabolic panel; Future  -     Sedimentation rate; Future  -     Anti-DNA antibody, double-stranded; Future  -     C3 complement; Future  -     C4 complement; Future  -     Protein / creatinine ratio, urine; Future  -     Urinalysis; Future  -     C-Reactive Protein; Future    Other orders  -     DULoxetine (CYMBALTA) 60 MG capsule; Take 1 capsule (60 mg total) by mouth 2 (two) times  daily.        rtc in 6 months

## 2020-08-05 ASSESSMENT — SYSTEMIC LUPUS ERYTHEMATOSUS DISEASE ACTIVITY INDEX (SLEDAI): TOTAL_SCORE: 0

## 2020-08-14 DIAGNOSIS — Z11.59 NEED FOR HEPATITIS C SCREENING TEST: ICD-10-CM

## 2020-08-17 ENCOUNTER — OFFICE VISIT (OUTPATIENT)
Dept: PSYCHIATRY | Facility: CLINIC | Age: 40
End: 2020-08-17
Payer: COMMERCIAL

## 2020-08-17 DIAGNOSIS — G47.01 INSOMNIA DUE TO MEDICAL CONDITION: ICD-10-CM

## 2020-08-17 DIAGNOSIS — F41.1 GAD (GENERALIZED ANXIETY DISORDER): ICD-10-CM

## 2020-08-17 DIAGNOSIS — F31.32 BIPOLAR AFFECTIVE DISORDER, CURRENTLY DEPRESSED, MODERATE: Primary | ICD-10-CM

## 2020-08-17 DIAGNOSIS — R41.3 MEMORY CHANGE: ICD-10-CM

## 2020-08-17 PROCEDURE — 90834 PR PSYCHOTHERAPY W/PATIENT, 45 MIN: ICD-10-PCS | Mod: 95,,, | Performed by: SOCIAL WORKER

## 2020-08-17 PROCEDURE — 90834 PSYTX W PT 45 MINUTES: CPT | Mod: 95,,, | Performed by: SOCIAL WORKER

## 2020-08-17 NOTE — PROGRESS NOTES
Individual Psychotherapy (PhD/LCSW)    8/17/2020    The patient location is: home in living room  The chief complaint leading to consultation is: anxiety, depression    Visit type: audiovisual    Face to Face time with patient: 45  55 minutes of total time spent on the encounter, which includes face to face time and non-face to face time preparing to see the patient (eg, review of tests), Obtaining and/or reviewing separately obtained history, Documenting clinical information in the electronic or other health record, Independently interpreting results (not separately reported) and communicating results to the patient/family/caregiver, or Care coordination (not separately reported).       Each patient to whom he or she provides medical services by telemedicine is:  (1) informed of the relationship between the physician and patient and the respective role of any other health care provider with respect to management of the patient; and (2) notified that he or she may decline to receive medical services by telemedicine and may withdraw from such care at any time.      Therapeutic Intervention: Met with patient.  Outpatient - Insight oriented psychotherapy 45 min - CPT code 37106, Outpatient - Behavior modifying psychotherapy 45 min - CPT code 61173, Outpatient - Supportive psychotherapy 45 min - CPT Code 11157 and Outpatient - Interactive psychotherapy 45 min - CPT code 73687    Chief complaint/reason for encounter: depression, anxiety, sleep and memory concerns     Interval history and content of current session: History of present illness: Pt is a 39 yo  female who presents today for f/u via telemedicine.  Pt presents as stable at this time and reports no SI/HI.  Pt is a moderate historian due to memory issues diagnosed through Neuro psych. Pt reports to recently establishing services with galina Miles N.P., who attempted to adjust medications, however reports insurance will not cover. LCSW reached out to  RAFAEL who was able to call in alternate med for pt to  (Abilify).    Pt reports in between sessions that she continues to work on skill improvement for memory by writing more items down during the day or taking pictures. Pt reports that she also recently got an alarm system with cameras and door notifications. Pt reports that the new system really helped in relieving a lot of her anxiety and has been able to also improve her sleep.  Pt states family continues to adjust to spouse's new work schedule, however pt states family has better adapted. Pt reports continued journaling and actively focusing on sleep health. Pt also continues to work on behavior modification tools learned during previous sessions. Continued supportive, interactive, behavior modification, and self-oriented psychotherapy provided/discussed. Pt remains receptive of psychotherapy and LCSW recommends f/u in 2 weeks.       Treatment plan:  · Target symptoms: depression, anxiety   · Why chosen therapy is appropriate versus another modality: relevant to diagnosis, patient responds to this modality, evidence based practice  · Outcome monitoring methods: self-report, observation  · Therapeutic intervention type: insight oriented psychotherapy, behavior modifying psychotherapy, supportive psychotherapy, interactive psychotherapy    Risk parameters:  Patient reports no suicidal ideation  Patient reports no homicidal ideation  Patient reports no self-injurious behavior  Patient reports no violent behavior    Verbal deficits: None    Patient's response to intervention:  The patient's response to intervention is accepting.    Progress toward goals and other mental status changes:    The patient's progress toward goals is good.       Diagnosis:     ICD-10-CM ICD-9-CM   1. Bipolar affective disorder, currently depressed, moderate  F31.32 296.52   2. RUPINDRE (generalized anxiety disorder)  F41.1 300.02   3. Insomnia due to medical condition  G47.01 327.01   4.  Memory change  R41.3 780.93       Plan:  individual psychotherapy, working towards improved sleep health. Behavior modification practice. Journaling     Return to clinic: 2 weeks    Length of Service (minutes): 45

## 2020-08-18 RX ORDER — ARIPIPRAZOLE 5 MG/1
5 TABLET ORAL DAILY
Qty: 30 TABLET | Refills: 1 | Status: SHIPPED | OUTPATIENT
Start: 2020-08-18 | End: 2020-08-27 | Stop reason: SDUPTHER

## 2020-08-27 ENCOUNTER — OFFICE VISIT (OUTPATIENT)
Dept: PSYCHIATRY | Facility: CLINIC | Age: 40
End: 2020-08-27
Payer: COMMERCIAL

## 2020-08-27 DIAGNOSIS — F41.1 GENERALIZED ANXIETY DISORDER: ICD-10-CM

## 2020-08-27 DIAGNOSIS — G47.00 INSOMNIA, UNSPECIFIED TYPE: Primary | ICD-10-CM

## 2020-08-27 DIAGNOSIS — F31.9 BIPOLAR AFFECTIVE DISORDER, REMISSION STATUS UNSPECIFIED: ICD-10-CM

## 2020-08-27 DIAGNOSIS — R41.3 OTHER AMNESIA: ICD-10-CM

## 2020-08-27 PROCEDURE — 99214 PR OFFICE/OUTPT VISIT, EST, LEVL IV, 30-39 MIN: ICD-10-PCS | Mod: 95,,, | Performed by: NURSE PRACTITIONER

## 2020-08-27 PROCEDURE — 99214 OFFICE O/P EST MOD 30 MIN: CPT | Mod: 95,,, | Performed by: NURSE PRACTITIONER

## 2020-08-27 RX ORDER — ARIPIPRAZOLE 5 MG/1
5 TABLET ORAL DAILY
Qty: 30 TABLET | Refills: 1 | Status: SHIPPED | OUTPATIENT
Start: 2020-08-27 | End: 2020-09-25 | Stop reason: SDUPTHER

## 2020-08-27 NOTE — PROGRESS NOTES
"8/27/2020 3:53 PM   Ropoa Rivas   1980   [id number]           OUTPATIENT PSYCHIATRY FOLLOW- UP VISIT    Reason for Encounter:  Roopa Rivas, a 40 y.o. female,who presents today for follow up of anxiety, depression. Met with patient.    Interval History and Content of Current Session:    Today,  Pt reports feeling energized on medication, reports was tired, exhausted with taking medication initially - first 3 to 4 days, but started feeling better, more energized, mood has been improved, less fatigue. Currently reports also has been seen by Rheumatology, Cymbalta has been increased to 120 mg per visit on 8/3. Started Abilify prescribed about one week prior, started about 5 days. No side effects yet per reports, reports has been having sexual desire, which was improved from last visit. Reports wants to get back to working out on treadmill. Reports "I am waking up feel well rested and I haven't had that in a while." Denies other s/e or increase in goal-directed or impulsive behaviors. Denies AVH, rufus, or psychosis.       Psychosocial stressors:  Rigidity in thinking, 4 children      Psychiatric Review Of Systems - Is patient experiencing or having changes in:     See HPI    Sleep: Sleep improved to 6 hours per night      Risk Parameters:  Patient reports no suicidal ideation  Patient reports no homicidal ideation  Patient reports no self-injurious behavior  Patient reports no violent behavior      Current meds- Abilify, Cymbalta    Compliance: yes    Side effects: None      Additional Psychiatric Family History:   Father -    Mother - possible anxiety/depression/emotional reactive    Social History:  Born and raised in [facility], GED after 10th grade, some college, 3 older brothers, no relationship, 1 sister younger, brother 4 years younger, no relationship. lived in [facility] from [place] to 2001,  to 1st  5 years, 2 children from that marriage, good relationship with wife of 1st . Has 4 " "children, 23 and 20 from 1st marriage, 10 y/o boy and 12 y/o. Reports supportive relationship with family members, but reports having difficulty with "empty nest" No ETOH until age 22. No rec. Drug use.      Review of Systems       Medical Review of Symptoms  History obtained from the patient   General : NO chills or fever   Respiratory: NO cough, shortness of breath   Cardiovascular: NO chest pain, palpitations or racing heart   Gastrointestinal: NO nausea, vomiting, constipation or diarrhea   Neurological: NO confusion, dizziness, headaches or tremors   Psychiatric: please see HPI     Objective     ALL MEDICATIONS:    Current Outpatient Medications:     ARIPiprazole (ABILIFY) 5 MG Tab, Take 1 tablet (5 mg total) by mouth once daily., Disp: 30 tablet, Rfl: 1    cetirizine (ZYRTEC) 10 MG tablet, Take 10 mg by mouth once daily., Disp: , Rfl:     clotrimazole-betamethasone 1-0.05% (LOTRISONE) cream, Apply topically 2 (two) times daily., Disp: 15 g, Rfl: 0    drospirenone-ethinyl estradioL (SARAH) 3-0.02 mg per tablet, TAKE 1 TABLET BY MOUTH EVERY DAY, Disp: 28 tablet, Rfl: 1    DULoxetine (CYMBALTA) 60 MG capsule, TAKE 1 CAPSULE(60 MG) BY MOUTH EVERY DAY, Disp: 90 capsule, Rfl: 1    ibuprofen (ADVIL,MOTRIN) 400 MG tablet, Take 400 mg by mouth every 6 (six) hours as needed for Other., Disp: , Rfl:     magnesium 30 mg Tab, Take by mouth once., Disp: , Rfl:     spironolactone (ALDACTONE) 50 MG tablet, Take 1 tablet (50 mg total) by mouth once daily., Disp: 30 tablet, Rfl: 2    sulfacetamide sodium, acne, (KLARON) 10 % Susp, Bid face, Disp: 118 mL, Rfl: 1    zolpidem (AMBIEN) 10 mg Tab, Take 1 tablet (10 mg total) by mouth nightly as needed (insomnia)., Disp: 30 tablet, Rfl: 2    ALLERGIES:  Review of patient's allergies indicates:  No Known Allergies    RELEVANT LABS/STUDIES:    Lab Results   Component Value Date    WBC 8.26 04/03/2020    HGB 11.3 (L) 04/03/2020    HCT 35.2 (L) 04/03/2020    MCV 93 " 04/03/2020     04/03/2020     BMP  Lab Results   Component Value Date     (L) 02/26/2020    K 4.5 02/26/2020     02/26/2020    CO2 24 02/26/2020    BUN 7 02/26/2020    CREATININE 0.8 02/26/2020    CALCIUM 9.3 02/26/2020    ANIONGAP 9 02/26/2020    ESTGFRAFRICA >60.0 02/26/2020    EGFRNONAA >60.0 02/26/2020     Lab Results   Component Value Date    ALT 18 02/26/2020    AST 17 02/26/2020    ALKPHOS 79 02/26/2020    BILITOT 0.2 02/26/2020     Lab Results   Component Value Date    TSH 1.656 07/07/2020     Lab Results   Component Value Date    HGBA1C 5.2 02/26/2020       Constitutional  Vitals:  Most recent vital signs, dated less than 90 days prior to this appointment, were reviewed.    There were no vitals filed for this visit.         PHYSICAL EXAM  General: well developed, well nourished  Neurologic:   Gait: Normal   Psychomotor signs:  No involuntary movements or tremor  AIMS: none    PSYCHIATRIC EXAM:     Mental Status Exam:  Appearance: unremarkable, age appropriate  Behavior/Cooperation: normal, cooperative  Speech: normal tone, normal rate, normal pitch, normal volume  Language: uses words appropriately; NO aphasia or dysarthria  Mood: steady  Affect: full, congruent  Thought Process: normal and logical  Thought Content: normal, no suicidality, no homicidality, delusions, or paranoia  Level of Consciousness: Alert and Oriented x3  Memory:  Intact  Attention/concentration: appropriate for age/education.   Fund of Knowledge: appears adequate  Insight:  Intact  Judgment: Intact    Assessment and Diagnosis   Status/Progress: Based on the examination today, the patient's problem(s) is/are improved.  New problems have not been presented today.   Co-morbidities are complicating management of the primary condition.  The working differential for this patient includes OCD, OCPD.     General Impression:   Bipolar II Disorder with mixed features                        RUPINDER                        Cluster C  Traits, rule out OCPD                        Rule out OCD    Intervention/Counseling/Treatment Plan   · Medication Management: -        Continue Cymbalta 120 mg by mouth once daily        Continue Abilify 5 mg by mouth once daily  · Labs: reviewed most recent - reinforced to have labs completed  · The treatment plan and follow up plan were reviewed with the patient.  · Discussed with patient informed consent, risks vs. benefits, alternative treatments, side effect profile and the inherent unpredictability of individual responses to these treatments. The patient expresses understanding of the above and displays the capacity to agree with this current plan and had no other questions.  · Encouraged Patient to keep future appointments.   · Take medications as prescribed and abstain from substance abuse.   · In the event of an emergency patient was advised to go to the emergency room.    Return to Clinic: 1 month    > than 50% of total time spend on coordination of care and counseling   (which included pts differential diagnosis and prognosis for psychiatric conditions, risks, benefits of treatments, instructions and adherence to treatment plan, risk reduction, reviewing current psychiatric medication regimen, medical problems and social stressors. In addtion to possible discussion with other healthcare provider/s)    Add on Psychotherapy time:0  Total Face time:30 min    The patient location is: [place]  The chief complaint leading to consultation is: anxiety, depression    Visit type: audiovisual    Face to Face time with patient: 30 min  30 minutes of total time spent on the encounter, which includes face to face time and non-face to face time preparing to see the patient (eg, review of tests), Obtaining and/or reviewing separately obtained history, Documenting clinical information in the electronic or other health record, Independently interpreting results (not separately reported) and communicating results to the  patient/family/caregiver, or Care coordination (not separately reported).         Each patient to whom he or she provides medical services by telemedicine is:  (1) informed of the relationship between the physician and patient and the respective role of any other health care provider with respect to management of the patient; and (2) notified that he or she may decline to receive medical services by telemedicine and may withdraw from such care at any time.    Notes:       Abel Monroe, MSN, APRN, PMHNP-BC [facility]   8/27/2020 3:53 PM

## 2020-08-31 ENCOUNTER — OFFICE VISIT (OUTPATIENT)
Dept: PSYCHIATRY | Facility: CLINIC | Age: 40
End: 2020-08-31
Payer: COMMERCIAL

## 2020-08-31 DIAGNOSIS — R41.3 MEMORY CHANGE: ICD-10-CM

## 2020-08-31 DIAGNOSIS — F41.1 GAD (GENERALIZED ANXIETY DISORDER): ICD-10-CM

## 2020-08-31 DIAGNOSIS — F31.32 BIPOLAR AFFECTIVE DISORDER, CURRENTLY DEPRESSED, MODERATE: Primary | ICD-10-CM

## 2020-08-31 PROCEDURE — 90834 PR PSYCHOTHERAPY W/PATIENT, 45 MIN: ICD-10-PCS | Mod: 95,,, | Performed by: SOCIAL WORKER

## 2020-08-31 PROCEDURE — 90834 PSYTX W PT 45 MINUTES: CPT | Mod: 95,,, | Performed by: SOCIAL WORKER

## 2020-08-31 NOTE — PROGRESS NOTES
Individual Psychotherapy (PhD/LCSW)    8/31/2020    The patient location is: home in living room  The chief complaint leading to consultation is: anxiety, depression    Visit type: audiovisual    Face to Face time with patient: 45  55 minutes of total time spent on the encounter, which includes face to face time and non-face to face time preparing to see the patient (eg, review of tests), Obtaining and/or reviewing separately obtained history, Documenting clinical information in the electronic or other health record, Independently interpreting results (not separately reported) and communicating results to the patient/family/caregiver, or Care coordination (not separately reported).       Each patient to whom he or she provides medical services by telemedicine is:  (1) informed of the relationship between the physician and patient and the respective role of any other health care provider with respect to management of the patient; and (2) notified that he or she may decline to receive medical services by telemedicine and may withdraw from such care at any time.      Therapeutic Intervention: Met with patient.  Outpatient - Insight oriented psychotherapy 45 min - CPT code 76123, Outpatient - Behavior modifying psychotherapy 45 min - CPT code 93118, Outpatient - Supportive psychotherapy 45 min - CPT Code 66362 and Outpatient - Interactive psychotherapy 45 min - CPT code 01765    Chief complaint/reason for encounter: depression, anxiety, sleep and memory concerns     Interval history and content of current session: History of present illness: Pt is a 41 yo   female who presents today for f/u via telemedicine.  Pt currently established with  Abel Miles N.P. Pt states was able to start the Abilify.     Pt reports since starting Ability pt has seen a great improvement in mood. Pt states has been more relaxed, able to focus better, and has been resting well. Pt reports cannot remember the last time she felt  well. Pt reports in between sessions that she continues to work on skill improvement for anxiety through practicing behavior modification tools learned during previous sessions. Continued supportive, interactive, behavior modification, and self-oriented psychotherapy provided/discussed. Pt remains receptive of psychotherapy and LCSW recommends f/u in 2 weeks.       Treatment plan:  · Target symptoms: depression, anxiety   · Why chosen therapy is appropriate versus another modality: relevant to diagnosis, patient responds to this modality, evidence based practice  · Outcome monitoring methods: self-report, observation  · Therapeutic intervention type: insight oriented psychotherapy, behavior modifying psychotherapy, supportive psychotherapy, interactive psychotherapy    Risk parameters:  Patient reports no suicidal ideation  Patient reports no homicidal ideation  Patient reports no self-injurious behavior  Patient reports no violent behavior    Verbal deficits: None    Patient's response to intervention:  The patient's response to intervention is accepting.    Progress toward goals and other mental status changes:    The patient's progress toward goals is good.       Diagnosis:     ICD-10-CM ICD-9-CM   1. Bipolar affective disorder, currently depressed, moderate  F31.32 296.52   2. RUPINDER (generalized anxiety disorder)  F41.1 300.02   3. Memory change  R41.3 780.93       Plan:  individual psychotherapy, working towards improved sleep health. Behavior modification practice. Journaling     Return to clinic: 2 weeks    Length of Service (minutes): 45

## 2020-09-03 RX ORDER — DROSPIRENONE AND ETHINYL ESTRADIOL 0.02-3(28)
1 KIT ORAL DAILY
Qty: 28 TABLET | Refills: 1 | Status: SHIPPED | OUTPATIENT
Start: 2020-09-03 | End: 2020-09-03

## 2020-09-08 ENCOUNTER — OFFICE VISIT (OUTPATIENT)
Dept: FAMILY MEDICINE | Facility: CLINIC | Age: 40
End: 2020-09-08
Payer: COMMERCIAL

## 2020-09-08 VITALS
OXYGEN SATURATION: 99 % | DIASTOLIC BLOOD PRESSURE: 64 MMHG | HEIGHT: 59 IN | RESPIRATION RATE: 19 BRPM | HEART RATE: 115 BPM | BODY MASS INDEX: 26.27 KG/M2 | WEIGHT: 130.31 LBS | TEMPERATURE: 98 F | SYSTOLIC BLOOD PRESSURE: 122 MMHG

## 2020-09-08 DIAGNOSIS — R41.3 MEMORY CHANGE: ICD-10-CM

## 2020-09-08 DIAGNOSIS — G47.01 INSOMNIA DUE TO MEDICAL CONDITION: ICD-10-CM

## 2020-09-08 DIAGNOSIS — F31.32 BIPOLAR AFFECTIVE DISORDER, CURRENTLY DEPRESSED, MODERATE: ICD-10-CM

## 2020-09-08 DIAGNOSIS — E03.8 SUBCLINICAL HYPOTHYROIDISM: ICD-10-CM

## 2020-09-08 DIAGNOSIS — R00.0 TACHYCARDIA: ICD-10-CM

## 2020-09-08 DIAGNOSIS — Z00.00 HEALTHCARE MAINTENANCE: Primary | ICD-10-CM

## 2020-09-08 DIAGNOSIS — M79.7 FIBROMYALGIA: ICD-10-CM

## 2020-09-08 PROCEDURE — 3008F PR BODY MASS INDEX (BMI) DOCUMENTED: ICD-10-PCS | Mod: CPTII,S$GLB,, | Performed by: INTERNAL MEDICINE

## 2020-09-08 PROCEDURE — 90471 FLU VACCINE (QUAD) GREATER THAN OR EQUAL TO 3YO PRESERVATIVE FREE IM: ICD-10-PCS | Mod: S$GLB,,, | Performed by: INTERNAL MEDICINE

## 2020-09-08 PROCEDURE — 99214 OFFICE O/P EST MOD 30 MIN: CPT | Mod: 25,S$GLB,, | Performed by: INTERNAL MEDICINE

## 2020-09-08 PROCEDURE — 90471 IMMUNIZATION ADMIN: CPT | Mod: S$GLB,,, | Performed by: INTERNAL MEDICINE

## 2020-09-08 PROCEDURE — 90686 FLU VACCINE (QUAD) GREATER THAN OR EQUAL TO 3YO PRESERVATIVE FREE IM: ICD-10-PCS | Mod: S$GLB,,, | Performed by: INTERNAL MEDICINE

## 2020-09-08 PROCEDURE — 90686 IIV4 VACC NO PRSV 0.5 ML IM: CPT | Mod: S$GLB,,, | Performed by: INTERNAL MEDICINE

## 2020-09-08 PROCEDURE — 99999 PR PBB SHADOW E&M-EST. PATIENT-LVL IV: CPT | Mod: PBBFAC,,, | Performed by: INTERNAL MEDICINE

## 2020-09-08 PROCEDURE — 3008F BODY MASS INDEX DOCD: CPT | Mod: CPTII,S$GLB,, | Performed by: INTERNAL MEDICINE

## 2020-09-08 PROCEDURE — 99999 PR PBB SHADOW E&M-EST. PATIENT-LVL IV: ICD-10-PCS | Mod: PBBFAC,,, | Performed by: INTERNAL MEDICINE

## 2020-09-08 PROCEDURE — 99214 PR OFFICE/OUTPT VISIT, EST, LEVL IV, 30-39 MIN: ICD-10-PCS | Mod: 25,S$GLB,, | Performed by: INTERNAL MEDICINE

## 2020-09-08 RX ORDER — ZOLPIDEM TARTRATE 10 MG/1
10 TABLET ORAL NIGHTLY PRN
Qty: 30 TABLET | Refills: 2 | Status: SHIPPED | OUTPATIENT
Start: 2020-09-08 | End: 2020-11-10

## 2020-09-08 NOTE — PROGRESS NOTES
Patient tolerated influenza med. administration well.  Instructed to wait for 15 minutes to monitor for any adverse reactions.

## 2020-09-08 NOTE — PROGRESS NOTES
HISTORY OF PRESENT ILLNESS:  Roopa Rivas is a 40 y.o. female who presents to the clinic today for follow up.    Bipolar d/o, insomnia  Being followed by psychiatry.  On increased Cymbalta and started Abilify in mid August.  Still requires Ambien 10 mg nightly and feels like sleeping better up to 8 hrs per night.  She is exercising daily on treadmill 45 min-hour.  Mood is better.    Lives with  and her three children.  Previously the exhaustion would really get to her but feels like right now is a lot better.    Fibromyalgia  LOV rheum 8/3/20.  On Cymbalta increased dose. Pain is improved with this increased dose.    Subclinical Hypothyroid  Last set of labs was normal in July.  Planning to follow up with endo in 6 months from then.  She started taking multivitamin daily that has iron in it.    Concern of memory issues  Feels like long term memory is improved and short term memory is still not better.  She has gone back to cooking.  She is writing in her journal daily and watching less TV/feeling less bed ridden.  She feels more energy and motivation to do things.      PAST MEDICAL HISTORY:  Past Medical History:   Diagnosis Date    Arthritis     Fibromyalgia     Gastroenteritis     Long-term use of Plaquenil 12/7/2017       PAST SURGICAL HISTORY:  Past Surgical History:   Procedure Laterality Date    NO PAST SURGERIES         SOCIAL HISTORY:  Social History     Socioeconomic History    Marital status:      Spouse name: Not on file    Number of children: Not on file    Years of education: Not on file    Highest education level: Not on file   Occupational History    Not on file   Social Needs    Financial resource strain: Not hard at all    Food insecurity     Worry: Never true     Inability: Never true    Transportation needs     Medical: No     Non-medical: No   Tobacco Use    Smoking status: Never Smoker    Smokeless tobacco: Never Used   Substance and Sexual Activity    Alcohol  use: No     Frequency: Never     Drinks per session: Patient refused     Binge frequency: Never    Drug use: No    Sexual activity: Yes     Partners: Male     Birth control/protection: Condom   Lifestyle    Physical activity     Days per week: 1 day     Minutes per session: 20 min    Stress: Very much   Relationships    Social connections     Talks on phone: More than three times a week     Gets together: Never     Attends Religion service: Not on file     Active member of club or organization: No     Attends meetings of clubs or organizations: Never     Relationship status:    Other Topics Concern    Not on file   Social History Narrative    Not on file       FAMILY HISTORY:  Family History   Problem Relation Age of Onset    Arthritis Mother     No Known Problems Father     Arthritis Maternal Grandmother     Arthritis Maternal Grandfather     Cancer Paternal Grandfather     Thyroid disease Maternal Aunt        ALLERGIES AND MEDICATIONS: updated and reviewed.  Review of patient's allergies indicates:  No Known Allergies  Medication List with Changes/Refills   Current Medications    ARIPIPRAZOLE (ABILIFY) 5 MG TAB    Take 1 tablet (5 mg total) by mouth once daily.    CETIRIZINE (ZYRTEC) 10 MG TABLET    Take 10 mg by mouth once daily.    CLOTRIMAZOLE-BETAMETHASONE 1-0.05% (LOTRISONE) CREAM    Apply topically 2 (two) times daily.    DROSPIRENONE-ETHINYL ESTRADIOL (SARAH) 3-0.02 MG PER TABLET    TAKE 1 TABLET BY MOUTH EVERY DAY    DULOXETINE (CYMBALTA) 60 MG CAPSULE    TAKE 1 CAPSULE(60 MG) BY MOUTH EVERY DAY    IBUPROFEN (ADVIL,MOTRIN) 400 MG TABLET    Take 400 mg by mouth every 6 (six) hours as needed for Other.    MAGNESIUM 30 MG TAB    Take by mouth once.    SPIRONOLACTONE (ALDACTONE) 50 MG TABLET    Take 1 tablet (50 mg total) by mouth once daily.    SULFACETAMIDE SODIUM, ACNE, (KLARON) 10 % SUSP    Bid face    ZOLPIDEM (AMBIEN) 10 MG TAB    Take 1 tablet (10 mg total) by mouth nightly as needed  (insomnia).          CARE TEAM:  Patient Care Team:  Justin Paz MD as PCP - General (Internal Medicine)  Anegles Herron MA as Care Coordinator         REVIEW OF SYSTEMS:  Review of Systems   Constitutional: Negative for activity change and unexpected weight change.   HENT: Negative for hearing loss, rhinorrhea and trouble swallowing.    Eyes: Negative for discharge and visual disturbance.   Respiratory: Negative for chest tightness and wheezing.    Cardiovascular: Positive for palpitations. Negative for chest pain.   Gastrointestinal: Positive for blood in stool and constipation. Negative for diarrhea and vomiting.   Endocrine: Negative for polydipsia and polyuria.   Genitourinary: Negative for difficulty urinating, dysuria, hematuria and menstrual problem.   Musculoskeletal: Positive for arthralgias and joint swelling. Negative for neck pain.   Neurological: Negative for weakness and headaches.   Psychiatric/Behavioral: Positive for confusion and dysphoric mood.       PHYSICAL EXAM:   Vitals:    09/08/20 1424   BP: 122/64   Pulse: (!) 115   Resp: 19   Temp: 97.5 °F (36.4 °C)             Body mass index is 26.32 kg/m².     General appearance - alert, well appearing, and in no distress and oriented to person, place, and time  Mental status - normal mood, behavior, speech, dress, motor activity, and thought processes  Eyes - sclera anicteric, left eye normal, right eye normal  Chest - no tachypnea, retractions or cyanosis  Heart - regular rhythm, elevated heart rate, no murmur  Neurological - alert, oriented, normal speech, no focal findings or movement disorder noted, motor and sensory grossly normal bilaterally  Extremities - peripheral pulses normal, no pedal edema, no clubbing or cyanosis  Skin - normal coloration and turgor, no rashes, no suspicious skin lesions noted      ASSESSMENT AND PLAN:  Healthcare maintenance  -     Influenza - Quadrivalent *Preferred* (6 months+) (PF)    Insomnia due to medical  condition  -     zolpidem (AMBIEN) 10 mg Tab; Take 1 tablet (10 mg total) by mouth nightly as needed (insomnia).  Dispense: 30 tablet; Refill: 2  - improved    Tachycardia  -     Ambulatory referral/consult to Cardiology; Future; Expected date: 09/15/2020  - Asymptomatic but resting heart rate runs 's    Subclinical hypothyroidism       - Improved, monitor labs    Fibromyalgia        - Stable on current meds.    Bipolar affective disorder, currently depressed, moderate  Memory change      - Improved with current meds      Follow up 6 months or sooner as needed.

## 2020-09-14 ENCOUNTER — OFFICE VISIT (OUTPATIENT)
Dept: PSYCHIATRY | Facility: CLINIC | Age: 40
End: 2020-09-14
Payer: COMMERCIAL

## 2020-09-14 DIAGNOSIS — F41.1 GAD (GENERALIZED ANXIETY DISORDER): ICD-10-CM

## 2020-09-14 DIAGNOSIS — R41.3 MEMORY CHANGES: ICD-10-CM

## 2020-09-14 DIAGNOSIS — F31.0 BIPOLAR AFFECTIVE DISORDER, CURRENT EPISODE HYPOMANIC: Primary | ICD-10-CM

## 2020-09-14 PROCEDURE — 90834 PSYTX W PT 45 MINUTES: CPT | Mod: 95,,, | Performed by: SOCIAL WORKER

## 2020-09-14 PROCEDURE — 90834 PR PSYCHOTHERAPY W/PATIENT, 45 MIN: ICD-10-PCS | Mod: 95,,, | Performed by: SOCIAL WORKER

## 2020-09-14 NOTE — PROGRESS NOTES
Individual Psychotherapy (PhD/LCSW)    9/14/2020    The patient location is: home in living room  The chief complaint leading to consultation is: anxiety, depression    Visit type: audiovisual    Face to Face time with patient: 45  55 minutes of total time spent on the encounter, which includes face to face time and non-face to face time preparing to see the patient (eg, review of tests), Obtaining and/or reviewing separately obtained history, Documenting clinical information in the electronic or other health record, Independently interpreting results (not separately reported) and communicating results to the patient/family/caregiver, or Care coordination (not separately reported).       Each patient to whom he or she provides medical services by telemedicine is:  (1) informed of the relationship between the physician and patient and the respective role of any other health care provider with respect to management of the patient; and (2) notified that he or she may decline to receive medical services by telemedicine and may withdraw from such care at any time.      Therapeutic Intervention: Met with patient.  Outpatient - Insight oriented psychotherapy 45 min - CPT code 77834, Outpatient - Behavior modifying psychotherapy 45 min - CPT code 27368, Outpatient - Supportive psychotherapy 45 min - CPT Code 08112 and Outpatient - Interactive psychotherapy 45 min - CPT code 79629    Chief complaint/reason for encounter: depression, anxiety, sleep and memory concerns     Interval history and content of current session: History of present illness: Pt is a 41 yo   female who presents today for f/u via telemedicine.  Pt currently established with  Abel Miles N.P. Pt was started on Abilfy and since then has had medicaiton increased. Since increase pt reports improved mood, sleep, and quality of life.      During current session patient rpeorts continued improvement since increasing Abilify. Patient states last  week she had become worried that she may have regressed mentally due to high anxiety and increased physical pain. Patient reports however she is now believing that she possibly had a side effect from the flu shot and she felt like maybe she had something similar to the flu. Patient reports she continues to work out daily and consistently, and has been feeling much better as of today. Patient does report that she is currently feeling as though she may be transitioning into a manic phase and has started over spending with inability to stop. LCSW and patient reviewed where patient was when first starting with LCSW. Patient feels that she was in depressive state and has now transitioned into hypomanic state. LCSW and patient discussed and proactive ways that patient could put in place for spending. Patient states will speak with  about putting a limit on card. Patient has been sleeping well due to Abilify and reports more awareness of mental health. Supportive and additional psychotherapies discussed and provided during session. Pt remains receptive of psychotherapy and LCSW recommends f/u in 2 weeks.       Treatment plan:  · Target symptoms: depression, anxiety   · Why chosen therapy is appropriate versus another modality: relevant to diagnosis, patient responds to this modality, evidence based practice  · Outcome monitoring methods: self-report, observation  · Therapeutic intervention type: insight oriented psychotherapy, behavior modifying psychotherapy, supportive psychotherapy, interactive psychotherapy    Risk parameters:  Patient reports no suicidal ideation  Patient reports no homicidal ideation  Patient reports no self-injurious behavior  Patient reports no violent behavior    Verbal deficits: None    Patient's response to intervention:  The patient's response to intervention is accepting.    Progress toward goals and other mental status changes:    The patient's progress toward goals is good.        Diagnosis:     ICD-10-CM ICD-9-CM   1. Bipolar affective disorder, current episode hypomanic  F31.0 296.40   2. RUPINDER (generalized anxiety disorder)  F41.1 300.02   3. Memory changes  R41.3 780.93       Plan:  individual psychotherapy, working towards improved sleep health. Behavior modification practice. Journaling     Return to clinic: 2 weeks    Length of Service (minutes): 45

## 2020-09-24 ENCOUNTER — OFFICE VISIT (OUTPATIENT)
Dept: OBSTETRICS AND GYNECOLOGY | Facility: CLINIC | Age: 40
End: 2020-09-24
Payer: COMMERCIAL

## 2020-09-24 VITALS
BODY MASS INDEX: 26.27 KG/M2 | SYSTOLIC BLOOD PRESSURE: 120 MMHG | DIASTOLIC BLOOD PRESSURE: 84 MMHG | WEIGHT: 130.06 LBS

## 2020-09-24 DIAGNOSIS — Z01.419 WELL WOMAN EXAM WITH ROUTINE GYNECOLOGICAL EXAM: Primary | ICD-10-CM

## 2020-09-24 DIAGNOSIS — Z30.41 SURVEILLANCE OF CONTRACEPTIVE PILL: ICD-10-CM

## 2020-09-24 PROCEDURE — 99396 PREV VISIT EST AGE 40-64: CPT | Mod: S$GLB,,, | Performed by: OBSTETRICS & GYNECOLOGY

## 2020-09-24 PROCEDURE — 99999 PR PBB SHADOW E&M-EST. PATIENT-LVL III: CPT | Mod: PBBFAC,,, | Performed by: OBSTETRICS & GYNECOLOGY

## 2020-09-24 PROCEDURE — 99396 PR PREVENTIVE VISIT,EST,40-64: ICD-10-PCS | Mod: S$GLB,,, | Performed by: OBSTETRICS & GYNECOLOGY

## 2020-09-24 PROCEDURE — 99999 PR PBB SHADOW E&M-EST. PATIENT-LVL III: ICD-10-PCS | Mod: PBBFAC,,, | Performed by: OBSTETRICS & GYNECOLOGY

## 2020-09-24 PROCEDURE — 3008F PR BODY MASS INDEX (BMI) DOCUMENTED: ICD-10-PCS | Mod: CPTII,S$GLB,, | Performed by: OBSTETRICS & GYNECOLOGY

## 2020-09-24 PROCEDURE — 3008F BODY MASS INDEX DOCD: CPT | Mod: CPTII,S$GLB,, | Performed by: OBSTETRICS & GYNECOLOGY

## 2020-09-24 RX ORDER — DROSPIRENONE AND ETHINYL ESTRADIOL 0.02-3(28)
1 KIT ORAL DAILY
Qty: 84 TABLET | Refills: 4 | Status: SHIPPED | OUTPATIENT
Start: 2020-09-24 | End: 2021-09-15 | Stop reason: SDUPTHER

## 2020-09-24 NOTE — PROGRESS NOTES
SUBJECTIVE:   Rooap Rivas is a 40 y.o. female   for annual well woman exam. Patient's last menstrual period was 2020 (exact date)..  She has no unusual complaints.      Started on aldactone by derm and stated helps with her acne/facial hair    Contraception: filiberto, would like refill    Past Medical History:   Diagnosis Date    Arthritis     Fibromyalgia     Gastroenteritis     Long-term use of Plaquenil 2017     Past Surgical History:   Procedure Laterality Date    NO PAST SURGERIES       Social History     Socioeconomic History    Marital status:      Spouse name: Not on file    Number of children: Not on file    Years of education: Not on file    Highest education level: Not on file   Occupational History    Not on file   Social Needs    Financial resource strain: Not hard at all    Food insecurity     Worry: Never true     Inability: Never true    Transportation needs     Medical: No     Non-medical: No   Tobacco Use    Smoking status: Never Smoker    Smokeless tobacco: Never Used   Substance and Sexual Activity    Alcohol use: No     Frequency: Never     Drinks per session: Patient refused     Binge frequency: Never    Drug use: No    Sexual activity: Yes     Partners: Male     Birth control/protection: Condom   Lifestyle    Physical activity     Days per week: 1 day     Minutes per session: 20 min    Stress: Very much   Relationships    Social connections     Talks on phone: More than three times a week     Gets together: Never     Attends Restorationism service: Not on file     Active member of club or organization: No     Attends meetings of clubs or organizations: Never     Relationship status:    Other Topics Concern    Not on file   Social History Narrative    Not on file     Family History   Problem Relation Age of Onset    Arthritis Mother     No Known Problems Father     Arthritis Maternal Grandmother     Arthritis Maternal Grandfather     Cancer  Paternal Grandfather     Thyroid disease Maternal Aunt      OB History    Para Term  AB Living   4 4 4     4   SAB TAB Ectopic Multiple Live Births           4      # Outcome Date GA Lbr Bhanu/2nd Weight Sex Delivery Anes PTL Lv   4 Term            3 Term            2 Term            1 Term               Obstetric Comments    x 4   Gynhx/ irregular every 35-60/5.    On ocp   H/o chlamydia in , s/p treatment   Denies abnl pap, last pap  neg/hpv neg         Current Outpatient Medications   Medication Sig Dispense Refill    ARIPiprazole (ABILIFY) 5 MG Tab Take 1 tablet (5 mg total) by mouth once daily. 30 tablet 1    cetirizine (ZYRTEC) 10 MG tablet Take 10 mg by mouth once daily.      clotrimazole-betamethasone 1-0.05% (LOTRISONE) cream Apply topically 2 (two) times daily. 15 g 0    drospirenone-ethinyl estradioL (SARAH) 3-0.02 mg per tablet TAKE 1 TABLET BY MOUTH EVERY DAY 84 tablet 0    DULoxetine (CYMBALTA) 60 MG capsule TAKE 1 CAPSULE(60 MG) BY MOUTH EVERY DAY 90 capsule 1    ibuprofen (ADVIL,MOTRIN) 400 MG tablet Take 400 mg by mouth every 6 (six) hours as needed for Other.      magnesium 30 mg Tab Take by mouth once.      multivitamin/iron/folic acid (MULTI COMPLETE WITH IRON ORAL) Take by mouth.      spironolactone (ALDACTONE) 50 MG tablet Take 1 tablet (50 mg total) by mouth once daily. 30 tablet 2    sulfacetamide sodium, acne, (KLARON) 10 % Susp Bid face 118 mL 1    zolpidem (AMBIEN) 10 mg Tab Take 1 tablet (10 mg total) by mouth nightly as needed (insomnia). 30 tablet 2     No current facility-administered medications for this visit.      Allergies: Patient has no known allergies.       ROS:  GENERAL: Denies weight gain or weight loss. Feeling well overall.   SKIN: Denies rash or lesions.   HEAD: Denies head injury or headache.   NODES: Denies enlarged lymph nodes.   CHEST: Denies chest pain or shortness of breath.   CARDIOVASCULAR: Denies palpitations or left sided chest  pain.   ABDOMEN: No abdominal pain, constipation, diarrhea, nausea, vomiting or rectal bleeding.   URINARY: No frequency, dysuria, hematuria, or burning on urination.  REPRODUCTIVE: Denies vaginal discharge, abnormal vaginal bleeding, lesions, pelvic pain  BREASTS: The patient performs breast self-examination and denies pain, lumps, or nipple discharge.   HEMATOLOGIC: No easy bruisability or excessive bleeding.  MUSCULOSKELETAL: Denies joint pain or swelling.   NEUROLOGIC: Denies syncope or weakness.   PSYCHIATRIC: Denies depression, anxiety or mood swings.      OBJECTIVE:   /84   Wt 59 kg (130 lb 1.1 oz)   LMP 08/30/2020 (Exact Date)   BMI 26.27 kg/m²   The patient appears well, alert, oriented x 3, in no distress.  PSYCH:  Normal, full range of affect  NECK: negative, no thyromegaly, trachea midline  SKIN: normal, good color, good turgor and no acne, striae, hirsutism  BREAST EXAM: breasts appear normal, no suspicious masses, no skin or nipple changes or axillary nodes  ABDOMEN: soft, non-tender; bowel sounds normal; no masses,  no organomegaly and no hernias, masses, or hepatosplenomegaly  GENITALIA: normal external genitalia, no erythema, no discharge  BLADDER: soft  URETHRA: normal appearing urethra with no masses, tenderness or lesions and normal urethra, normal urethral meatus  VAGINA: Normal  CERVIX: no lesions or cervical motion tenderness  UTERUS: normal size, contour, position, consistency, mobility, non-tender  ADNEXA: no mass, fullness, tenderness      ASSESSMENT:   1. Health maintenance  -pap UTD  -screening MMG UTD  -counseled on exercise and healthy diet  -bone health:  Discussed Vitamin D and Calcium supplementation, weight bearing exercises  2.  Discussed safety at home/school/work, healthy and balanced diet, sleep hygiene, as well as violence/weapons exposure.   3.  Refilled on [facility]

## 2020-09-25 ENCOUNTER — PATIENT MESSAGE (OUTPATIENT)
Dept: PSYCHIATRY | Facility: CLINIC | Age: 40
End: 2020-09-25

## 2020-09-25 ENCOUNTER — OFFICE VISIT (OUTPATIENT)
Dept: PSYCHIATRY | Facility: CLINIC | Age: 40
End: 2020-09-25
Payer: COMMERCIAL

## 2020-09-25 DIAGNOSIS — F31.70 BIPOLAR DISORDER IN PARTIAL REMISSION, MOST RECENT EPISODE UNSPECIFIED TYPE: ICD-10-CM

## 2020-09-25 DIAGNOSIS — F41.9 ANXIETY: Primary | ICD-10-CM

## 2020-09-25 PROCEDURE — 99215 OFFICE O/P EST HI 40 MIN: CPT | Mod: 95,,, | Performed by: NURSE PRACTITIONER

## 2020-09-25 PROCEDURE — 90833 PSYTX W PT W E/M 30 MIN: CPT | Mod: 95,,, | Performed by: NURSE PRACTITIONER

## 2020-09-25 PROCEDURE — 99215 PR OFFICE/OUTPT VISIT, EST, LEVL V, 40-54 MIN: ICD-10-PCS | Mod: 95,,, | Performed by: NURSE PRACTITIONER

## 2020-09-25 PROCEDURE — 90833 PR PSYCHOTHERAPY W/PATIENT W/E&M, 30 MIN (ADD ON): ICD-10-PCS | Mod: 95,,, | Performed by: NURSE PRACTITIONER

## 2020-09-25 RX ORDER — ARIPIPRAZOLE 5 MG/1
5 TABLET ORAL DAILY
Qty: 30 TABLET | Refills: 1 | Status: SHIPPED | OUTPATIENT
Start: 2020-09-25 | End: 2020-10-05

## 2020-09-25 NOTE — PROGRESS NOTES
"8/27/2020 3:53 PM   Roopa Rivas   1980   [id number]                                                                   OUTPATIENT PSYCHIATRY FOLLOW- UP VISIT     Reason for Encounter:  Roopa Riavs, a 40 y.o. female,who presents today for follow up of anxiety, depression. Met with patient.     Interval History and Content of Current Session:     Today,  Pt reports feeling "down" since getting flu shot about 2 weeks prior to this visit, reports immediately after flu vaccination, next day started feeling down, fatigued. Pt. Reports pain levels have been increasing, feels this is due to weather fluxuations and changes in pressures and been taking ibuprofen more often. Reports compulsive behaviors have increased per report with online spending.  Denies increase in goal-directed or impulsive sexual behaviors.  Denies AVH, rufus, or psychosis. Discussed OCD traits this visit, reports increase in skin poking (jutting nails into hand to produce pain to relieve anxiety), reports previous ritualistic movements whereby would put hair on certain areas of body "until the hair would get raw." Discussed setting boundaries with  re: spending, setting limits with spending, discussing with  beforehand to help alleviate anxiety surrounding this. Discussed trial of NAC to help with compulsive behaviors and recent anxiety. YBOCS completed this visit, discussed results with patient at length.         Psychosocial stressors:  Rigidity in thinking, 4 children        Psychiatric Review Of Systems - Is patient experiencing or having changes in:      See HPI     Sleep: Sleep has declined, reports waking up at 2:50 every morning, and reports has declined, anxiety has increased recently.        Risk Parameters:  Patient reports no suicidal ideation  Patient reports no homicidal ideation  Patient reports no self-injurious behavior  Patient reports no violent behavior        Current meds- Abilify, Cymbalta     Previous " "Trials:  Seroquel - severe dry eyes, hurt to blink, no peripheral vision, memory loss  Lexapro - early 2000s, stopped taking due to insurance  Current meds- Cymbalta  Compliance: yes     Side effects: None        Additional Psychiatric Family History:   Father -    Mother - possible anxiety/depression/emotional reactive     Social History:  Born and raised in  [place], GED after 10th grade, some college, 3 older brothers, no relationship, 1 sister younger, brother 4 years younger, no relationship. lived in [facility] from [place] to [place],  to 1st  5 years, 2 children from that marriage, good relationship with wife of 1st . Has 4 children, 23 and 20 from 1st marriage, 10 y/o boy and 12 y/o. Reports supportive relationship with family members, but reports having difficulty with "empty nest" No ETOH until age 22. No rec. Drug use.       Review of Systems         Medical Review of Symptoms  History obtained from the patient  · General : NO chills or fever  · Respiratory: NO cough, shortness of breath  · Cardiovascular: NO chest pain, palpitations or racing heart  · Gastrointestinal: NO nausea, vomiting, constipation or diarrhea  · Neurological: NO confusion, dizziness, headaches or tremors  · Psychiatric: please see HPI      Objective      ALL MEDICATIONS:     Current Outpatient Medications:     ARIPiprazole (ABILIFY) 5 MG Tab, Take 1 tablet (5 mg total) by mouth once daily., Disp: 30 tablet, Rfl: 1    cetirizine (ZYRTEC) 10 MG tablet, Take 10 mg by mouth once daily., Disp: , Rfl:     clotrimazole-betamethasone 1-0.05% (LOTRISONE) cream, Apply topically 2 (two) times daily., Disp: 15 g, Rfl: 0    drospirenone-ethinyl estradioL (SARAH) 3-0.02 mg per tablet, TAKE 1 TABLET BY MOUTH EVERY DAY, Disp: 28 tablet, Rfl: 1    DULoxetine (CYMBALTA) 60 MG capsule, TAKE 1 CAPSULE(60 MG) BY MOUTH EVERY DAY, Disp: 90 capsule, Rfl: 1    ibuprofen (ADVIL,MOTRIN) 400 MG tablet, Take 400 mg by mouth every 6 (six) " hours as needed for Other., Disp: , Rfl:     magnesium 30 mg Tab, Take by mouth once., Disp: , Rfl:     spironolactone (ALDACTONE) 50 MG tablet, Take 1 tablet (50 mg total) by mouth once daily., Disp: 30 tablet, Rfl: 2    sulfacetamide sodium, acne, (KLARON) 10 % Susp, Bid face, Disp: 118 mL, Rfl: 1    zolpidem (AMBIEN) 10 mg Tab, Take 1 tablet (10 mg total) by mouth nightly as needed (insomnia)., Disp: 30 tablet, Rfl: 2     ALLERGIES:  Review of patient's allergies indicates:  No Known Allergies     RELEVANT LABS/STUDIES:          Lab Results   Component Value Date     WBC 8.26 04/03/2020     HGB 11.3 (L) 04/03/2020     HCT 35.2 (L) 04/03/2020     MCV 93 04/03/2020      04/03/2020      BMP        Lab Results   Component Value Date      (L) 02/26/2020     K 4.5 02/26/2020      02/26/2020     CO2 24 02/26/2020     BUN 7 02/26/2020     CREATININE 0.8 02/26/2020     CALCIUM 9.3 02/26/2020     ANIONGAP 9 02/26/2020     ESTGFRAFRICA >60.0 02/26/2020     EGFRNONAA >60.0 02/26/2020            Lab Results   Component Value Date     ALT 18 02/26/2020     AST 17 02/26/2020     ALKPHOS 79 02/26/2020     BILITOT 0.2 02/26/2020            Lab Results   Component Value Date     TSH 1.656 07/07/2020            Lab Results   Component Value Date     HGBA1C 5.2 02/26/2020         Constitutional  Vitals:  Most recent vital signs, dated less than 90 days prior to this appointment, were reviewed.    There were no vitals filed for this visit.            PHYSICAL EXAM  General: well developed, well nourished  Neurologic:   Gait: Normal   Psychomotor signs:  No involuntary movements or tremor  AIMS: none     PSYCHIATRIC EXAM:     Completed YBOCS this visit with patient, score of 25 indicating moderate-to-severe OCD symptoms  1. 4  2. 2  3. 3  4. 1  5. 3  6. 3  7. 2  8. 3  9. 1  10. 3  Mental Status Exam:  Appearance: unremarkable, age appropriate  Behavior/Cooperation: normal, cooperative  Speech: normal tone,  normal rate, normal pitch, normal volume  Language: uses words appropriately; NO aphasia or dysarthria  Mood: steady  Affect: full, congruent  Thought Process: normal and logical  Thought Content: normal, no suicidality, no homicidality, delusions, or paranoia  Level of Consciousness: Alert and Oriented x3  Memory:  Intact  Attention/concentration: appropriate for age/education.   Fund of Knowledge: appears adequate  Insight:  Intact  Judgment: Intact     Assessment and Diagnosis   Status/Progress: Based on the examination today, the patient's problem(s) is/are improved.  New problems have not been presented today.   Co-morbidities are complicating management of the primary condition.  The working differential for this patient includes OCD, OCPD.      General Impression:   Bipolar II Disorder with mixed features  RUPINDER  OCD versus OCPD                           Intervention/Counseling/Treatment Plan   · Medication Management: -        Continue Cymbalta 120 mg by mouth once daily        Continue Abilify 5 mg by mouth once daily        Start N-acetylcysteine 600 mg by mouth twice daily for one to two weeks, then increase to 1200 mg by mouth twice daily. Consider increase in Abilify or addition of serotonergic booster such as buspirone or low dose SSRI.   · Labs: reviewed most recent - reinforced to have labs completed  · The treatment plan and follow up plan were reviewed with the patient.  · Discussed with patient informed consent, risks vs. benefits, alternative treatments, side effect profile and the inherent unpredictability of individual responses to these treatments. The patient expresses understanding of the above and displays the capacity to agree with this current plan and had no other questions.  · Encouraged Patient to keep future appointments.   · Take medications as prescribed and abstain from substance abuse.   · In the event of an emergency patient was advised to go to the emergency room.  · Continue  psychotherapy with Sofya Copeland     Return to Clinic: 1 month     > than 50% of total time spend on coordination of care and counseling   (which included pts differential diagnosis and prognosis for psychiatric conditions, risks, benefits of treatments, instructions and adherence to treatment plan, risk reduction, reviewing current psychiatric medication regimen, medical problems and social stressors. In addtion to possible discussion with other healthcare provider/s)     Add on Psychotherapy time:30  Total Face time:45 min     The patient location is: [place]  The chief complaint leading to consultation is: anxiety, depression     Visit type: audiovisual     Face to Face time with patient: 30 min  30 minutes of total time spent on the encounter, which includes face to face time and non-face to face time preparing to see the patient (eg, review of tests), Obtaining and/or reviewing separately obtained history, Documenting clinical information in the electronic or other health record, Independently interpreting results (not separately reported) and communicating results to the patient/family/caregiver, or Care coordination (not separately reported).            Each patient to whom he or she provides medical services by telemedicine is:  (1) informed of the relationship between the physician and patient and the respective role of any other health care provider with respect to management of the patient; and (2) notified that he or she may decline to receive medical services by telemedicine and may withdraw from such care at any time.     Notes:     The patient location is: [place]  The chief complaint leading to consultation is: med f/u    Visit type: audiovisual    Face to Face time with patient: 45 min  50 minutes of total time spent on the encounter, which includes face to face time and non-face to face time preparing to see the patient (eg, review of tests), Obtaining and/or reviewing separately obtained  history, Documenting clinical information in the electronic or other health record, Independently interpreting results (not separately reported) and communicating results to the patient/family/caregiver, or Care coordination (not separately reported).         Each patient to whom he or she provides medical services by telemedicine is:  (1) informed of the relationship between the physician and patient and the respective role of any other health care provider with respect to management of the patient; and (2) notified that he or she may decline to receive medical services by telemedicine and may withdraw from such care at any time.    Notes:       Abel Monroe, MSN, APRN, PMHNP-BC [facility]   8/27/2020 3:53 PM

## 2020-09-25 NOTE — PATIENT INSTRUCTIONS
Discussed N-Acetylcysteine (NAC) 600 mg twice daily for one to two weeks, then to 1200 mg twice daily  Discussed the Kay-Brown Obsessive Compulsive Scale        [email or web address]-[email or web address]

## 2020-09-30 ENCOUNTER — OFFICE VISIT (OUTPATIENT)
Dept: CARDIOLOGY | Facility: CLINIC | Age: 40
End: 2020-09-30
Payer: COMMERCIAL

## 2020-09-30 VITALS
WEIGHT: 130 LBS | BODY MASS INDEX: 26.21 KG/M2 | DIASTOLIC BLOOD PRESSURE: 70 MMHG | HEART RATE: 109 BPM | SYSTOLIC BLOOD PRESSURE: 118 MMHG | OXYGEN SATURATION: 96 % | HEIGHT: 59 IN | RESPIRATION RATE: 16 BRPM

## 2020-09-30 DIAGNOSIS — R00.2 PALPITATIONS: Primary | ICD-10-CM

## 2020-09-30 DIAGNOSIS — F31.32 BIPOLAR AFFECTIVE DISORDER, CURRENTLY DEPRESSED, MODERATE: ICD-10-CM

## 2020-09-30 DIAGNOSIS — R00.0 TACHYCARDIA: ICD-10-CM

## 2020-09-30 DIAGNOSIS — F41.9 ANXIETY: ICD-10-CM

## 2020-09-30 DIAGNOSIS — L70.9 ACNE, UNSPECIFIED ACNE TYPE: ICD-10-CM

## 2020-09-30 DIAGNOSIS — R00.0 SINUS TACHYCARDIA: ICD-10-CM

## 2020-09-30 PROCEDURE — 3008F BODY MASS INDEX DOCD: CPT | Mod: CPTII,S$GLB,, | Performed by: INTERNAL MEDICINE

## 2020-09-30 PROCEDURE — 99203 PR OFFICE/OUTPT VISIT, NEW, LEVL III, 30-44 MIN: ICD-10-PCS | Mod: S$GLB,,, | Performed by: INTERNAL MEDICINE

## 2020-09-30 PROCEDURE — 93000 ELECTROCARDIOGRAM COMPLETE: CPT | Mod: S$GLB,,, | Performed by: INTERNAL MEDICINE

## 2020-09-30 PROCEDURE — 93000 EKG 12-LEAD: ICD-10-PCS | Mod: S$GLB,,, | Performed by: INTERNAL MEDICINE

## 2020-09-30 PROCEDURE — 99203 OFFICE O/P NEW LOW 30 MIN: CPT | Mod: S$GLB,,, | Performed by: INTERNAL MEDICINE

## 2020-09-30 PROCEDURE — 3008F PR BODY MASS INDEX (BMI) DOCUMENTED: ICD-10-PCS | Mod: CPTII,S$GLB,, | Performed by: INTERNAL MEDICINE

## 2020-09-30 NOTE — LETTER
September 30, 2020      Justin Paz MD  [address]  [facility] [address]           [facility]  [address]  [place] [address]  Phone: [phone]  Fax: [phone]          Patient: Roopa Rivas   MR Number: [id number]   YOB: 1980   Date of Visit: 9/30/2020       Dear Dr. Justin Paz:    Thank you for referring Roopa Rivas to me for evaluation. Attached you will find relevant portions of my assessment and plan of care.    If you have questions, please do not hesitate to call me. I look forward to following Roopa Rivas along with you.    Sincerely,    Afia Galvan MD    Enclosure  CC:  No Recipients    If you would like to receive this communication electronically, please contact [email or web address] or [phone] to request more information on [facility] access.    For providers and/or their staff who would like to refer a patient to [facility], please contact us through our one-stop-shop provider referral line, [facility], at [phone].    If you feel you have received this communication in error or would no longer like to receive these types of communications, please e-mail [email or web address]

## 2020-09-30 NOTE — PROGRESS NOTES
Subjective:    Patient ID:  Roopa Rivas is a 40 y.o. female     HPI  Patient was referred to the clinic today for evaluation of her sinus tachycardia.  She reportedly has been having tachycardia with the past several months.  She reports palpitations with a sensation of heart racing and occasional lightheadedness and dizziness.  She denies any syncopal episodes.  She denies any chest pain, shortness of breath, lower extremity edema.  She had a TSH checked in in July of 2020 which was normal.  She does have a history of anxiety.  EKG shows sinus tachycardia with no significant ST T wave abnormalities.    Social History     Socioeconomic History    Marital status:      Spouse name: Not on file    Number of children: Not on file    Years of education: Not on file    Highest education level: Not on file   Occupational History    Not on file   Social Needs    Financial resource strain: Not hard at all    Food insecurity     Worry: Never true     Inability: Never true    Transportation needs     Medical: No     Non-medical: No   Tobacco Use    Smoking status: Never Smoker    Smokeless tobacco: Never Used   Substance and Sexual Activity    Alcohol use: No     Frequency: Never     Drinks per session: Patient refused     Binge frequency: Never    Drug use: No    Sexual activity: Yes     Partners: Male     Birth control/protection: Condom   Lifestyle    Physical activity     Days per week: 1 day     Minutes per session: 20 min    Stress: Very much   Relationships    Social connections     Talks on phone: More than three times a week     Gets together: Never     Attends Evangelical service: Not on file     Active member of club or organization: No     Attends meetings of clubs or organizations: Never     Relationship status:    Other Topics Concern    Not on file   Social History Narrative    Not on file        Family History   Problem Relation Age of Onset    Arthritis Mother     No Known  "Problems Father     Arthritis Maternal Grandmother     Arthritis Maternal Grandfather     Cancer Paternal Grandfather     Thyroid disease Maternal Aunt           Current Outpatient Medications   Medication Instructions    ARIPiprazole (ABILIFY) 5 mg, Oral, Daily    cetirizine (ZYRTEC) 10 mg, Oral, Daily    clotrimazole-betamethasone 1-0.05% (LOTRISONE) cream Topical (Top), 2 times daily    drospirenone-ethinyl estradioL (SARAH) 3-0.02 mg per tablet 1 tablet, Oral, Daily    DULoxetine (CYMBALTA) 60 MG capsule TAKE 1 CAPSULE(60 MG) BY MOUTH EVERY DAY    ibuprofen (ADVIL,MOTRIN) 400 mg, Oral, Every 6 hours PRN    magnesium 30 mg Tab Oral, Once    multivitamin/iron/folic acid (MULTI COMPLETE WITH IRON ORAL) Oral    spironolactone (ALDACTONE) 50 mg, Oral, Daily    sulfacetamide sodium, acne, (KLARON) 10 % Susp Bid face    zolpidem (AMBIEN) 10 mg, Oral, Nightly PRN        Review of Systems   Constitution: Negative for decreased appetite, fever, malaise/fatigue, weight gain and weight loss.   HENT: Negative for ear pain and sore throat.    Eyes: Negative for double vision and pain.   Cardiovascular: Positive for palpitations. Negative for chest pain, claudication, dyspnea on exertion and near-syncope.   Respiratory: Negative for cough and hemoptysis.    Endocrine: Negative for heat intolerance and polydipsia.   Hematologic/Lymphatic: Negative for bleeding problem.   Skin: Negative for rash.   Musculoskeletal: Negative for stiffness.   Gastrointestinal: Negative for abdominal pain, jaundice and vomiting.   Genitourinary: Negative for dysuria.   Neurological: Negative for seizures and tremors.   Psychiatric/Behavioral: Negative for altered mental status, hallucinations and suicidal ideas.        Objective:     Vitals:    09/30/20 1107   BP: 118/70   BP Location: Left arm   Patient Position: Sitting   BP Method: Medium (Manual)   Pulse: 109   Resp: 16   SpO2: 96%   Weight: 59 kg (130 lb)   Height: 4' 11" (1.499 " m)       Physical Exam   Constitutional: She is oriented to person, place, and time. She appears well-developed and well-nourished.   HENT:   Head: Normocephalic and atraumatic.   Eyes: Pupils are equal, round, and reactive to light. Conjunctivae are normal.   Neck: Neck supple. No JVD present.   Cardiovascular: Normal rate, regular rhythm, S1 normal, S2 normal and normal heart sounds. Exam reveals no gallop and no friction rub.   No murmur heard.  Pulses:       Carotid pulses are 2+ on the right side and 2+ on the left side.       Posterior tibial pulses are 2+ on the right side and 2+ on the left side.   Pulmonary/Chest: No stridor. No respiratory distress. She has no wheezes. She has no rales.   Abdominal: Soft. She exhibits no distension. There is no abdominal tenderness.   Musculoskeletal:         General: No edema.   Neurological: She is alert and oriented to person, place, and time.   Skin: Skin is warm and dry. No burn and no rash noted. No cyanosis. Nails show no clubbing.   Psychiatric: Her behavior is normal. She expresses no suicidal ideation.     No results found for this or any previous visit.  No results found for this or any previous visit.       Assessment:       Problem List Items Addressed This Visit        Psychiatric    Bipolar affective disorder, currently depressed, moderate    Overview     Ms. Rivas reported a history of symptoms of bipolar disorder that have been responsive to treatment in the past. She is not currently engaged in medication management or therapy and reported symptoms of depressed mood, anxiety, and sleep difficulty.         Anxiety       Derm    Acne       Cardiac/Vascular    Tachycardia      Other Visit Diagnoses     Palpitations    -  Primary    Sinus tachycardia                Plan:       1.  Obtain Holter monitor times 24 hr to evaluate her average heart rate as well as her heart rate during sleep.  2.  Obtain an echocardiogram.  3.  She was advised to keep herself  well hydrated.  4.  Return to the clinic in about 1 month or sooner if needed.

## 2020-10-06 ENCOUNTER — PATIENT MESSAGE (OUTPATIENT)
Dept: RHEUMATOLOGY | Facility: CLINIC | Age: 40
End: 2020-10-06

## 2020-10-06 ENCOUNTER — PATIENT MESSAGE (OUTPATIENT)
Dept: PSYCHIATRY | Facility: CLINIC | Age: 40
End: 2020-10-06

## 2020-10-08 ENCOUNTER — HOSPITAL ENCOUNTER (OUTPATIENT)
Dept: CARDIOLOGY | Facility: CLINIC | Age: 40
Discharge: HOME | End: 2020-10-08
Attending: INTERNAL MEDICINE
Payer: COMMERCIAL

## 2020-10-08 VITALS — BODY MASS INDEX: 26.21 KG/M2 | WEIGHT: 130 LBS | HEIGHT: 59 IN

## 2020-10-08 DIAGNOSIS — R00.0 SINUS TACHYCARDIA: ICD-10-CM

## 2020-10-08 DIAGNOSIS — R00.2 PALPITATIONS: ICD-10-CM

## 2020-10-08 PROCEDURE — 93306 TTE W/DOPPLER COMPLETE: CPT | Mod: S$GLB,,, | Performed by: INTERNAL MEDICINE

## 2020-10-08 PROCEDURE — 93306 ECHO (CUPID ONLY): ICD-10-PCS | Mod: S$GLB,,, | Performed by: INTERNAL MEDICINE

## 2020-10-08 RX ORDER — DULOXETIN HYDROCHLORIDE 60 MG/1
60 CAPSULE, DELAYED RELEASE ORAL 2 TIMES DAILY
Qty: 180 CAPSULE | Refills: 1 | Status: SHIPPED | OUTPATIENT
Start: 2020-10-08 | End: 2021-03-29

## 2020-10-09 ENCOUNTER — OFFICE VISIT (OUTPATIENT)
Dept: PSYCHIATRY | Facility: CLINIC | Age: 40
End: 2020-10-09
Payer: COMMERCIAL

## 2020-10-09 DIAGNOSIS — R41.3 MEMORY CHANGE: ICD-10-CM

## 2020-10-09 DIAGNOSIS — F31.32 BIPOLAR AFFECTIVE DISORDER, CURRENTLY DEPRESSED, MODERATE: Primary | ICD-10-CM

## 2020-10-09 DIAGNOSIS — F41.9 ANXIETY: ICD-10-CM

## 2020-10-09 LAB
OHS CV EVENT MONITOR DAY: 1
OHS CV HOLTER LENGTH MINUTES: 0

## 2020-10-09 PROCEDURE — 90834 PSYTX W PT 45 MINUTES: CPT | Mod: 95,,, | Performed by: SOCIAL WORKER

## 2020-10-09 PROCEDURE — 90834 PR PSYCHOTHERAPY W/PATIENT, 45 MIN: ICD-10-PCS | Mod: 95,,, | Performed by: SOCIAL WORKER

## 2020-10-09 RX ORDER — LEVOMEFOLATE/ALGAL OIL 15-90.314
15 CAPSULE ORAL DAILY
Qty: 50 CAPSULE | Refills: 1 | Status: SHIPPED | OUTPATIENT
Start: 2020-10-09 | End: 2021-12-08

## 2020-10-14 NOTE — PROGRESS NOTES
Individual Psychotherapy (PhD/LCSW)    10/9/2020    The patient location is: home in living room  The chief complaint leading to consultation is: anxiety, depression    Visit type: audiovisual    Face to Face time with patient: 45  55 minutes of total time spent on the encounter, which includes face to face time and non-face to face time preparing to see the patient (eg, review of tests), Obtaining and/or reviewing separately obtained history, Documenting clinical information in the electronic or other health record, Independently interpreting results (not separately reported) and communicating results to the patient/family/caregiver, or Care coordination (not separately reported).       Each patient to whom he or she provides medical services by telemedicine is:  (1) informed of the relationship between the physician and patient and the respective role of any other health care provider with respect to management of the patient; and (2) notified that he or she may decline to receive medical services by telemedicine and may withdraw from such care at any time.      Therapeutic Intervention: Met with patient.  Outpatient - Insight oriented psychotherapy 45 min - CPT code 07652, Outpatient - Behavior modifying psychotherapy 45 min - CPT code 76981, Outpatient - Supportive psychotherapy 45 min - CPT Code 39481 and Outpatient - Interactive psychotherapy 45 min - CPT code 67507    Chief complaint/reason for encounter: depression, anxiety, sleep and memory concerns     Interval history and content of current session: History of present illness: Pt is a 41 yo   female who presents today for f/u via telemedicine.  Pt currently established with  Abel Miles N.P. Pt was started on Abilify and since then has had medication increased. Since increase pt reports improved mood, sleep, and quality of life.      Patent reports increased medical issues during this week. Pt reports increased pain from fibromyalgia as  well as testing for her heart rate as her MDs felt that it was too high. Patient reports increased high anxiety. she had become worried that she may have regressed mentally due to high anxiety and increased physical pain. Pt also reports more increased out of control spending. LCSW noted pt's pain and medical issues increase with emotional state. LCSW to discussed potential f/o of Borderline diagnosis due to exhibiting several markers over time. Patient continues to report more awareness of her mental health, however is also reporting increased memory loss.  Re-framing and self care dicussed as well as continued stress reduction techniques.  Pt remains receptive of psychotherapy and is scheduled to f/u with in 2 weeks.     R/O borderline    Treatment plan:  · Target symptoms: depression, anxiety   · Why chosen therapy is appropriate versus another modality: relevant to diagnosis, patient responds to this modality, evidence based practice  · Outcome monitoring methods: self-report, observation  · Therapeutic intervention type: insight oriented psychotherapy, behavior modifying psychotherapy, supportive psychotherapy, interactive psychotherapy    Risk parameters:  Patient reports no suicidal ideation  Patient reports no homicidal ideation  Patient reports no self-injurious behavior  Patient reports no violent behavior    Verbal deficits: None    Patient's response to intervention:  The patient's response to intervention is accepting.    Progress toward goals and other mental status changes:    The patient's progress toward goals is good.       Diagnosis:     ICD-10-CM ICD-9-CM   1. Bipolar affective disorder, currently depressed, moderate  F31.32 296.52   2. Anxiety  F41.9 300.00   3. Memory change  R41.3 780.93       Plan:  individual psychotherapy, working towards improved sleep health. Behavior modification practice. Journaling     Return to clinic: 2 weeks    Length of Service (minutes): 45

## 2020-10-20 ENCOUNTER — OFFICE VISIT (OUTPATIENT)
Dept: DERMATOLOGY | Facility: CLINIC | Age: 40
End: 2020-10-20
Payer: COMMERCIAL

## 2020-10-20 DIAGNOSIS — Z51.81 MEDICATION MONITORING ENCOUNTER: ICD-10-CM

## 2020-10-20 DIAGNOSIS — L81.0 POST-INFLAMMATORY HYPERPIGMENTATION: ICD-10-CM

## 2020-10-20 DIAGNOSIS — L70.9 ADULT ACNE: Primary | ICD-10-CM

## 2020-10-20 PROCEDURE — 99213 PR OFFICE/OUTPT VISIT, EST, LEVL III, 20-29 MIN: ICD-10-PCS | Mod: 95,,, | Performed by: DERMATOLOGY

## 2020-10-20 PROCEDURE — 99213 OFFICE O/P EST LOW 20 MIN: CPT | Mod: 95,,, | Performed by: DERMATOLOGY

## 2020-10-20 NOTE — PROGRESS NOTES
The patient location is: home  The chief complaint leading to consultation is: acne    Visit type: audiovisual    Face to Face time with patient: 9 m  15 minutes of total time spent on the encounter, which includes face to face time and non-face to face time preparing to see the patient (eg, review of tests), Obtaining and/or reviewing separately obtained history, Documenting clinical information in the electronic or other health record, Independently interpreting results (not separately reported) and communicating results to the patient/family/caregiver, or Care coordination (not separately reported).         Each patient to whom he or she provides medical services by telemedicine is:  (1) informed of the relationship between the physician and patient and the respective role of any other health care provider with respect to management of the patient; and (2) notified that he or she may decline to receive medical services by telemedicine and may withdraw from such care at any time.    Notes:       Subjective:       Patient ID:  Roopa Rivas is a 40 y.o. female who presents for No chief complaint on file.    Pt fu for acne of the face.  Doing great wo breakouts.  Taking aldactone 50 mg qd.        Review of Systems   Constitutional: Negative for fever and chills.   HENT: Negative for sore throat and headaches. Rhinorrhea: allergies.    Respiratory: Negative for cough.    Musculoskeletal: Positive for myalgias (with flu shot) and arthralgias (normal but worse with recent flu shot).   Skin: Positive for dry skin.   Neurological: Negative for lightheadedness with standing and headaches.        Objective:    Physical Exam   Constitutional: She appears well-developed and well-nourished.   HENT:   Mouth/Throat: Lips normal.    Eyes: No conjunctival no injection.   Neurological: She is alert and oriented to person, place, and time.   Psychiatric: She has a normal mood and affect.   Skin:   Areas Examined (abnormalities  noted in diagram):   Head / Face Inspection Performed  Neck Inspection Performed              Diagram Legend     Erythematous scaling macule/papule c/w actinic keratosis       Vascular papule c/w angioma      Pigmented verrucoid papule/plaque c/w seborrheic keratosis      Yellow umbilicated papule c/w sebaceous hyperplasia      Irregularly shaped tan macule c/w lentigo     1-2 mm smooth white papules consistent with Milia      Movable subcutaneous cyst with punctum c/w epidermal inclusion cyst      Subcutaneous movable cyst c/w pilar cyst      Firm pink to brown papule c/w dermatofibroma      Pedunculated fleshy papule(s) c/w skin tag(s)      Evenly pigmented macule c/w junctional nevus     Mildly variegated pigmented, slightly irregular-bordered macule c/w mildly atypical nevus      Flesh colored to evenly pigmented papule c/w intradermal nevus       Pink pearly papule/plaque c/w basal cell carcinoma      Erythematous hyperkeratotic cursted plaque c/w SCC      Surgical scar with no sign of skin cancer recurrence      Open and closed comedones      Inflammatory papules and pustules      Verrucoid papule consistent consistent with wart     Erythematous eczematous patches and plaques     Dystrophic onycholytic nail with subungual debris c/w onychomycosis     Umbilicated papule    Erythematous-base heme-crusted tan verrucoid plaque consistent with inflamed seborrheic keratosis     Erythematous Silvery Scaling Plaque c/w Psoriasis     See annotation      Assessment / Plan:        Adult acne  -     spironolactone (ALDACTONE) 50 MG tablet; Take 1 tablet (50 mg total) by mouth once daily.  Dispense: 30 tablet; Refill: 2  Doing great.  Labs in past good.  Reviewed with patient different treatment options and associated risks.  Patient and or guardian to monitor this area/lesion or these areas/lesions for changes or worsening.  Patient and or guardian to contact us if any changes are noted for such.  Cont sulfur soap.  Cont  aldactone 50 mg qd or do trial of 25 mg qd.  Up to pt.    Post-inflammatory hyperpigmentation  Discussed with patient the etiology and pathogenesis of the disease or skin lesion(s) and possible treatments and aggravators.    Patient instructed in importance in daily sun protection. Sun avoidance and topical protection discussed.     Patient encouraged to wear hat for all outdoor exposure.     Also discussed sun protective clothing.  Patient can try lemon juice focally and carefully to avoid irritation or dryness in effort to lighten dark color.    KP (keratosis pilaris)  Condition is stable.  We will continue present management.  Patient and or guardian to monitor this area/lesion or these areas/lesions for changes or worsening.  Patient and or guardian to contact us if any changes are noted for such.  Pt cannot tolerate sal acid soap.             Follow up in about 1 year (around 10/20/2021).

## 2020-10-24 LAB
AORTIC ROOT ANNULUS: 2.3 CM
AORTIC VALVE CUSP SEPERATION: 1.8 CM
AV INDEX (PROSTH): 1.15
AV MEAN GRADIENT: 3 MMHG
AV PEAK GRADIENT: 5 MMHG
AV VALVE AREA: 3.27 CM2
AV VELOCITY RATIO: 0.93
BSA FOR ECHO PROCEDURE: 1.57 M2
CV ECHO LV RWT: 0.33 CM
DOP CALC AO PEAK VEL: 1.16 M/S
DOP CALC AO VTI: 19.4 CM
DOP CALC LVOT AREA: 2.8 CM2
DOP CALC LVOT DIAMETER: 1.9 CM
DOP CALC LVOT PEAK VEL: 1.08 M/S
DOP CALC LVOT STROKE VOLUME: 63.48 CM3
DOP CALCLVOT PEAK VEL VTI: 22.4 CM
E WAVE DECELERATION TIME: 164 MS
E/A RATIO: 1.5
E/E' RATIO: 6.96 M/S
ECHO LV POSTERIOR WALL: 0.66 CM (ref 0.6–1.1)
FRACTIONAL SHORTENING: 43 % (ref 28–44)
INTERVENTRICULAR SEPTUM: 0.79 CM (ref 0.6–1.1)
IVRT: 69 MS
LA MAJOR: 3 CM
LEFT ATRIUM SIZE: 3 CM
LEFT INTERNAL DIMENSION IN SYSTOLE: 2.3 CM (ref 2.1–4)
LEFT VENTRICLE MASS INDEX: 54 G/M2
LEFT VENTRICULAR INTERNAL DIMENSION IN DIASTOLE: 4.03 CM (ref 3.5–6)
LEFT VENTRICULAR MASS: 83.06 G
LV LATERAL E/E' RATIO: 6.21 M/S
LV SEPTAL E/E' RATIO: 7.91 M/S
MV PEAK A VEL: 0.58 M/S
MV PEAK E VEL: 0.87 M/S
MV STENOSIS PRESSURE HALF TIME: 42 MS
MV VALVE AREA P 1/2 METHOD: 5.24 CM2
PISA TR MAX VEL: 2.43 M/S
RA PRESSURE: 3 MMHG
RIGHT VENTRICULAR END-DIASTOLIC DIMENSION: 1.88 CM
TDI LATERAL: 0.14 M/S
TDI SEPTAL: 0.11 M/S
TDI: 0.13 M/S
TR MAX PG: 24 MMHG
TV REST PULMONARY ARTERY PRESSURE: 27 MMHG

## 2020-10-26 ENCOUNTER — OFFICE VISIT (OUTPATIENT)
Dept: PSYCHIATRY | Facility: CLINIC | Age: 40
End: 2020-10-26
Payer: COMMERCIAL

## 2020-10-26 DIAGNOSIS — F41.1 GENERALIZED ANXIETY DISORDER: ICD-10-CM

## 2020-10-26 DIAGNOSIS — F39 MOOD DISORDER: Primary | ICD-10-CM

## 2020-10-26 PROCEDURE — 99215 PR OFFICE/OUTPT VISIT, EST, LEVL V, 40-54 MIN: ICD-10-PCS | Mod: 95,,, | Performed by: NURSE PRACTITIONER

## 2020-10-26 PROCEDURE — 99215 OFFICE O/P EST HI 40 MIN: CPT | Mod: 95,,, | Performed by: NURSE PRACTITIONER

## 2020-10-26 RX ORDER — ARIPIPRAZOLE 10 MG/1
10 TABLET ORAL DAILY
Qty: 30 TABLET | Refills: 2 | Status: SHIPPED | OUTPATIENT
Start: 2020-10-26 | End: 2021-01-12 | Stop reason: SDUPTHER

## 2020-10-26 NOTE — PROGRESS NOTES
"  10/26/2020 2:31 PM   Roopa Rivas   1980   [id number]           OUTPATIENT PSYCHIATRY FOLLOW- UP VISIT    Reason for Encounter:  Roopa Rivas, a 40 y.o. female,who presents today for follow up of anxiety, compulsive behaviors. Met with patient.    Interval History and Content of Current Session:    Today,  Pt presents today as f/u after visit about one month prior, reports has been feeling down, depressed, feels "exhausted." I feel drained every day, reports "I just feel tired every day." Pt. Reports "I notice this triggers a memory" looking at things from the past. Pt. Reports short-term memory loss has still been present. Pt. Reports still having racing thoughts daily, and reports has been having nightmares, nightmares about demons most lately too.      Pt. Reports didn't trial NAC due to "smelling like burnt hair and tar." Discussed potential trial of another formulation to assess if this would be more helpful.     Pt. Reports has pervasive difficulty in intimacy in relationships, difficulty with trust, difficulty in opening up to , reports "you've made me cry twice already and I never cry." Pt. Reports feels vulnerable and weak when crying. Discussed how relationship with brother has been difficult, and brother recently reported he was suicidal - mother, sister and brother then minimized this behavior and pt. Reports frustration "just decades of burying this stuff and pretending its not there." Discussed processing emotions, vulnerability, as well as shame-based thinking. Discussed signs/symptoms of borderline personality disorder this visit - pt. Reports "I probably had many of those criteria when I was younger but I don't have all of them now." Discussed increasing stability with age, responsibility, coping strategies.     Discussed increase in Abilify to 10 mg daily and f/u in 2 weeks.       Psychosocial stressors:  Rigidity in thinking, 4 children      Psychiatric Review Of Systems - Is " "patient experiencing or having changes in:      See HPI  Sleep: reporting difficulty with falling sleep and staying asleep, up for about two hours, little better than what was before.     Risk Parameters:  Patient reports no suicidal ideation  Patient reports no homicidal ideation  Patient reports no self-injurious behavior  Patient reports no violent behavior      Current meds- Abilify, ambien, Cymbalta     Compliance: yes    Side effects: None       Previous Trials:  Seroquel - severe dry eyes, hurt to blink, no peripheral vision, memory loss  Lexapro - early 2000s, stopped taking due to insurance  Current meds- Cymbalta  Compliance: yes     Side effects: None        Additional Psychiatric Family History:   Father -    Mother - possible anxiety/depression/emotional reactive     Social History:  Born and raised in  [place], GED after 10th grade, some college, 3 older brothers, no relationship, 1 sister younger, brother 4 years younger, no relationship. lived in [facility] [place] [facility] from 1999 to 2001,  to 1st  5 years, 2 children from that marriage, good relationship with wife of 1st . Has 4 children, 23 and 20 from 1st marriage, 10 y/o boy and 12 y/o. Reports supportive relationship with family members, but reports having difficulty with "empty nest" No ETOH until age 22. No rec. Drug use.               Review of Systems       Medical Review of Symptoms  History obtained from the patient   General : NO chills or fever   Respiratory: NO cough, shortness of breath   Cardiovascular: NO chest pain, palpitations or racing heart   Gastrointestinal: NO nausea, vomiting, constipation or diarrhea   Neurological: NO confusion, dizziness, headaches or tremors   Psychiatric: please see HPI     Objective     ALL MEDICATIONS:    Current Outpatient Medications:     ARIPiprazole (ABILIFY) 5 MG Tab, TAKE 1 TABLET(5 MG) BY MOUTH EVERY DAY, Disp: 30 tablet, Rfl: 1    cetirizine (ZYRTEC) 10 MG tablet, Take 10 mg by " mouth once daily., Disp: , Rfl:     clotrimazole-betamethasone 1-0.05% (LOTRISONE) cream, Apply topically 2 (two) times daily., Disp: 15 g, Rfl: 0    drospirenone-ethinyl estradioL (SARAH) 3-0.02 mg per tablet, Take 1 tablet by mouth once daily., Disp: 84 tablet, Rfl: 4    DULoxetine (CYMBALTA) 60 MG capsule, Take 1 capsule (60 mg total) by mouth 2 (two) times daily., Disp: 180 capsule, Rfl: 1    ibuprofen (ADVIL,MOTRIN) 400 MG tablet, Take 400 mg by mouth every 6 (six) hours as needed for Other., Disp: , Rfl:     levomefolate-algal oil (DEPLIN, ALGAL OIL,) 15-90.314 mg Cap, Take 15 mg by mouth once daily., Disp: 50 capsule, Rfl: 1    magnesium 30 mg Tab, Take by mouth once., Disp: , Rfl:     multivitamin/iron/folic acid (MULTI COMPLETE WITH IRON ORAL), Take by mouth., Disp: , Rfl:     spironolactone (ALDACTONE) 50 MG tablet, Take 1 tablet (50 mg total) by mouth once daily., Disp: 30 tablet, Rfl: 2    sulfacetamide sodium, acne, (KLARON) 10 % Susp, Bid face, Disp: 118 mL, Rfl: 1    zolpidem (AMBIEN) 10 mg Tab, Take 1 tablet (10 mg total) by mouth nightly as needed (insomnia)., Disp: 30 tablet, Rfl: 2    ALLERGIES:  Review of patient's allergies indicates:  No Known Allergies    RELEVANT LABS/STUDIES:    Lab Results   Component Value Date    WBC 8.26 04/03/2020    HGB 11.3 (L) 04/03/2020    HCT 35.2 (L) 04/03/2020    MCV 93 04/03/2020     04/03/2020     BMP  Lab Results   Component Value Date     (L) 02/26/2020    K 4.5 02/26/2020     02/26/2020    CO2 24 02/26/2020    BUN 7 02/26/2020    CREATININE 0.8 02/26/2020    CALCIUM 9.3 02/26/2020    ANIONGAP 9 02/26/2020    ESTGFRAFRICA >60.0 02/26/2020    EGFRNONAA >60.0 02/26/2020     Lab Results   Component Value Date    ALT 18 02/26/2020    AST 17 02/26/2020    ALKPHOS 79 02/26/2020    BILITOT 0.2 02/26/2020     Lab Results   Component Value Date    TSH 1.656 07/07/2020     Lab Results   Component Value Date    HGBA1C 5.2 02/26/2020        Constitutional  Vitals:  Most recent vital signs, dated less than 90 days prior to this appointment, were reviewed.    There were no vitals filed for this visit.         PHYSICAL EXAM  General: well developed, well nourished  Neurologic:   Gait: Normal   Psychomotor signs:  No involuntary movements or tremor  AIMS: none    PSYCHIATRIC EXAM:     Mental Status Exam:  Appearance: unremarkable, age appropriate  Behavior/Cooperation: normal, cooperative  Speech: normal tone, normal rate, normal pitch, normal volume  Language: uses words appropriately; NO aphasia or dysarthria  Mood: anxious  Affect:  Full, congruent  Thought Process: normal and logical  Thought Content: normal, no suicidality, no homicidality, delusions, or paranoia  Level of Consciousness: Alert and Oriented x3  Memory:  Intact  Attention/concentration: appropriate for age/education.   Fund of Knowledge: appears adequate  Insight: Intact  Judgment: Intact     Assessment and Diagnosis   Status/Progress: Based on the examination today, the patient's problem(s) is/are inadequately controlled.  New problems have not been presented today.   Co-morbidities are complicating management of the primary condition.  The working differential for this patient includes personality disorder OCPD, BPD, OCD.     General Impression:   Bipolar II Disorder with mixed features  RUPINDER      Intervention/Counseling/Treatment Plan   · Medication Management: -        Increase Abilify to 10 mg by mouth once daily        Continue Cymbalta 120 mg by mouth once daily        Continue Ambien 10 mg         Consider Remeron or Wellbutrin for depressive symptoms  · Labs: reviewed most recent  · The treatment plan and follow up plan were reviewed with the patient.  · Discussed with patient informed consent, risks vs. benefits, alternative treatments, side effect profile and the inherent unpredictability of individual responses to these treatments. The patient expresses understanding of  the above and displays the capacity to agree with this current plan and had no other questions.  · Encouraged Patient to keep future appointments.   · Take medications as prescribed and abstain from substance abuse.   · In the event of an emergency patient was advised to go to the emergency room.    Return to Clinic: 2 weeks    > than 50% of total time spend on coordination of care and counseling   (which included pts differential diagnosis and prognosis for psychiatric conditions, risks, benefits of treatments, instructions and adherence to treatment plan, risk reduction, reviewing current psychiatric medication regimen, medical problems and social stressors. In addtion to possible discussion with other healthcare provider/s)    Add on Psychotherapy time:0  Total Face time:60 min    The patient location is: [place]  The chief complaint leading to consultation is: med f/u    Visit type: audiovisual    Face to Face time with patient: 60 min  60 minutes of total time spent on the encounter, which includes face to face time and non-face to face time preparing to see the patient (eg, review of tests), Obtaining and/or reviewing separately obtained history, Documenting clinical information in the electronic or other health record, Independently interpreting results (not separately reported) and communicating results to the patient/family/caregiver, or Care coordination (not separately reported).         Each patient to whom he or she provides medical services by telemedicine is:  (1) informed of the relationship between the physician and patient and the respective role of any other health care provider with respect to management of the patient; and (2) notified that he or she may decline to receive medical services by telemedicine and may withdraw from such care at any time.    Notes:     Abel Monroe, MSN, APRN, PMHNP-BC [facility]   10/26/2020 2:31 PM

## 2020-10-28 ENCOUNTER — OFFICE VISIT (OUTPATIENT)
Dept: PSYCHIATRY | Facility: CLINIC | Age: 40
End: 2020-10-28
Payer: COMMERCIAL

## 2020-10-28 DIAGNOSIS — F41.9 ANXIETY: Primary | ICD-10-CM

## 2020-10-28 PROCEDURE — 90834 PSYTX W PT 45 MINUTES: CPT | Mod: 95,,, | Performed by: SOCIAL WORKER

## 2020-10-28 PROCEDURE — 90834 PR PSYCHOTHERAPY W/PATIENT, 45 MIN: ICD-10-PCS | Mod: 95,,, | Performed by: SOCIAL WORKER

## 2020-11-03 ENCOUNTER — OFFICE VISIT (OUTPATIENT)
Dept: CARDIOLOGY | Facility: CLINIC | Age: 40
End: 2020-11-03
Payer: COMMERCIAL

## 2020-11-03 VITALS
DIASTOLIC BLOOD PRESSURE: 66 MMHG | BODY MASS INDEX: 27.21 KG/M2 | HEART RATE: 98 BPM | SYSTOLIC BLOOD PRESSURE: 122 MMHG | WEIGHT: 135 LBS | HEIGHT: 59 IN | OXYGEN SATURATION: 98 % | RESPIRATION RATE: 16 BRPM

## 2020-11-03 DIAGNOSIS — R53.83 FATIGUE, UNSPECIFIED TYPE: ICD-10-CM

## 2020-11-03 DIAGNOSIS — F41.9 ANXIETY: ICD-10-CM

## 2020-11-03 DIAGNOSIS — R00.2 PALPITATIONS: ICD-10-CM

## 2020-11-03 DIAGNOSIS — R00.0 TACHYCARDIA: Primary | ICD-10-CM

## 2020-11-03 PROCEDURE — 3008F PR BODY MASS INDEX (BMI) DOCUMENTED: ICD-10-PCS | Mod: CPTII,S$GLB,, | Performed by: INTERNAL MEDICINE

## 2020-11-03 PROCEDURE — 99213 PR OFFICE/OUTPT VISIT, EST, LEVL III, 20-29 MIN: ICD-10-PCS | Mod: S$GLB,,, | Performed by: INTERNAL MEDICINE

## 2020-11-03 PROCEDURE — 99213 OFFICE O/P EST LOW 20 MIN: CPT | Mod: S$GLB,,, | Performed by: INTERNAL MEDICINE

## 2020-11-03 PROCEDURE — 3008F BODY MASS INDEX DOCD: CPT | Mod: CPTII,S$GLB,, | Performed by: INTERNAL MEDICINE

## 2020-11-03 NOTE — PROGRESS NOTES
Subjective:    Patient ID:  Roopa Rivas is a 40 y.o. female     HPI  Patient presents to the clinic today for follow for her tachycardia episodes to discuss her Holter monitor and echocardiogram results.  Echo showed normal LVEF and no significant valvular abnormalities.  Holter monitor showed least heart rate of 76 beats per minute, average heart rate of 99 beats per minute and maximum heart rate of 144 beats per minute.  Tachycardia seemed to be appropriate with gradual rise and gradual slowing down.  She continues to have the fatigue.  She reports palpitations occasionally but these are not interfering with her activities of daily living.  In my opinion the fatigue is not related to her tachycardia.    Current Outpatient Medications   Medication Instructions    ARIPiprazole (ABILIFY) 10 mg, Oral, Daily    cetirizine (ZYRTEC) 10 mg, Oral, Daily    clotrimazole-betamethasone 1-0.05% (LOTRISONE) cream Topical (Top), 2 times daily    drospirenone-ethinyl estradioL (SARAH) 3-0.02 mg per tablet 1 tablet, Oral, Daily    DULoxetine (CYMBALTA) 60 mg, Oral, 2 times daily    ibuprofen (ADVIL,MOTRIN) 400 mg, Oral, Every 6 hours PRN    levomefolate-algal oil (DEPLIN, ALGAL OIL,) 15-90.314 mg Cap 15 mg, Oral, Daily    magnesium 30 mg Tab Oral, Once    multivitamin/iron/folic acid (MULTI COMPLETE WITH IRON ORAL) Oral    spironolactone (ALDACTONE) 50 mg, Oral, Daily    sulfacetamide sodium, acne, (KLARON) 10 % Susp Bid face    zolpidem (AMBIEN) 10 mg, Oral, Nightly PRN        Review of Systems   Constitution: Positive for malaise/fatigue. Negative for decreased appetite, fever, weight gain and weight loss.   HENT: Negative for ear pain and sore throat.    Eyes: Negative for double vision and pain.   Cardiovascular: Positive for palpitations. Negative for chest pain, claudication and irregular heartbeat.   Respiratory: Negative for cough and hemoptysis.    Endocrine: Negative for heat intolerance and polydipsia.  "  Hematologic/Lymphatic: Negative for bleeding problem.   Skin: Negative for rash.   Musculoskeletal: Negative for stiffness.   Gastrointestinal: Negative for abdominal pain, jaundice and vomiting.   Genitourinary: Negative for dysuria.   Neurological: Negative for seizures and tremors.   Psychiatric/Behavioral: Negative for altered mental status, hallucinations and suicidal ideas.        Objective:     Vitals:    11/03/20 1431   BP: 122/66   BP Location: Left arm   Patient Position: Sitting   BP Method: Medium (Manual)   Pulse: 98   Resp: 16   SpO2: 98%   Weight: 61.2 kg (135 lb)   Height: 4' 11" (1.499 m)       Physical Exam   Constitutional: She is oriented to person, place, and time. She appears well-developed and well-nourished.   HENT:   Head: Normocephalic and atraumatic.   Eyes: Pupils are equal, round, and reactive to light. Conjunctivae are normal.   Neck: Neck supple. No JVD present.   Cardiovascular: Normal rate, regular rhythm, S1 normal, S2 normal and normal heart sounds. Exam reveals no gallop and no friction rub.   No murmur heard.  Pulses:       Carotid pulses are 2+ on the right side and 2+ on the left side.       Posterior tibial pulses are 2+ on the right side and 2+ on the left side.   Pulmonary/Chest: No stridor. No respiratory distress. She has no wheezes. She has no rales.   Abdominal: Soft. She exhibits no distension. There is no abdominal tenderness.   Musculoskeletal:         General: No edema.   Neurological: She is alert and oriented to person, place, and time.   Skin: Skin is warm and dry. No burn and no rash noted. No cyanosis. Nails show no clubbing.   Psychiatric: Her behavior is normal. She expresses no suicidal ideation.        Results for orders placed during the hospital encounter of 10/08/20   Echo Color Flow Doppler? Yes    Narrative · The left ventricle is normal in size with hyperdynamic systolic   function. The estimated ejection fraction is 70%.  · Normal left ventricular " diastolic function.  · Normal right ventricular systolic function.  · Normal central venous pressure (3 mmHg).         No results found for this or any previous visit.       Assessment:       Problem List Items Addressed This Visit        Psychiatric    Anxiety       Cardiac/Vascular    Tachycardia - Primary      Other Visit Diagnoses     Fatigue, unspecified type        Palpitations                Plan:       1.  Since the patient's tachycardia is not interfering with her activities of daily living at this point in time and I do not think that the fatigue is contributed from this, I do not advised the patient to be started on any new medications.  She can just be monitored.  She was reassured of the findings.  She was advised to keep herself well hydrated and drink at least 2 L of fluids per day.  She was advised to stay away from caffeine.  2.  Follow-up in the clinic as needed.

## 2020-11-04 NOTE — PROGRESS NOTES
Individual Psychotherapy (PhD/LCSW)    10/28/2020    The patient location is: home in living room  The chief complaint leading to consultation is: anxiety, depression, pain    Visit type: audio only- pt and LCSW experienced connection issues, video visit moved to audio only    Face to Face time with patient: 30  40 minutes of total time spent on the encounter, which includes face to face time and non-face to face time preparing to see the patient (eg, review of tests), Obtaining and/or reviewing separately obtained history, Documenting clinical information in the electronic or other health record, Independently interpreting results (not separately reported) and communicating results to the patient/family/caregiver, or Care coordination (not separately reported).       Each patient to whom he or she provides medical services by telemedicine is:  (1) informed of the relationship between the physician and patient and the respective role of any other health care provider with respect to management of the patient; and (2) notified that he or she may decline to receive medical services by telemedicine and may withdraw from such care at any time.      Therapeutic Intervention: Met with patient.   ME NONPHYSICIAN TELEPHONE ASSESSMENT 21-30 MIN [08512]    Chief complaint/reason for encounter: depression, anxiety, sleep and memory concerns     Interval history and content of current session: History of present illness: Pt is a 41 yo  female who presents today for f/u via telemedicine.  Pt currently established with  Abel Miles N.P. Pt was started on Abilify and since then has had medication increased. Since increase pt reports improved mood, sleep, and quality of life overall.      Patent reports fibromyalgia pain is improved since last week, however is still worse than a few sessions ago. Pt reports trying to relax anxieties and un-tense body. Pt noted has been holding a lot of tension in body making ability to  relax difficult, while also increasing pain. Breathing techniques and relaxation/ breathing exeresis engaged in. Pt reports feeling immediate relief of tension after breathing techniques were in engaged in. Supportive psychotherapy and behavior modification practice for anxiety reduction continued.    Pt remains receptive of psychotherapy and is scheduled to f/u with in 2 weeks.     R/O borderline    Treatment plan:  · Target symptoms: depression, anxiety , pain  · Why chosen therapy is appropriate versus another modality: relevant to diagnosis, patient responds to this modality, evidence based practice  · Outcome monitoring methods: self-report, observation  · Therapeutic intervention type: insight oriented psychotherapy, behavior modifying psychotherapy, supportive psychotherapy, interactive psychotherapy    Risk parameters:  Patient reports no suicidal ideation  Patient reports no homicidal ideation  Patient reports no self-injurious behavior  Patient reports no violent behavior    Verbal deficits: None    Patient's response to intervention:  The patient's response to intervention is accepting.    Progress toward goals and other mental status changes:    The patient's progress toward goals is good.       Diagnosis:     ICD-10-CM ICD-9-CM   1. Anxiety  F41.9 300.00       Plan:  individual psychotherapy, working towards improved sleep health. Behavior modification practice. Journaling     Return to clinic: 2 weeks    Length of Service (minutes): 45

## 2020-11-10 ENCOUNTER — OFFICE VISIT (OUTPATIENT)
Dept: PSYCHIATRY | Facility: CLINIC | Age: 40
End: 2020-11-10
Payer: COMMERCIAL

## 2020-11-10 DIAGNOSIS — G47.00 INSOMNIA, UNSPECIFIED TYPE: ICD-10-CM

## 2020-11-10 DIAGNOSIS — F41.1 GENERALIZED ANXIETY DISORDER: Primary | ICD-10-CM

## 2020-11-10 PROCEDURE — 99214 OFFICE O/P EST MOD 30 MIN: CPT | Mod: 95,,, | Performed by: NURSE PRACTITIONER

## 2020-11-10 PROCEDURE — 99214 PR OFFICE/OUTPT VISIT, EST, LEVL IV, 30-39 MIN: ICD-10-PCS | Mod: 95,,, | Performed by: NURSE PRACTITIONER

## 2020-11-10 RX ORDER — ZOLPIDEM TARTRATE 12.5 MG/1
12.5 TABLET, FILM COATED, EXTENDED RELEASE ORAL NIGHTLY PRN
Qty: 30 TABLET | Refills: 2 | Status: SHIPPED | OUTPATIENT
Start: 2020-11-10 | End: 2021-01-12 | Stop reason: SDUPTHER

## 2020-11-10 NOTE — PROGRESS NOTES
"11/10/2020 10:00 AM   Roopa Rivas   1980   [id number]                                                                   OUTPATIENT PSYCHIATRY FOLLOW- UP VISIT     Reason for Encounter:  Roopa Rivas, a 40 y.o. female,who presents today for follow up of anxiety, compulsive behaviors. Met with patient.     Interval History and Content of Current Session:     Today,  Pt presents today as f/u after visit about two weeks prior when started increased dosage of Abilify, reports, has still been feeling down, no to little change in mood symptoms, reports has been having higher energy levels so this has been an improvement. Reports has also been developing boundaries and communicating with  regarding spending habits and this has been helpful in relieving anxiety associated with "feeling like I"m hiding things from him." Reports also has been getting free stuff and having  pick it up and this has been helpful in feeding impulses as well. Pt. Reports had new formula of NAC and this has been more tolerable, has been administering for about 1.5 weeks. Reports anxiety has been improved.     Pt. Reports discussed deep breathing exercise with Sofya last visit and this had been helpful, discussed progressive muscle relaxation this visit as well as helpful with muscle tension.     Discussed rechecking thyroid levels as well, due to history of subclinical hypothyroidism.     Also discussed option of controlled-release ambien to be more helpful for sleep and will prescribe this visit.     Discussed consideration of addition of supplementation of thyroid hormone to be helpful for symptoms as well as consideration of mirtazapine - s/e, risks and benefits discussed at length.         Psychosocial stressors:  Rigidity in thinking, 4 children        Psychiatric Review Of Systems - Is patient experiencing or having changes in:        See HPI  Sleep: 4.5 hours of sleep per night. Reports administers ambien nightly, " "helpful with falling asleep at 10, but difficulty once waking up, falling back to sleep.      Risk Parameters:  Patient reports no suicidal ideation  Patient reports no homicidal ideation  Patient reports no self-injurious behavior  Patient reports no violent behavior        Current meds- Abilify, ambien, Cymbalta      Compliance: yes     Side effects: None        Previous Trials:  Seroquel - severe dry eyes, hurt to blink, no peripheral vision, memory loss  Lexapro - early 2000s, stopped taking due to insurance  Current meds- Cymbalta  Compliance: yes     Side effects: None        Additional Psychiatric Family History:   Father -    Mother - possible anxiety/depression/emotional reactive     Social History:  Born and raised in  [place], GED after 10th grade, some college, 3 older brothers, no relationship, 1 sister younger, brother 4 years younger, no relationship. lived in [facility] from [place] to 2001,  to 1st  5 years, 2 children from that marriage, good relationship with wife of 1st . Has 4 children, 23 and 20 from 1st marriage, 10 y/o boy and 12 y/o. Reports supportive relationship with family members, but reports having difficulty with "empty nest" No ETOH until age 22. No rec. Drug use.                   Review of Systems         Medical Review of Symptoms  History obtained from the patient  · General : NO chills or fever  · Respiratory: NO cough, shortness of breath  · Cardiovascular: NO chest pain, palpitations or racing heart  · Gastrointestinal: NO nausea, vomiting, constipation or diarrhea  · Neurological: NO confusion, dizziness, headaches or tremors  · Psychiatric: please see HPI      Objective      ALL MEDICATIONS:     Current Outpatient Medications:     ARIPiprazole (ABILIFY) 5 MG Tab, TAKE 1 TABLET(5 MG) BY MOUTH EVERY DAY, Disp: 30 tablet, Rfl: 1    cetirizine (ZYRTEC) 10 MG tablet, Take 10 mg by mouth once daily., Disp: , Rfl:     clotrimazole-betamethasone 1-0.05% " (LOTRISONE) cream, Apply topically 2 (two) times daily., Disp: 15 g, Rfl: 0    drospirenone-ethinyl estradioL (SARAH) 3-0.02 mg per tablet, Take 1 tablet by mouth once daily., Disp: 84 tablet, Rfl: 4    DULoxetine (CYMBALTA) 60 MG capsule, Take 1 capsule (60 mg total) by mouth 2 (two) times daily., Disp: 180 capsule, Rfl: 1    ibuprofen (ADVIL,MOTRIN) 400 MG tablet, Take 400 mg by mouth every 6 (six) hours as needed for Other., Disp: , Rfl:     levomefolate-algal oil (DEPLIN, ALGAL OIL,) 15-90.314 mg Cap, Take 15 mg by mouth once daily., Disp: 50 capsule, Rfl: 1    magnesium 30 mg Tab, Take by mouth once., Disp: , Rfl:     multivitamin/iron/folic acid (MULTI COMPLETE WITH IRON ORAL), Take by mouth., Disp: , Rfl:     spironolactone (ALDACTONE) 50 MG tablet, Take 1 tablet (50 mg total) by mouth once daily., Disp: 30 tablet, Rfl: 2    sulfacetamide sodium, acne, (KLARON) 10 % Susp, Bid face, Disp: 118 mL, Rfl: 1    zolpidem (AMBIEN) 10 mg Tab, Take 1 tablet (10 mg total) by mouth nightly as needed (insomnia)., Disp: 30 tablet, Rfl: 2     ALLERGIES:  Review of patient's allergies indicates:  No Known Allergies     RELEVANT LABS/STUDIES:          Lab Results   Component Value Date     WBC 8.26 04/03/2020     HGB 11.3 (L) 04/03/2020     HCT 35.2 (L) 04/03/2020     MCV 93 04/03/2020      04/03/2020      BMP        Lab Results   Component Value Date      (L) 02/26/2020     K 4.5 02/26/2020      02/26/2020     CO2 24 02/26/2020     BUN 7 02/26/2020     CREATININE 0.8 02/26/2020     CALCIUM 9.3 02/26/2020     ANIONGAP 9 02/26/2020     ESTGFRAFRICA >60.0 02/26/2020     EGFRNONAA >60.0 02/26/2020            Lab Results   Component Value Date     ALT 18 02/26/2020     AST 17 02/26/2020     ALKPHOS 79 02/26/2020     BILITOT 0.2 02/26/2020            Lab Results   Component Value Date     TSH 1.656 07/07/2020            Lab Results   Component Value Date     HGBA1C 5.2 02/26/2020          Constitutional  Vitals:  Most recent vital signs, dated less than 90 days prior to this appointment, were reviewed.    There were no vitals filed for this visit.            PHYSICAL EXAM  General: well developed, well nourished  Neurologic:   Gait: Normal   Psychomotor signs:  No involuntary movements or tremor  AIMS: none     PSYCHIATRIC EXAM:     Mental Status Exam:  Appearance: unremarkable, age appropriate  Behavior/Cooperation: normal, cooperative  Speech: normal tone, normal rate, normal pitch, normal volume  Language: uses words appropriately; NO aphasia or dysarthria  Mood: anxious  Affect:  Full, congruent  Thought Process: normal and logical  Thought Content: normal, no suicidality, no homicidality, delusions, or paranoia  Level of Consciousness: Alert and Oriented x3  Memory:  Intact  Attention/concentration: appropriate for age/education.   Fund of Knowledge: appears adequate  Insight: Intact  Judgment: Intact      Assessment and Diagnosis   Status/Progress: Based on the examination today, the patient's problem(s) is/are inadequately controlled.  New problems have not been presented today.   Co-morbidities are complicating management of the primary condition.  The working differential for this patient includes personality disorder OCPD, BPD, OCD.      General Impression:   Bipolar II Disorder with mixed features by history  RUPINDER   Insomnia     Intervention/Counseling/Treatment Plan   · Medication Management: -        Continue Abilify to 10 mg by mouth once daily        Continue Cymbalta 120 mg by mouth once daily        Discontinue Ambien 10 mg by mouth once daily, start Ambien CR 12.5 mg once nightly for longer lasting effects        Consider Remeron for sleep.   · Labs: reviewed most recent  · The treatment plan and follow up plan were reviewed with the patient.  · Discussed with patient informed consent, risks vs. benefits, alternative treatments, side effect profile and the inherent  unpredictability of individual responses to these treatments. The patient expresses understanding of the above and displays the capacity to agree with this current plan and had no other questions.  · Encouraged Patient to keep future appointments.   · Take medications as prescribed and abstain from substance abuse.   · In the event of an emergency patient was advised to go to the emergency room.     Return to Clinic: 3 weeks     > than 50% of total time spend on coordination of care and counseling   (which included pts differential diagnosis and prognosis for psychiatric conditions, risks, benefits of treatments, instructions and adherence to treatment plan, risk reduction, reviewing current psychiatric medication regimen, medical problems and social stressors. In addtion to possible discussion with other healthcare provider/s)     Add on Psychotherapy time:0  Total Face time:30 min     The patient location is: [place]  The chief complaint leading to consultation is: med f/u     Visit type: audiovisual     Face to Face time with patient: 30 min  60 minutes of total time spent on the encounter, which includes face to face time and non-face to face time preparing to see the patient (eg, review of tests), Obtaining and/or reviewing separately obtained history, Documenting clinical information in the electronic or other health record, Independently interpreting results (not separately reported) and communicating results to the patient/family/caregiver, or Care coordination (not separately reported).            Each patient to whom he or she provides medical services by telemedicine is:  (1) informed of the relationship between the physician and patient and the respective role of any other health care provider with respect to management of the patient; and (2) notified that he or she may decline to receive medical services by telemedicine and may withdraw from such care at any time.     Notes:      Abel Monroe,  MSN, APRN, PMHNP-BC  [facility]   11/10/2020 10:00 AM

## 2020-11-24 ENCOUNTER — OFFICE VISIT (OUTPATIENT)
Dept: PSYCHIATRY | Facility: CLINIC | Age: 40
End: 2020-11-24
Payer: COMMERCIAL

## 2020-11-24 DIAGNOSIS — F31.32 BIPOLAR AFFECTIVE DISORDER, CURRENTLY DEPRESSED, MODERATE: Primary | ICD-10-CM

## 2020-11-24 PROCEDURE — 90834 PR PSYCHOTHERAPY W/PATIENT, 45 MIN: ICD-10-PCS | Mod: 95,,, | Performed by: SOCIAL WORKER

## 2020-11-24 PROCEDURE — 90834 PSYTX W PT 45 MINUTES: CPT | Mod: 95,,, | Performed by: SOCIAL WORKER

## 2020-12-01 ENCOUNTER — OFFICE VISIT (OUTPATIENT)
Dept: PSYCHIATRY | Facility: CLINIC | Age: 40
End: 2020-12-01
Payer: COMMERCIAL

## 2020-12-01 ENCOUNTER — LAB VISIT (OUTPATIENT)
Dept: LAB | Facility: HOSPITAL | Age: 40
End: 2020-12-01
Attending: NURSE PRACTITIONER
Payer: COMMERCIAL

## 2020-12-01 DIAGNOSIS — F41.1 GENERALIZED ANXIETY DISORDER: ICD-10-CM

## 2020-12-01 DIAGNOSIS — F39 MOOD DISORDER: ICD-10-CM

## 2020-12-01 DIAGNOSIS — F39 MOOD DISORDER: Primary | ICD-10-CM

## 2020-12-01 LAB
T3FREE SERPL-MCNC: 3.1 PG/ML (ref 2.3–4.2)
T4 FREE SERPL-MCNC: 1.08 NG/DL (ref 0.71–1.51)
TSH SERPL DL<=0.005 MIU/L-ACNC: 3.74 UIU/ML (ref 0.4–4)

## 2020-12-01 PROCEDURE — 84443 ASSAY THYROID STIM HORMONE: CPT

## 2020-12-01 PROCEDURE — 99214 PR OFFICE/OUTPT VISIT, EST, LEVL IV, 30-39 MIN: ICD-10-PCS | Mod: 95,,, | Performed by: NURSE PRACTITIONER

## 2020-12-01 PROCEDURE — 36415 COLL VENOUS BLD VENIPUNCTURE: CPT | Mod: PO

## 2020-12-01 PROCEDURE — 84481 FREE ASSAY (FT-3): CPT

## 2020-12-01 PROCEDURE — 84439 ASSAY OF FREE THYROXINE: CPT

## 2020-12-01 PROCEDURE — 99214 OFFICE O/P EST MOD 30 MIN: CPT | Mod: 95,,, | Performed by: NURSE PRACTITIONER

## 2020-12-01 NOTE — PROGRESS NOTES
Individual Psychotherapy (PhD/LCSW)    11/24/2020    The patient location is: home in living room  The chief complaint leading to consultation is: anxiety, depression, pain    Visit type: audiovisual     Face to Face time with patient: 45  55 minutes of total time spent on the encounter, which includes face to face time and non-face to face time preparing to see the patient (eg, review of tests), Obtaining and/or reviewing separately obtained history, Documenting clinical information in the electronic or other health record, Independently interpreting results (not separately reported) and communicating results to the patient/family/caregiver, or Care coordination (not separately reported).       Each patient to whom he or she provides medical services by telemedicine is:  (1) informed of the relationship between the physician and patient and the respective role of any other health care provider with respect to management of the patient; and (2) notified that he or she may decline to receive medical services by telemedicine and may withdraw from such care at any time.      Therapeutic Intervention: Met with patient.   Outpatient - Insight oriented psychotherapy 45 min - CPT code 38505, Outpatient - Behavior modifying psychotherapy 45 min - CPT code 49888, Outpatient - Supportive psychotherapy 45 min - CPT Code 54374 and Outpatient - Interactive psychotherapy 45 min - CPT code 36538      Chief complaint/reason for encounter: depression, anxiety, sleep and memory concerns     Interval history and content of current session: History of present illness: Pt is a 39 yo  female who presents today for f/u via telemedicine.  Pt currently established with  Abel Miles N.P. Pt was started on Abilify and since then has had medication increased. Since increase pt reports improved mood, sleep, and quality of life overall.          Patent reports fibromyalgia pain has again continued to improved since previous sessions.  Pt dicussed that she has been working on meditation and fibromyalgia exercises that has helped release tension and reduce pain. Pt spouse also switched his schedule to days again which has been helpful. Pt has also been better about being mindful of what she is spending throughout the week. Pt does admit to still being tired, however motivation has started improving. Reviewed relaxation techniques for symptom management.    Pt remains receptive of psychotherapy and is scheduled to f/u with in 2 weeks.      Treatment plan:  · Target symptoms: depression, anxiety , pain  · Why chosen therapy is appropriate versus another modality: relevant to diagnosis, patient responds to this modality, evidence based practice  · Outcome monitoring methods: self-report, observation  · Therapeutic intervention type: insight oriented psychotherapy, behavior modifying psychotherapy, supportive psychotherapy, interactive psychotherapy    Risk parameters:  Patient reports no suicidal ideation  Patient reports no homicidal ideation  Patient reports no self-injurious behavior  Patient reports no violent behavior    Verbal deficits: None    Patient's response to intervention:  The patient's response to intervention is accepting.    Progress toward goals and other mental status changes:    The patient's progress toward goals is good.       Diagnosis:     ICD-10-CM ICD-9-CM   1. Bipolar affective disorder, currently depressed, moderate  F31.32 296.52       Plan:  individual psychotherapy, working towards improved sleep health. Behavior modification practice. Journaling     Return to clinic: 2 weeks    Length of Service (minutes): 45

## 2020-12-01 NOTE — PROGRESS NOTES
"12/1/2020 10:08 AM   Roopa Rivas   1980   [id number]           OUTPATIENT PSYCHIATRY FOLLOW- UP VISIT    Reason for Encounter:  Roopa Rivas, a 40 y.o. female,who presents today for follow up of anxiety, mood disorder.  Met with patient.     Interval History and Content of Current Session:    Today,  Pt reports has been doing really well on current medication regimen, reports switch to Ambien CR has been very helpful with sleep - reports has been sleeping 8 to 10 hours with intermittent waking, but able to return to sleep almost immediately.  Pt. has been staying away from TV shows that have been triggering and producing tension as this was recognized as a source. Pt. Reports has been consistent with psychotherapy, reports has been practicing MR exercises and this has been very helpful in reducing tension.     Reports doing well overall, still learning to be consistent with curbing spending, but not as much of an issue as much of spending was done while on IR Ambien, but reports Ambien CR makes patient "more social" and disinhibited but this is not much a concern to patient, denies risky behaviors or rufus.  Reports still have muscle and joint pain in extremities into chest but is currently being worked up for this and pain flare-ups due to temperature changes have been only concern.     Denies SI/HI, racing thoughts, reports some nightmares from Ambien but not concerning. Reports has been mildly stressed with holidays but overall doing well.     Discussed to continue current regimen and f/u in 6 to 8 weeks.              Psychosocial stressors: family, financial, social pressures      Review Of Systems:     Medical Review Of Systems:  Pertinent items are noted in HPI.    Psychiatric Review Of Systems:  sleep: yes, improved - 8 to 10 hours per night currently  appetite changes: no  weight changes: no  energy/anergy: yes - improved with improved sleep  interest/pleasure/anhedonia: no  somatic symptoms: " no  libido: no  anxiety/panic: no  guilty/hopeless: no  S.I.B.s/risky behavior: no  any drugs: no  alcohol: no      Sleep: 8 to 10 hours per night      Risk Parameters:  Patient reports no suicidal ideation  Patient reports no homicidal ideation  Patient reports no self-injurious behavior  Patient reports no violent behavior      Current meds- Cymbalta, Abilify, NAC, Ambien    Compliance: yes    Side effects: None      Psychiatric, Social and Family history reviewed prior and during visit.           Objective     ALL MEDICATIONS:    Current Outpatient Medications:     ARIPiprazole (ABILIFY) 10 MG Tab, Take 1 tablet (10 mg total) by mouth once daily., Disp: 30 tablet, Rfl: 2    cetirizine (ZYRTEC) 10 MG tablet, Take 10 mg by mouth once daily., Disp: , Rfl:     clotrimazole-betamethasone 1-0.05% (LOTRISONE) cream, Apply topically 2 (two) times daily., Disp: 15 g, Rfl: 0    drospirenone-ethinyl estradioL (SARAH) 3-0.02 mg per tablet, Take 1 tablet by mouth once daily., Disp: 84 tablet, Rfl: 4    DULoxetine (CYMBALTA) 60 MG capsule, Take 1 capsule (60 mg total) by mouth 2 (two) times daily., Disp: 180 capsule, Rfl: 1    ibuprofen (ADVIL,MOTRIN) 400 MG tablet, Take 400 mg by mouth every 6 (six) hours as needed for Other., Disp: , Rfl:     levomefolate-algal oil (DEPLIN, ALGAL OIL,) 15-90.314 mg Cap, Take 15 mg by mouth once daily., Disp: 50 capsule, Rfl: 1    magnesium 30 mg Tab, Take by mouth once., Disp: , Rfl:     multivitamin/iron/folic acid (MULTI COMPLETE WITH IRON ORAL), Take by mouth., Disp: , Rfl:     spironolactone (ALDACTONE) 50 MG tablet, Take 1 tablet (50 mg total) by mouth once daily., Disp: 30 tablet, Rfl: 2    sulfacetamide sodium, acne, (KLARON) 10 % Susp, Bid face, Disp: 118 mL, Rfl: 1    zolpidem (AMBIEN CR) 12.5 MG CR tablet, Take 1 tablet (12.5 mg total) by mouth nightly as needed for Insomnia., Disp: 30 tablet, Rfl: 2    ALLERGIES:  Review of patient's allergies indicates:  No Known  Allergies    RELEVANT LABS/STUDIES:    Lab Results   Component Value Date    WBC 8.26 04/03/2020    HGB 11.3 (L) 04/03/2020    HCT 35.2 (L) 04/03/2020    MCV 93 04/03/2020     04/03/2020     BMP  Lab Results   Component Value Date     (L) 02/26/2020    K 4.5 02/26/2020     02/26/2020    CO2 24 02/26/2020    BUN 7 02/26/2020    CREATININE 0.8 02/26/2020    CALCIUM 9.3 02/26/2020    ANIONGAP 9 02/26/2020    ESTGFRAFRICA >60.0 02/26/2020    EGFRNONAA >60.0 02/26/2020     Lab Results   Component Value Date    ALT 18 02/26/2020    AST 17 02/26/2020    ALKPHOS 79 02/26/2020    BILITOT 0.2 02/26/2020     Lab Results   Component Value Date    TSH 1.656 07/07/2020     Lab Results   Component Value Date    HGBA1C 5.2 02/26/2020       Constitutional  Vitals:  Most recent vital signs, dated less than 90 days prior to this appointment, were reviewed.    There were no vitals filed for this visit.         PHYSICAL EXAM  General: well developed, well nourished  Neurologic:   Gait: Normal   Psychomotor signs:  No involuntary movements or tremor  AIMS: none    PSYCHIATRIC EXAM:     Mental Status Exam:  Appearance: unremarkable, age appropriate  Behavior/Cooperation: normal, cooperative  Speech: normal tone, normal rate, normal pitch, normal volume  Language: uses words appropriately; NO aphasia or dysarthria  Mood: steady  Affect: full, congruent and appropriate to situation  Thought Process: normal and logical  Thought Content: normal, no suicidality, no homicidality, delusions, or paranoia  Level of Consciousness: Alert and Oriented x3  Memory:  Intact  Attention/concentration: appropriate for age/education.   Fund of Knowledge: appears adequate  Insight:  Impaired/  Limited/ Intact  Judgment: Impaired/  Limited/ Intact     Assessment and Diagnosis   Status/Progress: Based on the examination today, the patient's problem(s) is/are improved.  New problems have not been presented today.   Co-morbidities are  complicating management of the primary condition.  The working differential for this patient includes OCPD, borderline personality disorder, OCD.     General Impression:   Bipolar II Disorder with mixed features by history  Mood Disorder NOS  RUPINDER  Insomnia      Intervention/Counseling/Treatment Plan   · Medication Management: -        Continue Abilify 10 mg by mouth once daily        Continue Cymbalta 120 mg by mouth once daily        Continue Ambien CR 12.5 mg by mouth once nightly  · Labs: reviewed most recent  · The treatment plan and follow up plan were reviewed with the patient.  · Discussed with patient informed consent, risks vs. benefits, alternative treatments, side effect profile and the inherent unpredictability of individual responses to these treatments. The patient expresses understanding of the above and displays the capacity to agree with this current plan and had no other questions.  · Encouraged Patient to keep future appointments.   · Take medications as prescribed and abstain from substance abuse.   · In the event of an emergency patient was advised to go to the emergency room.    Return to Clinic: 2 months    > than 50% of total time spend on coordination of care and counseling   (which included pts differential diagnosis and prognosis for psychiatric conditions, risks, benefits of treatments, instructions and adherence to treatment plan, risk reduction, reviewing current psychiatric medication regimen, medical problems and social stressors. In addtion to possible discussion with other healthcare provider/s)    Add on Psychotherapy time:0  Total Face time:25 min    The patient location is: [place]  The chief complaint leading to consultation is: med f/u    Visit type: audiovisual    Face to Face time with patient: 25 min  25 minutes of total time spent on the encounter, which includes face to face time and non-face to face time preparing to see the patient (eg, review of tests), Obtaining  and/or reviewing separately obtained history, Documenting clinical information in the electronic or other health record, Independently interpreting results (not separately reported) and communicating results to the patient/family/caregiver, or Care coordination (not separately reported).         Each patient to whom he or she provides medical services by telemedicine is:  (1) informed of the relationship between the physician and patient and the respective role of any other health care provider with respect to management of the patient; and (2) notified that he or she may decline to receive medical services by telemedicine and may withdraw from such care at any time.    Notes:       Aebl Monroe, MSN, APRN, PMHNP-BC  [facility]   12/1/2020 10:08 AM

## 2020-12-02 DIAGNOSIS — E03.8 SUBCLINICAL HYPOTHYROIDISM: Primary | ICD-10-CM

## 2020-12-11 ENCOUNTER — OFFICE VISIT (OUTPATIENT)
Dept: PSYCHIATRY | Facility: CLINIC | Age: 40
End: 2020-12-11
Payer: COMMERCIAL

## 2020-12-11 DIAGNOSIS — F41.1 GENERALIZED ANXIETY DISORDER: Primary | ICD-10-CM

## 2020-12-11 DIAGNOSIS — F31.81 BIPOLAR II DISORDER, MOST RECENT EPISODE HYPOMANIC: ICD-10-CM

## 2020-12-11 PROCEDURE — 90832 PSYTX W PT 30 MINUTES: CPT | Mod: 95,,, | Performed by: SOCIAL WORKER

## 2020-12-11 PROCEDURE — 90832 PR PSYCHOTHERAPY W/PATIENT, 30 MIN: ICD-10-PCS | Mod: 95,,, | Performed by: SOCIAL WORKER

## 2020-12-11 NOTE — PROGRESS NOTES
Individual Psychotherapy (PhD/LCSW)    12/11/2020    The patient location is: home in living room  The chief complaint leading to consultation is: anxiety, depression, pain    Visit type: audiovisual     Face to Face time with patient: 30  55 minutes of total time spent on the encounter, which includes face to face time and non-face to face time preparing to see the patient (eg, review of tests), Obtaining and/or reviewing separately obtained history, Documenting clinical information in the electronic or other health record, Independently interpreting results (not separately reported) and communicating results to the patient/family/caregiver, or Care coordination (not separately reported).       Each patient to whom he or she provides medical services by telemedicine is:  (1) informed of the relationship between the physician and patient and the respective role of any other health care provider with respect to management of the patient; and (2) notified that he or she may decline to receive medical services by telemedicine and may withdraw from such care at any time.      Therapeutic Intervention: Met with patient.    Outpatient - Insight oriented psychotherapy 30 min - CPT code 81999, Outpatient - Behavior modifying psychotherapy 30 min - CPT code 78495, Outpatient - Supportive psychotherapy 30 min - CPT Code 54309 and Outpatient - Interactive psychotherapy 30 min - CPT code 07092      Chief complaint/reason for encounter: depression, anxiety, sleep and memory concerns     Interval history and content of current session: History of present illness: Pt is a 41 yo  female who presents today for f/u via telemedicine.  Pt currently established with  Abel Miles N.P. Pt has a dx of Bipolar and often shows OCPD and borderline personality tendencies. Pt was started on Abilify and since then has had medication increased. Since increase pt reports improved mood, sleep, and quality of life overall.        Pt  joined via telelmed.  Pt has been feeling much better and finds herself in less fibromyalgia pain and with a happier mood. Pt has started tending to her plants again. Pt relayed has also started spending again. Pt dicussed was doing well and then began spending again for the holidays. Pt plans to get a prefilled card that has some funds loaded on it for monthly spending and will not spend more that what's allotted onto the card. Reviewed techniques for symptom management.   Pt remains receptive of psychotherapy and is scheduled to f/u with in 2 weeks.      Treatment plan:  · Target symptoms: depression, anxiety , pain  · Why chosen therapy is appropriate versus another modality: relevant to diagnosis, patient responds to this modality, evidence based practice  · Outcome monitoring methods: self-report, observation  · Therapeutic intervention type: insight oriented psychotherapy, behavior modifying psychotherapy, supportive psychotherapy, interactive psychotherapy    Risk parameters:  Patient reports no suicidal ideation  Patient reports no homicidal ideation  Patient reports no self-injurious behavior  Patient reports no violent behavior    Verbal deficits: None    Patient's response to intervention:  The patient's response to intervention is accepting.    Progress toward goals and other mental status changes:    The patient's progress toward goals is good.       Diagnosis:     ICD-10-CM ICD-9-CM   1. Generalized anxiety disorder  F41.1 300.02   2. Bipolar II disorder, most recent episode hypomanic  F31.81 296.89       Plan:  individual psychotherapy, working towards improved sleep health. Behavior modification practice. Journaling     Return to clinic: 2 weeks    Length of Service (minutes): 45

## 2020-12-14 PROBLEM — Z00.00 HEALTHCARE MAINTENANCE: Status: RESOLVED | Noted: 2020-09-08 | Resolved: 2020-12-14

## 2020-12-22 ENCOUNTER — OFFICE VISIT (OUTPATIENT)
Dept: PSYCHIATRY | Facility: CLINIC | Age: 40
End: 2020-12-22
Payer: COMMERCIAL

## 2020-12-22 DIAGNOSIS — F31.31 BIPOLAR AFFECTIVE DISORDER, CURRENTLY DEPRESSED, MILD: ICD-10-CM

## 2020-12-22 DIAGNOSIS — F41.1 GENERALIZED ANXIETY DISORDER: Primary | ICD-10-CM

## 2020-12-22 PROCEDURE — 90834 PR PSYCHOTHERAPY W/PATIENT, 45 MIN: ICD-10-PCS | Mod: 95,,, | Performed by: SOCIAL WORKER

## 2020-12-22 PROCEDURE — 90834 PSYTX W PT 45 MINUTES: CPT | Mod: 95,,, | Performed by: SOCIAL WORKER

## 2020-12-22 NOTE — PROGRESS NOTES
Individual Psychotherapy (PhD/LCSW)    12/22/2020    The patient location is: home in living room  The chief complaint leading to consultation is: anxiety, depression, pain    Visit type: audiovisual     Face to Face time with patient: 45  55 minutes of total time spent on the encounter, which includes face to face time and non-face to face time preparing to see the patient (eg, review of tests), Obtaining and/or reviewing separately obtained history, Documenting clinical information in the electronic or other health record, Independently interpreting results (not separately reported) and communicating results to the patient/family/caregiver, or Care coordination (not separately reported).       Each patient to whom he or she provides medical services by telemedicine is:  (1) informed of the relationship between the physician and patient and the respective role of any other health care provider with respect to management of the patient; and (2) notified that he or she may decline to receive medical services by telemedicine and may withdraw from such care at any time.      Therapeutic Intervention: Met with patient.   Outpatient - Insight oriented psychotherapy 45 min - CPT code 42210, Outpatient - Behavior modifying psychotherapy 45 min - CPT code 69280, Outpatient - Supportive psychotherapy 45 min - CPT Code 49679 and Outpatient - Interactive psychotherapy 45 min - CPT code 36098      Chief complaint/reason for encounter: depression, anxiety, sleep and memory concerns     Interval history and content of current session: History of present illness: Pt is a 39 yo  female who presents today for f/u via telemedicine.  Pt currently established with  Abel Miles N.P. Pt has a dx of Bipolar and often shows OCPD and borderline personality tendencies. Pt was started on Abilify and since then has had medication increased. Since increase pt reports improved mood, sleep, and quality of life overall.        Pt joined  via telelmed. Pt discussed feeling more depressed this week with the season change. Pt discussed has been trying to improve mood by walking outside and engaging in hobbies. Discussed potential techniques for motivation and ways to improve mood, I.e. journaling, challenging negative thinking, light therapy. Pt also discussed fibromyalgia has been worsening. Pointed out the correlation of when mood is low pt is in more pain, versus when mood is positively elevated pt feels good.  Dicussed continued techniques for identify tension and how to release.  Pt remains receptive of psychotherapy and is scheduled to f/u with in 2 weeks.      Treatment plan:  · Target symptoms: depression, anxiety , pain  · Why chosen therapy is appropriate versus another modality: relevant to diagnosis, patient responds to this modality, evidence based practice  · Outcome monitoring methods: self-report, observation  · Therapeutic intervention type: insight oriented psychotherapy, behavior modifying psychotherapy, supportive psychotherapy, interactive psychotherapy    Risk parameters:  Patient reports no suicidal ideation  Patient reports no homicidal ideation  Patient reports no self-injurious behavior  Patient reports no violent behavior    Verbal deficits: None    Patient's response to intervention:  The patient's response to intervention is accepting.    Progress toward goals and other mental status changes:    The patient's progress toward goals is good.       Diagnosis:     ICD-10-CM ICD-9-CM   1. Generalized anxiety disorder  F41.1 300.02   2. Bipolar affective disorder, currently depressed, mild  F31.31 296.51       Plan:  individual psychotherapy, working towards improved sleep health. Behavior modification practice. Journaling     Return to clinic: 2 weeks    Length of Service (minutes): 45

## 2021-01-12 ENCOUNTER — OFFICE VISIT (OUTPATIENT)
Dept: PSYCHIATRY | Facility: CLINIC | Age: 41
End: 2021-01-12
Payer: COMMERCIAL

## 2021-01-12 DIAGNOSIS — F41.1 GENERALIZED ANXIETY DISORDER: Primary | ICD-10-CM

## 2021-01-12 DIAGNOSIS — F39 MOOD DISORDER: ICD-10-CM

## 2021-01-12 DIAGNOSIS — G47.00 INSOMNIA, UNSPECIFIED TYPE: ICD-10-CM

## 2021-01-12 PROCEDURE — 90834 PR PSYCHOTHERAPY W/PATIENT, 45 MIN: ICD-10-PCS | Mod: 95,,, | Performed by: SOCIAL WORKER

## 2021-01-12 PROCEDURE — 99214 OFFICE O/P EST MOD 30 MIN: CPT | Mod: 95,,, | Performed by: NURSE PRACTITIONER

## 2021-01-12 PROCEDURE — 90834 PSYTX W PT 45 MINUTES: CPT | Mod: 95,,, | Performed by: SOCIAL WORKER

## 2021-01-12 PROCEDURE — 99214 PR OFFICE/OUTPT VISIT, EST, LEVL IV, 30-39 MIN: ICD-10-PCS | Mod: 95,,, | Performed by: NURSE PRACTITIONER

## 2021-01-12 RX ORDER — ARIPIPRAZOLE 10 MG/1
10 TABLET ORAL DAILY
Qty: 30 TABLET | Refills: 2 | Status: SHIPPED | OUTPATIENT
Start: 2021-01-12 | End: 2021-04-13

## 2021-01-12 RX ORDER — ZOLPIDEM TARTRATE 12.5 MG/1
12.5 TABLET, FILM COATED, EXTENDED RELEASE ORAL NIGHTLY PRN
Qty: 30 TABLET | Refills: 2 | Status: SHIPPED | OUTPATIENT
Start: 2021-01-12 | End: 2021-05-07

## 2021-01-13 ENCOUNTER — PATIENT MESSAGE (OUTPATIENT)
Dept: DERMATOLOGY | Facility: CLINIC | Age: 41
End: 2021-01-13

## 2021-01-13 DIAGNOSIS — L70.9 ADULT ACNE: ICD-10-CM

## 2021-01-13 RX ORDER — SPIRONOLACTONE 50 MG/1
50 TABLET, FILM COATED ORAL DAILY
Qty: 30 TABLET | Refills: 2 | Status: SHIPPED | OUTPATIENT
Start: 2021-01-13 | End: 2021-04-05

## 2021-01-28 ENCOUNTER — OFFICE VISIT (OUTPATIENT)
Dept: PSYCHIATRY | Facility: CLINIC | Age: 41
End: 2021-01-28
Payer: COMMERCIAL

## 2021-01-28 DIAGNOSIS — F39 MOOD DISORDER: ICD-10-CM

## 2021-01-28 DIAGNOSIS — F41.1 GENERALIZED ANXIETY DISORDER: Primary | ICD-10-CM

## 2021-01-28 PROCEDURE — 90834 PSYTX W PT 45 MINUTES: CPT | Mod: 95,,, | Performed by: SOCIAL WORKER

## 2021-01-28 PROCEDURE — 90834 PR PSYCHOTHERAPY W/PATIENT, 45 MIN: ICD-10-PCS | Mod: 95,,, | Performed by: SOCIAL WORKER

## 2021-02-10 ENCOUNTER — OFFICE VISIT (OUTPATIENT)
Dept: PSYCHIATRY | Facility: CLINIC | Age: 41
End: 2021-02-10
Payer: COMMERCIAL

## 2021-02-10 DIAGNOSIS — F31.31 BIPOLAR AFFECTIVE DISORDER, CURRENTLY DEPRESSED, MILD: ICD-10-CM

## 2021-02-10 DIAGNOSIS — F41.1 GENERALIZED ANXIETY DISORDER: Primary | ICD-10-CM

## 2021-02-10 PROCEDURE — 90834 PR PSYCHOTHERAPY W/PATIENT, 45 MIN: ICD-10-PCS | Mod: 95,,, | Performed by: SOCIAL WORKER

## 2021-02-10 PROCEDURE — 90834 PSYTX W PT 45 MINUTES: CPT | Mod: 95,,, | Performed by: SOCIAL WORKER

## 2021-02-23 ENCOUNTER — PATIENT MESSAGE (OUTPATIENT)
Dept: RHEUMATOLOGY | Facility: CLINIC | Age: 41
End: 2021-02-23

## 2021-02-24 ENCOUNTER — OFFICE VISIT (OUTPATIENT)
Dept: PSYCHIATRY | Facility: CLINIC | Age: 41
End: 2021-02-24
Payer: COMMERCIAL

## 2021-02-24 DIAGNOSIS — F41.1 GENERALIZED ANXIETY DISORDER: Primary | ICD-10-CM

## 2021-02-24 DIAGNOSIS — F31.31 BIPOLAR AFFECTIVE DISORDER, CURRENTLY DEPRESSED, MILD: ICD-10-CM

## 2021-02-24 PROCEDURE — 90837 PSYTX W PT 60 MINUTES: CPT | Mod: 95,,, | Performed by: SOCIAL WORKER

## 2021-02-24 PROCEDURE — 90837 PR PSYCHOTHERAPY W/PATIENT, 60 MIN: ICD-10-PCS | Mod: 95,,, | Performed by: SOCIAL WORKER

## 2021-03-01 ENCOUNTER — OFFICE VISIT (OUTPATIENT)
Dept: FAMILY MEDICINE | Facility: CLINIC | Age: 41
End: 2021-03-01
Payer: COMMERCIAL

## 2021-03-01 VITALS
SYSTOLIC BLOOD PRESSURE: 132 MMHG | RESPIRATION RATE: 16 BRPM | DIASTOLIC BLOOD PRESSURE: 68 MMHG | WEIGHT: 139.13 LBS | OXYGEN SATURATION: 96 % | BODY MASS INDEX: 28.1 KG/M2 | HEART RATE: 112 BPM | TEMPERATURE: 98 F

## 2021-03-01 DIAGNOSIS — Z12.39 ENCOUNTER FOR SCREENING FOR MALIGNANT NEOPLASM OF BREAST, UNSPECIFIED SCREENING MODALITY: Primary | ICD-10-CM

## 2021-03-01 DIAGNOSIS — Z00.00 ANNUAL PHYSICAL EXAM: ICD-10-CM

## 2021-03-01 PROCEDURE — 3008F BODY MASS INDEX DOCD: CPT | Mod: CPTII,S$GLB,, | Performed by: INTERNAL MEDICINE

## 2021-03-01 PROCEDURE — 3008F PR BODY MASS INDEX (BMI) DOCUMENTED: ICD-10-PCS | Mod: CPTII,S$GLB,, | Performed by: INTERNAL MEDICINE

## 2021-03-01 PROCEDURE — 99214 PR OFFICE/OUTPT VISIT, EST, LEVL IV, 30-39 MIN: ICD-10-PCS | Mod: S$GLB,,, | Performed by: INTERNAL MEDICINE

## 2021-03-01 PROCEDURE — 99999 PR PBB SHADOW E&M-EST. PATIENT-LVL IV: ICD-10-PCS | Mod: PBBFAC,,, | Performed by: INTERNAL MEDICINE

## 2021-03-01 PROCEDURE — 99214 OFFICE O/P EST MOD 30 MIN: CPT | Mod: S$GLB,,, | Performed by: INTERNAL MEDICINE

## 2021-03-01 PROCEDURE — 1125F PR PAIN SEVERITY QUANTIFIED, PAIN PRESENT: ICD-10-PCS | Mod: S$GLB,,, | Performed by: INTERNAL MEDICINE

## 2021-03-01 PROCEDURE — 1125F AMNT PAIN NOTED PAIN PRSNT: CPT | Mod: S$GLB,,, | Performed by: INTERNAL MEDICINE

## 2021-03-01 PROCEDURE — 99999 PR PBB SHADOW E&M-EST. PATIENT-LVL IV: CPT | Mod: PBBFAC,,, | Performed by: INTERNAL MEDICINE

## 2021-03-05 ENCOUNTER — LAB VISIT (OUTPATIENT)
Dept: LAB | Facility: HOSPITAL | Age: 41
End: 2021-03-05
Attending: INTERNAL MEDICINE
Payer: COMMERCIAL

## 2021-03-05 DIAGNOSIS — Z00.00 ANNUAL PHYSICAL EXAM: ICD-10-CM

## 2021-03-05 LAB
ALBUMIN SERPL BCP-MCNC: 3.6 G/DL (ref 3.5–5.2)
ALP SERPL-CCNC: 99 U/L (ref 55–135)
ALT SERPL W/O P-5'-P-CCNC: 53 U/L (ref 10–44)
ANION GAP SERPL CALC-SCNC: 10 MMOL/L (ref 8–16)
AST SERPL-CCNC: 20 U/L (ref 10–40)
BASOPHILS # BLD AUTO: 0.05 K/UL (ref 0–0.2)
BASOPHILS NFR BLD: 0.4 % (ref 0–1.9)
BILIRUB SERPL-MCNC: 0.2 MG/DL (ref 0.1–1)
BUN SERPL-MCNC: 7 MG/DL (ref 6–20)
CALCIUM SERPL-MCNC: 9.7 MG/DL (ref 8.7–10.5)
CHLORIDE SERPL-SCNC: 105 MMOL/L (ref 95–110)
CHOLEST SERPL-MCNC: 209 MG/DL (ref 120–199)
CHOLEST/HDLC SERPL: 4.2 {RATIO} (ref 2–5)
CO2 SERPL-SCNC: 22 MMOL/L (ref 23–29)
CREAT SERPL-MCNC: 0.7 MG/DL (ref 0.5–1.4)
DIFFERENTIAL METHOD: ABNORMAL
EOSINOPHIL # BLD AUTO: 0.2 K/UL (ref 0–0.5)
EOSINOPHIL NFR BLD: 1.6 % (ref 0–8)
ERYTHROCYTE [DISTWIDTH] IN BLOOD BY AUTOMATED COUNT: 13 % (ref 11.5–14.5)
EST. GFR  (AFRICAN AMERICAN): >60 ML/MIN/1.73 M^2
EST. GFR  (NON AFRICAN AMERICAN): >60 ML/MIN/1.73 M^2
ESTIMATED AVG GLUCOSE: 97 MG/DL (ref 68–131)
GLUCOSE SERPL-MCNC: 100 MG/DL (ref 70–110)
HBA1C MFR BLD: 5 % (ref 4–5.6)
HCT VFR BLD AUTO: 36.1 % (ref 37–48.5)
HDLC SERPL-MCNC: 50 MG/DL (ref 40–75)
HDLC SERPL: 23.9 % (ref 20–50)
HGB BLD-MCNC: 11.6 G/DL (ref 12–16)
IMM GRANULOCYTES # BLD AUTO: 0.02 K/UL (ref 0–0.04)
IMM GRANULOCYTES NFR BLD AUTO: 0.2 % (ref 0–0.5)
LDLC SERPL CALC-MCNC: 116.8 MG/DL (ref 63–159)
LYMPHOCYTES # BLD AUTO: 3.1 K/UL (ref 1–4.8)
LYMPHOCYTES NFR BLD: 26.8 % (ref 18–48)
MCH RBC QN AUTO: 29.5 PG (ref 27–31)
MCHC RBC AUTO-ENTMCNC: 32.1 G/DL (ref 32–36)
MCV RBC AUTO: 92 FL (ref 82–98)
MONOCYTES # BLD AUTO: 0.5 K/UL (ref 0.3–1)
MONOCYTES NFR BLD: 4.3 % (ref 4–15)
NEUTROPHILS # BLD AUTO: 7.7 K/UL (ref 1.8–7.7)
NEUTROPHILS NFR BLD: 66.7 % (ref 38–73)
NONHDLC SERPL-MCNC: 159 MG/DL
NRBC BLD-RTO: 0 /100 WBC
PLATELET # BLD AUTO: 444 K/UL (ref 150–350)
PMV BLD AUTO: 9.8 FL (ref 9.2–12.9)
POTASSIUM SERPL-SCNC: 4.1 MMOL/L (ref 3.5–5.1)
PROT SERPL-MCNC: 7.7 G/DL (ref 6–8.4)
RBC # BLD AUTO: 3.93 M/UL (ref 4–5.4)
SODIUM SERPL-SCNC: 137 MMOL/L (ref 136–145)
T4 FREE SERPL-MCNC: 1.14 NG/DL (ref 0.71–1.51)
TRIGL SERPL-MCNC: 211 MG/DL (ref 30–150)
TSH SERPL DL<=0.005 MIU/L-ACNC: 9.26 UIU/ML (ref 0.4–4)
WBC # BLD AUTO: 11.48 K/UL (ref 3.9–12.7)

## 2021-03-05 PROCEDURE — 84443 ASSAY THYROID STIM HORMONE: CPT | Performed by: INTERNAL MEDICINE

## 2021-03-05 PROCEDURE — 85025 COMPLETE CBC W/AUTO DIFF WBC: CPT | Performed by: INTERNAL MEDICINE

## 2021-03-05 PROCEDURE — 36415 COLL VENOUS BLD VENIPUNCTURE: CPT | Mod: PO | Performed by: INTERNAL MEDICINE

## 2021-03-05 PROCEDURE — 84439 ASSAY OF FREE THYROXINE: CPT | Performed by: INTERNAL MEDICINE

## 2021-03-05 PROCEDURE — 86803 HEPATITIS C AB TEST: CPT | Performed by: INTERNAL MEDICINE

## 2021-03-05 PROCEDURE — 83036 HEMOGLOBIN GLYCOSYLATED A1C: CPT | Performed by: INTERNAL MEDICINE

## 2021-03-05 PROCEDURE — 80053 COMPREHEN METABOLIC PANEL: CPT | Performed by: INTERNAL MEDICINE

## 2021-03-05 PROCEDURE — 80061 LIPID PANEL: CPT | Performed by: INTERNAL MEDICINE

## 2021-03-09 LAB — HCV AB SERPL QL IA: NEGATIVE

## 2021-03-10 ENCOUNTER — OFFICE VISIT (OUTPATIENT)
Dept: PSYCHIATRY | Facility: CLINIC | Age: 41
End: 2021-03-10
Payer: COMMERCIAL

## 2021-03-10 ENCOUNTER — HOSPITAL ENCOUNTER (OUTPATIENT)
Dept: RADIOLOGY | Facility: CLINIC | Age: 41
Discharge: HOME | End: 2021-03-10
Attending: INTERNAL MEDICINE
Payer: COMMERCIAL

## 2021-03-10 DIAGNOSIS — F41.1 GENERALIZED ANXIETY DISORDER: Primary | ICD-10-CM

## 2021-03-10 DIAGNOSIS — R79.89 ELEVATED TSH: Primary | ICD-10-CM

## 2021-03-10 DIAGNOSIS — Z12.39 ENCOUNTER FOR SCREENING FOR MALIGNANT NEOPLASM OF BREAST, UNSPECIFIED SCREENING MODALITY: ICD-10-CM

## 2021-03-10 DIAGNOSIS — F31.61 BIPOLAR DISORDER, CURRENT EPISODE MIXED, MILD: ICD-10-CM

## 2021-03-10 PROCEDURE — 77067 SCR MAMMO BI INCL CAD: CPT | Mod: TC,PO

## 2021-03-10 PROCEDURE — 77063 MAMMO DIGITAL SCREENING BILAT WITH TOMO: ICD-10-PCS | Mod: 26,,, | Performed by: RADIOLOGY

## 2021-03-10 PROCEDURE — 90837 PR PSYCHOTHERAPY W/PATIENT, 60 MIN: ICD-10-PCS | Mod: 95,,, | Performed by: SOCIAL WORKER

## 2021-03-10 PROCEDURE — 77067 MAMMO DIGITAL SCREENING BILAT WITH TOMO: ICD-10-PCS | Mod: 26,,, | Performed by: RADIOLOGY

## 2021-03-10 PROCEDURE — 77063 BREAST TOMOSYNTHESIS BI: CPT | Mod: 26,,, | Performed by: RADIOLOGY

## 2021-03-10 PROCEDURE — 90837 PSYTX W PT 60 MINUTES: CPT | Mod: 95,,, | Performed by: SOCIAL WORKER

## 2021-03-10 PROCEDURE — 77067 SCR MAMMO BI INCL CAD: CPT | Mod: 26,,, | Performed by: RADIOLOGY

## 2021-03-19 ENCOUNTER — TELEPHONE (OUTPATIENT)
Dept: FAMILY MEDICINE | Facility: CLINIC | Age: 41
End: 2021-03-19

## 2021-03-24 ENCOUNTER — OFFICE VISIT (OUTPATIENT)
Dept: PSYCHIATRY | Facility: CLINIC | Age: 41
End: 2021-03-24
Payer: COMMERCIAL

## 2021-03-24 DIAGNOSIS — F31.61 BIPOLAR DISORDER, CURRENT EPISODE MIXED, MILD: ICD-10-CM

## 2021-03-24 DIAGNOSIS — F41.1 GENERALIZED ANXIETY DISORDER: Primary | ICD-10-CM

## 2021-03-24 PROCEDURE — 90834 PSYTX W PT 45 MINUTES: CPT | Mod: 95,,, | Performed by: SOCIAL WORKER

## 2021-03-24 PROCEDURE — 90834 PR PSYCHOTHERAPY W/PATIENT, 45 MIN: ICD-10-PCS | Mod: 95,,, | Performed by: SOCIAL WORKER

## 2021-03-25 ENCOUNTER — OFFICE VISIT (OUTPATIENT)
Dept: ENDOCRINOLOGY | Facility: CLINIC | Age: 41
End: 2021-03-25
Payer: COMMERCIAL

## 2021-03-25 VITALS
WEIGHT: 135.81 LBS | DIASTOLIC BLOOD PRESSURE: 80 MMHG | SYSTOLIC BLOOD PRESSURE: 118 MMHG | HEIGHT: 59 IN | OXYGEN SATURATION: 98 % | BODY MASS INDEX: 27.38 KG/M2 | HEART RATE: 104 BPM | TEMPERATURE: 98 F

## 2021-03-25 DIAGNOSIS — R00.0 TACHYCARDIA: ICD-10-CM

## 2021-03-25 DIAGNOSIS — E03.8 SUBCLINICAL HYPOTHYROIDISM: Primary | ICD-10-CM

## 2021-03-25 PROBLEM — E55.9 VITAMIN D DEFICIENCY: Status: RESOLVED | Noted: 2019-02-11 | Resolved: 2021-03-25

## 2021-03-25 PROCEDURE — 1126F AMNT PAIN NOTED NONE PRSNT: CPT | Mod: S$GLB,,, | Performed by: INTERNAL MEDICINE

## 2021-03-25 PROCEDURE — 99213 PR OFFICE/OUTPT VISIT, EST, LEVL III, 20-29 MIN: ICD-10-PCS | Mod: S$GLB,,, | Performed by: INTERNAL MEDICINE

## 2021-03-25 PROCEDURE — 3008F BODY MASS INDEX DOCD: CPT | Mod: CPTII,S$GLB,, | Performed by: INTERNAL MEDICINE

## 2021-03-25 PROCEDURE — 99999 PR PBB SHADOW E&M-EST. PATIENT-LVL IV: CPT | Mod: PBBFAC,,, | Performed by: INTERNAL MEDICINE

## 2021-03-25 PROCEDURE — 3008F PR BODY MASS INDEX (BMI) DOCUMENTED: ICD-10-PCS | Mod: CPTII,S$GLB,, | Performed by: INTERNAL MEDICINE

## 2021-03-25 PROCEDURE — 99999 PR PBB SHADOW E&M-EST. PATIENT-LVL IV: ICD-10-PCS | Mod: PBBFAC,,, | Performed by: INTERNAL MEDICINE

## 2021-03-25 PROCEDURE — 99213 OFFICE O/P EST LOW 20 MIN: CPT | Mod: S$GLB,,, | Performed by: INTERNAL MEDICINE

## 2021-03-25 PROCEDURE — 1126F PR PAIN SEVERITY QUANTIFIED, NO PAIN PRESENT: ICD-10-PCS | Mod: S$GLB,,, | Performed by: INTERNAL MEDICINE

## 2021-04-01 ENCOUNTER — LAB VISIT (OUTPATIENT)
Dept: LAB | Facility: HOSPITAL | Age: 41
End: 2021-04-01
Attending: INTERNAL MEDICINE
Payer: COMMERCIAL

## 2021-04-01 ENCOUNTER — OFFICE VISIT (OUTPATIENT)
Dept: RHEUMATOLOGY | Facility: CLINIC | Age: 41
End: 2021-04-01
Payer: COMMERCIAL

## 2021-04-01 DIAGNOSIS — M79.7 FIBROMYALGIA: ICD-10-CM

## 2021-04-01 DIAGNOSIS — M79.7 FIBROMYALGIA: Primary | ICD-10-CM

## 2021-04-01 DIAGNOSIS — R76.8 ANA POSITIVE: ICD-10-CM

## 2021-04-01 LAB — ERYTHROCYTE [SEDIMENTATION RATE] IN BLOOD BY WESTERGREN METHOD: 36 MM/HR (ref 0–20)

## 2021-04-01 PROCEDURE — 85651 RBC SED RATE NONAUTOMATED: CPT | Mod: PO | Performed by: INTERNAL MEDICINE

## 2021-04-01 PROCEDURE — 85025 COMPLETE CBC W/AUTO DIFF WBC: CPT | Performed by: INTERNAL MEDICINE

## 2021-04-01 PROCEDURE — 99214 OFFICE O/P EST MOD 30 MIN: CPT | Mod: 95,,, | Performed by: INTERNAL MEDICINE

## 2021-04-01 PROCEDURE — 86160 COMPLEMENT ANTIGEN: CPT | Performed by: INTERNAL MEDICINE

## 2021-04-01 PROCEDURE — 86140 C-REACTIVE PROTEIN: CPT | Performed by: INTERNAL MEDICINE

## 2021-04-01 PROCEDURE — 80053 COMPREHEN METABOLIC PANEL: CPT | Performed by: INTERNAL MEDICINE

## 2021-04-01 PROCEDURE — 86225 DNA ANTIBODY NATIVE: CPT | Performed by: INTERNAL MEDICINE

## 2021-04-01 PROCEDURE — 1126F AMNT PAIN NOTED NONE PRSNT: CPT | Mod: ,,, | Performed by: INTERNAL MEDICINE

## 2021-04-01 PROCEDURE — 99214 PR OFFICE/OUTPT VISIT, EST, LEVL IV, 30-39 MIN: ICD-10-PCS | Mod: 95,,, | Performed by: INTERNAL MEDICINE

## 2021-04-01 PROCEDURE — 36415 COLL VENOUS BLD VENIPUNCTURE: CPT | Mod: PO | Performed by: INTERNAL MEDICINE

## 2021-04-01 PROCEDURE — 86160 COMPLEMENT ANTIGEN: CPT | Mod: 59 | Performed by: INTERNAL MEDICINE

## 2021-04-01 PROCEDURE — 1126F PR PAIN SEVERITY QUANTIFIED, NO PAIN PRESENT: ICD-10-PCS | Mod: ,,, | Performed by: INTERNAL MEDICINE

## 2021-04-01 RX ORDER — ARIPIPRAZOLE 5 MG/1
5 TABLET ORAL DAILY
COMMUNITY
Start: 2021-01-25 | End: 2021-04-01 | Stop reason: SDUPTHER

## 2021-04-01 ASSESSMENT — SYSTEMIC LUPUS ERYTHEMATOSUS DISEASE ACTIVITY INDEX (SLEDAI): TOTAL_SCORE: 0

## 2021-04-02 LAB
ALBUMIN SERPL BCP-MCNC: 3.8 G/DL (ref 3.5–5.2)
ALP SERPL-CCNC: 128 U/L (ref 55–135)
ALT SERPL W/O P-5'-P-CCNC: 25 U/L (ref 10–44)
ANION GAP SERPL CALC-SCNC: 14 MMOL/L (ref 8–16)
AST SERPL-CCNC: 18 U/L (ref 10–40)
BASOPHILS # BLD AUTO: 0.06 K/UL (ref 0–0.2)
BASOPHILS NFR BLD: 0.5 % (ref 0–1.9)
BILIRUB SERPL-MCNC: 0.2 MG/DL (ref 0.1–1)
BUN SERPL-MCNC: 8 MG/DL (ref 6–20)
C3 SERPL-MCNC: 195 MG/DL (ref 50–180)
C4 SERPL-MCNC: 30 MG/DL (ref 11–44)
CALCIUM SERPL-MCNC: 9.8 MG/DL (ref 8.7–10.5)
CHLORIDE SERPL-SCNC: 103 MMOL/L (ref 95–110)
CO2 SERPL-SCNC: 22 MMOL/L (ref 23–29)
CREAT SERPL-MCNC: 0.8 MG/DL (ref 0.5–1.4)
CRP SERPL-MCNC: 31.1 MG/L (ref 0–8.2)
DIFFERENTIAL METHOD: ABNORMAL
EOSINOPHIL # BLD AUTO: 0.1 K/UL (ref 0–0.5)
EOSINOPHIL NFR BLD: 0.6 % (ref 0–8)
ERYTHROCYTE [DISTWIDTH] IN BLOOD BY AUTOMATED COUNT: 12.7 % (ref 11.5–14.5)
EST. GFR  (AFRICAN AMERICAN): >60 ML/MIN/1.73 M^2
EST. GFR  (NON AFRICAN AMERICAN): >60 ML/MIN/1.73 M^2
GLUCOSE SERPL-MCNC: 79 MG/DL (ref 70–110)
HCT VFR BLD AUTO: 37.8 % (ref 37–48.5)
HGB BLD-MCNC: 11.9 G/DL (ref 12–16)
IMM GRANULOCYTES # BLD AUTO: 0.04 K/UL (ref 0–0.04)
IMM GRANULOCYTES NFR BLD AUTO: 0.3 % (ref 0–0.5)
LYMPHOCYTES # BLD AUTO: 2.2 K/UL (ref 1–4.8)
LYMPHOCYTES NFR BLD: 18.7 % (ref 18–48)
MCH RBC QN AUTO: 30.1 PG (ref 27–31)
MCHC RBC AUTO-ENTMCNC: 31.5 G/DL (ref 32–36)
MCV RBC AUTO: 96 FL (ref 82–98)
MONOCYTES # BLD AUTO: 0.4 K/UL (ref 0.3–1)
MONOCYTES NFR BLD: 3.8 % (ref 4–15)
NEUTROPHILS # BLD AUTO: 8.8 K/UL (ref 1.8–7.7)
NEUTROPHILS NFR BLD: 76.1 % (ref 38–73)
NRBC BLD-RTO: 0 /100 WBC
PLATELET # BLD AUTO: 470 K/UL (ref 150–450)
PMV BLD AUTO: 9.9 FL (ref 9.2–12.9)
POTASSIUM SERPL-SCNC: 4.1 MMOL/L (ref 3.5–5.1)
PROT SERPL-MCNC: 8.1 G/DL (ref 6–8.4)
RBC # BLD AUTO: 3.95 M/UL (ref 4–5.4)
SODIUM SERPL-SCNC: 139 MMOL/L (ref 136–145)
WBC # BLD AUTO: 11.61 K/UL (ref 3.9–12.7)

## 2021-04-05 LAB — DSDNA AB SER-ACNC: NORMAL [IU]/ML

## 2021-04-07 ENCOUNTER — OFFICE VISIT (OUTPATIENT)
Dept: PSYCHIATRY | Facility: CLINIC | Age: 41
End: 2021-04-07
Payer: COMMERCIAL

## 2021-04-07 DIAGNOSIS — F41.1 GENERALIZED ANXIETY DISORDER: Primary | ICD-10-CM

## 2021-04-07 DIAGNOSIS — F31.61 BIPOLAR DISORDER, CURRENT EPISODE MIXED, MILD: ICD-10-CM

## 2021-04-07 PROCEDURE — 90834 PR PSYCHOTHERAPY W/PATIENT, 45 MIN: ICD-10-PCS | Mod: 95,,, | Performed by: SOCIAL WORKER

## 2021-04-07 PROCEDURE — 90834 PSYTX W PT 45 MINUTES: CPT | Mod: 95,,, | Performed by: SOCIAL WORKER

## 2021-04-19 ENCOUNTER — PATIENT MESSAGE (OUTPATIENT)
Dept: RHEUMATOLOGY | Facility: CLINIC | Age: 41
End: 2021-04-19

## 2021-04-21 ENCOUNTER — OFFICE VISIT (OUTPATIENT)
Dept: PSYCHIATRY | Facility: CLINIC | Age: 41
End: 2021-04-21
Payer: COMMERCIAL

## 2021-04-21 DIAGNOSIS — F31.81 BIPOLAR II DISORDER, MOST RECENT EPISODE HYPOMANIC: ICD-10-CM

## 2021-04-21 DIAGNOSIS — F41.1 GENERALIZED ANXIETY DISORDER: Primary | ICD-10-CM

## 2021-04-21 PROCEDURE — 90834 PR PSYCHOTHERAPY W/PATIENT, 45 MIN: ICD-10-PCS | Mod: 95,,, | Performed by: SOCIAL WORKER

## 2021-04-21 PROCEDURE — 90834 PSYTX W PT 45 MINUTES: CPT | Mod: 95,,, | Performed by: SOCIAL WORKER

## 2021-04-26 ENCOUNTER — LAB VISIT (OUTPATIENT)
Dept: LAB | Facility: HOSPITAL | Age: 41
End: 2021-04-26
Attending: INTERNAL MEDICINE
Payer: COMMERCIAL

## 2021-04-26 DIAGNOSIS — R79.89 ELEVATED TSH: ICD-10-CM

## 2021-04-26 LAB
T4 FREE SERPL-MCNC: 1.03 NG/DL (ref 0.71–1.51)
TSH SERPL DL<=0.005 MIU/L-ACNC: 5.49 UIU/ML (ref 0.4–4)

## 2021-04-26 PROCEDURE — 36415 COLL VENOUS BLD VENIPUNCTURE: CPT | Mod: PO | Performed by: INTERNAL MEDICINE

## 2021-04-26 PROCEDURE — 84443 ASSAY THYROID STIM HORMONE: CPT | Performed by: INTERNAL MEDICINE

## 2021-04-26 PROCEDURE — 84439 ASSAY OF FREE THYROXINE: CPT | Performed by: INTERNAL MEDICINE

## 2021-05-04 ENCOUNTER — OFFICE VISIT (OUTPATIENT)
Dept: ENDOCRINOLOGY | Facility: CLINIC | Age: 41
End: 2021-05-04
Payer: COMMERCIAL

## 2021-05-04 VITALS
WEIGHT: 132.69 LBS | HEART RATE: 66 BPM | HEIGHT: 59 IN | TEMPERATURE: 98 F | DIASTOLIC BLOOD PRESSURE: 70 MMHG | OXYGEN SATURATION: 97 % | BODY MASS INDEX: 26.75 KG/M2 | SYSTOLIC BLOOD PRESSURE: 108 MMHG

## 2021-05-04 DIAGNOSIS — E03.8 SUBCLINICAL HYPOTHYROIDISM: Primary | ICD-10-CM

## 2021-05-04 PROCEDURE — 99214 PR OFFICE/OUTPT VISIT, EST, LEVL IV, 30-39 MIN: ICD-10-PCS | Mod: S$GLB,,, | Performed by: INTERNAL MEDICINE

## 2021-05-04 PROCEDURE — 1126F PR PAIN SEVERITY QUANTIFIED, NO PAIN PRESENT: ICD-10-PCS | Mod: S$GLB,,, | Performed by: INTERNAL MEDICINE

## 2021-05-04 PROCEDURE — 99214 OFFICE O/P EST MOD 30 MIN: CPT | Mod: S$GLB,,, | Performed by: INTERNAL MEDICINE

## 2021-05-04 PROCEDURE — 3008F BODY MASS INDEX DOCD: CPT | Mod: CPTII,S$GLB,, | Performed by: INTERNAL MEDICINE

## 2021-05-04 PROCEDURE — 3008F PR BODY MASS INDEX (BMI) DOCUMENTED: ICD-10-PCS | Mod: CPTII,S$GLB,, | Performed by: INTERNAL MEDICINE

## 2021-05-04 PROCEDURE — 99999 PR PBB SHADOW E&M-EST. PATIENT-LVL V: CPT | Mod: PBBFAC,,, | Performed by: INTERNAL MEDICINE

## 2021-05-04 PROCEDURE — 99999 PR PBB SHADOW E&M-EST. PATIENT-LVL V: ICD-10-PCS | Mod: PBBFAC,,, | Performed by: INTERNAL MEDICINE

## 2021-05-04 PROCEDURE — 1126F AMNT PAIN NOTED NONE PRSNT: CPT | Mod: S$GLB,,, | Performed by: INTERNAL MEDICINE

## 2021-05-04 RX ORDER — LEVOTHYROXINE SODIUM 50 UG/1
50 TABLET ORAL
Qty: 30 TABLET | Refills: 3 | Status: SHIPPED | OUTPATIENT
Start: 2021-05-04 | End: 2021-08-23

## 2021-05-31 ENCOUNTER — OFFICE VISIT (OUTPATIENT)
Dept: PSYCHIATRY | Facility: CLINIC | Age: 41
End: 2021-05-31
Payer: COMMERCIAL

## 2021-05-31 DIAGNOSIS — F31.81 BIPOLAR II DISORDER, MOST RECENT EPISODE HYPOMANIC: Primary | ICD-10-CM

## 2021-05-31 DIAGNOSIS — F41.1 GAD (GENERALIZED ANXIETY DISORDER): ICD-10-CM

## 2021-05-31 DIAGNOSIS — G47.00 INSOMNIA, UNSPECIFIED TYPE: ICD-10-CM

## 2021-05-31 PROCEDURE — 99214 OFFICE O/P EST MOD 30 MIN: CPT | Mod: 95,,, | Performed by: NURSE PRACTITIONER

## 2021-05-31 PROCEDURE — 90833 PSYTX W PT W E/M 30 MIN: CPT | Mod: 95,,, | Performed by: NURSE PRACTITIONER

## 2021-05-31 PROCEDURE — 90833 PR PSYCHOTHERAPY W/PATIENT W/E&M, 30 MIN (ADD ON): ICD-10-PCS | Mod: 95,,, | Performed by: NURSE PRACTITIONER

## 2021-05-31 PROCEDURE — 99214 PR OFFICE/OUTPT VISIT, EST, LEVL IV, 30-39 MIN: ICD-10-PCS | Mod: 95,,, | Performed by: NURSE PRACTITIONER

## 2021-05-31 RX ORDER — ZOLPIDEM TARTRATE 12.5 MG/1
12.5 TABLET, FILM COATED, EXTENDED RELEASE ORAL NIGHTLY PRN
Qty: 30 TABLET | Refills: 2 | Status: SHIPPED | OUTPATIENT
Start: 2021-05-31 | End: 2021-08-27 | Stop reason: SDUPTHER

## 2021-05-31 RX ORDER — ARIPIPRAZOLE 10 MG/1
10 TABLET ORAL DAILY
Qty: 30 TABLET | Refills: 5 | Status: SHIPPED | OUTPATIENT
Start: 2021-05-31 | End: 2021-07-29

## 2021-07-02 ENCOUNTER — OFFICE VISIT (OUTPATIENT)
Dept: PSYCHIATRY | Facility: CLINIC | Age: 41
End: 2021-07-02
Payer: COMMERCIAL

## 2021-07-02 DIAGNOSIS — F41.1 GAD (GENERALIZED ANXIETY DISORDER): ICD-10-CM

## 2021-07-02 DIAGNOSIS — F31.61 BIPOLAR DISORDER, CURRENT EPISODE MIXED, MILD: Primary | ICD-10-CM

## 2021-07-02 PROCEDURE — 90834 PR PSYCHOTHERAPY W/PATIENT, 45 MIN: ICD-10-PCS | Mod: 95,,, | Performed by: SOCIAL WORKER

## 2021-07-02 PROCEDURE — 90834 PSYTX W PT 45 MINUTES: CPT | Mod: 95,,, | Performed by: SOCIAL WORKER

## 2021-07-23 ENCOUNTER — OFFICE VISIT (OUTPATIENT)
Dept: PSYCHIATRY | Facility: CLINIC | Age: 41
End: 2021-07-23
Payer: COMMERCIAL

## 2021-07-23 DIAGNOSIS — F41.1 GAD (GENERALIZED ANXIETY DISORDER): Primary | ICD-10-CM

## 2021-07-23 DIAGNOSIS — F31.61 BIPOLAR DISORDER, CURRENT EPISODE MIXED, MILD: ICD-10-CM

## 2021-07-23 PROCEDURE — 90837 PSYTX W PT 60 MINUTES: CPT | Mod: 95,,, | Performed by: SOCIAL WORKER

## 2021-07-23 PROCEDURE — 3044F PR MOST RECENT HEMOGLOBIN A1C LEVEL <7.0%: ICD-10-PCS | Mod: CPTII,,, | Performed by: SOCIAL WORKER

## 2021-07-23 PROCEDURE — 90837 PR PSYCHOTHERAPY W/PATIENT, 60 MIN: ICD-10-PCS | Mod: 95,,, | Performed by: SOCIAL WORKER

## 2021-07-23 PROCEDURE — 3044F HG A1C LEVEL LT 7.0%: CPT | Mod: CPTII,,, | Performed by: SOCIAL WORKER

## 2021-07-28 ENCOUNTER — LAB VISIT (OUTPATIENT)
Dept: LAB | Facility: HOSPITAL | Age: 41
End: 2021-07-28
Attending: INTERNAL MEDICINE
Payer: COMMERCIAL

## 2021-07-28 DIAGNOSIS — E03.8 SUBCLINICAL HYPOTHYROIDISM: ICD-10-CM

## 2021-07-28 PROCEDURE — 84443 ASSAY THYROID STIM HORMONE: CPT | Performed by: INTERNAL MEDICINE

## 2021-07-28 PROCEDURE — 84439 ASSAY OF FREE THYROXINE: CPT | Performed by: INTERNAL MEDICINE

## 2021-07-28 PROCEDURE — 36415 COLL VENOUS BLD VENIPUNCTURE: CPT | Mod: PO | Performed by: INTERNAL MEDICINE

## 2021-07-29 LAB
T4 FREE SERPL-MCNC: 1.09 NG/DL (ref 0.71–1.51)
TSH SERPL DL<=0.005 MIU/L-ACNC: 2.27 UIU/ML (ref 0.4–4)

## 2021-08-05 ENCOUNTER — OFFICE VISIT (OUTPATIENT)
Dept: PSYCHIATRY | Facility: CLINIC | Age: 41
End: 2021-08-05
Payer: COMMERCIAL

## 2021-08-05 DIAGNOSIS — F41.1 GAD (GENERALIZED ANXIETY DISORDER): Primary | ICD-10-CM

## 2021-08-05 DIAGNOSIS — F31.81 BIPOLAR II DISORDER, MILD, DEPRESSED, WITH ANXIOUS DISTRESS: ICD-10-CM

## 2021-08-05 PROCEDURE — 3044F HG A1C LEVEL LT 7.0%: CPT | Mod: CPTII,,, | Performed by: SOCIAL WORKER

## 2021-08-05 PROCEDURE — 3044F PR MOST RECENT HEMOGLOBIN A1C LEVEL <7.0%: ICD-10-PCS | Mod: CPTII,,, | Performed by: SOCIAL WORKER

## 2021-08-05 PROCEDURE — 90834 PSYTX W PT 45 MINUTES: CPT | Mod: 95,,, | Performed by: SOCIAL WORKER

## 2021-08-05 PROCEDURE — 90834 PR PSYCHOTHERAPY W/PATIENT, 45 MIN: ICD-10-PCS | Mod: 95,,, | Performed by: SOCIAL WORKER

## 2021-08-06 DIAGNOSIS — E03.8 SUBCLINICAL HYPOTHYROIDISM: Primary | ICD-10-CM

## 2021-08-19 ENCOUNTER — OFFICE VISIT (OUTPATIENT)
Dept: PSYCHIATRY | Facility: CLINIC | Age: 41
End: 2021-08-19
Payer: COMMERCIAL

## 2021-08-19 DIAGNOSIS — F31.81 BIPOLAR II DISORDER, MILD, DEPRESSED, WITH ANXIOUS DISTRESS: Primary | ICD-10-CM

## 2021-08-19 PROCEDURE — 3044F HG A1C LEVEL LT 7.0%: CPT | Mod: CPTII,,, | Performed by: SOCIAL WORKER

## 2021-08-19 PROCEDURE — 90832 PSYTX W PT 30 MINUTES: CPT | Mod: 95,,, | Performed by: SOCIAL WORKER

## 2021-08-19 PROCEDURE — 3044F PR MOST RECENT HEMOGLOBIN A1C LEVEL <7.0%: ICD-10-PCS | Mod: CPTII,,, | Performed by: SOCIAL WORKER

## 2021-08-19 PROCEDURE — 90832 PR PSYCHOTHERAPY W/PATIENT, 30 MIN: ICD-10-PCS | Mod: 95,,, | Performed by: SOCIAL WORKER

## 2021-08-27 ENCOUNTER — OFFICE VISIT (OUTPATIENT)
Dept: PSYCHIATRY | Facility: CLINIC | Age: 41
End: 2021-08-27
Payer: COMMERCIAL

## 2021-08-27 DIAGNOSIS — F41.1 GENERALIZED ANXIETY DISORDER: Primary | ICD-10-CM

## 2021-08-27 PROCEDURE — 99214 PR OFFICE/OUTPT VISIT, EST, LEVL IV, 30-39 MIN: ICD-10-PCS | Mod: 95,,, | Performed by: NURSE PRACTITIONER

## 2021-08-27 PROCEDURE — 99214 OFFICE O/P EST MOD 30 MIN: CPT | Mod: 95,,, | Performed by: NURSE PRACTITIONER

## 2021-08-27 PROCEDURE — 90833 PR PSYCHOTHERAPY W/PATIENT W/E&M, 30 MIN (ADD ON): ICD-10-PCS | Mod: 95,,, | Performed by: NURSE PRACTITIONER

## 2021-08-27 PROCEDURE — 3044F HG A1C LEVEL LT 7.0%: CPT | Mod: CPTII,,, | Performed by: NURSE PRACTITIONER

## 2021-08-27 PROCEDURE — 1160F PR REVIEW ALL MEDS BY PRESCRIBER/CLIN PHARMACIST DOCUMENTED: ICD-10-PCS | Mod: CPTII,,, | Performed by: NURSE PRACTITIONER

## 2021-08-27 PROCEDURE — 90833 PSYTX W PT W E/M 30 MIN: CPT | Mod: 95,,, | Performed by: NURSE PRACTITIONER

## 2021-08-27 PROCEDURE — 1160F RVW MEDS BY RX/DR IN RCRD: CPT | Mod: CPTII,,, | Performed by: NURSE PRACTITIONER

## 2021-08-27 PROCEDURE — 1159F MED LIST DOCD IN RCRD: CPT | Mod: CPTII,,, | Performed by: NURSE PRACTITIONER

## 2021-08-27 PROCEDURE — 1159F PR MEDICATION LIST DOCUMENTED IN MEDICAL RECORD: ICD-10-PCS | Mod: CPTII,,, | Performed by: NURSE PRACTITIONER

## 2021-08-27 PROCEDURE — 3044F PR MOST RECENT HEMOGLOBIN A1C LEVEL <7.0%: ICD-10-PCS | Mod: CPTII,,, | Performed by: NURSE PRACTITIONER

## 2021-09-08 ENCOUNTER — TELEPHONE (OUTPATIENT)
Dept: FAMILY MEDICINE | Facility: CLINIC | Age: 41
End: 2021-09-08

## 2021-09-08 ENCOUNTER — OFFICE VISIT (OUTPATIENT)
Dept: FAMILY MEDICINE | Facility: CLINIC | Age: 41
End: 2021-09-08
Payer: COMMERCIAL

## 2021-09-08 DIAGNOSIS — F31.32 BIPOLAR AFFECTIVE DISORDER, CURRENTLY DEPRESSED, MODERATE: ICD-10-CM

## 2021-09-08 DIAGNOSIS — E03.8 SUBCLINICAL HYPOTHYROIDISM: ICD-10-CM

## 2021-09-08 DIAGNOSIS — G47.01 INSOMNIA DUE TO MEDICAL CONDITION: ICD-10-CM

## 2021-09-08 DIAGNOSIS — M79.7 FIBROMYALGIA: Primary | ICD-10-CM

## 2021-09-08 PROCEDURE — 3044F HG A1C LEVEL LT 7.0%: CPT | Mod: CPTII,,, | Performed by: INTERNAL MEDICINE

## 2021-09-08 PROCEDURE — 99213 OFFICE O/P EST LOW 20 MIN: CPT | Mod: 95,,, | Performed by: INTERNAL MEDICINE

## 2021-09-08 PROCEDURE — 99213 PR OFFICE/OUTPT VISIT, EST, LEVL III, 20-29 MIN: ICD-10-PCS | Mod: 95,,, | Performed by: INTERNAL MEDICINE

## 2021-09-08 PROCEDURE — 1159F PR MEDICATION LIST DOCUMENTED IN MEDICAL RECORD: ICD-10-PCS | Mod: CPTII,,, | Performed by: INTERNAL MEDICINE

## 2021-09-08 PROCEDURE — 1159F MED LIST DOCD IN RCRD: CPT | Mod: CPTII,,, | Performed by: INTERNAL MEDICINE

## 2021-09-08 PROCEDURE — 1160F RVW MEDS BY RX/DR IN RCRD: CPT | Mod: CPTII,,, | Performed by: INTERNAL MEDICINE

## 2021-09-08 PROCEDURE — 1160F PR REVIEW ALL MEDS BY PRESCRIBER/CLIN PHARMACIST DOCUMENTED: ICD-10-PCS | Mod: CPTII,,, | Performed by: INTERNAL MEDICINE

## 2021-09-08 PROCEDURE — 3044F PR MOST RECENT HEMOGLOBIN A1C LEVEL <7.0%: ICD-10-PCS | Mod: CPTII,,, | Performed by: INTERNAL MEDICINE

## 2021-09-08 RX ORDER — ZOLPIDEM TARTRATE 12.5 MG/1
12.5 TABLET, FILM COATED, EXTENDED RELEASE ORAL NIGHTLY PRN
Qty: 30 TABLET | Refills: 2 | Status: SHIPPED | OUTPATIENT
Start: 2021-09-08 | End: 2021-11-04 | Stop reason: SDUPTHER

## 2021-09-08 RX ORDER — DULOXETIN HYDROCHLORIDE 60 MG/1
60 CAPSULE, DELAYED RELEASE ORAL 2 TIMES DAILY
Qty: 180 CAPSULE | Refills: 1 | Status: SHIPPED | OUTPATIENT
Start: 2021-09-08 | End: 2021-10-04

## 2021-09-09 ENCOUNTER — PATIENT OUTREACH (OUTPATIENT)
Dept: ADMINISTRATIVE | Facility: OTHER | Age: 41
End: 2021-09-09

## 2021-09-10 ENCOUNTER — OFFICE VISIT (OUTPATIENT)
Dept: DERMATOLOGY | Facility: CLINIC | Age: 41
End: 2021-09-10
Payer: COMMERCIAL

## 2021-09-10 DIAGNOSIS — L70.9 ADULT ACNE: ICD-10-CM

## 2021-09-10 DIAGNOSIS — L81.1 MELASMA: Primary | ICD-10-CM

## 2021-09-10 PROCEDURE — 99999 PR PBB SHADOW E&M-EST. PATIENT-LVL III: CPT | Mod: PBBFAC,,, | Performed by: DERMATOLOGY

## 2021-09-10 PROCEDURE — 99214 OFFICE O/P EST MOD 30 MIN: CPT | Mod: S$GLB,,, | Performed by: DERMATOLOGY

## 2021-09-10 PROCEDURE — 1159F PR MEDICATION LIST DOCUMENTED IN MEDICAL RECORD: ICD-10-PCS | Mod: CPTII,S$GLB,, | Performed by: DERMATOLOGY

## 2021-09-10 PROCEDURE — 99999 PR PBB SHADOW E&M-EST. PATIENT-LVL III: ICD-10-PCS | Mod: PBBFAC,,, | Performed by: DERMATOLOGY

## 2021-09-10 PROCEDURE — 99214 PR OFFICE/OUTPT VISIT, EST, LEVL IV, 30-39 MIN: ICD-10-PCS | Mod: S$GLB,,, | Performed by: DERMATOLOGY

## 2021-09-10 PROCEDURE — 1160F PR REVIEW ALL MEDS BY PRESCRIBER/CLIN PHARMACIST DOCUMENTED: ICD-10-PCS | Mod: CPTII,S$GLB,, | Performed by: DERMATOLOGY

## 2021-09-10 PROCEDURE — 1160F RVW MEDS BY RX/DR IN RCRD: CPT | Mod: CPTII,S$GLB,, | Performed by: DERMATOLOGY

## 2021-09-10 PROCEDURE — 3044F HG A1C LEVEL LT 7.0%: CPT | Mod: CPTII,S$GLB,, | Performed by: DERMATOLOGY

## 2021-09-10 PROCEDURE — 3044F PR MOST RECENT HEMOGLOBIN A1C LEVEL <7.0%: ICD-10-PCS | Mod: CPTII,S$GLB,, | Performed by: DERMATOLOGY

## 2021-09-10 PROCEDURE — 1159F MED LIST DOCD IN RCRD: CPT | Mod: CPTII,S$GLB,, | Performed by: DERMATOLOGY

## 2021-09-10 RX ORDER — SPIRONOLACTONE 50 MG/1
TABLET, FILM COATED ORAL
Qty: 30 TABLET | Refills: 11 | Status: SHIPPED | OUTPATIENT
Start: 2021-09-10 | End: 2022-08-15

## 2021-09-10 RX ORDER — SULFACETAMIDE SODIUM 100 MG/ML
LOTION TOPICAL
Qty: 118 ML | Refills: 11 | Status: SHIPPED | OUTPATIENT
Start: 2021-09-10 | End: 2022-04-06

## 2021-09-15 ENCOUNTER — OFFICE VISIT (OUTPATIENT)
Dept: OBSTETRICS AND GYNECOLOGY | Facility: CLINIC | Age: 41
End: 2021-09-15
Payer: COMMERCIAL

## 2021-09-15 VITALS
SYSTOLIC BLOOD PRESSURE: 122 MMHG | DIASTOLIC BLOOD PRESSURE: 72 MMHG | WEIGHT: 135.81 LBS | BODY MASS INDEX: 27.43 KG/M2

## 2021-09-15 DIAGNOSIS — Z12.4 ENCOUNTER FOR PAPANICOLAOU SMEAR FOR CERVICAL CANCER SCREENING: ICD-10-CM

## 2021-09-15 DIAGNOSIS — Z30.41 ORAL CONTRACEPTIVE PILL SURVEILLANCE: ICD-10-CM

## 2021-09-15 DIAGNOSIS — Z01.419 WELL WOMAN EXAM WITH ROUTINE GYNECOLOGICAL EXAM: Primary | ICD-10-CM

## 2021-09-15 PROCEDURE — 87624 HPV HI-RISK TYP POOLED RSLT: CPT | Performed by: OBSTETRICS & GYNECOLOGY

## 2021-09-15 PROCEDURE — 3078F DIAST BP <80 MM HG: CPT | Mod: CPTII,S$GLB,, | Performed by: OBSTETRICS & GYNECOLOGY

## 2021-09-15 PROCEDURE — 3044F HG A1C LEVEL LT 7.0%: CPT | Mod: CPTII,S$GLB,, | Performed by: OBSTETRICS & GYNECOLOGY

## 2021-09-15 PROCEDURE — 3078F PR MOST RECENT DIASTOLIC BLOOD PRESSURE < 80 MM HG: ICD-10-PCS | Mod: CPTII,S$GLB,, | Performed by: OBSTETRICS & GYNECOLOGY

## 2021-09-15 PROCEDURE — 1159F PR MEDICATION LIST DOCUMENTED IN MEDICAL RECORD: ICD-10-PCS | Mod: CPTII,S$GLB,, | Performed by: OBSTETRICS & GYNECOLOGY

## 2021-09-15 PROCEDURE — 1159F MED LIST DOCD IN RCRD: CPT | Mod: CPTII,S$GLB,, | Performed by: OBSTETRICS & GYNECOLOGY

## 2021-09-15 PROCEDURE — 88175 CYTOPATH C/V AUTO FLUID REDO: CPT | Performed by: OBSTETRICS & GYNECOLOGY

## 2021-09-15 PROCEDURE — 99396 PREV VISIT EST AGE 40-64: CPT | Mod: S$GLB,,, | Performed by: OBSTETRICS & GYNECOLOGY

## 2021-09-15 PROCEDURE — 99999 PR PBB SHADOW E&M-EST. PATIENT-LVL III: ICD-10-PCS | Mod: PBBFAC,,, | Performed by: OBSTETRICS & GYNECOLOGY

## 2021-09-15 PROCEDURE — 3074F SYST BP LT 130 MM HG: CPT | Mod: CPTII,S$GLB,, | Performed by: OBSTETRICS & GYNECOLOGY

## 2021-09-15 PROCEDURE — 3074F PR MOST RECENT SYSTOLIC BLOOD PRESSURE < 130 MM HG: ICD-10-PCS | Mod: CPTII,S$GLB,, | Performed by: OBSTETRICS & GYNECOLOGY

## 2021-09-15 PROCEDURE — 3044F PR MOST RECENT HEMOGLOBIN A1C LEVEL <7.0%: ICD-10-PCS | Mod: CPTII,S$GLB,, | Performed by: OBSTETRICS & GYNECOLOGY

## 2021-09-15 PROCEDURE — 3008F PR BODY MASS INDEX (BMI) DOCUMENTED: ICD-10-PCS | Mod: CPTII,S$GLB,, | Performed by: OBSTETRICS & GYNECOLOGY

## 2021-09-15 PROCEDURE — 99396 PR PREVENTIVE VISIT,EST,40-64: ICD-10-PCS | Mod: S$GLB,,, | Performed by: OBSTETRICS & GYNECOLOGY

## 2021-09-15 PROCEDURE — 3008F BODY MASS INDEX DOCD: CPT | Mod: CPTII,S$GLB,, | Performed by: OBSTETRICS & GYNECOLOGY

## 2021-09-15 PROCEDURE — 99999 PR PBB SHADOW E&M-EST. PATIENT-LVL III: CPT | Mod: PBBFAC,,, | Performed by: OBSTETRICS & GYNECOLOGY

## 2021-09-15 RX ORDER — DROSPIRENONE AND ETHINYL ESTRADIOL 0.02-3(28)
1 KIT ORAL DAILY
Qty: 84 TABLET | Refills: 4 | Status: SHIPPED | OUTPATIENT
Start: 2021-09-15 | End: 2021-10-31 | Stop reason: SDUPTHER

## 2021-09-20 LAB
FINAL PATHOLOGIC DIAGNOSIS: NORMAL
HPV HR 12 DNA SPEC QL NAA+PROBE: NEGATIVE
HPV16 AG SPEC QL: NEGATIVE
HPV18 DNA SPEC QL NAA+PROBE: NEGATIVE
Lab: NORMAL

## 2021-10-01 ENCOUNTER — OFFICE VISIT (OUTPATIENT)
Dept: PSYCHIATRY | Facility: CLINIC | Age: 41
End: 2021-10-01
Payer: COMMERCIAL

## 2021-10-01 DIAGNOSIS — F41.1 GENERALIZED ANXIETY DISORDER: Primary | ICD-10-CM

## 2021-10-01 DIAGNOSIS — F31.31 BIPOLAR AFFECTIVE DISORDER, CURRENTLY DEPRESSED, MILD: ICD-10-CM

## 2021-10-01 PROCEDURE — 3044F PR MOST RECENT HEMOGLOBIN A1C LEVEL <7.0%: ICD-10-PCS | Mod: CPTII,95,, | Performed by: SOCIAL WORKER

## 2021-10-01 PROCEDURE — 3044F HG A1C LEVEL LT 7.0%: CPT | Mod: CPTII,95,, | Performed by: SOCIAL WORKER

## 2021-10-01 PROCEDURE — 90837 PSYTX W PT 60 MINUTES: CPT | Mod: 95,,, | Performed by: SOCIAL WORKER

## 2021-10-01 PROCEDURE — 90837 PR PSYCHOTHERAPY W/PATIENT, 60 MIN: ICD-10-PCS | Mod: 95,,, | Performed by: SOCIAL WORKER

## 2021-10-04 ENCOUNTER — OFFICE VISIT (OUTPATIENT)
Dept: RHEUMATOLOGY | Facility: CLINIC | Age: 41
End: 2021-10-04
Payer: COMMERCIAL

## 2021-10-04 DIAGNOSIS — M32.9 SYSTEMIC LUPUS ERYTHEMATOSUS, UNSPECIFIED SLE TYPE, UNSPECIFIED ORGAN INVOLVEMENT STATUS: Primary | ICD-10-CM

## 2021-10-04 PROCEDURE — 3044F PR MOST RECENT HEMOGLOBIN A1C LEVEL <7.0%: ICD-10-PCS | Mod: CPTII,95,, | Performed by: INTERNAL MEDICINE

## 2021-10-04 PROCEDURE — 99214 PR OFFICE/OUTPT VISIT, EST, LEVL IV, 30-39 MIN: ICD-10-PCS | Mod: 95,,, | Performed by: INTERNAL MEDICINE

## 2021-10-04 PROCEDURE — 99214 OFFICE O/P EST MOD 30 MIN: CPT | Mod: 95,,, | Performed by: INTERNAL MEDICINE

## 2021-10-04 PROCEDURE — 3044F HG A1C LEVEL LT 7.0%: CPT | Mod: CPTII,95,, | Performed by: INTERNAL MEDICINE

## 2021-10-04 RX ORDER — DULOXETIN HYDROCHLORIDE 60 MG/1
60 CAPSULE, DELAYED RELEASE ORAL 2 TIMES DAILY
Qty: 180 CAPSULE | Refills: 1 | Status: SHIPPED | OUTPATIENT
Start: 2021-10-04 | End: 2022-03-14 | Stop reason: SDUPTHER

## 2021-10-20 ENCOUNTER — OFFICE VISIT (OUTPATIENT)
Dept: PSYCHIATRY | Facility: CLINIC | Age: 41
End: 2021-10-20
Payer: COMMERCIAL

## 2021-10-20 DIAGNOSIS — F41.1 GENERALIZED ANXIETY DISORDER: Primary | ICD-10-CM

## 2021-10-20 DIAGNOSIS — F31.31 BIPOLAR AFFECTIVE DISORDER, CURRENTLY DEPRESSED, MILD: ICD-10-CM

## 2021-10-20 PROCEDURE — 3044F HG A1C LEVEL LT 7.0%: CPT | Mod: CPTII,95,, | Performed by: SOCIAL WORKER

## 2021-10-20 PROCEDURE — 90834 PSYTX W PT 45 MINUTES: CPT | Mod: 95,,, | Performed by: SOCIAL WORKER

## 2021-10-20 PROCEDURE — 90834 PR PSYCHOTHERAPY W/PATIENT, 45 MIN: ICD-10-PCS | Mod: 95,,, | Performed by: SOCIAL WORKER

## 2021-10-20 PROCEDURE — 3044F PR MOST RECENT HEMOGLOBIN A1C LEVEL <7.0%: ICD-10-PCS | Mod: CPTII,95,, | Performed by: SOCIAL WORKER

## 2021-11-03 ENCOUNTER — PATIENT MESSAGE (OUTPATIENT)
Dept: PSYCHIATRY | Facility: CLINIC | Age: 41
End: 2021-11-03
Payer: COMMERCIAL

## 2021-11-18 ENCOUNTER — OFFICE VISIT (OUTPATIENT)
Dept: PSYCHIATRY | Facility: CLINIC | Age: 41
End: 2021-11-18
Payer: COMMERCIAL

## 2021-11-18 DIAGNOSIS — F41.1 GENERALIZED ANXIETY DISORDER: Primary | ICD-10-CM

## 2021-11-18 DIAGNOSIS — F31.31 BIPOLAR AFFECTIVE DISORDER, CURRENTLY DEPRESSED, MILD: ICD-10-CM

## 2021-11-18 PROCEDURE — 3044F PR MOST RECENT HEMOGLOBIN A1C LEVEL <7.0%: ICD-10-PCS | Mod: CPTII,95,, | Performed by: SOCIAL WORKER

## 2021-11-18 PROCEDURE — 3044F HG A1C LEVEL LT 7.0%: CPT | Mod: CPTII,95,, | Performed by: SOCIAL WORKER

## 2021-11-18 PROCEDURE — 90834 PSYTX W PT 45 MINUTES: CPT | Mod: 95,,, | Performed by: SOCIAL WORKER

## 2021-11-18 PROCEDURE — 90834 PR PSYCHOTHERAPY W/PATIENT, 45 MIN: ICD-10-PCS | Mod: 95,,, | Performed by: SOCIAL WORKER

## 2021-11-22 ENCOUNTER — OFFICE VISIT (OUTPATIENT)
Dept: PSYCHIATRY | Facility: CLINIC | Age: 41
End: 2021-11-22
Payer: COMMERCIAL

## 2021-11-22 DIAGNOSIS — F41.1 GAD (GENERALIZED ANXIETY DISORDER): ICD-10-CM

## 2021-11-22 DIAGNOSIS — G47.00 INSOMNIA, UNSPECIFIED TYPE: ICD-10-CM

## 2021-11-22 DIAGNOSIS — F31.81 BIPOLAR II DISORDER, MILD, DEPRESSED, WITH ANXIOUS DISTRESS: Primary | ICD-10-CM

## 2021-11-22 DIAGNOSIS — F31.61 BIPOLAR DISORDER, CURRENT EPISODE MIXED, MILD: ICD-10-CM

## 2021-11-22 PROCEDURE — 99214 PR OFFICE/OUTPT VISIT, EST, LEVL IV, 30-39 MIN: ICD-10-PCS | Mod: 95,,, | Performed by: NURSE PRACTITIONER

## 2021-11-22 PROCEDURE — 99214 OFFICE O/P EST MOD 30 MIN: CPT | Mod: 95,,, | Performed by: NURSE PRACTITIONER

## 2021-11-22 PROCEDURE — 90833 PR PSYCHOTHERAPY W/PATIENT W/E&M, 30 MIN (ADD ON): ICD-10-PCS | Mod: 95,,, | Performed by: NURSE PRACTITIONER

## 2021-11-22 PROCEDURE — 90833 PSYTX W PT W E/M 30 MIN: CPT | Mod: 95,,, | Performed by: NURSE PRACTITIONER

## 2021-11-22 RX ORDER — FLUOXETINE 10 MG/1
10 CAPSULE ORAL DAILY
Qty: 30 CAPSULE | Refills: 2 | Status: SHIPPED | OUTPATIENT
Start: 2021-11-22 | End: 2022-02-21

## 2021-12-02 ENCOUNTER — LAB VISIT (OUTPATIENT)
Dept: LAB | Facility: HOSPITAL | Age: 41
End: 2021-12-02
Attending: INTERNAL MEDICINE
Payer: COMMERCIAL

## 2021-12-02 ENCOUNTER — OFFICE VISIT (OUTPATIENT)
Dept: ENDOCRINOLOGY | Facility: CLINIC | Age: 41
End: 2021-12-02
Payer: COMMERCIAL

## 2021-12-02 DIAGNOSIS — E03.8 SUBCLINICAL HYPOTHYROIDISM: Primary | ICD-10-CM

## 2021-12-02 DIAGNOSIS — E03.8 SUBCLINICAL HYPOTHYROIDISM: ICD-10-CM

## 2021-12-02 DIAGNOSIS — R53.83 FATIGUE, UNSPECIFIED TYPE: ICD-10-CM

## 2021-12-02 LAB
BASOPHILS # BLD AUTO: 0.07 K/UL (ref 0–0.2)
BASOPHILS NFR BLD: 0.8 % (ref 0–1.9)
DIFFERENTIAL METHOD: ABNORMAL
EOSINOPHIL # BLD AUTO: 0.1 K/UL (ref 0–0.5)
EOSINOPHIL NFR BLD: 1.6 % (ref 0–8)
ERYTHROCYTE [DISTWIDTH] IN BLOOD BY AUTOMATED COUNT: 12.5 % (ref 11.5–14.5)
FERRITIN SERPL-MCNC: 117 NG/ML (ref 20–300)
HCT VFR BLD AUTO: 34.9 % (ref 37–48.5)
HGB BLD-MCNC: 11.2 G/DL (ref 12–16)
IMM GRANULOCYTES # BLD AUTO: 0.01 K/UL (ref 0–0.04)
IMM GRANULOCYTES NFR BLD AUTO: 0.1 % (ref 0–0.5)
LYMPHOCYTES # BLD AUTO: 3 K/UL (ref 1–4.8)
LYMPHOCYTES NFR BLD: 35.3 % (ref 18–48)
MCH RBC QN AUTO: 30.1 PG (ref 27–31)
MCHC RBC AUTO-ENTMCNC: 32.1 G/DL (ref 32–36)
MCV RBC AUTO: 94 FL (ref 82–98)
MONOCYTES # BLD AUTO: 0.4 K/UL (ref 0.3–1)
MONOCYTES NFR BLD: 5 % (ref 4–15)
NEUTROPHILS # BLD AUTO: 4.9 K/UL (ref 1.8–7.7)
NEUTROPHILS NFR BLD: 57.2 % (ref 38–73)
NRBC BLD-RTO: 0 /100 WBC
PLATELET # BLD AUTO: 450 K/UL (ref 150–450)
PMV BLD AUTO: 9.7 FL (ref 9.2–12.9)
RBC # BLD AUTO: 3.72 M/UL (ref 4–5.4)
T4 FREE SERPL-MCNC: 1.14 NG/DL (ref 0.71–1.51)
TSH SERPL DL<=0.005 MIU/L-ACNC: 1.79 UIU/ML (ref 0.4–4)
WBC # BLD AUTO: 8.58 K/UL (ref 3.9–12.7)

## 2021-12-02 PROCEDURE — 82728 ASSAY OF FERRITIN: CPT | Performed by: INTERNAL MEDICINE

## 2021-12-02 PROCEDURE — 36415 COLL VENOUS BLD VENIPUNCTURE: CPT | Mod: PO | Performed by: INTERNAL MEDICINE

## 2021-12-02 PROCEDURE — 99214 OFFICE O/P EST MOD 30 MIN: CPT | Mod: 95,,, | Performed by: INTERNAL MEDICINE

## 2021-12-02 PROCEDURE — 84439 ASSAY OF FREE THYROXINE: CPT | Performed by: INTERNAL MEDICINE

## 2021-12-02 PROCEDURE — 85025 COMPLETE CBC W/AUTO DIFF WBC: CPT | Performed by: INTERNAL MEDICINE

## 2021-12-02 PROCEDURE — 84443 ASSAY THYROID STIM HORMONE: CPT | Performed by: INTERNAL MEDICINE

## 2021-12-02 PROCEDURE — 99214 PR OFFICE/OUTPT VISIT, EST, LEVL IV, 30-39 MIN: ICD-10-PCS | Mod: 95,,, | Performed by: INTERNAL MEDICINE

## 2021-12-08 ENCOUNTER — OFFICE VISIT (OUTPATIENT)
Dept: PSYCHIATRY | Facility: CLINIC | Age: 41
End: 2021-12-08
Payer: COMMERCIAL

## 2021-12-08 DIAGNOSIS — F31.81 BIPOLAR II DISORDER, MILD, DEPRESSED, WITH ANXIOUS DISTRESS: Primary | ICD-10-CM

## 2021-12-08 DIAGNOSIS — F41.9 ANXIETY: ICD-10-CM

## 2021-12-08 PROCEDURE — 99214 PR OFFICE/OUTPT VISIT, EST, LEVL IV, 30-39 MIN: ICD-10-PCS | Mod: 95,,, | Performed by: NURSE PRACTITIONER

## 2021-12-08 PROCEDURE — 99214 OFFICE O/P EST MOD 30 MIN: CPT | Mod: 95,,, | Performed by: NURSE PRACTITIONER

## 2021-12-08 RX ORDER — ARIPIPRAZOLE 10 MG/1
10 TABLET ORAL DAILY
Qty: 30 TABLET | Refills: 5 | Status: SHIPPED | OUTPATIENT
Start: 2021-12-08 | End: 2022-05-10 | Stop reason: SDUPTHER

## 2021-12-09 ENCOUNTER — OFFICE VISIT (OUTPATIENT)
Dept: PSYCHIATRY | Facility: CLINIC | Age: 41
End: 2021-12-09
Payer: COMMERCIAL

## 2021-12-09 DIAGNOSIS — F41.9 ANXIETY: Primary | ICD-10-CM

## 2021-12-09 DIAGNOSIS — F31.81 BIPOLAR II DISORDER, MOST RECENT EPISODE HYPOMANIC: ICD-10-CM

## 2021-12-09 PROCEDURE — 90834 PSYTX W PT 45 MINUTES: CPT | Mod: 95,,, | Performed by: SOCIAL WORKER

## 2021-12-09 PROCEDURE — 90834 PR PSYCHOTHERAPY W/PATIENT, 45 MIN: ICD-10-PCS | Mod: 95,,, | Performed by: SOCIAL WORKER

## 2021-12-28 ENCOUNTER — PATIENT MESSAGE (OUTPATIENT)
Dept: ADMINISTRATIVE | Facility: OTHER | Age: 41
End: 2021-12-28
Payer: COMMERCIAL

## 2021-12-29 ENCOUNTER — OFFICE VISIT (OUTPATIENT)
Dept: PSYCHIATRY | Facility: CLINIC | Age: 41
End: 2021-12-29
Payer: COMMERCIAL

## 2021-12-29 DIAGNOSIS — F41.9 ANXIETY: ICD-10-CM

## 2021-12-29 DIAGNOSIS — F31.81 BIPOLAR II DISORDER, MOST RECENT EPISODE HYPOMANIC: Primary | ICD-10-CM

## 2021-12-29 PROCEDURE — 3044F HG A1C LEVEL LT 7.0%: CPT | Mod: CPTII,95,, | Performed by: SOCIAL WORKER

## 2021-12-29 PROCEDURE — 3044F PR MOST RECENT HEMOGLOBIN A1C LEVEL <7.0%: ICD-10-PCS | Mod: CPTII,95,, | Performed by: SOCIAL WORKER

## 2021-12-29 PROCEDURE — 90785 PR INTERACTIVE COMPLEXITY: ICD-10-PCS | Mod: 95,,, | Performed by: SOCIAL WORKER

## 2021-12-29 PROCEDURE — 90785 PSYTX COMPLEX INTERACTIVE: CPT | Mod: 95,,, | Performed by: SOCIAL WORKER

## 2021-12-29 PROCEDURE — 90834 PSYTX W PT 45 MINUTES: CPT | Mod: 95,,, | Performed by: SOCIAL WORKER

## 2021-12-29 PROCEDURE — 90834 PR PSYCHOTHERAPY W/PATIENT, 45 MIN: ICD-10-PCS | Mod: 95,,, | Performed by: SOCIAL WORKER

## 2022-01-11 ENCOUNTER — OFFICE VISIT (OUTPATIENT)
Dept: PSYCHIATRY | Facility: CLINIC | Age: 42
End: 2022-01-11
Payer: COMMERCIAL

## 2022-01-11 DIAGNOSIS — F41.9 ANXIETY: Primary | ICD-10-CM

## 2022-01-11 DIAGNOSIS — F31.61 BIPOLAR DISORDER, CURRENT EPISODE MIXED, MILD: ICD-10-CM

## 2022-01-11 PROCEDURE — 90834 PSYTX W PT 45 MINUTES: CPT | Mod: 95,,, | Performed by: SOCIAL WORKER

## 2022-01-11 PROCEDURE — 90834 PR PSYCHOTHERAPY W/PATIENT, 45 MIN: ICD-10-PCS | Mod: 95,,, | Performed by: SOCIAL WORKER

## 2022-01-11 NOTE — PROGRESS NOTES
Individual Psychotherapy (PhD/LCSW)    1/11/2022    The patient location is: home in living room ([facility])  The chief complaint leading to consultation is: anxiety, depression, mood    Visit type:  audio due to technical difficulties    Face to Face time with patient: 45  65 minutes of total time spent on the encounter, which includes face to face time and non-face to face time preparing to see the patient (eg, review of tests), Obtaining and/or reviewing separately obtained history, Documenting clinical information in the electronic or other health record, Independently interpreting results (not separately reported) and communicating results to the patient/family/caregiver, or Care coordination (not separately reported).       Each patient to whom he or she provides medical services by telemedicine is:  (1) informed of the relationship between the physician and patient and the respective role of any other health care provider with respect to management of the patient; and (2) notified that he or she may decline to receive medical services by telemedicine and may withdraw from such care at any time.      Therapeutic Intervention: Met with patient.      Outpatient - Insight oriented psychotherapy 45 min - CPT code 82051, Outpatient - Behavior modifying psychotherapy 45 min - CPT code 54058, Outpatient - Supportive psychotherapy 45 min - CPT Code 04648 and Outpatient - Interactive psychotherapy 45 min - CPT code 16978      Chief complaint/reason for encounter: depression, anxiety, Mood     Interval history and content of current session: History of present illness: Pt is a 39 yo  female who presents today for f/u via telemedicine.  Pt currently established with  Abel Miles N.P. Pt has a dx of Bipolar and often shows OCPD and borderline personality tendencies. Pt was started on Abilify and since then has had medication increased. Pt is also prescribed Prozac. Since increase pt reports improved  mood, sleep, and quality of life overall.      Pt presents via telemed. Pt continues to feel she has been doing well mentally. Pt notes that she still ruminates on mistakes she feels like she makes at work. Reviewed scenarios from the past few weeks. Pt has been doing more mantras and affirmations in the morning. Pt has not been impulsively spending, however notices that she has been bringing her cleaning habits to work. Pt also feels her relationship has been doing well. She also has been actively challenging her anxieties. Reviewed self care and positivity along with techniques for anxiety reduction and emotion regulation for symptom management. She continues to still struggle with the idea of perfectionism, however is improving on identifying realistic versus unrealistic thinking.  Provided platform for ongoing discussion, processing, support, and feedback. Pt fully engaged.  Pt remains receptive of psychotherapy. Assisted with scheduling f/u.       Treatment plan:  · Target symptoms: depression, anxiety , mood  · Why chosen therapy is appropriate versus another modality: relevant to diagnosis, patient responds to this modality, evidence based practice  · Outcome monitoring methods: self-report, observation  · Therapeutic intervention type: insight oriented psychotherapy, behavior modifying psychotherapy, supportive psychotherapy, interactive psychotherapy    Risk parameters:  Patient reports no suicidal ideation  Patient reports no homicidal ideation  Patient reports no self-injurious behavior  Patient reports no violent behavior    Verbal deficits: None    Patient's response to intervention:  The patient's response to intervention is accepting.    Progress toward goals and other mental status changes:    The patient's progress toward goals is good.       Diagnosis:     ICD-10-CM ICD-9-CM   1. Bipolar II disorder, most recent episode hypomanic  F31.81 296.89   2. Anxiety  F41.9 300.00       Plan:  individual  psychotherapy, working towards improved sleep health. Behavior modification practice. Journaling     Return to clinic: as scheduled     Length of Service (minutes): 45

## 2022-02-04 ENCOUNTER — OFFICE VISIT (OUTPATIENT)
Dept: PSYCHIATRY | Facility: CLINIC | Age: 42
End: 2022-02-04
Payer: COMMERCIAL

## 2022-02-04 ENCOUNTER — PATIENT MESSAGE (OUTPATIENT)
Dept: PSYCHIATRY | Facility: CLINIC | Age: 42
End: 2022-02-04
Payer: COMMERCIAL

## 2022-02-04 DIAGNOSIS — F31.62 BIPOLAR DISORDER, CURRENT EPISODE MIXED, MODERATE: Primary | ICD-10-CM

## 2022-02-04 DIAGNOSIS — F41.1 GENERALIZED ANXIETY DISORDER: ICD-10-CM

## 2022-02-04 PROCEDURE — 90834 PR PSYCHOTHERAPY W/PATIENT, 45 MIN: ICD-10-PCS | Mod: 95,,, | Performed by: SOCIAL WORKER

## 2022-02-04 PROCEDURE — 90834 PSYTX W PT 45 MINUTES: CPT | Mod: 95,,, | Performed by: SOCIAL WORKER

## 2022-02-04 NOTE — PROGRESS NOTES
Individual Psychotherapy (PhD/LCSW)    2/4/2022    The patient location is: home in living room ([facility])  The chief complaint leading to consultation is: anxiety, depression, mood    Visit type: audio visutal    Face to Face time with patient: 45  65 minutes of total time spent on the encounter, which includes face to face time and non-face to face time preparing to see the patient (eg, review of tests), Obtaining and/or reviewing separately obtained history, Documenting clinical information in the electronic or other health record, Independently interpreting results (not separately reported) and communicating results to the patient/family/caregiver, or Care coordination (not separately reported).       Each patient to whom he or she provides medical services by telemedicine is:  (1) informed of the relationship between the physician and patient and the respective role of any other health care provider with respect to management of the patient; and (2) notified that he or she may decline to receive medical services by telemedicine and may withdraw from such care at any time.      Therapeutic Intervention: Met with patient.      Outpatient - Insight oriented psychotherapy 45 min - CPT code 97596, Outpatient - Behavior modifying psychotherapy 45 min - CPT code 56055, Outpatient - Supportive psychotherapy 45 min - CPT Code 75385 and Outpatient - Interactive psychotherapy 45 min - CPT code 89847      Chief complaint/reason for encounter: depression, anxiety, Mood     Interval history and content of current session: History of present illness: Pt is a 41 yo  female who presents today for f/u via telemedicine.  Pt currently established with  Abel Miles N.P. Pt has a dx of Bipolar and often shows OCPD and borderline personality tendencies. Pt was started on Abilify and since then has had medication increased. Pt is also prescribed Prozac. Since increase pt reports improved mood, sleep, and quality  of life overall.      Pt presents via telemed. Checked in 15 minutes late.  Pt realys that she has been feeling overwhelmed within her new job. This week pt is feeling more exhausted and in a lower mood. Pt states she did not sleep at all the past weekend even with taking her medications. Since the weekend she has been getting 9 hours of sleep each night however still feels worn out. Dicussed possible swing into dysthymia after possible manic episode. Pt feels her anxiety that has been increased by her job is causing the shifts in mood.  She also is beginning to have memory issues again. She is not having any fibromyalgia flare ups. Discussed triggers and indications of mental health flare up. Pt acknowledges that she is having a high need for perfection and it is causing herself to strain mentally. Discussed possible part time. Pt also has been having some new issues with her 11 yo daughter after discovering she has been talking with people secretly online which concerned her a great deal. Pt relays that her daughter was expressing concerning metal health on there. She worries that she should be at home more with her daughter than at work. Discussed and reviewed stressors and concerns. Engaged in processing and re-framing. Pt is going to potentially look into a child therapist for her daughter. Reviewed anxiety and stress reduction methods that pt has found helpful in past.  Provided platform for discussion, processing, support, and feedback. Pt fully engaged.  Pt remains receptive of psychotherapy. Assisted with scheduling f/u.       Treatment plan:  · Target symptoms: depression, anxiety , mood  · Why chosen therapy is appropriate versus another modality: relevant to diagnosis, patient responds to this modality, evidence based practice  · Outcome monitoring methods: self-report, observation  · Therapeutic intervention type: insight oriented psychotherapy, behavior modifying psychotherapy, supportive  psychotherapy, interactive psychotherapy    Risk parameters:  Patient reports no suicidal ideation  Patient reports no homicidal ideation  Patient reports no self-injurious behavior  Patient reports no violent behavior    Verbal deficits: None    Patient's response to intervention:  The patient's response to intervention is accepting.    Progress toward goals and other mental status changes:    The patient's progress toward goals is good.       Diagnosis:     ICD-10-CM ICD-9-CM   1. Bipolar disorder, current episode mixed, moderate  F31.62 296.62   2. Generalized anxiety disorder  F41.1 300.02       Plan:  individual psychotherapy, conitnued med management through psychiatrist.     Return to clinic: as scheduled     Length of Service (minutes): 45

## 2022-02-07 RX ORDER — ZOLPIDEM TARTRATE 12.5 MG/1
12.5 TABLET, FILM COATED, EXTENDED RELEASE ORAL NIGHTLY PRN
Qty: 30 TABLET | Refills: 2 | Status: SHIPPED | OUTPATIENT
Start: 2022-02-07 | End: 2022-05-03 | Stop reason: SDUPTHER

## 2022-02-22 ENCOUNTER — OFFICE VISIT (OUTPATIENT)
Dept: PSYCHIATRY | Facility: CLINIC | Age: 42
End: 2022-02-22
Payer: COMMERCIAL

## 2022-02-22 DIAGNOSIS — F41.1 GENERALIZED ANXIETY DISORDER: ICD-10-CM

## 2022-02-22 DIAGNOSIS — F31.62 BIPOLAR DISORDER, CURRENT EPISODE MIXED, MODERATE: Primary | ICD-10-CM

## 2022-02-22 PROCEDURE — 99215 PR OFFICE/OUTPT VISIT, EST, LEVL V, 40-54 MIN: ICD-10-PCS | Mod: 95,,, | Performed by: NURSE PRACTITIONER

## 2022-02-22 PROCEDURE — 90833 PSYTX W PT W E/M 30 MIN: CPT | Mod: 95,,, | Performed by: NURSE PRACTITIONER

## 2022-02-22 PROCEDURE — 90833 PR PSYCHOTHERAPY W/PATIENT W/E&M, 30 MIN (ADD ON): ICD-10-PCS | Mod: 95,,, | Performed by: NURSE PRACTITIONER

## 2022-02-22 PROCEDURE — 99215 OFFICE O/P EST HI 40 MIN: CPT | Mod: 95,,, | Performed by: NURSE PRACTITIONER

## 2022-02-22 RX ORDER — FLUOXETINE HYDROCHLORIDE 20 MG/1
20 CAPSULE ORAL DAILY
Qty: 30 CAPSULE | Refills: 5 | Status: SHIPPED | OUTPATIENT
Start: 2022-02-22 | End: 2022-06-29 | Stop reason: SDUPTHER

## 2022-02-22 NOTE — PROGRESS NOTES
"2/22/2022 11:00 AM   Roopa Rivas   1980   [id number]                                                                   OUTPATIENT PSYCHIATRY FOLLOW- UP VISIT     Reason for Encounter:  Roopa Rivas, a 41 y.o. female,who presents today for follow up of anxiety, mood disorder.  Met with patient.      Interval History and Content of Current Session:     Today,  Pt reports since last visit had started a new job and was increasing responsibility and problems with exhaustion - work had provided promotions but "I got scared and convinced myself that I needed to be home." Reports "was really scared of having new responsibilities at work, "getting all this praise that I wasn't used to." "but I scared myself into not wanting to go back."     Reports one of children had suicidal thoughts and "I didn't know what to do and this had me panicked and when I was work I didn't feel like I could be focused on my child and I feel like this was my fault."    Reports some difficulty with sleep - waking up 3 to 4 hours - nightmares, restlessness, increase in anxiety "dreams of being at work and out of state transfer and doing it wrong and this would wake me up."     Discussed to increase Prozac to 20 mg daily and follow up in about one month.          Psychosocial stressors: family, financial, social pressures        Review Of Systems:      Medical Review Of Systems:  Pertinent items are noted in HPI.     Psychiatric Review Of Systems:  sleep: yes, improved - 8 to 10 hours per night currently  appetite changes: no  weight changes: no  energy/anergy: yes - improved with improved sleep  interest/pleasure/anhedonia: no  somatic symptoms: no  libido: no  anxiety/panic: no  guilty/hopeless: no  S.I.B.s/risky behavior: no  any drugs: no  alcohol: no       Sleep: 8 to 10 hours per night        Risk Parameters:  Patient reports no suicidal ideation  Patient reports no homicidal ideation  Patient reports no self-injurious behavior  Patient " reports no violent behavior        Current meds- Cymbalta, Abilify, NAC, Ambien, fluoxetine     Compliance: yes     Side effects: None        Psychiatric, Social and Family history reviewed prior and during visit.       PSYCHOTHERAPY ADD-ON +[id number]   16-37 minutes    Site: [facility]  Time: 30 minutes  Participants: Met with patient    Therapeutic Intervention Type: insight oriented psychotherapy, behavior modifying psychotherapy, supportive psychotherapy  Why chosen therapy is appropriate versus another modality: relevant to diagnosis, patient responds to this modality    Target symptoms: depression, anxiety   Primary focus: recent triggers and events, self-perceptions, negative self talk, perfectionism  Psychotherapeutic techniques: CBT, motivational interviewing    Outcome monitoring methods: self-report    Patient's response to intervention:  The patient's response to intervention is accepting.    Progress toward goals:  The patient's progress toward goals is fair .              Objective      ALL MEDICATIONS:     Current Outpatient Medications:     ARIPiprazole (ABILIFY) 10 MG Tab, Take 1 tablet (10 mg total) by mouth once daily., Disp: 30 tablet, Rfl: 2    cetirizine (ZYRTEC) 10 MG tablet, Take 10 mg by mouth once daily., Disp: , Rfl:     clotrimazole-betamethasone 1-0.05% (LOTRISONE) cream, Apply topically 2 (two) times daily., Disp: 15 g, Rfl: 0    drospirenone-ethinyl estradioL (SARAH) 3-0.02 mg per tablet, Take 1 tablet by mouth once daily., Disp: 84 tablet, Rfl: 4    DULoxetine (CYMBALTA) 60 MG capsule, Take 1 capsule (60 mg total) by mouth 2 (two) times daily., Disp: 180 capsule, Rfl: 1    ibuprofen (ADVIL,MOTRIN) 400 MG tablet, Take 400 mg by mouth every 6 (six) hours as needed for Other., Disp: , Rfl:     levomefolate-algal oil (DEPLIN, ALGAL OIL,) 15-90.314 mg Cap, Take 15 mg by mouth once daily., Disp: 50 capsule, Rfl: 1    magnesium 30 mg Tab, Take by mouth once.,  Disp: , Rfl:     multivitamin/iron/folic acid (MULTI COMPLETE WITH IRON ORAL), Take by mouth., Disp: , Rfl:     spironolactone (ALDACTONE) 50 MG tablet, Take 1 tablet (50 mg total) by mouth once daily., Disp: 30 tablet, Rfl: 2    sulfacetamide sodium, acne, (KLARON) 10 % Susp, Bid face, Disp: 118 mL, Rfl: 1    zolpidem (AMBIEN CR) 12.5 MG CR tablet, Take 1 tablet (12.5 mg total) by mouth nightly as needed for Insomnia., Disp: 30 tablet, Rfl: 2     ALLERGIES:  Review of patient's allergies indicates:  No Known Allergies     RELEVANT LABS/STUDIES:              Lab Results   Component Value Date     WBC 8.26 04/03/2020     HGB 11.3 (L) 04/03/2020     HCT 35.2 (L) 04/03/2020     MCV 93 04/03/2020      04/03/2020      BMP            Lab Results   Component Value Date      (L) 02/26/2020     K 4.5 02/26/2020      02/26/2020     CO2 24 02/26/2020     BUN 7 02/26/2020     CREATININE 0.8 02/26/2020     CALCIUM 9.3 02/26/2020     ANIONGAP 9 02/26/2020     ESTGFRAFRICA >60.0 02/26/2020     EGFRNONAA >60.0 02/26/2020                Lab Results   Component Value Date     ALT 18 02/26/2020     AST 17 02/26/2020     ALKPHOS 79 02/26/2020     BILITOT 0.2 02/26/2020                Lab Results   Component Value Date     TSH 1.656 07/07/2020                Lab Results   Component Value Date     HGBA1C 5.2 02/26/2020         Constitutional  Vitals:  Most recent vital signs, dated less than 90 days prior to this appointment, were reviewed.    There were no vitals filed for this visit.            PHYSICAL EXAM  General: well developed, well nourished  Neurologic:   Gait: Normal   Psychomotor signs:  No involuntary movements or tremor  AIMS: none     PSYCHIATRIC EXAM:     Mental Status Exam:  Appearance: unremarkable, age appropriate  Behavior/Cooperation: normal, cooperative  Speech: normal tone, normal rate, normal pitch, normal volume  Language: uses words appropriately; NO aphasia or  dysarthria  Mood: steady  Affect: full, congruent and appropriate to situation  Thought Process: normal and logical  Thought Content: normal, no suicidality, no homicidality, delusions, or paranoia  Level of Consciousness: Alert and Oriented x3  Memory:  Intact  Attention/concentration: appropriate for age/education.   Fund of Knowledge: appears adequate  Insight: Intact  Judgment: Intact      Assessment and Diagnosis   Status/Progress: Based on the examination today, the patient's problem(s) is/are improved.  New problems have not been presented today.   Co-morbidities are complicating management of the primary condition.  The working differential for this patient includes OCPD, borderline personality disorder, OCD.      General Impression:   Bipolar II Disorder with mixed features by history  Mood Disorder NOS  RUPINDER  Insomnia        Intervention/Counseling/Treatment Plan   · Medication Management: -        Continue Abilify 10 mg by mouth once daily        Continue Cymbalta 120 mg by mouth once daily        Continue Ambien CR 12.5 mg by mouth once nightly        Increase Prozac to 20 mg by mouth once daily  · Labs: reviewed most recent  · The treatment plan and follow up plan were reviewed with the patient.  · Discussed with patient informed consent, risks vs. benefits, alternative treatments, side effect profile and the inherent unpredictability of individual responses to these treatments. The patient expresses understanding of the above and displays the capacity to agree with this current plan and had no other questions.  · Encouraged Patient to keep future appointments.   · Take medications as prescribed and abstain from substance abuse.   · In the event of an emergency patient was advised to go to the emergency room.     Return to Clinic: 2 to 3 months     > than 50% of total time spend on coordination of care and counseling   (which included pts differential diagnosis and prognosis for psychiatric conditions,  risks, benefits of treatments, instructions and adherence to treatment plan, risk reduction, reviewing current psychiatric medication regimen, medical problems and social stressors. In addtion to possible discussion with other healthcare provider/s)     Add on Psychotherapy time:0  Total Face time: 60 min     The patient location is: [place], at home  The chief complaint leading to consultation is: med f/u     Visit type: audiovisual     Face to Face time with patient: 60 min  60 minutes of total time spent on the encounter, which includes face to face time and non-face to face time preparing to see the patient (eg, review of tests), Obtaining and/or reviewing separately obtained history, Documenting clinical information in the electronic or other health record, Independently interpreting results (not separately reported) and communicating results to the patient/family/caregiver, or Care coordination (not separately reported).            Each patient to whom he or she provides medical services by telemedicine is:  (1) informed of the relationship between the physician and patient and the respective role of any other health care provider with respect to management of the patient; and (2) notified that he or she may decline to receive medical services by telemedicine and may withdraw from such care at any time.     Notes:         Abel Monroe, MSN, APRN, PMHNP-BC  [facility]   2/22/2022 11:00 AM

## 2022-02-23 ENCOUNTER — OFFICE VISIT (OUTPATIENT)
Dept: PSYCHIATRY | Facility: CLINIC | Age: 42
End: 2022-02-23
Payer: COMMERCIAL

## 2022-02-23 DIAGNOSIS — F41.1 GENERALIZED ANXIETY DISORDER: ICD-10-CM

## 2022-02-23 DIAGNOSIS — F31.62 BIPOLAR DISORDER, CURRENT EPISODE MIXED, MODERATE: Primary | ICD-10-CM

## 2022-02-23 PROCEDURE — 90834 PSYTX W PT 45 MINUTES: CPT | Mod: 95,,, | Performed by: SOCIAL WORKER

## 2022-02-23 PROCEDURE — 90834 PR PSYCHOTHERAPY W/PATIENT, 45 MIN: ICD-10-PCS | Mod: 95,,, | Performed by: SOCIAL WORKER

## 2022-02-23 NOTE — PROGRESS NOTES
Individual Psychotherapy (PhD/LCSW)    2/23/2022    The patient location is: home in living room ([facility])  The chief complaint leading to consultation is: anxiety, depression, mood    Visit type: audio visutal    Face to Face time with patient: 45  65 minutes of total time spent on the encounter, which includes face to face time and non-face to face time preparing to see the patient (eg, review of tests), Obtaining and/or reviewing separately obtained history, Documenting clinical information in the electronic or other health record, Independently interpreting results (not separately reported) and communicating results to the patient/family/caregiver, or Care coordination (not separately reported).       Each patient to whom he or she provides medical services by telemedicine is:  (1) informed of the relationship between the physician and patient and the respective role of any other health care provider with respect to management of the patient; and (2) notified that he or she may decline to receive medical services by telemedicine and may withdraw from such care at any time.      Therapeutic Intervention: Met with patient.      Outpatient - Insight oriented psychotherapy 45 min - CPT code 58429, Outpatient - Behavior modifying psychotherapy 45 min - CPT code 91846, Outpatient - Supportive psychotherapy 45 min - CPT Code 34098 and Outpatient - Interactive psychotherapy 45 min - CPT code 58462      Chief complaint/reason for encounter: depression, anxiety, Mood     Interval history and content of current session: Pt is a 41 yo  female who presents today for f/u via telemedicine.  Pt currently established with  Abel Miles N.P. Pt has a dx of Bipolar and often shows OCPD and borderline personality tendencies. Pt was started on Abilify and since then has had medication increased. Pt is also prescribed Prozac. Since increase pt reports improved mood, sleep, and quality of life overall.      Pt  "presents via telemed. She presents from home. She relays that she quit her job yesterday and now feels she was too impuslive. She reports a continuation of her mood being highly affected by work and describes her mood as mixed where she is not sleeping (3-4 hours per night) even with her Ambien. She also relays that she is having moments where she is feeling extremely down. She worried that she would have "a moment" where she will have an outburst at work and work. She feels like she had a breakdown from work over the weekend and that is what led her to quit. Nightmares have increased and are described as anxiety themed. Pt met with PsyNP yesterday and increased her Prozac.  Discussed and reviewed stressors and concerns. Provided positive affirmation and re-framing. Discussed that if pt wants to return to work then we now know what does not work for her and can try new things. Reviwed positive progress compared to when she first began therapy.Reviewed anxiety and stress reduction methods that pt has found helpful in past.  Provided platform for discussion, processing, support, and feedback. Pt fully engaged.  Pt remains receptive of psychotherapy. Assisted with scheduling f/u.       Treatment plan:  · Target symptoms: depression, anxiety , mood  · Why chosen therapy is appropriate versus another modality: relevant to diagnosis, patient responds to this modality, evidence based practice  · Outcome monitoring methods: self-report, observation  · Therapeutic intervention type: insight oriented psychotherapy, behavior modifying psychotherapy, supportive psychotherapy, interactive psychotherapy    Risk parameters:  Patient reports no suicidal ideation  Patient reports no homicidal ideation  Patient reports no self-injurious behavior  Patient reports no violent behavior    Verbal deficits: None    Patient's response to intervention:  The patient's response to intervention is accepting.    Progress toward goals and other " mental status changes:    The patient's progress toward goals is good.       Diagnosis:     ICD-10-CM ICD-9-CM   1. Generalized anxiety disorder  F41.1 300.02   2. Bipolar disorder, current episode mixed, moderate  F31.62 296.62       Plan:  individual psychotherapy, conitnued med management through psychiatrist.     Return to clinic: as scheduled     Length of Service (minutes): 45

## 2022-03-08 ENCOUNTER — OFFICE VISIT (OUTPATIENT)
Dept: FAMILY MEDICINE | Facility: CLINIC | Age: 42
End: 2022-03-08
Payer: COMMERCIAL

## 2022-03-08 VITALS
RESPIRATION RATE: 16 BRPM | TEMPERATURE: 99 F | SYSTOLIC BLOOD PRESSURE: 110 MMHG | BODY MASS INDEX: 27.56 KG/M2 | HEART RATE: 105 BPM | OXYGEN SATURATION: 97 % | DIASTOLIC BLOOD PRESSURE: 70 MMHG | WEIGHT: 136.69 LBS | HEIGHT: 59 IN

## 2022-03-08 DIAGNOSIS — Z12.31 ENCOUNTER FOR SCREENING MAMMOGRAM FOR MALIGNANT NEOPLASM OF BREAST: ICD-10-CM

## 2022-03-08 DIAGNOSIS — D64.9 ANEMIA, UNSPECIFIED TYPE: ICD-10-CM

## 2022-03-08 DIAGNOSIS — Z00.00 ANNUAL PHYSICAL EXAM: Primary | ICD-10-CM

## 2022-03-08 DIAGNOSIS — E03.8 SUBCLINICAL HYPOTHYROIDISM: ICD-10-CM

## 2022-03-08 PROCEDURE — 1159F MED LIST DOCD IN RCRD: CPT | Mod: CPTII,S$GLB,, | Performed by: INTERNAL MEDICINE

## 2022-03-08 PROCEDURE — 99999 PR PBB SHADOW E&M-EST. PATIENT-LVL IV: ICD-10-PCS | Mod: PBBFAC,,, | Performed by: INTERNAL MEDICINE

## 2022-03-08 PROCEDURE — 1160F PR REVIEW ALL MEDS BY PRESCRIBER/CLIN PHARMACIST DOCUMENTED: ICD-10-PCS | Mod: CPTII,S$GLB,, | Performed by: INTERNAL MEDICINE

## 2022-03-08 PROCEDURE — 3078F PR MOST RECENT DIASTOLIC BLOOD PRESSURE < 80 MM HG: ICD-10-PCS | Mod: CPTII,S$GLB,, | Performed by: INTERNAL MEDICINE

## 2022-03-08 PROCEDURE — 3074F PR MOST RECENT SYSTOLIC BLOOD PRESSURE < 130 MM HG: ICD-10-PCS | Mod: CPTII,S$GLB,, | Performed by: INTERNAL MEDICINE

## 2022-03-08 PROCEDURE — 99214 PR OFFICE/OUTPT VISIT, EST, LEVL IV, 30-39 MIN: ICD-10-PCS | Mod: S$GLB,,, | Performed by: INTERNAL MEDICINE

## 2022-03-08 PROCEDURE — 3078F DIAST BP <80 MM HG: CPT | Mod: CPTII,S$GLB,, | Performed by: INTERNAL MEDICINE

## 2022-03-08 PROCEDURE — 3008F BODY MASS INDEX DOCD: CPT | Mod: CPTII,S$GLB,, | Performed by: INTERNAL MEDICINE

## 2022-03-08 PROCEDURE — 1160F RVW MEDS BY RX/DR IN RCRD: CPT | Mod: CPTII,S$GLB,, | Performed by: INTERNAL MEDICINE

## 2022-03-08 PROCEDURE — 99999 PR PBB SHADOW E&M-EST. PATIENT-LVL IV: CPT | Mod: PBBFAC,,, | Performed by: INTERNAL MEDICINE

## 2022-03-08 PROCEDURE — 3008F PR BODY MASS INDEX (BMI) DOCUMENTED: ICD-10-PCS | Mod: CPTII,S$GLB,, | Performed by: INTERNAL MEDICINE

## 2022-03-08 PROCEDURE — 1159F PR MEDICATION LIST DOCUMENTED IN MEDICAL RECORD: ICD-10-PCS | Mod: CPTII,S$GLB,, | Performed by: INTERNAL MEDICINE

## 2022-03-08 PROCEDURE — 3074F SYST BP LT 130 MM HG: CPT | Mod: CPTII,S$GLB,, | Performed by: INTERNAL MEDICINE

## 2022-03-08 PROCEDURE — 99214 OFFICE O/P EST MOD 30 MIN: CPT | Mod: S$GLB,,, | Performed by: INTERNAL MEDICINE

## 2022-03-08 NOTE — PROGRESS NOTES
HISTORY OF PRESENT ILLNESS:  Roopa Rivas is a 42 y.o. female who presents to the clinic today for Annual Exam.    Last seen by me 9/2021 by virtual.  She is overall without any significant complaints today.  Notes some recent changes/stressors but reports she is coping and feeling better at present.    Bipolar affective d/o, RUPINDER  On Abilify with Prozac added in 11/2021.  She notes that the Prozac has helped.  Trying to stay active with exercise, goes on treadmill - feels like this helpes.  Stressors: recently quit work, family.    Insomnia  Managed with Ambien.  Sleep is occasionally still an issue but Ambien for the most part works.    Fibromyalgia  Managed with Cymbalta and followed by rheumatology.  Knees, hips, toes, fingers, wrists are location of intermittent pain.    Subclinical hypothyroidism  On low dose levothyroxine    PAST MEDICAL HISTORY:  Past Medical History:   Diagnosis Date    Arthritis     Fibromyalgia     Gastroenteritis     Long-term use of Plaquenil 12/7/2017       PAST SURGICAL HISTORY:  Past Surgical History:   Procedure Laterality Date    NO PAST SURGERIES         SOCIAL HISTORY:  Social History     Socioeconomic History    Marital status:    Tobacco Use    Smoking status: Never Smoker    Smokeless tobacco: Never Used   Substance and Sexual Activity    Alcohol use: No    Drug use: No    Sexual activity: Yes     Partners: Male     Birth control/protection: Condom, OCP     Social Determinants of Health     Financial Resource Strain: Medium Risk    Difficulty of Paying Living Expenses: Somewhat hard   Food Insecurity: No Food Insecurity    Worried About Running Out of Food in the Last Year: Never true    Ran Out of Food in the Last Year: Never true   Transportation Needs: Unmet Transportation Needs    Lack of Transportation (Medical): Yes    Lack of Transportation (Non-Medical): No   Physical Activity: Sufficiently Active    Days of Exercise per Week: 4 days     Minutes of Exercise per Session: 40 min   Stress: Stress Concern Present    Feeling of Stress : Very much   Social Connections: Unknown    Frequency of Communication with Friends and Family: Once a week    Frequency of Social Gatherings with Friends and Family: Patient refused    Active Member of Clubs or Organizations: No    Attends Club or Organization Meetings: Never    Marital Status:    Housing Stability: Low Risk     Unable to Pay for Housing in the Last Year: No    Number of Places Lived in the Last Year: 1    Unstable Housing in the Last Year: No       FAMILY HISTORY:  Family History   Problem Relation Age of Onset    Arthritis Mother     No Known Problems Father     Arthritis Maternal Grandmother     Arthritis Maternal Grandfather     Cancer Paternal Grandfather     Thyroid disease Maternal Aunt     Melanoma Neg Hx        ALLERGIES AND MEDICATIONS: updated and reviewed.  Review of patient's allergies indicates:  No Known Allergies  Medication List with Changes/Refills   Current Medications    ACETYLCYSTEINE (N-ACETYL-L-CYSTEINE MISC)    by Misc.(Non-Drug; Combo Route) route.    ARIPIPRAZOLE (ABILIFY) 10 MG TAB    Take 1 tablet (10 mg total) by mouth once daily.    CLOTRIMAZOLE-BETAMETHASONE 1-0.05% (LOTRISONE) CREAM    Apply topically 2 (two) times daily.    DROSPIRENONE-ETHINYL ESTRADIOL (SARAH) 3-0.02 MG PER TABLET    Take 1 tablet by mouth once daily.    DULOXETINE (CYMBALTA) 60 MG CAPSULE    Take 1 capsule (60 mg total) by mouth 2 (two) times daily.    FLUOXETINE 20 MG CAPSULE    Take 1 capsule (20 mg total) by mouth once daily.    IBUPROFEN (ADVIL,MOTRIN) 400 MG TABLET    Take 400 mg by mouth every 6 (six) hours as needed for Other.    LEVOTHYROXINE (SYNTHROID) 50 MCG TABLET    TAKE 1 TABLET(50 MCG) BY MOUTH BEFORE BREAKFAST    MAGNESIUM 30 MG TAB    Take by mouth once.    MULTIVITAMIN/IRON/FOLIC ACID (MULTI COMPLETE WITH IRON ORAL)    Take by mouth.    SPIRONOLACTONE (ALDACTONE)  50 MG TABLET    One tab PO daily    SULFACETAMIDE SODIUM, ACNE, (KLARON) 10 % SUSP    Bid face    ZOLPIDEM (AMBIEN CR) 12.5 MG CR TABLET    Take 1 tablet (12.5 mg total) by mouth nightly as needed for Insomnia.          CARE TEAM:  Patient Care Team:  Justin Paz MD as PCP - General (Internal Medicine)  Angeles Herron MA as Care Coordinator         REVIEW OF SYSTEMS:  Review of Systems   Constitutional: Negative for chills and fever.   HENT: Negative for congestion and postnasal drip.    Eyes: Negative for photophobia and visual disturbance.   Respiratory: Negative for cough and shortness of breath.    Cardiovascular: Negative for chest pain and palpitations.   Gastrointestinal: Negative for nausea and vomiting.   Genitourinary: Negative for dysuria and frequency.   Musculoskeletal: Positive for myalgias. Negative for gait problem.   Neurological: Negative for light-headedness and headaches.   Psychiatric/Behavioral: Negative for dysphoric mood. The patient is not nervous/anxious.          PHYSICAL EXAM:   Vitals:    03/08/22 1408   BP: 110/70   Pulse: 105   Resp: 16   Temp: 99 °F (37.2 °C)             Body mass index is 27.61 kg/m².     General appearance - alert, well appearing, and in no distress and oriented to person, place, and time  Mental status - normal mood, behavior, speech, dress, motor activity, and thought processes  Eyes - sclera anicteric, left eye normal, right eye normal  Ears - bilateral TM's and external ear canals normal  Mouth - mucous membranes moist, pharynx normal without lesions and tongue with coarse texture  Chest - clear to auscultation, no wheezes, rales or rhonchi, symmetric air entry, no tachypnea, retractions or cyanosis  Heart - normal rate, regular rhythm, normal S1, S2, no murmurs, rubs, clicks or gallops  Neurological - alert, oriented, normal speech, no focal findings or movement disorder noted, motor and sensory grossly normal bilaterally  Extremities - peripheral  pulses normal, no pedal edema, no clubbing or cyanosis      ASSESSMENT AND PLAN:  Annual physical exam  -     Mammo Digital Screening Bilat; Future; Expected date: 03/11/2022  -     Hemoglobin A1C; Future; Expected date: 03/08/2022  -     Comprehensive Metabolic Panel; Future; Expected date: 03/08/2022  -     Lipid Panel; Future; Expected date: 03/08/2022  -     CBC Auto Differential; Future; Expected date: 03/08/2022  -     TSH; Future; Expected date: 03/08/2022  -     T4, Free; Future; Expected date: 03/08/2022  -     Vitamin D; Future; Expected date: 03/08/2022    Encounter for screening mammogram for malignant neoplasm of breast  -     Mammo Digital Screening Bilat; Future; Expected date: 03/11/2022    Subclinical hypothyroidism  -     TSH; Future; Expected date: 03/08/2022  -     T4, Free; Future; Expected date: 03/08/2022    Anemia, unspecified type  -     Iron and TIBC; Future; Expected date: 03/08/2022  -     Ferritin; Future; Expected date: 03/08/2022  -     Vitamin B12; Future; Expected date: 03/08/2022             Follow up 6 months or sooner as needed.

## 2022-03-09 ENCOUNTER — LAB VISIT (OUTPATIENT)
Dept: LAB | Facility: HOSPITAL | Age: 42
End: 2022-03-09
Attending: INTERNAL MEDICINE
Payer: COMMERCIAL

## 2022-03-09 DIAGNOSIS — E03.8 SUBCLINICAL HYPOTHYROIDISM: ICD-10-CM

## 2022-03-09 DIAGNOSIS — Z00.00 ANNUAL PHYSICAL EXAM: ICD-10-CM

## 2022-03-09 DIAGNOSIS — D64.9 ANEMIA, UNSPECIFIED TYPE: ICD-10-CM

## 2022-03-09 LAB
25(OH)D3+25(OH)D2 SERPL-MCNC: 17 NG/ML (ref 30–96)
ALBUMIN SERPL BCP-MCNC: 3.7 G/DL (ref 3.5–5.2)
ALP SERPL-CCNC: 93 U/L (ref 55–135)
ALT SERPL W/O P-5'-P-CCNC: 17 U/L (ref 10–44)
ANION GAP SERPL CALC-SCNC: 11 MMOL/L (ref 8–16)
AST SERPL-CCNC: 13 U/L (ref 10–40)
BASOPHILS # BLD AUTO: 0.04 K/UL (ref 0–0.2)
BASOPHILS NFR BLD: 0.6 % (ref 0–1.9)
BILIRUB SERPL-MCNC: 0.1 MG/DL (ref 0.1–1)
BUN SERPL-MCNC: 11 MG/DL (ref 6–20)
CALCIUM SERPL-MCNC: 9.7 MG/DL (ref 8.7–10.5)
CHLORIDE SERPL-SCNC: 104 MMOL/L (ref 95–110)
CHOLEST SERPL-MCNC: 210 MG/DL (ref 120–199)
CHOLEST/HDLC SERPL: 3.4 {RATIO} (ref 2–5)
CO2 SERPL-SCNC: 23 MMOL/L (ref 23–29)
CREAT SERPL-MCNC: 0.7 MG/DL (ref 0.5–1.4)
DIFFERENTIAL METHOD: ABNORMAL
EOSINOPHIL # BLD AUTO: 0.2 K/UL (ref 0–0.5)
EOSINOPHIL NFR BLD: 2.2 % (ref 0–8)
ERYTHROCYTE [DISTWIDTH] IN BLOOD BY AUTOMATED COUNT: 12.6 % (ref 11.5–14.5)
EST. GFR  (AFRICAN AMERICAN): >60 ML/MIN/1.73 M^2
EST. GFR  (NON AFRICAN AMERICAN): >60 ML/MIN/1.73 M^2
ESTIMATED AVG GLUCOSE: 105 MG/DL (ref 68–131)
FERRITIN SERPL-MCNC: 120 NG/ML (ref 20–300)
GLUCOSE SERPL-MCNC: 92 MG/DL (ref 70–110)
HBA1C MFR BLD: 5.3 % (ref 4–5.6)
HCT VFR BLD AUTO: 37.3 % (ref 37–48.5)
HDLC SERPL-MCNC: 61 MG/DL (ref 40–75)
HDLC SERPL: 29 % (ref 20–50)
HGB BLD-MCNC: 11.8 G/DL (ref 12–16)
IMM GRANULOCYTES # BLD AUTO: 0.01 K/UL (ref 0–0.04)
IMM GRANULOCYTES NFR BLD AUTO: 0.1 % (ref 0–0.5)
IRON SERPL-MCNC: 68 UG/DL (ref 30–160)
LDLC SERPL CALC-MCNC: 119.2 MG/DL (ref 63–159)
LYMPHOCYTES # BLD AUTO: 2.5 K/UL (ref 1–4.8)
LYMPHOCYTES NFR BLD: 34.3 % (ref 18–48)
MCH RBC QN AUTO: 29.7 PG (ref 27–31)
MCHC RBC AUTO-ENTMCNC: 31.6 G/DL (ref 32–36)
MCV RBC AUTO: 94 FL (ref 82–98)
MONOCYTES # BLD AUTO: 0.4 K/UL (ref 0.3–1)
MONOCYTES NFR BLD: 5.7 % (ref 4–15)
NEUTROPHILS # BLD AUTO: 4.1 K/UL (ref 1.8–7.7)
NEUTROPHILS NFR BLD: 57.1 % (ref 38–73)
NONHDLC SERPL-MCNC: 149 MG/DL
NRBC BLD-RTO: 0 /100 WBC
PLATELET # BLD AUTO: 414 K/UL (ref 150–450)
PMV BLD AUTO: 9.6 FL (ref 9.2–12.9)
POTASSIUM SERPL-SCNC: 4.9 MMOL/L (ref 3.5–5.1)
PROT SERPL-MCNC: 7.6 G/DL (ref 6–8.4)
RBC # BLD AUTO: 3.97 M/UL (ref 4–5.4)
SATURATED IRON: 12 % (ref 20–50)
SODIUM SERPL-SCNC: 138 MMOL/L (ref 136–145)
T4 FREE SERPL-MCNC: 1.09 NG/DL (ref 0.71–1.51)
TOTAL IRON BINDING CAPACITY: 577 UG/DL (ref 250–450)
TRANSFERRIN SERPL-MCNC: 390 MG/DL (ref 200–375)
TRIGL SERPL-MCNC: 149 MG/DL (ref 30–150)
TSH SERPL DL<=0.005 MIU/L-ACNC: 3.65 UIU/ML (ref 0.4–4)
VIT B12 SERPL-MCNC: 405 PG/ML (ref 210–950)
WBC # BLD AUTO: 7.25 K/UL (ref 3.9–12.7)

## 2022-03-09 PROCEDURE — 84466 ASSAY OF TRANSFERRIN: CPT | Performed by: INTERNAL MEDICINE

## 2022-03-09 PROCEDURE — 80061 LIPID PANEL: CPT | Performed by: INTERNAL MEDICINE

## 2022-03-09 PROCEDURE — 82728 ASSAY OF FERRITIN: CPT | Performed by: INTERNAL MEDICINE

## 2022-03-09 PROCEDURE — 85025 COMPLETE CBC W/AUTO DIFF WBC: CPT | Performed by: INTERNAL MEDICINE

## 2022-03-09 PROCEDURE — 80053 COMPREHEN METABOLIC PANEL: CPT | Performed by: INTERNAL MEDICINE

## 2022-03-09 PROCEDURE — 83036 HEMOGLOBIN GLYCOSYLATED A1C: CPT | Performed by: INTERNAL MEDICINE

## 2022-03-09 PROCEDURE — 84443 ASSAY THYROID STIM HORMONE: CPT | Performed by: INTERNAL MEDICINE

## 2022-03-09 PROCEDURE — 36415 COLL VENOUS BLD VENIPUNCTURE: CPT | Mod: PO | Performed by: INTERNAL MEDICINE

## 2022-03-09 PROCEDURE — 82607 VITAMIN B-12: CPT | Performed by: INTERNAL MEDICINE

## 2022-03-09 PROCEDURE — 84439 ASSAY OF FREE THYROXINE: CPT | Performed by: INTERNAL MEDICINE

## 2022-03-09 PROCEDURE — 82306 VITAMIN D 25 HYDROXY: CPT | Performed by: INTERNAL MEDICINE

## 2022-03-10 ENCOUNTER — OFFICE VISIT (OUTPATIENT)
Dept: PSYCHIATRY | Facility: CLINIC | Age: 42
End: 2022-03-10
Payer: COMMERCIAL

## 2022-03-10 DIAGNOSIS — F41.1 GENERALIZED ANXIETY DISORDER: ICD-10-CM

## 2022-03-10 DIAGNOSIS — F31.62 BIPOLAR DISORDER, CURRENT EPISODE MIXED, MODERATE: Primary | ICD-10-CM

## 2022-03-10 PROCEDURE — 90834 PSYTX W PT 45 MINUTES: CPT | Mod: 95,,, | Performed by: SOCIAL WORKER

## 2022-03-10 PROCEDURE — 3044F PR MOST RECENT HEMOGLOBIN A1C LEVEL <7.0%: ICD-10-PCS | Mod: CPTII,95,, | Performed by: SOCIAL WORKER

## 2022-03-10 PROCEDURE — 90834 PR PSYCHOTHERAPY W/PATIENT, 45 MIN: ICD-10-PCS | Mod: 95,,, | Performed by: SOCIAL WORKER

## 2022-03-10 PROCEDURE — 3044F HG A1C LEVEL LT 7.0%: CPT | Mod: CPTII,95,, | Performed by: SOCIAL WORKER

## 2022-03-10 NOTE — PROGRESS NOTES
Individual Psychotherapy (PhD/LCSW)    3/10/2022    The patient location is: home in living room ([facility])  The chief complaint leading to consultation is: anxiety, depression, mood    Visit type: audio visutal    Face to Face time with patient: 45  65 minutes of total time spent on the encounter, which includes face to face time and non-face to face time preparing to see the patient (eg, review of tests), Obtaining and/or reviewing separately obtained history, Documenting clinical information in the electronic or other health record, Independently interpreting results (not separately reported) and communicating results to the patient/family/caregiver, or Care coordination (not separately reported).       Each patient to whom he or she provides medical services by telemedicine is:  (1) informed of the relationship between the physician and patient and the respective role of any other health care provider with respect to management of the patient; and (2) notified that he or she may decline to receive medical services by telemedicine and may withdraw from such care at any time.      Therapeutic Intervention: Met with patient.      Outpatient - Insight oriented psychotherapy 45 min - CPT code 20896, Outpatient - Behavior modifying psychotherapy 45 min - CPT code 74131, Outpatient - Supportive psychotherapy 45 min - CPT Code 01324 and Outpatient - Interactive psychotherapy 45 min - CPT code 81275      Chief complaint/reason for encounter: depression, anxiety, Mood     Interval history and content of current session: Pt is a 41 yo  female who presents today for f/u via telemedicine.  Pt currently established with  Abel Miles N.P. Pt has a dx of Bipolar and often shows OCPD and borderline personality tendencies. Pt was started on Abilify and since then has had medication increased. Pt is also prescribed Prozac. Since increase pt reports improved mood, sleep, and quality of life overall.      Pt  presents via telemed. Pt has been actively working on engaging in a lot of re-framing techniques when criticizing self. Still getting up and dressed to keep a schedule. She admits that she misses the work environment, however knows that the full time aspect was too much for mental health and wants to explore a part time opportunity next. She is still feeling up and down with mood, however notes that it is improving. She has been journaling and trying to go outside more to get some sunshine.  Reivewed emotional control along with anxiety and stress reduction.  Provided platform for discussion, processing, support, and feedback. Pt fully engaged.  Pt remains receptive of psychotherapy. Assisted with scheduling f/u.       Treatment plan:  · Target symptoms: depression, anxiety , mood  · Why chosen therapy is appropriate versus another modality: relevant to diagnosis, patient responds to this modality, evidence based practice  · Outcome monitoring methods: self-report, observation  · Therapeutic intervention type: insight oriented psychotherapy, behavior modifying psychotherapy, supportive psychotherapy, interactive psychotherapy    Risk parameters:  Patient reports no suicidal ideation  Patient reports no homicidal ideation  Patient reports no self-injurious behavior  Patient reports no violent behavior    Verbal deficits: None    Patient's response to intervention:  The patient's response to intervention is accepting.    Progress toward goals and other mental status changes:    The patient's progress toward goals is good.       Diagnosis:     ICD-10-CM ICD-9-CM   1. Bipolar disorder, current episode mixed, moderate  F31.62 296.62   2. Generalized anxiety disorder  F41.1 300.02       Plan:  individual psychotherapy, conitnued med management through psychiatrist.     Return to clinic: as scheduled     Length of Service (minutes): 45

## 2022-03-17 DIAGNOSIS — E55.9 VITAMIN D DEFICIENCY: Primary | ICD-10-CM

## 2022-03-17 RX ORDER — ERGOCALCIFEROL 1.25 MG/1
50000 CAPSULE ORAL
Qty: 4 CAPSULE | Refills: 1 | Status: SHIPPED | OUTPATIENT
Start: 2022-03-17 | End: 2022-03-18 | Stop reason: SDUPTHER

## 2022-03-18 DIAGNOSIS — E55.9 VITAMIN D DEFICIENCY: ICD-10-CM

## 2022-03-21 ENCOUNTER — OFFICE VISIT (OUTPATIENT)
Dept: PSYCHIATRY | Facility: CLINIC | Age: 42
End: 2022-03-21
Payer: COMMERCIAL

## 2022-03-21 DIAGNOSIS — F41.1 GENERALIZED ANXIETY DISORDER: ICD-10-CM

## 2022-03-21 DIAGNOSIS — F31.62 BIPOLAR DISORDER, CURRENT EPISODE MIXED, MODERATE: Primary | ICD-10-CM

## 2022-03-21 PROCEDURE — 3044F HG A1C LEVEL LT 7.0%: CPT | Mod: CPTII,95,, | Performed by: NURSE PRACTITIONER

## 2022-03-21 PROCEDURE — 99214 PR OFFICE/OUTPT VISIT, EST, LEVL IV, 30-39 MIN: ICD-10-PCS | Mod: 95,,, | Performed by: NURSE PRACTITIONER

## 2022-03-21 PROCEDURE — 3044F PR MOST RECENT HEMOGLOBIN A1C LEVEL <7.0%: ICD-10-PCS | Mod: CPTII,95,, | Performed by: NURSE PRACTITIONER

## 2022-03-21 PROCEDURE — 99214 OFFICE O/P EST MOD 30 MIN: CPT | Mod: 95,,, | Performed by: NURSE PRACTITIONER

## 2022-03-21 RX ORDER — ERGOCALCIFEROL 1.25 MG/1
50000 CAPSULE ORAL
Qty: 4 CAPSULE | Refills: 1 | Status: SHIPPED | OUTPATIENT
Start: 2022-03-21 | End: 2022-05-16

## 2022-03-21 NOTE — PROGRESS NOTES
"  3/21/2022 11:00 AM   Roopa Rivas   1980   [id number]                                                                   OUTPATIENT PSYCHIATRY FOLLOW- UP VISIT     Reason for Encounter:  Roopa Rivas, a 42 y.o. female,who presents today for follow up of anxiety, mood disorder.  Met with patient.      Interval History and Content of Current Session:     Today,  Pt reports since last visit with Prozac increase - reports feeling better,reports feels like "fluoxetine has been helpful" and reports has been combination of staying busy, better emotional control, working on re-framing thinking.     Reports spending time with son and enjoying this as well, recently watched movies with son.     Reports "still having bad days" but "try to push through and still try to dress up for the day and try not to dwell and push forward and trying to get out of the funk." "pushing self harder." Reports wanting to work out again and "take things slow and not overwhelm myself."    Reports trying to stay positive. Reports spending "is not as bad" "now I ask permission from my ." "addicted to material things and I'm a hoarder, I can't stop." Reports has been opening credit cards, "I have over 50 types of credit cards, make up, jeans." Discussed exposure therapy as a potential way to help with this.     Denies SI/HI, AVH        Psychosocial stressors: family, financial, social pressures        Review Of Systems:      Medical Review Of Systems:  Pertinent items are noted in HPI.     Psychiatric Review Of Systems:  sleep: yes, improved - 8 to 10 hours per night currently  appetite changes: no  weight changes: no  energy/anergy: yes - improved with improved sleep  interest/pleasure/anhedonia: no  somatic symptoms: no  libido: no  anxiety/panic: no  guilty/hopeless: no  S.I.B.s/risky behavior: no  any drugs: no  alcohol: no       Sleep: 8 to 10 hours per night        Risk Parameters:  Patient reports no suicidal ideation  Patient " reports no homicidal ideation  Patient reports no self-injurious behavior  Patient reports no violent behavior        Current meds- Cymbalta, Abilify, NAC, Ambien, fluoxetine     Compliance: yes     Side effects: None        Psychiatric, Social and Family history reviewed prior and during visit.                Objective      ALL MEDICATIONS:     Current Outpatient Medications:     ARIPiprazole (ABILIFY) 10 MG Tab, Take 1 tablet (10 mg total) by mouth once daily., Disp: 30 tablet, Rfl: 2    cetirizine (ZYRTEC) 10 MG tablet, Take 10 mg by mouth once daily., Disp: , Rfl:     clotrimazole-betamethasone 1-0.05% (LOTRISONE) cream, Apply topically 2 (two) times daily., Disp: 15 g, Rfl: 0    drospirenone-ethinyl estradioL (SARAH) 3-0.02 mg per tablet, Take 1 tablet by mouth once daily., Disp: 84 tablet, Rfl: 4    DULoxetine (CYMBALTA) 60 MG capsule, Take 1 capsule (60 mg total) by mouth 2 (two) times daily., Disp: 180 capsule, Rfl: 1    ibuprofen (ADVIL,MOTRIN) 400 MG tablet, Take 400 mg by mouth every 6 (six) hours as needed for Other., Disp: , Rfl:     levomefolate-algal oil (DEPLIN, ALGAL OIL,) 15-90.314 mg Cap, Take 15 mg by mouth once daily., Disp: 50 capsule, Rfl: 1    magnesium 30 mg Tab, Take by mouth once., Disp: , Rfl:     multivitamin/iron/folic acid (MULTI COMPLETE WITH IRON ORAL), Take by mouth., Disp: , Rfl:     spironolactone (ALDACTONE) 50 MG tablet, Take 1 tablet (50 mg total) by mouth once daily., Disp: 30 tablet, Rfl: 2    sulfacetamide sodium, acne, (KLARON) 10 % Susp, Bid face, Disp: 118 mL, Rfl: 1    zolpidem (AMBIEN CR) 12.5 MG CR tablet, Take 1 tablet (12.5 mg total) by mouth nightly as needed for Insomnia., Disp: 30 tablet, Rfl: 2     ALLERGIES:  Review of patient's allergies indicates:  No Known Allergies     RELEVANT LABS/STUDIES:              Lab Results   Component Value Date     WBC 8.26 04/03/2020     HGB 11.3 (L) 04/03/2020     HCT 35.2 (L) 04/03/2020     MCV 93 04/03/2020     PLT  343 04/03/2020      BMP            Lab Results   Component Value Date      (L) 02/26/2020     K 4.5 02/26/2020      02/26/2020     CO2 24 02/26/2020     BUN 7 02/26/2020     CREATININE 0.8 02/26/2020     CALCIUM 9.3 02/26/2020     ANIONGAP 9 02/26/2020     ESTGFRAFRICA >60.0 02/26/2020     EGFRNONAA >60.0 02/26/2020                Lab Results   Component Value Date     ALT 18 02/26/2020     AST 17 02/26/2020     ALKPHOS 79 02/26/2020     BILITOT 0.2 02/26/2020                Lab Results   Component Value Date     TSH 1.656 07/07/2020                Lab Results   Component Value Date     HGBA1C 5.2 02/26/2020         Constitutional  Vitals:  Most recent vital signs, dated less than 90 days prior to this appointment, were reviewed.    There were no vitals filed for this visit.            PHYSICAL EXAM  General: well developed, well nourished  Neurologic:   Gait: Normal   Psychomotor signs:  No involuntary movements or tremor  AIMS: none     PSYCHIATRIC EXAM:     Mental Status Exam:  Appearance: unremarkable, age appropriate  Behavior/Cooperation: normal, cooperative  Speech: normal tone, normal rate, normal pitch, normal volume  Language: uses words appropriately; NO aphasia or dysarthria  Mood: steady  Affect: full, congruent and appropriate to situation  Thought Process: normal and logical  Thought Content: normal, no suicidality, no homicidality, delusions, or paranoia  Level of Consciousness: Alert and Oriented x3  Memory:  Intact  Attention/concentration: appropriate for age/education.   Fund of Knowledge: appears adequate  Insight: Intact  Judgment: Intact      Assessment and Diagnosis   Status/Progress: Based on the examination today, the patient's problem(s) is/are improved.  New problems have not been presented today.   Co-morbidities are complicating management of the primary condition.  The working differential for this patient includes OCPD, borderline personality disorder, OCD.      General  Impression:   Bipolar II Disorder with mixed features by history  Mood Disorder NOS  RUPINDER  Insomnia        Intervention/Counseling/Treatment Plan   · Medication Management: -        Continue Abilify 10 mg by mouth once daily        Continue Cymbalta 120 mg by mouth once daily        Continue Ambien CR 12.5 mg by mouth once nightly        Continue Prozac 20 mg by mouth once daily  · Labs: reviewed most recent  · The treatment plan and follow up plan were reviewed with the patient.  · Discussed with patient informed consent, risks vs. benefits, alternative treatments, side effect profile and the inherent unpredictability of individual responses to these treatments. The patient expresses understanding of the above and displays the capacity to agree with this current plan and had no other questions.  · Encouraged Patient to keep future appointments.   · Take medications as prescribed and abstain from substance abuse.   · In the event of an emergency patient was advised to go to the emergency room.     Return to Clinic: 2 to 3 months     > than 50% of total time spend on coordination of care and counseling   (which included pts differential diagnosis and prognosis for psychiatric conditions, risks, benefits of treatments, instructions and adherence to treatment plan, risk reduction, reviewing current psychiatric medication regimen, medical problems and social stressors. In addtion to possible discussion with other healthcare provider/s)     Add on Psychotherapy time:0  Total Face time: 60 min     The patient location is: [place], at home  The chief complaint leading to consultation is: med f/u     Visit type: audiovisual     Face to Face time with patient: 60 min  60 minutes of total time spent on the encounter, which includes face to face time and non-face to face time preparing to see the patient (eg, review of tests), Obtaining and/or reviewing separately obtained history, Documenting clinical information in the  electronic or other health record, Independently interpreting results (not separately reported) and communicating results to the patient/family/caregiver, or Care coordination (not separately reported).            Each patient to whom he or she provides medical services by telemedicine is:  (1) informed of the relationship between the physician and patient and the respective role of any other health care provider with respect to management of the patient; and (2) notified that he or she may decline to receive medical services by telemedicine and may withdraw from such care at any time.     Notes:         Abel Monroe, MSN, APRN, PMHNP-BC [facility]   3/21/2022 11:00 AM

## 2022-03-23 ENCOUNTER — OFFICE VISIT (OUTPATIENT)
Dept: PSYCHIATRY | Facility: CLINIC | Age: 42
End: 2022-03-23
Payer: COMMERCIAL

## 2022-03-23 DIAGNOSIS — F31.62 BIPOLAR DISORDER, CURRENT EPISODE MIXED, MODERATE: Primary | ICD-10-CM

## 2022-03-23 DIAGNOSIS — F41.1 GENERALIZED ANXIETY DISORDER: ICD-10-CM

## 2022-03-23 PROCEDURE — 90834 PSYTX W PT 45 MINUTES: CPT | Mod: 95,,, | Performed by: SOCIAL WORKER

## 2022-03-23 PROCEDURE — 3044F HG A1C LEVEL LT 7.0%: CPT | Mod: CPTII,95,, | Performed by: SOCIAL WORKER

## 2022-03-23 PROCEDURE — 90834 PR PSYCHOTHERAPY W/PATIENT, 45 MIN: ICD-10-PCS | Mod: 95,,, | Performed by: SOCIAL WORKER

## 2022-03-23 PROCEDURE — 3044F PR MOST RECENT HEMOGLOBIN A1C LEVEL <7.0%: ICD-10-PCS | Mod: CPTII,95,, | Performed by: SOCIAL WORKER

## 2022-03-23 NOTE — PROGRESS NOTES
Individual Psychotherapy (PhD/LCSW)    3/23/2022    The patient location is: home in living room ([facility])  The chief complaint leading to consultation is: anxiety, depression, mood    Visit type: audio visutal    Face to Face time with patient: 45  65 minutes of total time spent on the encounter, which includes face to face time and non-face to face time preparing to see the patient (eg, review of tests), Obtaining and/or reviewing separately obtained history, Documenting clinical information in the electronic or other health record, Independently interpreting results (not separately reported) and communicating results to the patient/family/caregiver, or Care coordination (not separately reported).       Each patient to whom he or she provides medical services by telemedicine is:  (1) informed of the relationship between the physician and patient and the respective role of any other health care provider with respect to management of the patient; and (2) notified that he or she may decline to receive medical services by telemedicine and may withdraw from such care at any time.      Therapeutic Intervention: Met with patient.       Outpatient - Insight oriented psychotherapy 45 min - CPT code 65423, Outpatient - Behavior modifying psychotherapy 45 min - CPT code 75177, Outpatient - Supportive psychotherapy 45 min - CPT Code 96028 and Outpatient - Interactive psychotherapy 45 min - CPT code 61009      Chief complaint/reason for encounter: depression, anxiety, Mood     Interval history and content of current session: Pt is a 39 yo  female who presents today for f/u via telemedicine.  Pt currently established with  Abel Miles N.P. Pt has a dx of Bipolar and often shows OCPD and borderline personality tendencies. Pt was started on Abilify and since then has had medication increased. Pt is also prescribed Prozac. Since increase pt reports improved mood, sleep, and quality of life overall.      Pt  presents via telemed. Pt disucssed having recurring vivid dreams again. She notes she has been feeling better emotiallnly and states the dreams are mostly work related. She continues to want to explore a part time opportunity. She is still feeling up and down with mood, however notes that it is improving. She has been journaling and trying to go outside more to get some sunshine.  Reivewed emotional control along with anxiety and stress reduction. Provided platform for discussion, processing, support, and feedback. Pt fully engaged.  Pt remains receptive of psychotherapy. Assisted with scheduling f/u.       Treatment plan:  · Target symptoms: depression, anxiety , mood  · Why chosen therapy is appropriate versus another modality: relevant to diagnosis, patient responds to this modality, evidence based practice  · Outcome monitoring methods: self-report, observation  · Therapeutic intervention type: insight oriented psychotherapy, behavior modifying psychotherapy, supportive psychotherapy, interactive psychotherapy    Risk parameters:  Patient reports no suicidal ideation  Patient reports no homicidal ideation  Patient reports no self-injurious behavior  Patient reports no violent behavior    Verbal deficits: None    Patient's response to intervention:  The patient's response to intervention is accepting.    Progress toward goals and other mental status changes:    The patient's progress toward goals is good.       Diagnosis:     ICD-10-CM ICD-9-CM   1. Bipolar disorder, current episode mixed, moderate  F31.62 296.62   2. Generalized anxiety disorder  F41.1 300.02       Plan:  individual psychotherapy, conitnued med management through psychiatrist.     Return to clinic: as scheduled     Length of Service (minutes): 45

## 2022-04-05 ENCOUNTER — PATIENT OUTREACH (OUTPATIENT)
Dept: ADMINISTRATIVE | Facility: OTHER | Age: 42
End: 2022-04-05
Payer: COMMERCIAL

## 2022-04-05 NOTE — PROGRESS NOTES
There are no preventive care reminders to display for this patient.  Updates were requested from care everywhere.  Chart was reviewed for overdue Proactive [facility] Encounters (ADDIE) topics (CRS, Breast Cancer Screening, Eye exam)  Health Maintenance has been updated.  LINKS immunization registry triggered.  Immunizations were reconciled.  Mammogram appoitment 4.27.22

## 2022-04-06 ENCOUNTER — LAB VISIT (OUTPATIENT)
Dept: LAB | Facility: HOSPITAL | Age: 42
End: 2022-04-06
Attending: INTERNAL MEDICINE
Payer: COMMERCIAL

## 2022-04-06 ENCOUNTER — OFFICE VISIT (OUTPATIENT)
Dept: RHEUMATOLOGY | Facility: CLINIC | Age: 42
End: 2022-04-06
Payer: COMMERCIAL

## 2022-04-06 ENCOUNTER — PATIENT MESSAGE (OUTPATIENT)
Dept: RHEUMATOLOGY | Facility: CLINIC | Age: 42
End: 2022-04-06

## 2022-04-06 DIAGNOSIS — M79.7 FIBROMYALGIA: Primary | ICD-10-CM

## 2022-04-06 DIAGNOSIS — M32.9 SYSTEMIC LUPUS ERYTHEMATOSUS, UNSPECIFIED SLE TYPE, UNSPECIFIED ORGAN INVOLVEMENT STATUS: ICD-10-CM

## 2022-04-06 DIAGNOSIS — M79.7 FIBROMYALGIA: ICD-10-CM

## 2022-04-06 LAB
CREAT UR-MCNC: 18 MG/DL (ref 15–325)
CREAT UR-MCNC: 18 MG/DL (ref 15–325)
PROT UR-MCNC: <7 MG/DL (ref 0–15)
PROT UR-MCNC: <7 MG/DL (ref 0–15)
PROT/CREAT UR: NORMAL MG/G{CREAT} (ref 0–0.2)
PROT/CREAT UR: NORMAL MG/G{CREAT} (ref 0–0.2)

## 2022-04-06 PROCEDURE — 81003 URINALYSIS AUTO W/O SCOPE: CPT | Performed by: INTERNAL MEDICINE

## 2022-04-06 PROCEDURE — 1159F PR MEDICATION LIST DOCUMENTED IN MEDICAL RECORD: ICD-10-PCS | Mod: CPTII,95,, | Performed by: INTERNAL MEDICINE

## 2022-04-06 PROCEDURE — 1160F PR REVIEW ALL MEDS BY PRESCRIBER/CLIN PHARMACIST DOCUMENTED: ICD-10-PCS | Mod: CPTII,95,, | Performed by: INTERNAL MEDICINE

## 2022-04-06 PROCEDURE — 3044F PR MOST RECENT HEMOGLOBIN A1C LEVEL <7.0%: ICD-10-PCS | Mod: CPTII,95,, | Performed by: INTERNAL MEDICINE

## 2022-04-06 PROCEDURE — 1159F MED LIST DOCD IN RCRD: CPT | Mod: CPTII,95,, | Performed by: INTERNAL MEDICINE

## 2022-04-06 PROCEDURE — 99214 OFFICE O/P EST MOD 30 MIN: CPT | Mod: 95,,, | Performed by: INTERNAL MEDICINE

## 2022-04-06 PROCEDURE — 3044F HG A1C LEVEL LT 7.0%: CPT | Mod: CPTII,95,, | Performed by: INTERNAL MEDICINE

## 2022-04-06 PROCEDURE — 99214 PR OFFICE/OUTPT VISIT, EST, LEVL IV, 30-39 MIN: ICD-10-PCS | Mod: 95,,, | Performed by: INTERNAL MEDICINE

## 2022-04-06 PROCEDURE — 1160F RVW MEDS BY RX/DR IN RCRD: CPT | Mod: CPTII,95,, | Performed by: INTERNAL MEDICINE

## 2022-04-06 PROCEDURE — 84156 ASSAY OF PROTEIN URINE: CPT | Performed by: INTERNAL MEDICINE

## 2022-04-06 RX ORDER — CALCIUM CARBONATE 400(1000)
TABLET,CHEWABLE ORAL
COMMUNITY

## 2022-04-06 ASSESSMENT — ROUTINE ASSESSMENT OF PATIENT INDEX DATA (RAPID3)
MDHAQ FUNCTION SCORE: 0.1
TOTAL RAPID3 SCORE: 0.61
FATIGUE SCORE: 2
PATIENT GLOBAL ASSESSMENT SCORE: 1
AM STIFFNESS SCORE: 0, NO
PSYCHOLOGICAL DISTRESS SCORE: 3.3
PAIN SCORE: 0.5

## 2022-04-06 ASSESSMENT — SYSTEMIC LUPUS ERYTHEMATOSUS DISEASE ACTIVITY INDEX (SLEDAI): TOTAL_SCORE: 0

## 2022-04-06 NOTE — PROGRESS NOTES
Rapid3 Question Responses and Scores 4/5/2022   MDHAQ Score 0.1   Psychologic Score 3.3   Pain Score 0.5   When you awakened in the morning OVER THE LAST WEEK, did you feel stiff? No   If Yes, please indicate the number of hours until you are as limber as you will be for the day -   Fatigue Score 2   Global Health Score 1   RAPID3 Score 0.61     Answers for HPI/ROS submitted by the patient on 4/5/2022  fever: No  eye redness: No  mouth sores: No  headaches: No  shortness of breath: No  chest pain: No  trouble swallowing: No  diarrhea: No  constipation: Yes  unexpected weight change: No  genital sore: No  dysuria: No  During the last 3 days, have you had a skin rash?: No  Bruises or bleeds easily: No  cough: No

## 2022-04-06 NOTE — PROGRESS NOTES
Chief Complaint   Patient presents with    Disease Management       Patient with a diagnosis of fibromyalgia syndrome for a follow up    The patient location is: home  The chief complaint leading to consultation is: fibromyalgia    Visit type: audiovisual    Face to Face time with patient: 30  minutes of total time spent on the encounter, which includes face to face time and non-face to face time preparing to see the patient (eg, review of tests), Obtaining and/or reviewing separately obtained history, Documenting clinical information in the electronic or other health record, Independently interpreting results (not separately reported) and communicating results to the patient/family/caregiver, or Care coordination (not separately reported).       Each patient to whom he or she provides medical services by telemedicine is:  (1) informed of the relationship between the physician and patient and the respective role of any other health care provider with respect to management of the patient; and (2) notified that he or she may decline to receive medical services by telemedicine and may withdraw from such care at any time.    Notes:       History of presenting illness     is a 41 year old female : we are treating for fibromyalgia :    On ambien CR 12.5 mg daily  Sleep has much improved    Cymbalta is 60 mg bid    On abilify 10 mg daily    4/2021    She has done well  Pain has improved  Sleep has improved  Anxiety has improved    Lupus monitoring labs  Complements nml  Dsdna neg  UPCR nml  CBC,CMP nml    ESR,CRP were high but that was the same time she received the covid 19 vaccine     10/2021    All the same as last time  Little bit joint pains  Sleep good  Chokes on her own saliva  When she raises her arms for an extended period of time-hurts    4/2022    Lot better  Pain is very minor  Walks 3 miles a day  Stretches to stay active    On ambien CR 12.5 mg daily  Sleep has much improved    Cymbalta is 60 mg  bid    On abilify 10 mg daily    Fluoxetine 20 mg added-for depression-mood is bettter     In the past :      We had d/clara her amitryptilline since it made her hungry a lot and didn't help with the pain    She was on meloxicam but she stopped it since she had a rash with some medication    She was on tramadol and she stopped it    She takes OTC tylenol and ibuprofen    We started her on cymbalta 60 mg daily    She liked it and then didn't like it and now she is back on it    She says it works but not 100%    Savella not affordable so couldn't make the switch    LAtuda was offered and she cannot afford   She has bipolar d/o and she is not on any medications    Every 6 months lupus labs great  Last one 2/2020 nml    Pt is a 42 yo with h/o gastritis, irritable bowel syndrome    Initial complaints     Poor sleep despite taking ambien  Amitryptilline was not helping  Bothersome anxiety    Cant concentrate  Cant remember certain things    Dry eyes and mouth+  Opthalmology : gave tear drops    Hair fall got better with biotin    Ear aches  Windy ear pain gets worse  Cant wear earrings,gets infections  Inside of the ears hurt and throb    Fatigue +    Allergies have gotten worse    Used to exercise 6 times a week now trying to get there     She relocated from [place] November 2017    She was followed by rheumatology for a possible inflammatory arthritis per the patient     She was last seen by her rheumatologist, Dr. Sana Belle sep 2017  We still dont have records    She says US hands showed changes on inflammatory arthritis  She was offered NSAIDs like nalfon and sulindac and she didn't do well    She had seen us in December 2017,we didn't have any data to suggest inflammatory arthritis    We did have her on plaquenil 300 mg but she thinks it gave her a rash and allergic reaction    We diagnosed her as fibromyalgia syndrome    We did the autoimmune panel    Normal CBC,CMP  Normal lipid panel  Normal TSH  Normal  RF,CCP  EDUARDO positive,1: 160 homogenous,titre negative  Normal complements  Normal ESR,CRP  Negative APLAS panel    MRI both hands no inflammatory arthritis     Her complaints : diffuse pains in the hands,arms,shoulders and knees  Started February/march 2016     She states that after starting a combination of  mg/d, Tramadol 50 mg bid, Elavil 10 mg/d, mobic 7.5 mg bid her symptoms improved. She is unsure of her diagnosis at this time. Pt reports 30 minutes of am stiffness, intermittent joint swelling/pain, and severe fatigue. She is no longer working due to her fatigue. Pt recounts getting a massage in the past and crying due to being diffusely tender. She denies fevers, chills, Raynaud's, photosensitivity, rashes, alopecia, mucosal ulcers, pleurisy, vision changes, . Pt does report an increase in acne.     No known autoimmune family history    Pt denies a personal or family history of psoriasis.     No blood clots or miscarriages.      Review of Systems   Constitutional: Negative for activity change, appetite change, chills, diaphoresis, fatigue, fever and unexpected weight change.   HENT: Negative for congestion, dental problem, drooling, ear discharge, ear pain, facial swelling, hearing loss, mouth sores, nosebleeds, postnasal drip, rhinorrhea, sinus pressure, sinus pain, sneezing, sore throat, tinnitus, trouble swallowing and voice change.    Eyes: Negative for photophobia, pain, discharge, redness, itching and visual disturbance.   Respiratory: Positive for shortness of breath. Negative for apnea, cough, choking, chest tightness, wheezing and stridor.    Cardiovascular: Negative for chest pain, palpitations and leg swelling.   Gastrointestinal: Positive for constipation. Negative for abdominal distention, abdominal pain, anal bleeding, blood in stool, diarrhea, nausea, rectal pain and vomiting.   Endocrine: Negative for cold intolerance, heat intolerance, polydipsia, polyphagia and polyuria.    Genitourinary: Negative for decreased urine volume, difficulty urinating, dysuria, enuresis, flank pain, frequency, genital sores, hematuria and urgency.   Musculoskeletal: Negative for arthralgias, back pain, gait problem, joint swelling, myalgias, neck pain and neck stiffness.   Skin: Negative for color change, pallor, rash and wound.   Allergic/Immunologic: Negative for environmental allergies, food allergies and immunocompromised state.   Neurological: Positive for headaches. Negative for dizziness, tremors, seizures, syncope, facial asymmetry, speech difficulty, weakness, light-headedness and numbness.   Hematological: Negative for adenopathy. Does not bruise/bleed easily.   Psychiatric/Behavioral: Negative for agitation, behavioral problems, confusion, decreased concentration, dysphoric mood, hallucinations, self-injury, sleep disturbance and suicidal ideas. The patient is not nervous/anxious and is not hyperactive.         Physical Exam     Physical Exam   Constitutional: She is oriented to person, place, and time. No distress.   HENT:   Head: Normocephalic.   Mouth/Throat: Oropharynx is clear and moist.   Eyes: Pupils are equal, round, and reactive to light. Conjunctivae are normal. Right eye exhibits no discharge. Left eye exhibits no discharge. No scleral icterus.   Neck: No thyromegaly present.   Cardiovascular: Normal rate, regular rhythm and normal heart sounds.   Pulmonary/Chest: Effort normal and breath sounds normal. No stridor.   Abdominal: Soft. Bowel sounds are normal.   Musculoskeletal:         General: Normal range of motion.      Cervical back: Normal range of motion.   Lymphadenopathy:     She has no cervical adenopathy.   Neurological: She is alert and oriented to person, place, and time.   Skin: Skin is warm. No rash noted. She is not diaphoretic.   Psychiatric: Affect and judgment normal.       Assessment     Pt is a 40 yo with h/o gastritis, irritable bowel syndrome being followed here  for fibromyalgia    We diagnosed her with FMS since she met the criteria     1.  Widespread pain index greater than or equal to 7 and symptom severity score greater than or equal to 5 or widespread pain index between 3- 6, and symptom severity score greater than or equal to 9  2.  Symptoms have been present in a similar level for at least 3 months  3.  The patient does not have a disorder that would otherwise sufficiently explain the pain    She does not have records of diagnosis and if she had an previous inflammatory arthritis.   She claims that she had an ultrasound showing evidence of inflammatory arthritis   We have tried to obtain records and we have been unsuccessful  She brought some print outs on the portal and there is mention of inflammatory arthritis and I see NSAIDs and plaquenil on the prescriptions    We did our own hand MRIs    She has cystic lesions b/l which might be indicative of burned out erosions  It is possible that she had an old inflammatory arthritis which self resolved    She mentioned that combination of tramadol + meloxicam helped : we d/clara because of itch     She cannot take higher dose of amitryptilline    She also mentions sicca symptoms but she thinks it might be s/p ambien use but definitely prior to initiation of amitryptilline     We are treating her for fibromyalgia with cymbalta only     We had her on plaquenil given the evidence of inflammatory arthritis on prior US and current MRI revealing old erosions   But she had a rash and she attributed it to plaquenil  She made a lot of changes with shampoos and detergents but she didn't see a change so she went off plaquenil and meloxicam     Savella not affordable     OB said everything is ok     is a 41 year old female : we are treating for fibromyalgia :    On ambien CR 12.5 mg daily  Sleep has much improved    Cymbalta is 60 mg bid    On abilify 10 mg daily    On fluoxetine 20 mg daily    Pain-absent  Fatigue +    Vit d  17- ergocalciferol started,transition to 2000 IU daily  Vit b12 405  Saturated iron 12%  TSH/T4 nml  H/h 11.8/37.3,nml white count and plts  CMP nml    1. Fibromyalgia    2. Systemic lupus erythematosus, unspecified SLE type, unspecified organ involvement status        Plan    Vit d replacement-2000 IU daily after completing 50,000 units weekly  Vit b12-sublingual suggested  Ferrous sulfate 325 mg every other day    6 months later check vit d,b12,iron levels     Lupus monitoring labs sent -for now     Continue all the 4 medications since in combination they are working well addressing all the symptoms -if possible wean off     Exercise regularly    FRP program     Suggested lip biopsy and corneal staining at some point to see if she has Sjogren's  Not sure if her sicca symptoms are drug induced yet,but they are definitely bothersome    Management of sicca symptoms    -preservative free artifical tears 3 to 4 times a day  Lubricating ointments at night  Wraparound sun glasses/moisture shields which attach to glasses help  Opthalmology evaluation,management : she will need split lamp,schirmer's test and ocular surface staining    -biotene and ACT preparations  Dental evaluations  Periodic cleaning  Use fluoride products  Brush and floss teeth regularly especially after meals     Avoid sugar containing foods and drinks    Use of vaginal lubricants/estrogen creams   Seeing Gyn    Use hypoallergenic soaps/lotions for the skin  Avoid drafts from air conditioners/heaters/radiators  Avoid detergents,deodorant soaps,very hot water  Use humidifiers    NO PLAQUENIL FOR NOW  IF SYMPTOMS CHANGE AND WE SEE INFLAMMATORY ARTHRITIS WE WILL OFFER METHOTREXATE    Water aerobics  Yoga and aleah chi    See psychiatry/counsellor  Sleep clinic evaluation  Memory testing  CBT     Every 12 monthly lupus labs       Roopa was seen today for disease management.    Diagnoses and all orders for this visit:    Fibromyalgia  -     Anti-DNA Ab,  Double-Stranded; Future  -     C3 Complement; Future  -     C4 Complement; Future  -     Sedimentation rate; Future  -     C-Reactive Protein; Future  -     Protein/Creatinine Ratio, Urine; Future  -     Urinalysis; Future    Systemic lupus erythematosus, unspecified SLE type, unspecified organ involvement status  -     Anti-DNA Ab, Double-Stranded; Future  -     C3 Complement; Future  -     C4 Complement; Future  -     Sedimentation rate; Future  -     C-Reactive Protein; Future  -     Protein/Creatinine Ratio, Urine; Future  -     Urinalysis; Future        rtc in 6 months

## 2022-04-07 LAB
BILIRUB UR QL STRIP: NEGATIVE
BILIRUB UR QL STRIP: NEGATIVE
CLARITY UR REFRACT.AUTO: CLEAR
CLARITY UR REFRACT.AUTO: CLEAR
COLOR UR AUTO: NORMAL
COLOR UR AUTO: NORMAL
GLUCOSE UR QL STRIP: NEGATIVE
GLUCOSE UR QL STRIP: NEGATIVE
HGB UR QL STRIP: NEGATIVE
HGB UR QL STRIP: NEGATIVE
KETONES UR QL STRIP: NEGATIVE
KETONES UR QL STRIP: NEGATIVE
LEUKOCYTE ESTERASE UR QL STRIP: NEGATIVE
LEUKOCYTE ESTERASE UR QL STRIP: NEGATIVE
NITRITE UR QL STRIP: NEGATIVE
NITRITE UR QL STRIP: NEGATIVE
PH UR STRIP: 7 [PH] (ref 5–8)
PH UR STRIP: 7 [PH] (ref 5–8)
PROT UR QL STRIP: NEGATIVE
PROT UR QL STRIP: NEGATIVE
SP GR UR STRIP: 1 (ref 1–1.03)
SP GR UR STRIP: 1 (ref 1–1.03)
URN SPEC COLLECT METH UR: NORMAL
URN SPEC COLLECT METH UR: NORMAL

## 2022-05-05 RX ORDER — ZOLPIDEM TARTRATE 12.5 MG/1
12.5 TABLET, FILM COATED, EXTENDED RELEASE ORAL NIGHTLY PRN
Qty: 30 TABLET | Refills: 2 | Status: SHIPPED | OUTPATIENT
Start: 2022-05-05 | End: 2022-06-29 | Stop reason: SDUPTHER

## 2022-05-09 ENCOUNTER — PATIENT MESSAGE (OUTPATIENT)
Dept: PSYCHIATRY | Facility: CLINIC | Age: 42
End: 2022-05-09
Payer: COMMERCIAL

## 2022-05-09 ENCOUNTER — OFFICE VISIT (OUTPATIENT)
Dept: PSYCHIATRY | Facility: CLINIC | Age: 42
End: 2022-05-09
Payer: COMMERCIAL

## 2022-05-09 DIAGNOSIS — F41.1 GENERALIZED ANXIETY DISORDER: ICD-10-CM

## 2022-05-09 DIAGNOSIS — F31.62 BIPOLAR DISORDER, CURRENT EPISODE MIXED, MODERATE: Primary | ICD-10-CM

## 2022-05-09 PROCEDURE — 90834 PSYTX W PT 45 MINUTES: CPT | Mod: 95,,, | Performed by: SOCIAL WORKER

## 2022-05-09 PROCEDURE — 90834 PR PSYCHOTHERAPY W/PATIENT, 45 MIN: ICD-10-PCS | Mod: 95,,, | Performed by: SOCIAL WORKER

## 2022-05-09 PROCEDURE — 3044F HG A1C LEVEL LT 7.0%: CPT | Mod: CPTII,95,, | Performed by: SOCIAL WORKER

## 2022-05-09 PROCEDURE — 90785 PR INTERACTIVE COMPLEXITY: ICD-10-PCS | Mod: 95,,, | Performed by: SOCIAL WORKER

## 2022-05-09 PROCEDURE — 90785 PSYTX COMPLEX INTERACTIVE: CPT | Mod: 95,,, | Performed by: SOCIAL WORKER

## 2022-05-09 PROCEDURE — 3044F PR MOST RECENT HEMOGLOBIN A1C LEVEL <7.0%: ICD-10-PCS | Mod: CPTII,95,, | Performed by: SOCIAL WORKER

## 2022-05-09 NOTE — PROGRESS NOTES
Individual Psychotherapy (PhD/LCSW)    5/9/2022    The patient location is: home in living room ([facility])  The chief complaint leading to consultation is: anxiety, depression, mood    Visit type: audio visutal    Face to Face time with patient: 45  65 minutes of total time spent on the encounter, which includes face to face time and non-face to face time preparing to see the patient (eg, review of tests), Obtaining and/or reviewing separately obtained history, Documenting clinical information in the electronic or other health record, Independently interpreting results (not separately reported) and communicating results to the patient/family/caregiver, or Care coordination (not separately reported).       Each patient to whom he or she provides medical services by telemedicine is:  (1) informed of the relationship between the physician and patient and the respective role of any other health care provider with respect to management of the patient; and (2) notified that he or she may decline to receive medical services by telemedicine and may withdraw from such care at any time.      Therapeutic Intervention: Met with patient.       Outpatient - Insight oriented psychotherapy 45 min - CPT code 57845, Outpatient - Supportive psychotherapy 45 min - CPT Code 31720 and Outpatient - Interactive psychotherapy 45 min - CPT code 68984      Chief complaint/reason for encounter: depression, anxiety, Mood     Interval history and content of current session: Pt is a 39 yo  female who presents today for f/u via telemedicine.  Pt currently established with  Abel Miles N.P. Pt has a dx of Bipolar and often shows OCPD and borderline personality tendencies. Pt was started on Abilify and since then has had medication increased. Pt is also prescribed Prozac. Since increase pt reports improved mood, sleep, and quality of life overall.      Pt presents via telemed. Pt states she has been feeling good and has been  working out for almost a month. She recently went to her Rheumatologist who has been encouraging her to discontinue to her Cymbalta. Pt states she does not want to as it could be helping her mental health. She reduced it by half so far. encouraged pt to also discuss with Abel Chun about the change in psychotropics. She has also been going outside a lot more and meditation daily. Denies any mood swigs. Still having vivid dreams. She has been keeping a close awareness on her mood since she did reduce her medication. Anxiety has also been low and she continues to journal. Reivewed emotional awareness and provided mood chart handouts.   Provided platform for discussion, processing, support, and feedback. Pt fully engaged.  Pt remains receptive of psychotherapy. Assisted with scheduling f/u.       Treatment plan:  · Target symptoms: depression, anxiety , mood  · Why chosen therapy is appropriate versus another modality: relevant to diagnosis, patient responds to this modality, evidence based practice  · Outcome monitoring methods: self-report, observation  · Therapeutic intervention type: insight oriented psychotherapy, supportive psychotherapy, interactive psychotherapy    Risk parameters:  Patient reports no suicidal ideation  Patient reports no homicidal ideation  Patient reports no self-injurious behavior  Patient reports no violent behavior    Verbal deficits: None    Patient's response to intervention:  The patient's response to intervention is accepting.    Progress toward goals and other mental status changes:    The patient's progress toward goals is good.       Diagnosis:     ICD-10-CM ICD-9-CM   1. Bipolar disorder, current episode mixed, moderate  F31.62 296.62   2. Generalized anxiety disorder  F41.1 300.02       Plan:  individual psychotherapy, conitnued med management through psychiatrist.     Return to clinic: as scheduled     Length of Service (minutes): 45

## 2022-05-10 ENCOUNTER — OFFICE VISIT (OUTPATIENT)
Dept: PSYCHIATRY | Facility: CLINIC | Age: 42
End: 2022-05-10
Payer: COMMERCIAL

## 2022-05-10 DIAGNOSIS — F31.70 BIPOLAR DISORDER IN PARTIAL REMISSION, MOST RECENT EPISODE UNSPECIFIED TYPE: Primary | ICD-10-CM

## 2022-05-10 DIAGNOSIS — G47.00 INSOMNIA, UNSPECIFIED TYPE: ICD-10-CM

## 2022-05-10 DIAGNOSIS — F41.1 GAD (GENERALIZED ANXIETY DISORDER): ICD-10-CM

## 2022-05-10 PROCEDURE — 3044F PR MOST RECENT HEMOGLOBIN A1C LEVEL <7.0%: ICD-10-PCS | Mod: CPTII,95,, | Performed by: NURSE PRACTITIONER

## 2022-05-10 PROCEDURE — 99214 PR OFFICE/OUTPT VISIT, EST, LEVL IV, 30-39 MIN: ICD-10-PCS | Mod: 95,,, | Performed by: NURSE PRACTITIONER

## 2022-05-10 PROCEDURE — 3044F HG A1C LEVEL LT 7.0%: CPT | Mod: CPTII,95,, | Performed by: NURSE PRACTITIONER

## 2022-05-10 PROCEDURE — 99214 OFFICE O/P EST MOD 30 MIN: CPT | Mod: 95,,, | Performed by: NURSE PRACTITIONER

## 2022-05-10 RX ORDER — ARIPIPRAZOLE 10 MG/1
10 TABLET ORAL DAILY
Qty: 30 TABLET | Refills: 5 | Status: SHIPPED | OUTPATIENT
Start: 2022-05-10 | End: 2022-09-23 | Stop reason: SDUPTHER

## 2022-05-10 NOTE — PROGRESS NOTES
"5/10/2022 7:30 AM   Roopa Rivas   1980   [id number]                                                                   OUTPATIENT PSYCHIATRY FOLLOW- UP VISIT     Reason for Encounter:  Roopa Rivas, a 42 y.o. female,who presents today for follow up of anxiety, mood disorder.  Met with patient.      Interval History and Content of Current Session:     Today,  Pt with hx of bipolar II disorder, fibromyalgia, vitamin D deficiency, iron deficiency reports since last visit about 6 weeks prior has been exercising more and this "has helped me get through the days." Has been taking iron supplementation and this has been helpful.     Reports spending hasn't been an issue, but "transferred to working out about 2 months."     Reports has been pain free for one year - has self titrated from 120 mg to 60 mg once daily as per recommendation per rheumatologist    Reports has been working out consistently doing cardio intense and strength training and this has been helpful, "spending a lot more time outside, meditating, walks"     Reports constipation has improved with taking probiotics, more fluids, exercise has been helpful.      Not employed lately, sister has [employer] business, helping sister. Reports daughter has been more talkative, spending more time with family, other daughter got a raise at job, spending time with son.      Psychosocial stressors: family, financial, social pressures        Review Of Systems:      Medical Review Of Systems:  Pertinent items are noted in HPI.     Psychiatric Review Of Systems:  sleep: yes, improved - 8 to 10 hours per night currently  appetite changes: no  weight changes: no  energy/anergy: yes - improved with improved sleep  interest/pleasure/anhedonia: no  somatic symptoms: no  libido: no  anxiety/panic: no  guilty/hopeless: no  S.I.B.s/risky behavior: no  any drugs: no  alcohol: no       Sleep: 8 to 10 hours per night, Ambien and ASMR has been helpful for sleep " regulation        Risk Parameters:  Patient reports no suicidal ideation  Patient reports no homicidal ideation  Patient reports no self-injurious behavior  Patient reports no violent behavior        Current meds- Cymbalta, Abilify, NAC, Ambien, fluoxetine     Compliance: yes     Side effects: None        Psychiatric, Social and Family history reviewed prior and during visit.             Objective      ALL MEDICATIONS:     Current Outpatient Medications:     ARIPiprazole (ABILIFY) 10 MG Tab, Take 1 tablet (10 mg total) by mouth once daily., Disp: 30 tablet, Rfl: 2    cetirizine (ZYRTEC) 10 MG tablet, Take 10 mg by mouth once daily., Disp: , Rfl:     clotrimazole-betamethasone 1-0.05% (LOTRISONE) cream, Apply topically 2 (two) times daily., Disp: 15 g, Rfl: 0    drospirenone-ethinyl estradioL (SARAH) 3-0.02 mg per tablet, Take 1 tablet by mouth once daily., Disp: 84 tablet, Rfl: 4    DULoxetine (CYMBALTA) 60 MG capsule, Take 1 capsule (60 mg total) by mouth 2 (two) times daily., Disp: 180 capsule, Rfl: 1    ibuprofen (ADVIL,MOTRIN) 400 MG tablet, Take 400 mg by mouth every 6 (six) hours as needed for Other., Disp: , Rfl:     levomefolate-algal oil (DEPLIN, ALGAL OIL,) 15-90.314 mg Cap, Take 15 mg by mouth once daily., Disp: 50 capsule, Rfl: 1    magnesium 30 mg Tab, Take by mouth once., Disp: , Rfl:     multivitamin/iron/folic acid (MULTI COMPLETE WITH IRON ORAL), Take by mouth., Disp: , Rfl:     spironolactone (ALDACTONE) 50 MG tablet, Take 1 tablet (50 mg total) by mouth once daily., Disp: 30 tablet, Rfl: 2    sulfacetamide sodium, acne, (KLARON) 10 % Susp, Bid face, Disp: 118 mL, Rfl: 1    zolpidem (AMBIEN CR) 12.5 MG CR tablet, Take 1 tablet (12.5 mg total) by mouth nightly as needed for Insomnia., Disp: 30 tablet, Rfl: 2     ALLERGIES:  Review of patient's allergies indicates:  No Known Allergies     RELEVANT LABS/STUDIES:              Lab Results   Component Value Date     WBC 8.26 04/03/2020     HGB  11.3 (L) 04/03/2020     HCT 35.2 (L) 04/03/2020     MCV 93 04/03/2020      04/03/2020      BMP            Lab Results   Component Value Date      (L) 02/26/2020     K 4.5 02/26/2020      02/26/2020     CO2 24 02/26/2020     BUN 7 02/26/2020     CREATININE 0.8 02/26/2020     CALCIUM 9.3 02/26/2020     ANIONGAP 9 02/26/2020     ESTGFRAFRICA >60.0 02/26/2020     EGFRNONAA >60.0 02/26/2020                Lab Results   Component Value Date     ALT 18 02/26/2020     AST 17 02/26/2020     ALKPHOS 79 02/26/2020     BILITOT 0.2 02/26/2020                Lab Results   Component Value Date     TSH 1.656 07/07/2020                Lab Results   Component Value Date     HGBA1C 5.2 02/26/2020         Constitutional  Vitals:  Most recent vital signs, dated less than 90 days prior to this appointment, were reviewed.    There were no vitals filed for this visit.            PHYSICAL EXAM  General: well developed, well nourished  Neurologic:   Gait: Normal   Psychomotor signs:  No involuntary movements or tremor  AIMS: none     PSYCHIATRIC EXAM:     Mental Status Exam:  Appearance: unremarkable, age appropriate  Behavior/Cooperation: normal, cooperative  Speech: normal tone, normal rate, normal pitch, normal volume  Language: uses words appropriately; NO aphasia or dysarthria  Mood: steady  Affect: full, congruent and appropriate to situation  Thought Process: normal and logical  Thought Content: normal, no suicidality, no homicidality, delusions, or paranoia  Level of Consciousness: Alert and Oriented x3  Memory:  Intact  Attention/concentration: appropriate for age/education.   Fund of Knowledge: appears adequate  Insight: Intact  Judgment: Intact      Assessment and Diagnosis   Status/Progress: Based on the examination today, the patient's problem(s) is/are improved.  New problems have not been presented today.   Co-morbidities are complicating management of the primary condition.  The working differential for  this patient includes OCPD, borderline personality disorder, OCD.      General Impression:   Bipolar II Disorder with mixed features by history  Mood Disorder NOS  RUPINDER  Insomnia        Intervention/Counseling/Treatment Plan   · Medication Management: -        Continue Abilify 10 mg by mouth once daily        Continue Cymbalta 60 mg by mouth once daily (self-titrated        Continue Ambien CR 12.5 mg by mouth once nightly        Continue Prozac 20 mg by mouth once daily  · Labs: reviewed most recent  · The treatment plan and follow up plan were reviewed with the patient.  · Discussed with patient informed consent, risks vs. benefits, alternative treatments, side effect profile and the inherent unpredictability of individual responses to these treatments. The patient expresses understanding of the above and displays the capacity to agree with this current plan and had no other questions.  · Encouraged Patient to keep future appointments.   · Take medications as prescribed and abstain from substance abuse.   · In the event of an emergency patient was advised to go to the emergency room.     Return to Clinic: 2 to 3 months     > than 50% of total time spend on coordination of care and counseling   (which included pts differential diagnosis and prognosis for psychiatric conditions, risks, benefits of treatments, instructions and adherence to treatment plan, risk reduction, reviewing current psychiatric medication regimen, medical problems and social stressors. In addtion to possible discussion with other healthcare provider/s)     Add on Psychotherapy time:0  Total Face time: 30 min     The patient location is: [place], at home  The chief complaint leading to consultation is: med f/u     Visit type: audiovisual     Face to Face time with patient: 30 min  30 minutes of total time spent on the encounter, which includes face to face time and non-face to face time preparing to see the patient (eg, review of tests),  Obtaining and/or reviewing separately obtained history, Documenting clinical information in the electronic or other health record, Independently interpreting results (not separately reported) and communicating results to the patient/family/caregiver, or Care coordination (not separately reported).         Each patient to whom he or she provides medical services by telemedicine is:  (1) informed of the relationship between the physician and patient and the respective role of any other health care provider with respect to management of the patient; and (2) notified that he or she may decline to receive medical services by telemedicine and may withdraw from such care at any time.     Notes:         Abel Monroe, MSN, APRN, PMHNP-BC  [facility]   5/10/2022 7:30 AM

## 2022-05-19 ENCOUNTER — OFFICE VISIT (OUTPATIENT)
Dept: PSYCHIATRY | Facility: CLINIC | Age: 42
End: 2022-05-19
Payer: COMMERCIAL

## 2022-05-19 DIAGNOSIS — F31.70 BIPOLAR DISORDER IN PARTIAL REMISSION, MOST RECENT EPISODE UNSPECIFIED TYPE: Primary | ICD-10-CM

## 2022-05-19 DIAGNOSIS — F41.1 GAD (GENERALIZED ANXIETY DISORDER): ICD-10-CM

## 2022-05-19 PROCEDURE — 90834 PR PSYCHOTHERAPY W/PATIENT, 45 MIN: ICD-10-PCS | Mod: 95,,, | Performed by: SOCIAL WORKER

## 2022-05-19 PROCEDURE — 3044F PR MOST RECENT HEMOGLOBIN A1C LEVEL <7.0%: ICD-10-PCS | Mod: CPTII,95,, | Performed by: SOCIAL WORKER

## 2022-05-19 PROCEDURE — 90834 PSYTX W PT 45 MINUTES: CPT | Mod: 95,,, | Performed by: SOCIAL WORKER

## 2022-05-19 PROCEDURE — 3044F HG A1C LEVEL LT 7.0%: CPT | Mod: CPTII,95,, | Performed by: SOCIAL WORKER

## 2022-05-19 NOTE — PROGRESS NOTES
Individual Psychotherapy (PhD/LCSW)    5/19/2022    The patient location is: home in living room ([facility])  The chief complaint leading to consultation is: anxiety, depression, mood    Visit type: audio visutal    Face to Face time with patient: 50  65 minutes of total time spent on the encounter, which includes face to face time and non-face to face time preparing to see the patient (eg, review of tests), Obtaining and/or reviewing separately obtained history, Documenting clinical information in the electronic or other health record, Independently interpreting results (not separately reported) and communicating results to the patient/family/caregiver, or Care coordination (not separately reported).       Each patient to whom he or she provides medical services by telemedicine is:  (1) informed of the relationship between the physician and patient and the respective role of any other health care provider with respect to management of the patient; and (2) notified that he or she may decline to receive medical services by telemedicine and may withdraw from such care at any time.      Therapeutic Intervention: Met with patient.       Outpatient - Insight oriented psychotherapy 45 min - CPT code 68311, Outpatient - Supportive psychotherapy 45 min - CPT Code 38117 and Outpatient - Interactive psychotherapy 45 min - CPT code 33557      Chief complaint/reason for encounter: depression, anxiety, Mood     Interval history and content of current session: Pt is a 41 yo  female who presents today for f/u via telemedicine.  Pt currently established with  Abel Miles N.P. Pt has a dx of Bipolar and often shows OCPD and borderline personality tendencies. Pt was started on Abilify and since then has had medication increased. Pt is also prescribed Prozac. Since increase pt reports improved mood, sleep, and quality of life overall.      Pt presents via telemed. Pt has been writing out her dreams and identifying  themes. Most of the dreams are centered around anxiety and television shows. Pt's dreams are sometimes so vivid she is able to remember certain smells. She finds that writing them down has cause her to not remember parts of them now. She continues to feel good and spoke with her N.P., who did not want to her discontinue her Cymbalta as her Rheumatologist recommended.  Pt had already decreased the dosage, however feels her mood has remained stable with decreasing it. Reivewed emotional awareness and anxiety redcution.   Provided platform for discussion, processing, support, and feedback. Pt fully engaged.  Pt remains receptive of psychotherapy. Assisted with scheduling f/u.       Treatment plan:  · Target symptoms: depression, anxiety , mood  · Why chosen therapy is appropriate versus another modality: relevant to diagnosis, patient responds to this modality, evidence based practice  · Outcome monitoring methods: self-report, observation  · Therapeutic intervention type: insight oriented psychotherapy, supportive psychotherapy, interactive psychotherapy    Risk parameters:  Patient reports no suicidal ideation  Patient reports no homicidal ideation  Patient reports no self-injurious behavior  Patient reports no violent behavior    Verbal deficits: None    Patient's response to intervention:  The patient's response to intervention is accepting.    Progress toward goals and other mental status changes:    The patient's progress toward goals is good.       Diagnosis:     ICD-10-CM ICD-9-CM   1. Bipolar disorder in partial remission, most recent episode unspecified type  F31.70 296.80   2. RUPINDER (generalized anxiety disorder)  F41.1 300.02       Plan:  individual psychotherapy, conitnued med management through psychiatrist.     Return to clinic: as scheduled     Length of Service (minutes): 45

## 2022-05-31 ENCOUNTER — PATIENT MESSAGE (OUTPATIENT)
Dept: ADMINISTRATIVE | Facility: HOSPITAL | Age: 42
End: 2022-05-31
Payer: COMMERCIAL

## 2022-06-29 ENCOUNTER — OFFICE VISIT (OUTPATIENT)
Dept: PSYCHIATRY | Facility: CLINIC | Age: 42
End: 2022-06-29
Payer: COMMERCIAL

## 2022-06-29 DIAGNOSIS — G47.00 INSOMNIA, UNSPECIFIED TYPE: ICD-10-CM

## 2022-06-29 DIAGNOSIS — F31.70 BIPOLAR DISORDER IN PARTIAL REMISSION, MOST RECENT EPISODE UNSPECIFIED TYPE: Primary | ICD-10-CM

## 2022-06-29 DIAGNOSIS — F41.1 GAD (GENERALIZED ANXIETY DISORDER): ICD-10-CM

## 2022-06-29 PROCEDURE — 99214 PR OFFICE/OUTPT VISIT, EST, LEVL IV, 30-39 MIN: ICD-10-PCS | Mod: 95,,, | Performed by: NURSE PRACTITIONER

## 2022-06-29 PROCEDURE — 99214 OFFICE O/P EST MOD 30 MIN: CPT | Mod: 95,,, | Performed by: NURSE PRACTITIONER

## 2022-06-29 PROCEDURE — 3044F PR MOST RECENT HEMOGLOBIN A1C LEVEL <7.0%: ICD-10-PCS | Mod: CPTII,95,, | Performed by: NURSE PRACTITIONER

## 2022-06-29 PROCEDURE — 3044F HG A1C LEVEL LT 7.0%: CPT | Mod: CPTII,95,, | Performed by: NURSE PRACTITIONER

## 2022-06-29 RX ORDER — FLUOXETINE HYDROCHLORIDE 20 MG/1
20 CAPSULE ORAL DAILY
Qty: 30 CAPSULE | Refills: 5 | Status: SHIPPED | OUTPATIENT
Start: 2022-06-29 | End: 2022-10-25 | Stop reason: SDUPTHER

## 2022-06-29 RX ORDER — DULOXETIN HYDROCHLORIDE 20 MG/1
60 CAPSULE, DELAYED RELEASE ORAL DAILY
Qty: 90 CAPSULE | Refills: 0 | Status: SHIPPED | OUTPATIENT
Start: 2022-06-29 | End: 2022-09-12

## 2022-06-29 RX ORDER — ZOLPIDEM TARTRATE 12.5 MG/1
12.5 TABLET, FILM COATED, EXTENDED RELEASE ORAL NIGHTLY PRN
Qty: 30 TABLET | Refills: 2 | Status: SHIPPED | OUTPATIENT
Start: 2022-06-29 | End: 2022-09-23 | Stop reason: SDUPTHER

## 2022-06-29 NOTE — PROGRESS NOTES
"6/29/2022 1:00 PM   Roopa Rivas   1980   [id number]                                                                   OUTPATIENT PSYCHIATRY FOLLOW- UP VISIT     Reason for Encounter:  Roopa Rivas, a 42 y.o. female,who presents today for follow up of anxiety, mood disorder.  Met with patient.      Interval History and Content of Current Session:     Today,  Pt with hx of bipolar II disorder, fibromyalgia, vitamin D deficiency, iron deficiency reports since last visit about 6 weeks prior has been exercising more and this "has helped me get through the days."    Has been continuing to self titrate to 60 mg, but upon recommendation of rheumatology had been taking 60 mg QOD but this resulted in increase in anxiety, mood swings, "felt crazy." Reports when withdrawing had higher anxiety and emphasis on "things being out of order, control" and perceived criticisms from family members.     Reports has been working out daily for about one hour. Reports pain has been better controlled. Reports weight was 119# to 136# recently.     Discussed to continue to wean from Cymbalta from 60 mg to 40 mg for two weeks, then decrease to 20 mg for two weeks. Discussed risks, benefits including withdrawal symptoms and potential of increased anxiety, OC symptoms. Discussed consideration of increase in Prozac to 30 mg if necessary.     Discussed concerns about pain returning and discussed this as a possibility.     Denies SI/HI, AVH     Psychosocial stressors: family, financial, social pressures        Review Of Systems:      Medical Review Of Systems:  Pertinent items are noted in HPI.     Psychiatric Review Of Systems:  sleep: yes, improved - 8 to 10 hours per night currently  appetite changes: no  weight changes: no  energy/anergy: yes - improved with improved sleep  interest/pleasure/anhedonia: no  somatic symptoms: no  libido: no  anxiety/panic: no  guilty/hopeless: no  S.I.B.s/risky behavior: no  any " drugs: no  alcohol: no       Sleep: 8 to 10 hours per night, Ambien and ASMR has been helpful for sleep regulation        Risk Parameters:  Patient reports no suicidal ideation  Patient reports no homicidal ideation  Patient reports no self-injurious behavior  Patient reports no violent behavior        Current meds- Cymbalta, Abilify, NAC, Ambien, fluoxetine     Compliance: yes     Side effects: None        Psychiatric, Social and Family history reviewed prior and during visit.             Objective      ALL MEDICATIONS:     Current Outpatient Medications:     ARIPiprazole (ABILIFY) 10 MG Tab, Take 1 tablet (10 mg total) by mouth once daily., Disp: 30 tablet, Rfl: 2    cetirizine (ZYRTEC) 10 MG tablet, Take 10 mg by mouth once daily., Disp: , Rfl:     clotrimazole-betamethasone 1-0.05% (LOTRISONE) cream, Apply topically 2 (two) times daily., Disp: 15 g, Rfl: 0    drospirenone-ethinyl estradioL (SARAH) 3-0.02 mg per tablet, Take 1 tablet by mouth once daily., Disp: 84 tablet, Rfl: 4    DULoxetine (CYMBALTA) 60 MG capsule, Take 1 capsule (60 mg total) by mouth 2 (two) times daily., Disp: 180 capsule, Rfl: 1    ibuprofen (ADVIL,MOTRIN) 400 MG tablet, Take 400 mg by mouth every 6 (six) hours as needed for Other., Disp: , Rfl:     levomefolate-algal oil (DEPLIN, ALGAL OIL,) 15-90.314 mg Cap, Take 15 mg by mouth once daily., Disp: 50 capsule, Rfl: 1    magnesium 30 mg Tab, Take by mouth once., Disp: , Rfl:     multivitamin/iron/folic acid (MULTI COMPLETE WITH IRON ORAL), Take by mouth., Disp: , Rfl:     spironolactone (ALDACTONE) 50 MG tablet, Take 1 tablet (50 mg total) by mouth once daily., Disp: 30 tablet, Rfl: 2    sulfacetamide sodium, acne, (KLARON) 10 % Susp, Bid face, Disp: 118 mL, Rfl: 1    zolpidem (AMBIEN CR) 12.5 MG CR tablet, Take 1 tablet (12.5 mg total) by mouth nightly as needed for Insomnia., Disp: 30 tablet, Rfl: 2     ALLERGIES:  Review of patient's allergies indicates:  No Known  Allergies     RELEVANT LABS/STUDIES:              Lab Results   Component Value Date     WBC 8.26 04/03/2020     HGB 11.3 (L) 04/03/2020     HCT 35.2 (L) 04/03/2020     MCV 93 04/03/2020      04/03/2020      BMP            Lab Results   Component Value Date      (L) 02/26/2020     K 4.5 02/26/2020      02/26/2020     CO2 24 02/26/2020     BUN 7 02/26/2020     CREATININE 0.8 02/26/2020     CALCIUM 9.3 02/26/2020     ANIONGAP 9 02/26/2020     ESTGFRAFRICA >60.0 02/26/2020     EGFRNONAA >60.0 02/26/2020                Lab Results   Component Value Date     ALT 18 02/26/2020     AST 17 02/26/2020     ALKPHOS 79 02/26/2020     BILITOT 0.2 02/26/2020                Lab Results   Component Value Date     TSH 1.656 07/07/2020                Lab Results   Component Value Date     HGBA1C 5.2 02/26/2020         Constitutional  Vitals:  Most recent vital signs, dated less than 90 days prior to this appointment, were reviewed.    There were no vitals filed for this visit.            PHYSICAL EXAM  General: well developed, well nourished  Neurologic:   Gait: Normal   Psychomotor signs:  No involuntary movements or tremor  AIMS: none     PSYCHIATRIC EXAM:     Mental Status Exam:  Appearance: unremarkable, age appropriate  Behavior/Cooperation: normal, cooperative  Speech: normal tone, normal rate, normal pitch, normal volume  Language: uses words appropriately; NO aphasia or dysarthria  Mood: steady  Affect: full, congruent and appropriate to situation  Thought Process: normal and logical  Thought Content: normal, no suicidality, no homicidality, delusions, or paranoia  Level of Consciousness: Alert and Oriented x3  Memory:  Intact  Attention/concentration: appropriate for age/education.   Fund of Knowledge: appears adequate  Insight: Intact  Judgment: Intact      Assessment and Diagnosis   Status/Progress: Based on the examination today, the patient's problem(s) is/are improved.  New problems have not been  presented today.   Co-morbidities are complicating management of the primary condition.  The working differential for this patient includes OCPD, borderline personality disorder, OCD, ADHD.      General Impression:   Bipolar II Disorder with mixed features by history  Mood Disorder NOS  RUPINDER  Insomnia        Intervention/Counseling/Treatment Plan   · Medication Management: -        Continue Abilify 10 mg by mouth once daily        Decrease Cymbalta to 40 mg by mouth once daily for two weeks, then 20 mg by mouth once daily for two weeks        Continue Ambien CR 12.5 mg by mouth once nightly        Continue Prozac 20 mg by mouth once daily  · Labs: reviewed most recent  · The treatment plan and follow up plan were reviewed with the patient.  · Discussed with patient informed consent, risks vs. benefits, alternative treatments, side effect profile and the inherent unpredictability of individual responses to these treatments. The patient expresses understanding of the above and displays the capacity to agree with this current plan and had no other questions.  · Encouraged Patient to keep future appointments.   · Take medications as prescribed and abstain from substance abuse.   · In the event of an emergency patient was advised to go to the emergency room.     Return to Clinic: 1 month     > than 50% of total time spend on coordination of care and counseling   (which included pts differential diagnosis and prognosis for psychiatric conditions, risks, benefits of treatments, instructions and adherence to treatment plan, risk reduction, reviewing current psychiatric medication regimen, medical problems and social stressors. In addtion to possible discussion with other healthcare provider/s)     Add on Psychotherapy time:0  Total Face time: 20 min     The patient location is: [place], at home  The chief complaint leading to consultation is: med f/u     Visit type: audiovisual     Face to Face time with  patient: 20 min  20 minutes of total time spent on the encounter, which includes face to face time and non-face to face time preparing to see the patient (eg, review of tests), Obtaining and/or reviewing separately obtained history, Documenting clinical information in the electronic or other health record, Independently interpreting results (not separately reported) and communicating results to the patient/family/caregiver, or Care coordination (not separately reported).         Each patient to whom he or she provides medical services by telemedicine is:  (1) informed of the relationship between the physician and patient and the respective role of any other health care provider with respect to management of the patient; and (2) notified that he or she may decline to receive medical services by telemedicine and may withdraw from such care at any time.     Notes:         Abel Monroe, MSN, APRN, PMHNP-BC [facility]   6/29/2022 1:00 PM

## 2022-08-13 DIAGNOSIS — L70.9 ADULT ACNE: ICD-10-CM

## 2022-08-15 RX ORDER — SPIRONOLACTONE 50 MG/1
TABLET, FILM COATED ORAL
Qty: 90 TABLET | Refills: 0 | Status: SHIPPED | OUTPATIENT
Start: 2022-08-15 | End: 2022-10-24 | Stop reason: SDUPTHER

## 2022-08-18 ENCOUNTER — OFFICE VISIT (OUTPATIENT)
Dept: PSYCHIATRY | Facility: CLINIC | Age: 42
End: 2022-08-18
Payer: COMMERCIAL

## 2022-08-18 DIAGNOSIS — G47.00 INSOMNIA, UNSPECIFIED TYPE: ICD-10-CM

## 2022-08-18 DIAGNOSIS — F41.1 GAD (GENERALIZED ANXIETY DISORDER): Primary | ICD-10-CM

## 2022-08-18 DIAGNOSIS — F31.70 BIPOLAR DISORDER IN PARTIAL REMISSION, MOST RECENT EPISODE UNSPECIFIED TYPE: ICD-10-CM

## 2022-08-18 PROCEDURE — 99214 OFFICE O/P EST MOD 30 MIN: CPT | Mod: 95,,, | Performed by: NURSE PRACTITIONER

## 2022-08-18 PROCEDURE — 99214 PR OFFICE/OUTPT VISIT, EST, LEVL IV, 30-39 MIN: ICD-10-PCS | Mod: 95,,, | Performed by: NURSE PRACTITIONER

## 2022-08-18 PROCEDURE — 90833 PSYTX W PT W E/M 30 MIN: CPT | Mod: 95,,, | Performed by: NURSE PRACTITIONER

## 2022-08-18 PROCEDURE — 90833 PR PSYCHOTHERAPY W/PATIENT W/E&M, 30 MIN (ADD ON): ICD-10-PCS | Mod: 95,,, | Performed by: NURSE PRACTITIONER

## 2022-08-18 PROCEDURE — 3044F PR MOST RECENT HEMOGLOBIN A1C LEVEL <7.0%: ICD-10-PCS | Mod: CPTII,95,, | Performed by: NURSE PRACTITIONER

## 2022-08-18 PROCEDURE — 3044F HG A1C LEVEL LT 7.0%: CPT | Mod: CPTII,95,, | Performed by: NURSE PRACTITIONER

## 2022-08-18 RX ORDER — FLUOXETINE 10 MG/1
10 CAPSULE ORAL DAILY
Qty: 30 CAPSULE | Refills: 5 | Status: SHIPPED | OUTPATIENT
Start: 2022-08-18 | End: 2022-10-25 | Stop reason: SDUPTHER

## 2022-08-18 NOTE — PROGRESS NOTES
"8/18/2022 12:30 PM   Roopa Rivas   1980   [id number]                                                                   OUTPATIENT PSYCHIATRY FOLLOW- UP VISIT     Reason for Encounter:  Roopa Rivas, a 42 y.o. female,who presents today for follow up of anxiety, mood disorder.  Met with patient.      Interval History and Content of Current Session:     Today,  Pt with hx of bipolar II disorder, fibromyalgia, vitamin D deficiency, iron deficiency reports since last visit about 6 weeks prior, has been decreasing the Cymbalta from 60 mg to 20 mg by mouth once daily.     Reports spending habits "have been getting worse." Reports spent $3k in about two weeks.     Reports "wanting to have something delivered to me or given to me."    Reports two months had free shipping and this was difficulty for spending.     Discussed understanding emotions surround triggers - reports "fights with " "and I just feel bad and want to get back to him."     Discussed all or none thinking, appropriate expression of anger, reports "I feel guilty because I don't want them to see the old me that was really angry and yelling."      Discussed concerns about pain returning and discussed this as a possibility.      Denies SI/HI, AVH     Psychosocial stressors: family, financial, social pressures        Review Of Systems:      Medical Review Of Systems:  Pertinent items are noted in HPI.     Psychiatric Review Of Systems:  sleep: yes, improved - 8 to 10 hours per night currently  appetite changes: no  weight changes: no  energy/anergy: yes - improved with improved sleep  interest/pleasure/anhedonia: no  somatic symptoms: no  libido: no  anxiety/panic: no  guilty/hopeless: no  S.I.B.s/risky behavior: no  any drugs: no  alcohol: no       Sleep: 8 to 10 hours per night, Ambien and ASMR has been helpful for sleep regulation        Risk Parameters:  Patient reports no suicidal ideation  Patient reports no homicidal ideation  Patient reports " no self-injurious behavior  Patient reports no violent behavior        Current meds- Cymbalta, Abilify, NAC, Ambien, fluoxetine     Compliance: yes     Side effects: None        Psychiatric, Social and Family history reviewed prior and during visit.        PSYCHOTHERAPY ADD-ON +[id number]   16-37 minutes    Site: [facility], [address]  Time: 30 minutes   Participants: Met with patient    Therapeutic Intervention Type: insight oriented psychotherapy, behavior modifying psychotherapy, supportive psychotherapy  Why chosen therapy is appropriate versus another modality: relevant to diagnosis    Target symptoms: anxiety , compulsive spending  Primary focus: anxiety, maladaptive coping and maladaptive expression of emotion, frustration  Psychotherapeutic techniques: CBT, motivational interviewing    Outcome monitoring methods: self-report, observation    Patient's response to intervention:  The patient's response to intervention is accepting.    Progress toward goals:  The patient's progress toward goals is fair , good.           Objective      ALL MEDICATIONS:     Current Outpatient Medications:     ARIPiprazole (ABILIFY) 10 MG Tab, Take 1 tablet (10 mg total) by mouth once daily., Disp: 30 tablet, Rfl: 2    cetirizine (ZYRTEC) 10 MG tablet, Take 10 mg by mouth once daily., Disp: , Rfl:     clotrimazole-betamethasone 1-0.05% (LOTRISONE) cream, Apply topically 2 (two) times daily., Disp: 15 g, Rfl: 0    drospirenone-ethinyl estradioL (SARAH) 3-0.02 mg per tablet, Take 1 tablet by mouth once daily., Disp: 84 tablet, Rfl: 4    DULoxetine (CYMBALTA) 60 MG capsule, Take 1 capsule (60 mg total) by mouth 2 (two) times daily., Disp: 180 capsule, Rfl: 1    ibuprofen (ADVIL,MOTRIN) 400 MG tablet, Take 400 mg by mouth every 6 (six) hours as needed for Other., Disp: , Rfl:     levomefolate-algal oil (DEPLIN, ALGAL OIL,) 15-90.314 mg Cap, Take 15 mg by mouth once daily., Disp: 50 capsule, Rfl: 1    magnesium 30 mg  Tab, Take by mouth once., Disp: , Rfl:     multivitamin/iron/folic acid (MULTI COMPLETE WITH IRON ORAL), Take by mouth., Disp: , Rfl:     spironolactone (ALDACTONE) 50 MG tablet, Take 1 tablet (50 mg total) by mouth once daily., Disp: 30 tablet, Rfl: 2    sulfacetamide sodium, acne, (KLARON) 10 % Susp, Bid face, Disp: 118 mL, Rfl: 1    zolpidem (AMBIEN CR) 12.5 MG CR tablet, Take 1 tablet (12.5 mg total) by mouth nightly as needed for Insomnia., Disp: 30 tablet, Rfl: 2     ALLERGIES:  Review of patient's allergies indicates:  No Known Allergies     RELEVANT LABS/STUDIES:              Lab Results   Component Value Date     WBC 8.26 04/03/2020     HGB 11.3 (L) 04/03/2020     HCT 35.2 (L) 04/03/2020     MCV 93 04/03/2020      04/03/2020      BMP            Lab Results   Component Value Date      (L) 02/26/2020     K 4.5 02/26/2020      02/26/2020     CO2 24 02/26/2020     BUN 7 02/26/2020     CREATININE 0.8 02/26/2020     CALCIUM 9.3 02/26/2020     ANIONGAP 9 02/26/2020     ESTGFRAFRICA >60.0 02/26/2020     EGFRNONAA >60.0 02/26/2020                Lab Results   Component Value Date     ALT 18 02/26/2020     AST 17 02/26/2020     ALKPHOS 79 02/26/2020     BILITOT 0.2 02/26/2020                Lab Results   Component Value Date     TSH 1.656 07/07/2020                Lab Results   Component Value Date     HGBA1C 5.2 02/26/2020         Constitutional  Vitals:  Most recent vital signs, dated less than 90 days prior to this appointment, were reviewed.    There were no vitals filed for this visit.            PHYSICAL EXAM  General: well developed, well nourished  Neurologic:   Gait: Normal   Psychomotor signs:  No involuntary movements or tremor  AIMS:       AIMS: Score 0/36  Abnormal Involuntary Movement Scale  0-4   Muscles of Facial Expression  0   Lips and Perioral Area  0   Jaw  0   Tongue  0   Upper (arms, wrists, hands, fingers)  0    Lower (legs, knees, ankles, toes)  0   Neck, shoulders, hips  " 0   Severity of abnormal movements (highest score from questions above)  0    Incapacitation due to abnormal movements  0    Patient's awareness of abnormal movements (rate only patient's report)  0   Current problems with teeth and/or dentures?  No    Does patient usually wear dentures?  No           PSYCHIATRIC EXAM:     Mental Status Exam:  Appearance: unremarkable, age appropriate  Behavior/Cooperation: normal, cooperative  Speech: normal tone, normal rate, normal pitch, normal volume  Language: uses words appropriately; NO aphasia or dysarthria  Mood: "'I'm ok."  Affect: full, congruent and appropriate to situation  Thought Process: normal and logical  Thought Content: normal, no suicidality, no homicidality, delusions, or paranoia  Level of Consciousness: Alert and Oriented x3  Memory:  Intact  Attention/concentration: appropriate for age/education.   Fund of Knowledge: appears adequate  Insight: Intact  Judgment: Intact      Assessment and Diagnosis   Status/Progress: Based on the examination today, the patient's problem(s) is/are improved.  New problems have not been presented today.   Co-morbidities are complicating management of the primary condition.  The working differential for this patient includes OCPD, borderline personality disorder, OCD, ADHD.      General Impression:   Bipolar II Disorder with mixed features by history  Mood Disorder NOS  RUPINDER  Insomnia        Intervention/Counseling/Treatment Plan   · Medication Management: -        Continue Abilify 10 mg by mouth once daily        Discontinue Cymbalta 20 mg by mouth once daily after one to two weeks on higher dosage of Prozac at 30 mg        Continue Ambien CR 12.5 mg by mouth once nightly        Increase Prozac to 30 mg by mouth once daily  · Labs: reviewed most recent  · The treatment plan and follow up plan were reviewed with the patient.  · Discussed with patient informed consent, risks vs. benefits, alternative treatments, side effect " profile and the inherent unpredictability of individual responses to these treatments. The patient expresses understanding of the above and displays the capacity to agree with this current plan and had no other questions.  · Encouraged Patient to keep future appointments.   · Take medications as prescribed and abstain from substance abuse.   · In the event of an emergency patient was advised to go to the emergency room.     Return to Clinic: 1 month     > than 50% of total time spend on coordination of care and counseling   (which included pts differential diagnosis and prognosis for psychiatric conditions, risks, benefits of treatments, instructions and adherence to treatment plan, risk reduction, reviewing current psychiatric medication regimen, medical problems and social stressors. In addtion to possible discussion with other healthcare provider/s)     Add on Psychotherapy time:0  Total Face time: 45 min     The patient location is: [place], at home  The chief complaint leading to consultation is: med f/u     Visit type: audiovisual     Face to Face time with patient: 45 min  50 minutes of total time spent on the encounter, which includes face to face time and non-face to face time preparing to see the patient (eg, review of tests), Obtaining and/or reviewing separately obtained history, Documenting clinical information in the electronic or other health record, Independently interpreting results (not separately reported) and communicating results to the patient/family/caregiver, or Care coordination (not separately reported).         Each patient to whom he or she provides medical services by telemedicine is:  (1) informed of the relationship between the physician and patient and the respective role of any other health care provider with respect to management of the patient; and (2) notified that he or she may decline to receive medical services by telemedicine and may withdraw from such care at any  time.     Notes:         Abel Monroe, MSN, APRN, PMHNP-BC  [facility]   8/18/2022 12:30 PM

## 2022-08-24 ENCOUNTER — PATIENT MESSAGE (OUTPATIENT)
Dept: ADMINISTRATIVE | Facility: HOSPITAL | Age: 42
End: 2022-08-24
Payer: COMMERCIAL

## 2022-09-12 ENCOUNTER — OFFICE VISIT (OUTPATIENT)
Dept: FAMILY MEDICINE | Facility: CLINIC | Age: 42
End: 2022-09-12
Payer: COMMERCIAL

## 2022-09-12 DIAGNOSIS — E61.1 IRON DEFICIENCY: ICD-10-CM

## 2022-09-12 DIAGNOSIS — Z00.00 HEALTHCARE MAINTENANCE: ICD-10-CM

## 2022-09-12 DIAGNOSIS — Z12.39 ENCOUNTER FOR SCREENING FOR MALIGNANT NEOPLASM OF BREAST, UNSPECIFIED SCREENING MODALITY: Primary | ICD-10-CM

## 2022-09-12 PROCEDURE — 3044F PR MOST RECENT HEMOGLOBIN A1C LEVEL <7.0%: ICD-10-PCS | Mod: CPTII,95,, | Performed by: INTERNAL MEDICINE

## 2022-09-12 PROCEDURE — 1160F RVW MEDS BY RX/DR IN RCRD: CPT | Mod: CPTII,95,, | Performed by: INTERNAL MEDICINE

## 2022-09-12 PROCEDURE — 99213 PR OFFICE/OUTPT VISIT, EST, LEVL III, 20-29 MIN: ICD-10-PCS | Mod: 95,,, | Performed by: INTERNAL MEDICINE

## 2022-09-12 PROCEDURE — 1159F PR MEDICATION LIST DOCUMENTED IN MEDICAL RECORD: ICD-10-PCS | Mod: CPTII,95,, | Performed by: INTERNAL MEDICINE

## 2022-09-12 PROCEDURE — 3044F HG A1C LEVEL LT 7.0%: CPT | Mod: CPTII,95,, | Performed by: INTERNAL MEDICINE

## 2022-09-12 PROCEDURE — 99213 OFFICE O/P EST LOW 20 MIN: CPT | Mod: 95,,, | Performed by: INTERNAL MEDICINE

## 2022-09-12 PROCEDURE — 1159F MED LIST DOCD IN RCRD: CPT | Mod: CPTII,95,, | Performed by: INTERNAL MEDICINE

## 2022-09-12 PROCEDURE — 1160F PR REVIEW ALL MEDS BY PRESCRIBER/CLIN PHARMACIST DOCUMENTED: ICD-10-PCS | Mod: CPTII,95,, | Performed by: INTERNAL MEDICINE

## 2022-09-12 NOTE — PROGRESS NOTES
HISTORY OF PRESENT ILLNESS:  Roopa Rivas is a 42 y.o. female who presents to the clinic today for Follow up.    The patient location is: home  The chief complaint leading to consultation is: Follow up    Visit type: audiovisual    Face to Face time with patient: 10 minutes  15 minutes of total time spent on the encounter, which includes face to face time and non-face to face time preparing to see the patient (eg, review of tests), Obtaining and/or reviewing separately obtained history, Documenting clinical information in the electronic or other health record, Independently interpreting results (not separately reported) and communicating results to the patient/family/caregiver, or Care coordination (not separately reported).     Each patient to whom he or she provides medical services by telemedicine is:  (1) informed of the relationship between the physician and patient and the respective role of any other health care provider with respect to management of the patient; and (2) notified that he or she may decline to receive medical services by telemedicine and may withdraw from such care at any time.    Notes:     Last seen by me 3/2022.    Bipolar affective d/o, RUPINDER  On Abilify with Prozac 30 mg. Tapered off Cymbalta for 5 months and doing well..  Seen by psychiatry.  Feels mood symptoms are stable.    Insomnia  Managed with Ambien.  Notes it's taking her longer to go to sleep recently.  Sometimes  departing for night shift can affect it.    Fibromyalgia  Off Cymbalta in the last week and followed by rheumatology.    PAST MEDICAL HISTORY:  Past Medical History:   Diagnosis Date    Arthritis     Fibromyalgia     Gastroenteritis     Long-term use of Plaquenil 12/7/2017       PAST SURGICAL HISTORY:  Past Surgical History:   Procedure Laterality Date    NO PAST SURGERIES         SOCIAL HISTORY:  Social History     Socioeconomic History    Marital status:    Tobacco Use    Smoking status: Never     Smokeless tobacco: Never   Substance and Sexual Activity    Alcohol use: No    Drug use: No    Sexual activity: Yes     Partners: Male     Birth control/protection: Condom, OCP     Social Determinants of Health     Financial Resource Strain: Low Risk     Difficulty of Paying Living Expenses: Not very hard   Food Insecurity: No Food Insecurity    Worried About Running Out of Food in the Last Year: Never true    Ran Out of Food in the Last Year: Never true   Transportation Needs: No Transportation Needs    Lack of Transportation (Medical): No    Lack of Transportation (Non-Medical): No   Physical Activity: Sufficiently Active    Days of Exercise per Week: 4 days    Minutes of Exercise per Session: 40 min   Stress: No Stress Concern Present    Feeling of Stress : Only a little   Social Connections: Unknown    Frequency of Communication with Friends and Family: More than three times a week    Frequency of Social Gatherings with Friends and Family: Never    Active Member of Clubs or Organizations: No    Attends Club or Organization Meetings: Never    Marital Status:    Housing Stability: Low Risk     Unable to Pay for Housing in the Last Year: No    Number of Places Lived in the Last Year: 1    Unstable Housing in the Last Year: No       FAMILY HISTORY:  Family History   Problem Relation Age of Onset    Arthritis Mother     No Known Problems Father     Arthritis Maternal Grandmother     Arthritis Maternal Grandfather     Cancer Paternal Grandfather     Thyroid disease Maternal Aunt     Melanoma Neg Hx        ALLERGIES AND MEDICATIONS: updated and reviewed.  Review of patient's allergies indicates:  No Known Allergies  Medication List with Changes/Refills   Current Medications    ACETYLCYSTEINE (N-ACETYL-L-CYSTEINE MISC)    by Misc.(Non-Drug; Combo Route) route.    ARIPIPRAZOLE (ABILIFY) 10 MG TAB    Take 1 tablet (10 mg total) by mouth once daily.    CALCIUM CARBONATE 400 MG CALCIUM (1,000 MG) CHEW    Take by  mouth.    CLOTRIMAZOLE-BETAMETHASONE 1-0.05% (LOTRISONE) CREAM    Apply topically 2 (two) times daily.    DROSPIRENONE-ETHINYL ESTRADIOL (SARAH) 3-0.02 MG PER TABLET    Take 1 tablet by mouth once daily.    DULOXETINE (CYMBALTA) 20 MG CAPSULE    Take 3 capsules (60 mg total) by mouth once daily.    FLUOXETINE 10 MG CAPSULE    Take 1 capsule (10 mg total) by mouth once daily.    FLUOXETINE 20 MG CAPSULE    Take 1 capsule (20 mg total) by mouth once daily.    IBUPROFEN (ADVIL,MOTRIN) 400 MG TABLET    Take 400 mg by mouth every 6 (six) hours as needed for Other.    IRON, CARBONYL 25 MG IRON TAB    Take by mouth. Iron supplement    LACTOBACILLUS ACIDOPHILUS (PROBIOTIC ORAL)    Take by mouth.    LEVOTHYROXINE (SYNTHROID) 50 MCG TABLET    TAKE 1 TABLET(50 MCG) BY MOUTH BEFORE BREAKFAST    MAGNESIUM 30 MG TAB    Take by mouth once.    MULTIVITAMIN/IRON/FOLIC ACID (MULTI COMPLETE WITH IRON ORAL)    Take by mouth.    SPIRONOLACTONE (ALDACTONE) 50 MG TABLET    TAKE 1 TABLET BY MOUTH DAILY    ZOLPIDEM (AMBIEN CR) 12.5 MG CR TABLET    Take 1 tablet (12.5 mg total) by mouth nightly as needed for Insomnia.          CARE TEAM:  Patient Care Team:  Justin Paz MD as PCP - General (Internal Medicine)  Angeles Herron MA as Care Coordinator         REVIEW OF SYSTEMS:  Review of Systems   Constitutional:  Negative for activity change and unexpected weight change.   HENT:  Negative for hearing loss, rhinorrhea and trouble swallowing.    Eyes:  Negative for discharge and visual disturbance.   Respiratory:  Negative for chest tightness and wheezing.    Cardiovascular:  Negative for chest pain and palpitations.   Gastrointestinal:  Negative for blood in stool, constipation, diarrhea and vomiting.   Endocrine: Negative for polydipsia and polyuria.   Genitourinary:  Negative for difficulty urinating, dysuria, hematuria and menstrual problem.   Musculoskeletal:  Negative for arthralgias, joint swelling and neck pain.   Neurological:   Negative for weakness and headaches.   Psychiatric/Behavioral:  Negative for confusion and dysphoric mood.        PHYSICAL EXAM:    General appearance - alert, well appearing, and in no distress      ASSESSMENT AND PLAN:  Encounter for screening for malignant neoplasm of breast, unspecified screening modality  -     Mammo Digital Screening Bilat; Future; Expected date: 09/12/2022    Healthcare maintenance  -     Mammo Digital Screening Bilat; Future; Expected date: 09/12/2022  -     Lipid Panel; Future; Expected date: 03/15/2023  -     Hemoglobin A1C; Future; Expected date: 03/15/2023  -     Comprehensive Metabolic Panel; Future; Expected date: 03/15/2023  -     CBC Auto Differential; Future; Expected date: 03/12/2023  -     Ferritin; Future; Expected date: 03/12/2023  -     Iron and TIBC; Future; Expected date: 03/12/2023    Iron deficiency  -     Ferritin; Future; Expected date: 03/12/2023  -     Iron and TIBC; Future; Expected date: 03/12/2023            No follow-ups on file. or sooner as needed.

## 2022-09-23 ENCOUNTER — OFFICE VISIT (OUTPATIENT)
Dept: PSYCHIATRY | Facility: CLINIC | Age: 42
End: 2022-09-23
Payer: COMMERCIAL

## 2022-09-23 DIAGNOSIS — F31.81 BIPOLAR II DISORDER, MOST RECENT EPISODE HYPOMANIC: ICD-10-CM

## 2022-09-23 DIAGNOSIS — F41.1 GAD (GENERALIZED ANXIETY DISORDER): Primary | ICD-10-CM

## 2022-09-23 PROCEDURE — 3044F PR MOST RECENT HEMOGLOBIN A1C LEVEL <7.0%: ICD-10-PCS | Mod: CPTII,95,, | Performed by: NURSE PRACTITIONER

## 2022-09-23 PROCEDURE — 3044F HG A1C LEVEL LT 7.0%: CPT | Mod: CPTII,95,, | Performed by: NURSE PRACTITIONER

## 2022-09-23 PROCEDURE — 90833 PR PSYCHOTHERAPY W/PATIENT W/E&M, 30 MIN (ADD ON): ICD-10-PCS | Mod: 95,,, | Performed by: NURSE PRACTITIONER

## 2022-09-23 PROCEDURE — 99213 OFFICE O/P EST LOW 20 MIN: CPT | Mod: 95,,, | Performed by: NURSE PRACTITIONER

## 2022-09-23 PROCEDURE — 90833 PSYTX W PT W E/M 30 MIN: CPT | Mod: 95,,, | Performed by: NURSE PRACTITIONER

## 2022-09-23 PROCEDURE — 99213 PR OFFICE/OUTPT VISIT, EST, LEVL III, 20-29 MIN: ICD-10-PCS | Mod: 95,,, | Performed by: NURSE PRACTITIONER

## 2022-09-23 RX ORDER — ZOLPIDEM TARTRATE 12.5 MG/1
12.5 TABLET, FILM COATED, EXTENDED RELEASE ORAL NIGHTLY PRN
Qty: 30 TABLET | Refills: 2 | Status: SHIPPED | OUTPATIENT
Start: 2022-09-23 | End: 2022-10-25 | Stop reason: SDUPTHER

## 2022-09-23 RX ORDER — ARIPIPRAZOLE 10 MG/1
10 TABLET ORAL DAILY
Qty: 30 TABLET | Refills: 5 | Status: SHIPPED | OUTPATIENT
Start: 2022-09-23 | End: 2022-10-25 | Stop reason: SDUPTHER

## 2022-09-30 ENCOUNTER — HOSPITAL ENCOUNTER (OUTPATIENT)
Dept: RADIOLOGY | Facility: CLINIC | Age: 42
Discharge: HOME | End: 2022-09-30
Attending: INTERNAL MEDICINE
Payer: COMMERCIAL

## 2022-09-30 DIAGNOSIS — Z12.39 ENCOUNTER FOR SCREENING FOR MALIGNANT NEOPLASM OF BREAST, UNSPECIFIED SCREENING MODALITY: ICD-10-CM

## 2022-09-30 DIAGNOSIS — Z00.00 HEALTHCARE MAINTENANCE: ICD-10-CM

## 2022-09-30 PROCEDURE — 77063 BREAST TOMOSYNTHESIS BI: CPT | Mod: 26,,, | Performed by: RADIOLOGY

## 2022-09-30 PROCEDURE — 77063 BREAST TOMOSYNTHESIS BI: CPT | Mod: TC,PO

## 2022-09-30 PROCEDURE — 77067 SCR MAMMO BI INCL CAD: CPT | Mod: TC,PO

## 2022-09-30 PROCEDURE — 77063 MAMMO DIGITAL SCREENING BILAT WITH TOMO: ICD-10-PCS | Mod: 26,,, | Performed by: RADIOLOGY

## 2022-09-30 PROCEDURE — 77067 MAMMO DIGITAL SCREENING BILAT WITH TOMO: ICD-10-PCS | Mod: 26,,, | Performed by: RADIOLOGY

## 2022-09-30 PROCEDURE — 77067 SCR MAMMO BI INCL CAD: CPT | Mod: 26,,, | Performed by: RADIOLOGY

## 2022-10-17 ENCOUNTER — LAB VISIT (OUTPATIENT)
Dept: LAB | Facility: HOSPITAL | Age: 42
End: 2022-10-17
Attending: INTERNAL MEDICINE
Payer: COMMERCIAL

## 2022-10-17 ENCOUNTER — OFFICE VISIT (OUTPATIENT)
Dept: RHEUMATOLOGY | Facility: CLINIC | Age: 42
End: 2022-10-17
Payer: COMMERCIAL

## 2022-10-17 VITALS
BODY MASS INDEX: 28.4 KG/M2 | SYSTOLIC BLOOD PRESSURE: 103 MMHG | WEIGHT: 140.88 LBS | DIASTOLIC BLOOD PRESSURE: 74 MMHG | TEMPERATURE: 98 F | HEART RATE: 88 BPM | HEIGHT: 59 IN

## 2022-10-17 DIAGNOSIS — M79.7 FIBROMYALGIA: Primary | ICD-10-CM

## 2022-10-17 DIAGNOSIS — M79.7 FIBROMYALGIA: ICD-10-CM

## 2022-10-17 LAB
25(OH)D3+25(OH)D2 SERPL-MCNC: 31 NG/ML (ref 30–96)
CRP SERPL-MCNC: 9.8 MG/L (ref 0–8.2)
ERYTHROCYTE [SEDIMENTATION RATE] IN BLOOD BY PHOTOMETRIC METHOD: 15 MM/HR (ref 0–36)
FERRITIN SERPL-MCNC: 108 NG/ML (ref 20–300)
IRON SERPL-MCNC: 104 UG/DL (ref 30–160)
SATURATED IRON: 17 % (ref 20–50)
TOTAL IRON BINDING CAPACITY: 622 UG/DL (ref 250–450)
TRANSFERRIN SERPL-MCNC: 420 MG/DL (ref 200–375)
VIT B12 SERPL-MCNC: 451 PG/ML (ref 210–950)

## 2022-10-17 PROCEDURE — 1159F PR MEDICATION LIST DOCUMENTED IN MEDICAL RECORD: ICD-10-PCS | Mod: CPTII,S$GLB,, | Performed by: INTERNAL MEDICINE

## 2022-10-17 PROCEDURE — 3078F PR MOST RECENT DIASTOLIC BLOOD PRESSURE < 80 MM HG: ICD-10-PCS | Mod: CPTII,S$GLB,, | Performed by: INTERNAL MEDICINE

## 2022-10-17 PROCEDURE — 84466 ASSAY OF TRANSFERRIN: CPT | Performed by: INTERNAL MEDICINE

## 2022-10-17 PROCEDURE — 86140 C-REACTIVE PROTEIN: CPT | Performed by: INTERNAL MEDICINE

## 2022-10-17 PROCEDURE — 3044F HG A1C LEVEL LT 7.0%: CPT | Mod: CPTII,S$GLB,, | Performed by: INTERNAL MEDICINE

## 2022-10-17 PROCEDURE — 99214 PR OFFICE/OUTPT VISIT, EST, LEVL IV, 30-39 MIN: ICD-10-PCS | Mod: S$GLB,,, | Performed by: INTERNAL MEDICINE

## 2022-10-17 PROCEDURE — 99214 OFFICE O/P EST MOD 30 MIN: CPT | Mod: S$GLB,,, | Performed by: INTERNAL MEDICINE

## 2022-10-17 PROCEDURE — 1160F PR REVIEW ALL MEDS BY PRESCRIBER/CLIN PHARMACIST DOCUMENTED: ICD-10-PCS | Mod: CPTII,S$GLB,, | Performed by: INTERNAL MEDICINE

## 2022-10-17 PROCEDURE — 1160F RVW MEDS BY RX/DR IN RCRD: CPT | Mod: CPTII,S$GLB,, | Performed by: INTERNAL MEDICINE

## 2022-10-17 PROCEDURE — 3074F SYST BP LT 130 MM HG: CPT | Mod: CPTII,S$GLB,, | Performed by: INTERNAL MEDICINE

## 2022-10-17 PROCEDURE — 3078F DIAST BP <80 MM HG: CPT | Mod: CPTII,S$GLB,, | Performed by: INTERNAL MEDICINE

## 2022-10-17 PROCEDURE — 82607 VITAMIN B-12: CPT | Performed by: INTERNAL MEDICINE

## 2022-10-17 PROCEDURE — 99999 PR PBB SHADOW E&M-EST. PATIENT-LVL IV: ICD-10-PCS | Mod: PBBFAC,,, | Performed by: INTERNAL MEDICINE

## 2022-10-17 PROCEDURE — 1159F MED LIST DOCD IN RCRD: CPT | Mod: CPTII,S$GLB,, | Performed by: INTERNAL MEDICINE

## 2022-10-17 PROCEDURE — 3044F PR MOST RECENT HEMOGLOBIN A1C LEVEL <7.0%: ICD-10-PCS | Mod: CPTII,S$GLB,, | Performed by: INTERNAL MEDICINE

## 2022-10-17 PROCEDURE — 3074F PR MOST RECENT SYSTOLIC BLOOD PRESSURE < 130 MM HG: ICD-10-PCS | Mod: CPTII,S$GLB,, | Performed by: INTERNAL MEDICINE

## 2022-10-17 PROCEDURE — 36415 COLL VENOUS BLD VENIPUNCTURE: CPT | Performed by: INTERNAL MEDICINE

## 2022-10-17 PROCEDURE — 85652 RBC SED RATE AUTOMATED: CPT | Performed by: INTERNAL MEDICINE

## 2022-10-17 PROCEDURE — 99999 PR PBB SHADOW E&M-EST. PATIENT-LVL IV: CPT | Mod: PBBFAC,,, | Performed by: INTERNAL MEDICINE

## 2022-10-17 PROCEDURE — 82728 ASSAY OF FERRITIN: CPT | Performed by: INTERNAL MEDICINE

## 2022-10-17 PROCEDURE — 82306 VITAMIN D 25 HYDROXY: CPT | Performed by: INTERNAL MEDICINE

## 2022-10-17 ASSESSMENT — ROUTINE ASSESSMENT OF PATIENT INDEX DATA (RAPID3)
PAIN SCORE: 0.5
MDHAQ FUNCTION SCORE: 0.1
FATIGUE SCORE: 3
PATIENT GLOBAL ASSESSMENT SCORE: 0
AM STIFFNESS SCORE: 0, NO
TOTAL RAPID3 SCORE: 0.28
PSYCHOLOGICAL DISTRESS SCORE: 3.3

## 2022-10-17 ASSESSMENT — SYSTEMIC LUPUS ERYTHEMATOSUS DISEASE ACTIVITY INDEX (SLEDAI): TOTAL_SCORE: 0

## 2022-10-17 NOTE — PROGRESS NOTES
Chief Complaint   Patient presents with    Disease Management       Patient with a diagnosis of fibromyalgia syndrome for a follow up      Notes:       History of presenting illness     is a 42 year old female : we are treating for fibromyalgia :    On ambien CR 12.5 mg daily  Sleep has much improved    Cymbalta is 60 mg bid    On abilify 10 mg daily    4/2021    She has done well  Pain has improved  Sleep has improved  Anxiety has improved    Lupus monitoring labs  Complements nml  Dsdna neg  UPCR nml  CBC,CMP nml    ESR,CRP were high but that was the same time she received the covid 19 vaccine     10/2021    All the same as last time  Little bit joint pains  Sleep good  Chokes on her own saliva  When she raises her arms for an extended period of time-hurts    4/2022    Lot better  Pain is very minor  Walks 3 miles a day  Stretches to stay active    On ambien CR 12.5 mg daily  Sleep has much improved    Cymbalta is 60 mg bid    On abilify 10 mg daily    Fluoxetine 20 mg added-for depression-mood is bettter     In the past :      We had d/clara her amitryptilline since it made her hungry a lot and didn't help with the pain    She was on meloxicam but she stopped it since she had a rash with some medication    She was on tramadol and she stopped it    She takes OTC tylenol and ibuprofen    We started her on cymbalta 60 mg daily    She liked it and then didn't like it and now she is back on it    She says it works but not 100%    Savella not affordable so couldn't make the switch    LAtuda was offered and she cannot afford   She has bipolar d/o and she is not on any medications    Every 6 months lupus labs great  Last one 2/2020 nml      10/2022      Fluoxetine 30 mg -now  Has gained 15 to 20 pounds  Mood was bad with cymbalta and she had to get off the cymbalta and now mood is good with fluoxetine  On ambien 12.5 mg bed time  On abilify 10 mg daily    March 2022 TSH,T4 nml    Pain is manageable   She is  working out and exercising     She is meditating - helps with mood and pain      Pt is a 43 yo with h/o gastritis, irritable bowel syndrome    Initial complaints     Poor sleep despite taking ambien  Amitryptilline was not helping  Bothersome anxiety    Cant concentrate  Cant remember certain things    Dry eyes and mouth+  Opthalmology : gave tear drops    Hair fall got better with biotin    Ear aches  Windy ear pain gets worse  Cant wear earrings,gets infections  Inside of the ears hurt and throb    Fatigue +    Allergies have gotten worse    Used to exercise 6 times a week now trying to get there     She relocated from [place] November 2017    She was followed by rheumatology for a possible inflammatory arthritis per the patient     She was last seen by her rheumatologist, Dr. Sana Belle sep 2017  We still dont have records    She says US hands showed changes on inflammatory arthritis  She was offered NSAIDs like nalfon and sulindac and she didn't do well    She had seen us in December 2017,we didn't have any data to suggest inflammatory arthritis    We did have her on plaquenil 300 mg but she thinks it gave her a rash and allergic reaction    We diagnosed her as fibromyalgia syndrome    We did the autoimmune panel    Normal CBC,CMP  Normal lipid panel  Normal TSH  Normal RF,CCP  EDUARDO positive,1: 160 homogenous,titre negative  Normal complements  Normal ESR,CRP  Negative APLAS panel    MRI both hands no inflammatory arthritis     Her complaints : diffuse pains in the hands,arms,shoulders and knees  Started February/march 2016     She states that after starting a combination of  mg/d, Tramadol 50 mg bid, Elavil 10 mg/d, mobic 7.5 mg bid her symptoms improved. She is unsure of her diagnosis at this time. Pt reports 30 minutes of am stiffness, intermittent joint swelling/pain, and severe fatigue. She is no longer working due to her fatigue. Pt recounts getting a massage in the past and crying due to being  diffusely tender. She denies fevers, chills, Raynaud's, photosensitivity, rashes, alopecia, mucosal ulcers, pleurisy, vision changes, . Pt does report an increase in acne.     No known autoimmune family history    Pt denies a personal or family history of psoriasis.     No blood clots or miscarriages.      Review of Systems   Constitutional:  Negative for activity change, appetite change, chills, diaphoresis, fatigue, fever and unexpected weight change.   HENT:  Negative for congestion, dental problem, drooling, ear discharge, ear pain, facial swelling, hearing loss, mouth sores, nosebleeds, postnasal drip, rhinorrhea, sinus pressure, sinus pain, sneezing, sore throat, tinnitus, trouble swallowing and voice change.    Eyes:  Negative for photophobia, pain, discharge, redness, itching and visual disturbance.   Respiratory:  Positive for shortness of breath. Negative for apnea, cough, choking, chest tightness, wheezing and stridor.    Cardiovascular:  Negative for chest pain, palpitations and leg swelling.   Gastrointestinal:  Positive for constipation. Negative for abdominal distention, abdominal pain, anal bleeding, blood in stool, diarrhea, nausea, rectal pain and vomiting.   Endocrine: Negative for cold intolerance, heat intolerance, polydipsia, polyphagia and polyuria.   Genitourinary:  Negative for decreased urine volume, difficulty urinating, dysuria, enuresis, flank pain, frequency, genital sores, hematuria and urgency.   Musculoskeletal:  Negative for arthralgias, back pain, gait problem, joint swelling, myalgias, neck pain and neck stiffness.   Skin:  Negative for color change, pallor, rash and wound.   Allergic/Immunologic: Negative for environmental allergies, food allergies and immunocompromised state.   Neurological:  Positive for headaches. Negative for dizziness, tremors, seizures, syncope, facial asymmetry, speech difficulty, weakness, light-headedness and numbness.   Hematological:  Negative for  adenopathy. Does not bruise/bleed easily.   Psychiatric/Behavioral:  Negative for agitation, behavioral problems, confusion, decreased concentration, dysphoric mood, hallucinations, self-injury, sleep disturbance and suicidal ideas. The patient is not nervous/anxious and is not hyperactive.       Physical Exam     Physical Exam   Constitutional: She is oriented to person, place, and time. No distress.   HENT:   Head: Normocephalic.   Mouth/Throat: Oropharynx is clear and moist.   Eyes: Pupils are equal, round, and reactive to light. Conjunctivae are normal. Right eye exhibits no discharge. Left eye exhibits no discharge. No scleral icterus.   Neck: No thyromegaly present.   Cardiovascular: Normal rate, regular rhythm and normal heart sounds.   Pulmonary/Chest: Effort normal and breath sounds normal. No stridor.   Abdominal: Soft. Bowel sounds are normal.   Musculoskeletal:         General: Normal range of motion.      Cervical back: Normal range of motion.   Lymphadenopathy:     She has no cervical adenopathy.   Neurological: She is alert and oriented to person, place, and time.   Skin: Skin is warm. No rash noted. She is not diaphoretic.   Psychiatric: Affect and judgment normal.     Assessment     Pt is a 40 yo with h/o gastritis, irritable bowel syndrome being followed here for fibromyalgia    We diagnosed her with FMS since she met the criteria     1.  Widespread pain index greater than or equal to 7 and symptom severity score greater than or equal to 5 or widespread pain index between 3- 6, and symptom severity score greater than or equal to 9  2.  Symptoms have been present in a similar level for at least 3 months  3.  The patient does not have a disorder that would otherwise sufficiently explain the pain    She does not have records of diagnosis and if she had an previous inflammatory arthritis.   She claims that she had an ultrasound showing evidence of inflammatory arthritis   We have tried to obtain  records and we have been unsuccessful  She brought some print outs on the portal and there is mention of inflammatory arthritis and I see NSAIDs and plaquenil on the prescriptions    We did our own hand MRIs    She has cystic lesions b/l which might be indicative of burned out erosions  It is possible that she had an old inflammatory arthritis which self resolved    She mentioned that combination of tramadol + meloxicam helped : we d/clara because of itch     She cannot take higher dose of amitryptilline    She also mentions sicca symptoms but she thinks it might be s/p ambien use but definitely prior to initiation of amitryptilline     We are treating her for fibromyalgia with cymbalta only     We had her on plaquenil given the evidence of inflammatory arthritis on prior US and current MRI revealing old erosions   But she had a rash and she attributed it to plaquenil  She made a lot of changes with shampoos and detergents but she didn't see a change so she went off plaquenil and meloxicam     Savella not affordable     OB said everything is ok     is a 41 year old female : we are treating for fibromyalgia :    On ambien CR 12.5 mg daily  Sleep has much improved    Cymbalta is 60 mg bid    On abilify 10 mg daily    On fluoxetine 20 mg daily    Pain-absent  Fatigue +    Vit d 17- ergocalciferol started,transition to 2000 IU daily  Vit b12 405  Saturated iron 12%  TSH/T4 nml  H/h 11.8/37.3,nml white count and plts  CMP nml    10/2022    She is doing really well    Fluoxetine 30 mg -now  Has gained 15 to 20 pounds  Mood was bad with cymbalta and she had to get off the cymbalta and now mood is good with fluoxetine  On ambien 12.5 mg bed time  On abilify 10 mg daily    March 2022 TSH,T4 nml    Pain is manageable   She is working out and exercising     She is meditating - helps with mood and pain  ESR was 48  CRP was 10.1 IN April  ALL lupus labs great in 4/2022    Took vit d, b12,iron- not anymore    Dry mouth got  better due to being off cymbalta  Dry eyes- opthal exam pending  OTC tear drops needed rarely only    1. Fibromyalgia          Plan    ESR,CRP,iron,vit b12,vit d today    Lupus monitoring labs -once a year-4/2023    Current regimen working really well    Exercise regularly    FRP program     Suggested lip biopsy and corneal staining at some point to see if she has Sjogren's  Not sure if her sicca symptoms are drug induced yet,but they are definitely bothersome    Management of sicca symptoms    -preservative free artifical tears 3 to 4 times a day  Lubricating ointments at night  Wraparound sun glasses/moisture shields which attach to glasses help  Opthalmology evaluation,management : she will need split lamp,schirmer's test and ocular surface staining    -biotene and ACT preparations  Dental evaluations  Periodic cleaning  Use fluoride products  Brush and floss teeth regularly especially after meals     Avoid sugar containing foods and drinks    Use of vaginal lubricants/estrogen creams   Seeing Gyn    Use hypoallergenic soaps/lotions for the skin  Avoid drafts from air conditioners/heaters/radiators  Avoid detergents,deodorant soaps,very hot water  Use humidifiers    NO PLAQUENIL FOR NOW  IF SYMPTOMS CHANGE AND WE SEE INFLAMMATORY ARTHRITIS WE WILL OFFER METHOTREXATE    Water aerobics  Yoga and aleah chi    See psychiatry/counsellor  Sleep clinic evaluation  Memory testing  CBT     Every 12 monthly lupus labs       Roopa was seen today for disease management.    Diagnoses and all orders for this visit:    Fibromyalgia  -     Sedimentation rate; Future  -     C-Reactive Protein; Future  -     Iron and TIBC; Future  -     Ferritin; Future  -     Vitamin B12; Future  -     Vitamin D; Future        rtc in 6 months

## 2022-10-17 NOTE — PROGRESS NOTES
Rapid3 Question Responses and Scores 10/10/2022   MDHAQ Score 0.1   Psychologic Score 3.3   Pain Score 0.5   When you awakened in the morning OVER THE LAST WEEK, did you feel stiff? No   If Yes, please indicate the number of hours until you are as limber as you will be for the day -   Fatigue Score 3   Global Health Score 0   RAPID3 Score 0.28       Answers submitted by the patient for this visit:  Rheumatology Questionnaire (Submitted on 10/10/2022)  fever: No  eye redness: No  mouth sores: No  headaches: No  shortness of breath: No  chest pain: No  trouble swallowing: No  diarrhea: No  constipation: No  unexpected weight change: Yes  genital sore: No  dysuria: No  During the last 3 days, have you had a skin rash?: No  Bruises or bleeds easily: No  cough: No

## 2022-10-24 ENCOUNTER — OFFICE VISIT (OUTPATIENT)
Dept: DERMATOLOGY | Facility: CLINIC | Age: 42
End: 2022-10-24
Payer: COMMERCIAL

## 2022-10-24 ENCOUNTER — OFFICE VISIT (OUTPATIENT)
Dept: OBSTETRICS AND GYNECOLOGY | Facility: CLINIC | Age: 42
End: 2022-10-24
Payer: COMMERCIAL

## 2022-10-24 VITALS
SYSTOLIC BLOOD PRESSURE: 104 MMHG | WEIGHT: 143.75 LBS | HEIGHT: 59 IN | DIASTOLIC BLOOD PRESSURE: 64 MMHG | BODY MASS INDEX: 28.98 KG/M2

## 2022-10-24 DIAGNOSIS — L70.9 ADULT ACNE: ICD-10-CM

## 2022-10-24 DIAGNOSIS — Z12.4 SCREENING FOR MALIGNANT NEOPLASM OF CERVIX: ICD-10-CM

## 2022-10-24 DIAGNOSIS — Z01.419 WELL WOMAN EXAM WITH ROUTINE GYNECOLOGICAL EXAM: Primary | ICD-10-CM

## 2022-10-24 DIAGNOSIS — N92.6 IRREGULAR PERIODS/MENSTRUAL CYCLES: ICD-10-CM

## 2022-10-24 DIAGNOSIS — L81.1 MELASMA: Primary | ICD-10-CM

## 2022-10-24 PROBLEM — Z23 NEED FOR INFLUENZA VACCINATION: Status: RESOLVED | Noted: 2019-10-29 | Resolved: 2022-10-24

## 2022-10-24 PROCEDURE — 3044F PR MOST RECENT HEMOGLOBIN A1C LEVEL <7.0%: ICD-10-PCS | Mod: CPTII,S$GLB,, | Performed by: DERMATOLOGY

## 2022-10-24 PROCEDURE — 1159F PR MEDICATION LIST DOCUMENTED IN MEDICAL RECORD: ICD-10-PCS | Mod: CPTII,S$GLB,, | Performed by: STUDENT IN AN ORGANIZED HEALTH CARE EDUCATION/TRAINING PROGRAM

## 2022-10-24 PROCEDURE — 3078F DIAST BP <80 MM HG: CPT | Mod: CPTII,S$GLB,, | Performed by: STUDENT IN AN ORGANIZED HEALTH CARE EDUCATION/TRAINING PROGRAM

## 2022-10-24 PROCEDURE — 3074F SYST BP LT 130 MM HG: CPT | Mod: CPTII,S$GLB,, | Performed by: STUDENT IN AN ORGANIZED HEALTH CARE EDUCATION/TRAINING PROGRAM

## 2022-10-24 PROCEDURE — 99999 PR PBB SHADOW E&M-EST. PATIENT-LVL III: CPT | Mod: PBBFAC,,, | Performed by: STUDENT IN AN ORGANIZED HEALTH CARE EDUCATION/TRAINING PROGRAM

## 2022-10-24 PROCEDURE — 1160F PR REVIEW ALL MEDS BY PRESCRIBER/CLIN PHARMACIST DOCUMENTED: ICD-10-PCS | Mod: CPTII,S$GLB,, | Performed by: STUDENT IN AN ORGANIZED HEALTH CARE EDUCATION/TRAINING PROGRAM

## 2022-10-24 PROCEDURE — 3074F PR MOST RECENT SYSTOLIC BLOOD PRESSURE < 130 MM HG: ICD-10-PCS | Mod: CPTII,S$GLB,, | Performed by: STUDENT IN AN ORGANIZED HEALTH CARE EDUCATION/TRAINING PROGRAM

## 2022-10-24 PROCEDURE — 1160F RVW MEDS BY RX/DR IN RCRD: CPT | Mod: CPTII,S$GLB,, | Performed by: STUDENT IN AN ORGANIZED HEALTH CARE EDUCATION/TRAINING PROGRAM

## 2022-10-24 PROCEDURE — 99999 PR PBB SHADOW E&M-EST. PATIENT-LVL II: CPT | Mod: PBBFAC,,, | Performed by: DERMATOLOGY

## 2022-10-24 PROCEDURE — 1160F RVW MEDS BY RX/DR IN RCRD: CPT | Mod: CPTII,S$GLB,, | Performed by: DERMATOLOGY

## 2022-10-24 PROCEDURE — 1159F MED LIST DOCD IN RCRD: CPT | Mod: CPTII,S$GLB,, | Performed by: DERMATOLOGY

## 2022-10-24 PROCEDURE — 99999 PR PBB SHADOW E&M-EST. PATIENT-LVL III: ICD-10-PCS | Mod: PBBFAC,,, | Performed by: STUDENT IN AN ORGANIZED HEALTH CARE EDUCATION/TRAINING PROGRAM

## 2022-10-24 PROCEDURE — 99999 PR PBB SHADOW E&M-EST. PATIENT-LVL II: ICD-10-PCS | Mod: PBBFAC,,, | Performed by: DERMATOLOGY

## 2022-10-24 PROCEDURE — 3044F HG A1C LEVEL LT 7.0%: CPT | Mod: CPTII,S$GLB,, | Performed by: DERMATOLOGY

## 2022-10-24 PROCEDURE — 87624 HPV HI-RISK TYP POOLED RSLT: CPT | Performed by: STUDENT IN AN ORGANIZED HEALTH CARE EDUCATION/TRAINING PROGRAM

## 2022-10-24 PROCEDURE — 3078F PR MOST RECENT DIASTOLIC BLOOD PRESSURE < 80 MM HG: ICD-10-PCS | Mod: CPTII,S$GLB,, | Performed by: STUDENT IN AN ORGANIZED HEALTH CARE EDUCATION/TRAINING PROGRAM

## 2022-10-24 PROCEDURE — 1159F MED LIST DOCD IN RCRD: CPT | Mod: CPTII,S$GLB,, | Performed by: STUDENT IN AN ORGANIZED HEALTH CARE EDUCATION/TRAINING PROGRAM

## 2022-10-24 PROCEDURE — 88175 CYTOPATH C/V AUTO FLUID REDO: CPT | Performed by: STUDENT IN AN ORGANIZED HEALTH CARE EDUCATION/TRAINING PROGRAM

## 2022-10-24 PROCEDURE — 1160F PR REVIEW ALL MEDS BY PRESCRIBER/CLIN PHARMACIST DOCUMENTED: ICD-10-PCS | Mod: CPTII,S$GLB,, | Performed by: DERMATOLOGY

## 2022-10-24 PROCEDURE — 99396 PREV VISIT EST AGE 40-64: CPT | Mod: S$GLB,,, | Performed by: STUDENT IN AN ORGANIZED HEALTH CARE EDUCATION/TRAINING PROGRAM

## 2022-10-24 PROCEDURE — 3044F PR MOST RECENT HEMOGLOBIN A1C LEVEL <7.0%: ICD-10-PCS | Mod: CPTII,S$GLB,, | Performed by: STUDENT IN AN ORGANIZED HEALTH CARE EDUCATION/TRAINING PROGRAM

## 2022-10-24 PROCEDURE — 3044F HG A1C LEVEL LT 7.0%: CPT | Mod: CPTII,S$GLB,, | Performed by: STUDENT IN AN ORGANIZED HEALTH CARE EDUCATION/TRAINING PROGRAM

## 2022-10-24 PROCEDURE — 99396 PR PREVENTIVE VISIT,EST,40-64: ICD-10-PCS | Mod: S$GLB,,, | Performed by: STUDENT IN AN ORGANIZED HEALTH CARE EDUCATION/TRAINING PROGRAM

## 2022-10-24 PROCEDURE — 99214 PR OFFICE/OUTPT VISIT, EST, LEVL IV, 30-39 MIN: ICD-10-PCS | Mod: S$GLB,,, | Performed by: DERMATOLOGY

## 2022-10-24 PROCEDURE — 99214 OFFICE O/P EST MOD 30 MIN: CPT | Mod: S$GLB,,, | Performed by: DERMATOLOGY

## 2022-10-24 PROCEDURE — 1159F PR MEDICATION LIST DOCUMENTED IN MEDICAL RECORD: ICD-10-PCS | Mod: CPTII,S$GLB,, | Performed by: DERMATOLOGY

## 2022-10-24 RX ORDER — SPIRONOLACTONE 50 MG/1
TABLET, FILM COATED ORAL
Qty: 90 TABLET | Refills: 3 | Status: SHIPPED | OUTPATIENT
Start: 2022-10-24 | End: 2023-10-26 | Stop reason: SDUPTHER

## 2022-10-24 RX ORDER — DROSPIRENONE AND ETHINYL ESTRADIOL 0.02-3(28)
1 KIT ORAL DAILY
Qty: 90 TABLET | Refills: 3 | Status: SHIPPED | OUTPATIENT
Start: 2022-10-24 | End: 2023-10-13 | Stop reason: SDUPTHER

## 2022-10-24 NOTE — PROGRESS NOTES
Subjective:       Patient ID:  Roopa Rivas is a 42 y.o. female who presents for   Chief Complaint   Patient presents with    Follow-up     acne     LOV 9/10/21 - Melasma, Adult acne    Patient here today for f/u on acne  Patient states her skin is staying clear  Taking Spironolactone 50mg daily. Washes face twice a day.  Wearing SPF daily. Thinks it has helped a little with melasma.  On SARAH written by gynecologist       Current Outpatient Medications:   ·  acetylcysteine (N-ACETYL-L-CYSTEINE MISC), by Misc.(Non-Drug; Combo Route) route., Disp: , Rfl:   ·  ARIPiprazole (ABILIFY) 10 MG Tab, Take 1 tablet (10 mg total) by mouth once daily., Disp: 30 tablet, Rfl: 5  ·  calcium carbonate 400 mg calcium (1,000 mg) Chew, Take by mouth., Disp: , Rfl:   ·  drospirenone-ethinyl estradioL (SARAH) 3-0.02 mg per tablet, Take 1 tablet by mouth once daily., Disp: 84 tablet, Rfl: 4  ·  FLUoxetine 10 MG capsule, Take 1 capsule (10 mg total) by mouth once daily., Disp: 30 capsule, Rfl: 5  ·  FLUoxetine 20 MG capsule, Take 1 capsule (20 mg total) by mouth once daily., Disp: 30 capsule, Rfl: 5  ·  ibuprofen (ADVIL,MOTRIN) 400 MG tablet, Take 400 mg by mouth every 6 (six) hours as needed for Other., Disp: , Rfl:   ·  iron, carbonyl 25 mg iron Tab, Take by mouth. Iron supplement, Disp: , Rfl:   ·  Lactobacillus acidophilus (PROBIOTIC ORAL), Take by mouth., Disp: , Rfl:   ·  levothyroxine (SYNTHROID) 50 MCG tablet, TAKE 1 TABLET(50 MCG) BY MOUTH BEFORE BREAKFAST, Disp: 30 tablet, Rfl: 8  ·  magnesium 30 mg Tab, Take by mouth once., Disp: , Rfl:   ·  multivitamin/iron/folic acid (MULTI COMPLETE WITH IRON ORAL), Take by mouth., Disp: , Rfl:   ·  spironolactone (ALDACTONE) 50 MG tablet, TAKE 1 TABLET BY MOUTH DAILY, Disp: 90 tablet, Rfl: 0  ·  zolpidem (AMBIEN CR) 12.5 MG CR tablet, Take 1 tablet (12.5 mg total) by mouth nightly as needed for Insomnia., Disp: 30 tablet, Rfl: 2  ·  clotrimazole-betamethasone 1-0.05% (LOTRISONE) cream,  Apply topically 2 (two) times daily. (Patient not taking: No sig reported), Disp: 15 g, Rfl: 0        Review of Systems   Constitutional:  Negative for fever, chills and fatigue.   Respiratory:  Negative for cough and shortness of breath.    Gastrointestinal:  Negative for nausea and vomiting.      Objective:    Physical Exam   Constitutional: She appears well-developed and well-nourished.   Neurological: She is alert and oriented to person, place, and time.   Psychiatric: She has a normal mood and affect.        Diagram Legend     Erythematous scaling macule/papule c/w actinic keratosis       Vascular papule c/w angioma      Pigmented verrucoid papule/plaque c/w seborrheic keratosis      Yellow umbilicated papule c/w sebaceous hyperplasia      Irregularly shaped tan macule c/w lentigo     1-2 mm smooth white papules consistent with Milia      Movable subcutaneous cyst with punctum c/w epidermal inclusion cyst      Subcutaneous movable cyst c/w pilar cyst      Firm pink to brown papule c/w dermatofibroma      Pedunculated fleshy papule(s) c/w skin tag(s)      Evenly pigmented macule c/w junctional nevus     Mildly variegated pigmented, slightly irregular-bordered macule c/w mildly atypical nevus      Flesh colored to evenly pigmented papule c/w intradermal nevus       Pink pearly papule/plaque c/w basal cell carcinoma      Erythematous hyperkeratotic cursted plaque c/w SCC      Surgical scar with no sign of skin cancer recurrence      Open and closed comedones      Inflammatory papules and pustules      Verrucoid papule consistent consistent with wart     Erythematous eczematous patches and plaques     Dystrophic onycholytic nail with subungual debris c/w onychomycosis     Umbilicated papule    Erythematous-base heme-crusted tan verrucoid plaque consistent with inflamed seborrheic keratosis     Erythematous Silvery Scaling Plaque c/w Psoriasis     See annotation   Latest Reference Range & Units 04/06/22 11:56    Sodium 136 - 145 mmol/L 137   Potassium 3.5 - 5.1 mmol/L 4.3   Chloride 95 - 110 mmol/L 104   CO2 23 - 29 mmol/L 20 (L)   Anion Gap 8 - 16 mmol/L 13   BUN 6 - 20 mg/dL 7   Creatinine 0.5 - 1.4 mg/dL 0.8   eGFR if non African American >60 mL/min/1.73 m^2 >60.0   eGFR if African American >60 mL/min/1.73 m^2 >60.0   (L): Data is abnormally low    Assessment / Plan:        Melasma  [facility] lightening cream: [id number]% kojic acid 6% niacinamide 2% vitamin C 1% in proprietary base     Daily tinted SPF, 40+ preferred    - reviewed side effects which may include ICD and exogenous ochronosis with long-term use. Instructed to use cream for 2 months then take 1 month break.     Adult acne  -     spironolactone (ALDACTONE) 50 MG tablet; TAKE 1 TABLET BY MOUTH DAILY  Dispense: 90 tablet; Refill: 3  Doing very well on monotherapy with spironolactone, continue  Recent BMP reviewed, nl K and kidney fn    Patient instructed in importance in daily broad spectrum sun protection of at least spf 30. Mineral sunscreen ingredients preferred (Zinc +/- Titanium) and can be found OTC.   Recommend Elta MD for daily use on face and neck.  Patient encouraged to wear hat for all outdoor exposure.   Also discussed sun avoidance and use of protective clothing.           No follow-ups on file.

## 2022-10-24 NOTE — PROGRESS NOTES
[facility]    Subjective:   Chief Complaint:    Chief Complaint   Patient presents with    Annual Exam       Patient's last menstrual period was 10/15/2022 (exact date).  Contraception: OCP.  HRT: None.    Ropoa Rivas is a 42 y.o.  who presents for an annual exam.  She does not want STD screening.  She participates in regular exercise: lifts weights.  She does not smoke.  She wears seatbelts.  She is not taking a multivitamin.  She denies any domestic violence.    She requests a refill of her birth control pills.  She had irregular cycles since menarche at 10.  She reports that [facility] has helped her manage her cycles. The patient has no GYN complaints today.      Last Pap: 9-15-21. Denies any history of abnormal pap smears. Results: negative for lesions or malignancy.  Last mammogram: 22. Results: normal--routine follow-up in 12 months.     FH:  Breast cancer: none.  Colon cancer: none.  Endometrial cancer: none.  Ovarian cancer: none.      OB History    Para Term  AB Living   5 4 4   1 4   SAB IAB Ectopic Multiple Live Births           4      # Outcome Date GA Lbr Bhanu/2nd Weight Sex Delivery Anes PTL Lv   5 Term 01/08/10    M Vag-Spont   DIANNA   4 Term 09    F Vag-Spont   DIANNA   3 Term 00    F Vag-Spont   DIANNA   2 Term 97    F Vag-Spont   DIANNA   1 SAB               Obstetric Comments    x 4   Gynhx/ irregular every 35-60/5.    On ocp   H/o chlamydia in , s/p treatment   Denies abnl pap, last pap  neg/hpv neg   MMG 2022 Neg       Past Medical History:   Diagnosis Date    Arthritis     Fibromyalgia     Gastroenteritis     Long-term use of Plaquenil 2017     Past Surgical History:   Procedure Laterality Date    DILATION AND CURETTAGE OF UTERUS      Miscarriage at age 25     Review of patient's allergies indicates:  No Known Allergies    Social History     Socioeconomic History    Marital status:    Tobacco Use    Smoking status:  Never    Smokeless tobacco: Never   Substance and Sexual Activity    Alcohol use: No    Drug use: No    Sexual activity: Yes     Partners: Male     Birth control/protection: Condom, OCP     Social Determinants of Health     Financial Resource Strain: Low Risk     Difficulty of Paying Living Expenses: Not very hard   Food Insecurity: No Food Insecurity    Worried About Running Out of Food in the Last Year: Never true    Ran Out of Food in the Last Year: Never true   Transportation Needs: No Transportation Needs    Lack of Transportation (Medical): No    Lack of Transportation (Non-Medical): No   Physical Activity: Sufficiently Active    Days of Exercise per Week: 4 days    Minutes of Exercise per Session: 40 min   Stress: No Stress Concern Present    Feeling of Stress : Only a little   Social Connections: Unknown    Frequency of Communication with Friends and Family: More than three times a week    Frequency of Social Gatherings with Friends and Family: Never    Active Member of Clubs or Organizations: No    Attends Club or Organization Meetings: Never    Marital Status:    Housing Stability: Low Risk     Unable to Pay for Housing in the Last Year: No    Number of Places Lived in the Last Year: 1    Unstable Housing in the Last Year: No       Family History   Problem Relation Age of Onset    Arthritis Mother     No Known Problems Father     Arthritis Maternal Grandmother     Arthritis Maternal Grandfather     Throat cancer Paternal Grandfather     Thyroid disease Maternal Aunt     Melanoma Neg Hx        Medications:  Current Outpatient Medications on File Prior to Visit   Medication Sig Dispense Refill Last Dose    ARIPiprazole (ABILIFY) 10 MG Tab Take 1 tablet (10 mg total) by mouth once daily. 30 tablet 5 Taking    calcium carbonate 400 mg calcium (1,000 mg) Chew Take by mouth.   Taking    FLUoxetine 10 MG capsule Take 1 capsule (10 mg total) by mouth once daily. 30 capsule 5 Taking    FLUoxetine 20 MG  capsule Take 1 capsule (20 mg total) by mouth once daily. 30 capsule 5 Taking    ibuprofen (ADVIL,MOTRIN) 400 MG tablet Take 400 mg by mouth every 6 (six) hours as needed for Other.   Taking    Lactobacillus acidophilus (PROBIOTIC ORAL) Take by mouth.   Taking    levothyroxine (SYNTHROID) 50 MCG tablet TAKE 1 TABLET(50 MCG) BY MOUTH BEFORE BREAKFAST 30 tablet 8 Taking    multivitamin/iron/folic acid (MULTI COMPLETE WITH IRON ORAL) Take by mouth.   Taking    spironolactone (ALDACTONE) 50 MG tablet TAKE 1 TABLET BY MOUTH DAILY 90 tablet 3 Taking    zolpidem (AMBIEN CR) 12.5 MG CR tablet Take 1 tablet (12.5 mg total) by mouth nightly as needed for Insomnia. 30 tablet 2 Taking    [DISCONTINUED] drospirenone-ethinyl estradioL (SARAH) 3-0.02 mg per tablet Take 1 tablet by mouth once daily. 84 tablet 4 Taking    acetylcysteine (N-ACETYL-L-CYSTEINE MISC) by Misc.(Non-Drug; Combo Route) route.   Not Taking    clotrimazole-betamethasone 1-0.05% (LOTRISONE) cream Apply topically 2 (two) times daily. (Patient not taking: No sig reported) 15 g 0 Not Taking    iron, carbonyl 25 mg iron Tab Take by mouth. Iron supplement   Not Taking    magnesium 30 mg Tab Take by mouth once.   Not Taking    [DISCONTINUED] spironolactone (ALDACTONE) 50 MG tablet TAKE 1 TABLET BY MOUTH DAILY 90 tablet 0        Review of Systems   Constitutional: Negative for appetite change, fever and unexpected weight change.   Respiratory: Negative for cough and shortness of breath.    Cardiovascular: Negative for chest pain and palpitations.   Gastrointestinal: Negative for abdominal distention, constipation, nausea and vomiting.   Genitourinary: Negative for dyspareunia, dysuria, hematuria and pelvic pain.        GYN ROS per HPI.   Musculoskeletal: Negative for gait problem and myalgias.   Skin: Negative for rash.   Neurological: Negative for dizziness, light-headedness and headaches.   Psychiatric/Behavioral: The patient is not nervous/anxious.      Objective:  "  /64 (BP Location: Right arm, Patient Position: Sitting, BP Method: Medium (Manual))   Ht 4' 11" (1.499 m)   Wt 65.2 kg (143 lb 11.8 oz)   LMP 10/15/2022 (Exact Date)   BMI 29.03 kg/m²     Physical Exam  Vitals reviewed. Exam conducted with a chaperone present.   HENT:      Head: Normocephalic and atraumatic.   Cardiovascular:      Rate and Rhythm: Normal rate and regular rhythm.   Pulmonary:      Effort: Pulmonary effort is normal. No accessory muscle usage or respiratory distress.   Chest:      Chest wall: No tenderness.   Breasts:     Breasts are symmetrical.      Right: No inverted nipple, mass, nipple discharge, skin change or tenderness.      Left: No inverted nipple, mass, nipple discharge, skin change or tenderness.   Abdominal:      General: Abdomen is flat.      Tenderness: There is no abdominal tenderness. There is no guarding.   Genitourinary:     General: Normal vulva.      Pubic Area: No rash.       Labia:         Right: No tenderness.         Left: No tenderness.       Urethra: No prolapse.      Vagina: Normal. No tenderness.      Cervix: No cervical motion tenderness, erythema or cervical bleeding.      Uterus: Not tender.       Adnexa:         Right: No mass or tenderness.          Left: No mass or tenderness.     Musculoskeletal:      Right lower leg: No edema.      Left lower leg: No edema.   Lymphadenopathy:      Upper Body:      Right upper body: No axillary adenopathy.      Left upper body: No axillary adenopathy.   Neurological:      General: No focal deficit present.      Mental Status: She is alert. Mental status is at baseline.        Assessment:     1. Well woman exam with routine gynecological exam    2. Screening for malignant neoplasm of cervix    3. Irregular periods/menstrual cycles      Plan:     42 y.o. female presents today for annual pap smear and exam.     1. Well woman exam with routine gynecological exam    2. Screening for malignant neoplasm of cervix  - " Liquid-Based Pap Smear, Screening  - HPV High Risk Genotypes, PCR    3. Irregular periods/menstrual cycles  - drospirenone-ethinyl estradioL (SARAH) 3-0.02 mg per tablet; Take 1 tablet by mouth once daily.  Dispense: 90 tablet; Refill: 3      Follow up in about 1 year (around 10/24/2023) for annual exam or if any GYN issues arise.    The above was reviewed and discussed with the patient.    Annual exam and screening issues based on the patient's age and family history were discussed.       - Pap and HPV testing performed.   - MM22.  - Colonoscopy N/A.  - Tobacco cessation N/A.  - DEXA N/A.  - Counseled to continue taking daily multivitamin. If patient is of reproductive age and not on contraception, to take prenatal vitamin. Patient has been counseled on the vitamin D and calcium requirements per ACOG recommendations.    Age    Calcium(mg/day)    Vitamin D (IU/day)  9-18     1300                       600  19-50   1000                       600  51-70   1200                       600  >70       1200                       800      The patient's questions were answered, and she agrees with the current plan.     The patient was counseled today on the new ACS guidelines for cervical cytology screening as well as the current recommendations for breast cancer screening. She was counseled to follow up with her PCP for other routine health maintenance.       Kaitlin Dorantes PA-C  10/24/2022

## 2022-10-25 ENCOUNTER — OFFICE VISIT (OUTPATIENT)
Dept: PSYCHIATRY | Facility: CLINIC | Age: 42
End: 2022-10-25
Payer: COMMERCIAL

## 2022-10-25 DIAGNOSIS — F31.81 BIPOLAR II DISORDER, MOST RECENT EPISODE HYPOMANIC: ICD-10-CM

## 2022-10-25 DIAGNOSIS — F41.1 GAD (GENERALIZED ANXIETY DISORDER): Primary | ICD-10-CM

## 2022-10-25 PROCEDURE — 99999 PR PBB SHADOW E&M-EST. PATIENT-LVL I: CPT | Mod: PBBFAC,,, | Performed by: NURSE PRACTITIONER

## 2022-10-25 PROCEDURE — 99999 PR PBB SHADOW E&M-EST. PATIENT-LVL I: ICD-10-PCS | Mod: PBBFAC,,, | Performed by: NURSE PRACTITIONER

## 2022-10-25 PROCEDURE — 99214 OFFICE O/P EST MOD 30 MIN: CPT | Mod: 95,,, | Performed by: NURSE PRACTITIONER

## 2022-10-25 PROCEDURE — 3044F PR MOST RECENT HEMOGLOBIN A1C LEVEL <7.0%: ICD-10-PCS | Mod: 95,,, | Performed by: NURSE PRACTITIONER

## 2022-10-25 PROCEDURE — 3044F HG A1C LEVEL LT 7.0%: CPT | Mod: 95,,, | Performed by: NURSE PRACTITIONER

## 2022-10-25 PROCEDURE — 99214 PR OFFICE/OUTPT VISIT, EST, LEVL IV, 30-39 MIN: ICD-10-PCS | Mod: 95,,, | Performed by: NURSE PRACTITIONER

## 2022-10-25 RX ORDER — FLUOXETINE HYDROCHLORIDE 20 MG/1
20 CAPSULE ORAL DAILY
Qty: 90 CAPSULE | Refills: 3 | Status: SHIPPED | OUTPATIENT
Start: 2022-10-25 | End: 2023-04-26

## 2022-10-25 RX ORDER — ZOLPIDEM TARTRATE 12.5 MG/1
12.5 TABLET, FILM COATED, EXTENDED RELEASE ORAL NIGHTLY PRN
Qty: 30 TABLET | Refills: 2 | Status: SHIPPED | OUTPATIENT
Start: 2022-11-22 | End: 2023-03-03 | Stop reason: SDUPTHER

## 2022-10-25 RX ORDER — ARIPIPRAZOLE 10 MG/1
10 TABLET ORAL DAILY
Qty: 90 TABLET | Refills: 3 | Status: SHIPPED | OUTPATIENT
Start: 2022-10-25 | End: 2023-08-10 | Stop reason: SDUPTHER

## 2022-10-25 RX ORDER — FLUOXETINE 10 MG/1
10 CAPSULE ORAL DAILY
Qty: 90 CAPSULE | Refills: 3 | Status: SHIPPED | OUTPATIENT
Start: 2022-10-25 | End: 2023-04-26

## 2022-10-25 NOTE — PROGRESS NOTES
"            10/25/2022                [id number]                                                                   OUTPATIENT PSYCHIATRY FOLLOW- UP VISIT     Reason for Encounter:  Roopa Rivas, a 42 y.o. female,who presents today for follow up of anxiety, mood disorder.  Met with patient.      Interval History and Content of Current Session:     Today,  Pt with hx of bipolar II disorder, fibromyalgia, vitamin D deficiency, iron deficiency reports since last visit about 6 weeks prior, reports at last visit had been spending more, but reports today, has been meditating "a lot more than I had been, especially when it came to shopping."     Reports since last visit has been completely weaned off of Cymbalta since last visit, reports appetite has been stronger and has been gaining some weight.     Reports concern about weight, some weight gain since last visit. Has been more active, meditating a lot lately, "think I'm in a good place mentally." Reports appetite has increased, "a lot of too is boredom." Reports happy and keeping busy lately, "not feeling as down."     Denies psychosis. Sleep has been normal. Relationship is better with , "feel really good."      Denies SI/HI, AVH     Psychosocial stressors: family, financial, social pressures        Review Of Systems:      Medical Review Of Systems:  Pertinent items are noted in HPI.     Psychiatric Review Of Systems:  sleep: yes, improved - 8 to 10 hours per night currently  appetite changes: no  weight changes: no  energy/anergy: yes - improved with improved sleep  interest/pleasure/anhedonia: no  somatic symptoms: no  libido: no  anxiety/panic: no  guilty/hopeless: no  S.I.B.s/risky behavior: no  any drugs: no  alcohol: no       Sleep: 8 to 10 hours per night, Ambien and ASMR has been helpful for sleep regulation        Risk Parameters:  Patient reports no suicidal ideation  Patient reports no homicidal ideation  Patient reports no self-injurious " behavior  Patient reports no violent behavior        Current meds- Cymbalta, Abilify, NAC, Ambien, fluoxetine     Compliance: yes     Side effects: None        Psychiatric, Social and Family history reviewed prior and during visit.                    Objective      ALL MEDICATIONS:     Current Outpatient Medications:     ARIPiprazole (ABILIFY) 10 MG Tab, Take 1 tablet (10 mg total) by mouth once daily., Disp: 30 tablet, Rfl: 2    cetirizine (ZYRTEC) 10 MG tablet, Take 10 mg by mouth once daily., Disp: , Rfl:     clotrimazole-betamethasone 1-0.05% (LOTRISONE) cream, Apply topically 2 (two) times daily., Disp: 15 g, Rfl: 0    drospirenone-ethinyl estradioL (SARAH) 3-0.02 mg per tablet, Take 1 tablet by mouth once daily., Disp: 84 tablet, Rfl: 4    DULoxetine (CYMBALTA) 60 MG capsule, Take 1 capsule (60 mg total) by mouth 2 (two) times daily., Disp: 180 capsule, Rfl: 1    ibuprofen (ADVIL,MOTRIN) 400 MG tablet, Take 400 mg by mouth every 6 (six) hours as needed for Other., Disp: , Rfl:     levomefolate-algal oil (DEPLIN, ALGAL OIL,) 15-90.314 mg Cap, Take 15 mg by mouth once daily., Disp: 50 capsule, Rfl: 1    magnesium 30 mg Tab, Take by mouth once., Disp: , Rfl:     multivitamin/iron/folic acid (MULTI COMPLETE WITH IRON ORAL), Take by mouth., Disp: , Rfl:     spironolactone (ALDACTONE) 50 MG tablet, Take 1 tablet (50 mg total) by mouth once daily., Disp: 30 tablet, Rfl: 2    sulfacetamide sodium, acne, (KLARON) 10 % Susp, Bid face, Disp: 118 mL, Rfl: 1    zolpidem (AMBIEN CR) 12.5 MG CR tablet, Take 1 tablet (12.5 mg total) by mouth nightly as needed for Insomnia., Disp: 30 tablet, Rfl: 2     ALLERGIES:  Review of patient's allergies indicates:  No Known Allergies     RELEVANT LABS/STUDIES:              Lab Results   Component Value Date     WBC 8.26 04/03/2020     HGB 11.3 (L) 04/03/2020     HCT 35.2 (L) 04/03/2020     MCV 93 04/03/2020      04/03/2020      BMP            Lab Results   Component Value Date      " (L) 02/26/2020     K 4.5 02/26/2020      02/26/2020     CO2 24 02/26/2020     BUN 7 02/26/2020     CREATININE 0.8 02/26/2020     CALCIUM 9.3 02/26/2020     ANIONGAP 9 02/26/2020     ESTGFRAFRICA >60.0 02/26/2020     EGFRNONAA >60.0 02/26/2020                Lab Results   Component Value Date     ALT 18 02/26/2020     AST 17 02/26/2020     ALKPHOS 79 02/26/2020     BILITOT 0.2 02/26/2020                Lab Results   Component Value Date     TSH 1.656 07/07/2020                Lab Results   Component Value Date     HGBA1C 5.2 02/26/2020         Constitutional  Vitals:  Most recent vital signs, dated less than 90 days prior to this appointment, were reviewed.    There were no vitals filed for this visit.            PHYSICAL EXAM  General: well developed, well nourished  Neurologic:   Gait: Normal   Psychomotor signs:  No involuntary movements or tremor  AIMS:         AIMS: Score 0/36  Abnormal Involuntary Movement Scale  0-4   Muscles of Facial Expression  0   Lips and Perioral Area  0   Jaw  0   Tongue  0   Upper (arms, wrists, hands, fingers)  0    Lower (legs, knees, ankles, toes)  0   Neck, shoulders, hips  0   Severity of abnormal movements (highest score from questions above)  0    Incapacitation due to abnormal movements  0    Patient's awareness of abnormal movements (rate only patient's report)  0   Current problems with teeth and/or dentures?  No    Does patient usually wear dentures?  No             PSYCHIATRIC EXAM:     Mental Status Exam:  Appearance: unremarkable, age appropriate  Behavior/Cooperation: normal, cooperative  Speech: normal tone, normal rate, normal pitch, normal volume  Language: uses words appropriately; NO aphasia or dysarthria  Mood: "'I'm feeling good lately"  Affect: full, congruent and appropriate to situation  Thought Process: normal and logical  Thought Content: normal, no suicidality, no homicidality, delusions, or paranoia  Level of Consciousness: Alert and Oriented " x3  Memory:  Intact  Attention/concentration: appropriate for age/education.   Fund of Knowledge: appears adequate  Insight: Intact  Judgment: Intact      Assessment and Diagnosis   Status/Progress: Based on the examination today, the patient's problem(s) is/are improved.  New problems have not been presented today.   Co-morbidities are complicating management of the primary condition.  The working differential for this patient includes OCPD, borderline personality disorder, OCD, ADHD.      General Impression:   Bipolar II Disorder with mixed features by history  Mood Disorder NOS  RUPINDER  Insomnia        Intervention/Counseling/Treatment Plan   Medication Management: -        Continue Abilify 10 mg by mouth once daily        Continue Prozac 30 mg by mouth once daily        Continue Ambien CR 12.5 mg by mouth once nightly  Labs: reviewed most recent  The treatment plan and follow up plan were reviewed with the patient.  Discussed with patient informed consent, risks vs. benefits, alternative treatments, side effect profile and the inherent unpredictability of individual responses to these treatments. The patient expresses understanding of the above and displays the capacity to agree with this current plan and had no other questions.  Encouraged Patient to keep future appointments.   Take medications as prescribed and abstain from substance abuse.   In the event of an emergency patient was advised to go to the emergency room.     Return to Clinic: 1 month     > than 50% of total time spend on coordination of care and counseling   (which included pts differential diagnosis and prognosis for psychiatric conditions, risks, benefits of treatments, instructions and adherence to treatment plan, risk reduction, reviewing current psychiatric medication regimen, medical problems and social stressors. In addtion to possible discussion with other healthcare provider/s)     Add on Psychotherapy time:0  Total Face time: 45 min      The patient location is: [place], at home  The chief complaint leading to consultation is: med f/u     Visit type: audiovisual     Face to Face time with patient: 30 min  30 minutes of total time spent on the encounter, which includes face to face time and non-face to face time preparing to see the patient (eg, review of tests), Obtaining and/or reviewing separately obtained history, Documenting clinical information in the electronic or other health record, Independently interpreting results (not separately reported) and communicating results to the patient/family/caregiver, or Care coordination (not separately reported).         Each patient to whom he or she provides medical services by telemedicine is:  (1) informed of the relationship between the physician and patient and the respective role of any other health care provider with respect to management of the patient; and (2) notified that he or she may decline to receive medical services by telemedicine and may withdraw from such care at any time.     Notes:         Abel Monroe, MSN, APRN, PMHNP-BC  [facility]   10/25/2022 10:30 AM

## 2022-10-27 LAB
FINAL PATHOLOGIC DIAGNOSIS: NORMAL
Lab: NORMAL

## 2022-11-01 LAB
HPV HR 12 DNA SPEC QL NAA+PROBE: NEGATIVE
HPV16 AG SPEC QL: NEGATIVE
HPV18 DNA SPEC QL NAA+PROBE: NEGATIVE

## 2022-11-28 ENCOUNTER — OFFICE VISIT (OUTPATIENT)
Dept: PSYCHIATRY | Facility: CLINIC | Age: 42
End: 2022-11-28
Payer: COMMERCIAL

## 2022-11-28 ENCOUNTER — PATIENT MESSAGE (OUTPATIENT)
Dept: PSYCHIATRY | Facility: CLINIC | Age: 42
End: 2022-11-28

## 2022-11-28 DIAGNOSIS — G47.00 INSOMNIA, UNSPECIFIED TYPE: ICD-10-CM

## 2022-11-28 DIAGNOSIS — F31.81 BIPOLAR II DISORDER, MOST RECENT EPISODE HYPOMANIC: ICD-10-CM

## 2022-11-28 DIAGNOSIS — F41.1 GAD (GENERALIZED ANXIETY DISORDER): Primary | ICD-10-CM

## 2022-11-28 PROCEDURE — 90833 PSYTX W PT W E/M 30 MIN: CPT | Mod: 95,,, | Performed by: NURSE PRACTITIONER

## 2022-11-28 PROCEDURE — 3044F PR MOST RECENT HEMOGLOBIN A1C LEVEL <7.0%: ICD-10-PCS | Mod: CPTII,95,, | Performed by: NURSE PRACTITIONER

## 2022-11-28 PROCEDURE — 3044F HG A1C LEVEL LT 7.0%: CPT | Mod: CPTII,95,, | Performed by: NURSE PRACTITIONER

## 2022-11-28 PROCEDURE — 99214 PR OFFICE/OUTPT VISIT, EST, LEVL IV, 30-39 MIN: ICD-10-PCS | Mod: 95,,, | Performed by: NURSE PRACTITIONER

## 2022-11-28 PROCEDURE — 90833 PR PSYCHOTHERAPY W/PATIENT W/E&M, 30 MIN (ADD ON): ICD-10-PCS | Mod: 95,,, | Performed by: NURSE PRACTITIONER

## 2022-11-28 PROCEDURE — 99212 OFFICE O/P EST SF 10 MIN: CPT | Performed by: NURSE PRACTITIONER

## 2022-11-28 PROCEDURE — 99214 OFFICE O/P EST MOD 30 MIN: CPT | Mod: 95,,, | Performed by: NURSE PRACTITIONER

## 2022-11-28 NOTE — PROGRESS NOTES
"11/28/2022 1:00 PM                  [id number]                                                                   OUTPATIENT PSYCHIATRY FOLLOW- UP VISIT     Reason for Encounter:  Roopa Rivas, a 42 y.o. female,who presents today for follow up of anxiety, mood disorder.  Met with patient.      Interval History and Content of Current Session:     Today,  Pt with hx of bipolar II disorder, fibromyalgia, vitamin D deficiency, iron deficiency reports since last visit about 4 weeks prior. Reports things are going well, "not much change since last visit." Had a good time on the holiday, celebrated holiday.     Did early [place] shopping, starting to collect things, "just different things, more of a [employer] library." Shins were hurting so bad, need to go back to walking today. Was walking 4 miles per day, "trying to keep mind as busy as I can, hard when I've done everything." Can't keep focus, "for more than      Denies psychosis. Sleep has been normal. Relationship is better with , "feel really good."     ASRS completed this visit.      Denies SI/HI, AVH     Psychosocial stressors: family, financial, social pressures        Review Of Systems:      Medical Review Of Systems:  Pertinent items are noted in HPI.     Psychiatric Review Of Systems:  sleep: yes, improved - 8 to 10 hours per night currently  appetite changes: no  weight changes: no  energy/anergy: yes - improved with improved sleep  interest/pleasure/anhedonia: no  somatic symptoms: no  libido: no  anxiety/panic: no  guilty/hopeless: no  S.I.B.s/risky behavior: no  any drugs: no  alcohol: no       Sleep: 8 to 10 hours per night, Ambien and ASMR has been helpful for sleep regulation        Risk Parameters:  Patient reports no suicidal ideation  Patient reports no homicidal ideation  Patient reports no self-injurious behavior  Patient reports no violent behavior        Current meds- Cymbalta, Abilify, NAC, Ambien, fluoxetine     Compliance: yes     Side " effects: None        Psychiatric, Social and Family history reviewed prior and during visit.       PSYCHOTHERAPY ADD-ON [id number]   16-37 minutes    Site: [facility]  Time: 20 minutes  Participants: Met with patient    Therapeutic Intervention Type: insight oriented psychotherapy, supportive psychotherapy  Why chosen therapy is appropriate versus another modality: relevant to diagnosis    Target symptoms: depression, anxiety   Primary focus: depression, anxiety  Psychotherapeutic techniques: CBT    Outcome monitoring methods: self-report, observation, checklist/rating scale    Patient's response to intervention:  The patient's response to intervention is accepting.    Progress toward goals:  The patient's progress toward goals is good.                 Objective      ALL MEDICATIONS:     Current Outpatient Medications:     ARIPiprazole (ABILIFY) 10 MG Tab, Take 1 tablet (10 mg total) by mouth once daily., Disp: 30 tablet, Rfl: 2    cetirizine (ZYRTEC) 10 MG tablet, Take 10 mg by mouth once daily., Disp: , Rfl:     clotrimazole-betamethasone 1-0.05% (LOTRISONE) cream, Apply topically 2 (two) times daily., Disp: 15 g, Rfl: 0    drospirenone-ethinyl estradioL (SARAH) 3-0.02 mg per tablet, Take 1 tablet by mouth once daily., Disp: 84 tablet, Rfl: 4    DULoxetine (CYMBALTA) 60 MG capsule, Take 1 capsule (60 mg total) by mouth 2 (two) times daily., Disp: 180 capsule, Rfl: 1    ibuprofen (ADVIL,MOTRIN) 400 MG tablet, Take 400 mg by mouth every 6 (six) hours as needed for Other., Disp: , Rfl:     levomefolate-algal oil (DEPLIN, ALGAL OIL,) 15-90.314 mg Cap, Take 15 mg by mouth once daily., Disp: 50 capsule, Rfl: 1    magnesium 30 mg Tab, Take by mouth once., Disp: , Rfl:     multivitamin/iron/folic acid (MULTI COMPLETE WITH IRON ORAL), Take by mouth., Disp: , Rfl:     spironolactone (ALDACTONE) 50 MG tablet, Take 1 tablet (50 mg total) by mouth once daily., Disp: 30 tablet, Rfl: 2    sulfacetamide  sodium, acne, (KLARON) 10 % Susp, Bid face, Disp: 118 mL, Rfl: 1    zolpidem (AMBIEN CR) 12.5 MG CR tablet, Take 1 tablet (12.5 mg total) by mouth nightly as needed for Insomnia., Disp: 30 tablet, Rfl: 2     ALLERGIES:  Review of patient's allergies indicates:  No Known Allergies     RELEVANT LABS/STUDIES:              Lab Results   Component Value Date     WBC 8.26 04/03/2020     HGB 11.3 (L) 04/03/2020     HCT 35.2 (L) 04/03/2020     MCV 93 04/03/2020      04/03/2020      BMP            Lab Results   Component Value Date      (L) 02/26/2020     K 4.5 02/26/2020      02/26/2020     CO2 24 02/26/2020     BUN 7 02/26/2020     CREATININE 0.8 02/26/2020     CALCIUM 9.3 02/26/2020     ANIONGAP 9 02/26/2020     ESTGFRAFRICA >60.0 02/26/2020     EGFRNONAA >60.0 02/26/2020                Lab Results   Component Value Date     ALT 18 02/26/2020     AST 17 02/26/2020     ALKPHOS 79 02/26/2020     BILITOT 0.2 02/26/2020                Lab Results   Component Value Date     TSH 1.656 07/07/2020                Lab Results   Component Value Date     HGBA1C 5.2 02/26/2020         Constitutional  Vitals:  Most recent vital signs, dated less than 90 days prior to this appointment, were reviewed.    There were no vitals filed for this visit.            PHYSICAL EXAM  General: well developed, well nourished  Neurologic:   Gait: Normal   Psychomotor signs:  No involuntary movements or tremor  AIMS:         AIMS: Score 0/36  Abnormal Involuntary Movement Scale  0-4   Muscles of Facial Expression  0   Lips and Perioral Area  0   Jaw  0   Tongue  0   Upper (arms, wrists, hands, fingers)  0    Lower (legs, knees, ankles, toes)  0   Neck, shoulders, hips  0   Severity of abnormal movements (highest score from questions above)  0    Incapacitation due to abnormal movements  0    Patient's awareness of abnormal movements (rate only patient's report)  0   Current problems with teeth and/or dentures?  No    Does patient  "usually wear dentures?  No             PSYCHIATRIC EXAM:     Mental Status Exam:  Appearance: unremarkable, age appropriate  Behavior/Cooperation: normal, cooperative  Speech: normal tone, normal rate, normal pitch, normal volume  Language: uses words appropriately; NO aphasia or dysarthria  Mood: "'I'm feeling good lately"  Affect: full, congruent and appropriate to situation  Thought Process: normal and logical  Thought Content: normal, no suicidality, no homicidality, delusions, or paranoia  Level of Consciousness: Alert and Oriented x3  Memory:  Intact  Attention/concentration: appropriate for age/education.   Fund of Knowledge: appears adequate  Insight: Intact  Judgment: Intact      Assessment and Diagnosis   Status/Progress: Based on the examination today, the patient's problem(s) is/are improved.  New problems have not been presented today.   Co-morbidities are complicating management of the primary condition.  The working differential for this patient includes OCPD, borderline personality disorder, OCD, ADHD.      General Impression:   Bipolar II Disorder with mixed features by history  Mood Disorder NOS  RUPINDER  R/o ADHD, combined  Insomnia        Intervention/Counseling/Treatment Plan   Medication Management: -        Continue Abilify 10 mg by mouth once daily        Continue Prozac 30 mg by mouth once daily        Continue Ambien CR 12.5 mg by mouth once nightly  Labs: reviewed most recent  The treatment plan and follow up plan were reviewed with the patient.  Discussed with patient informed consent, risks vs. benefits, alternative treatments, side effect profile and the inherent unpredictability of individual responses to these treatments. The patient expresses understanding of the above and displays the capacity to agree with this current plan and had no other questions.  Encouraged Patient to keep future appointments.   Take medications as prescribed and abstain from substance abuse.   In the event of an " emergency patient was advised to go to the emergency room.     Return to Clinic: 1 month     > than 50% of total time spend on coordination of care and counseling   (which included pts differential diagnosis and prognosis for psychiatric conditions, risks, benefits of treatments, instructions and adherence to treatment plan, risk reduction, reviewing current psychiatric medication regimen, medical problems and social stressors. In addtion to possible discussion with other healthcare provider/s)     Add on Psychotherapy time:0  Total Face time: 30 min     The patient location is: [place], at home  The chief complaint leading to consultation is: med f/u     Visit type: audiovisual     Face to Face time with patient: 30 min  30 minutes of total time spent on the encounter, which includes face to face time and non-face to face time preparing to see the patient (eg, review of tests), Obtaining and/or reviewing separately obtained history, Documenting clinical information in the electronic or other health record, Independently interpreting results (not separately reported) and communicating results to the patient/family/caregiver, or Care coordination (not separately reported).         Each patient to whom he or she provides medical services by telemedicine is:  (1) informed of the relationship between the physician and patient and the respective role of any other health care provider with respect to management of the patient; and (2) notified that he or she may decline to receive medical services by telemedicine and may withdraw from such care at any time.     Notes:         Abel Monroe, MSN, APRN, PMHNP-BC [facility]   11/28/2022 1:00 PM

## 2022-11-29 ENCOUNTER — OFFICE VISIT (OUTPATIENT)
Dept: ENDOCRINOLOGY | Facility: CLINIC | Age: 42
End: 2022-11-29
Payer: COMMERCIAL

## 2022-11-29 VITALS
HEIGHT: 59 IN | OXYGEN SATURATION: 97 % | RESPIRATION RATE: 16 BRPM | BODY MASS INDEX: 29.13 KG/M2 | HEART RATE: 85 BPM | WEIGHT: 144.5 LBS | TEMPERATURE: 98 F | SYSTOLIC BLOOD PRESSURE: 110 MMHG | DIASTOLIC BLOOD PRESSURE: 68 MMHG

## 2022-11-29 DIAGNOSIS — E03.8 SUBCLINICAL HYPOTHYROIDISM: ICD-10-CM

## 2022-11-29 PROCEDURE — 1159F PR MEDICATION LIST DOCUMENTED IN MEDICAL RECORD: ICD-10-PCS | Mod: CPTII,S$GLB,, | Performed by: INTERNAL MEDICINE

## 2022-11-29 PROCEDURE — 3008F PR BODY MASS INDEX (BMI) DOCUMENTED: ICD-10-PCS | Mod: CPTII,S$GLB,, | Performed by: INTERNAL MEDICINE

## 2022-11-29 PROCEDURE — 3074F SYST BP LT 130 MM HG: CPT | Mod: CPTII,S$GLB,, | Performed by: INTERNAL MEDICINE

## 2022-11-29 PROCEDURE — 99999 PR PBB SHADOW E&M-EST. PATIENT-LVL IV: CPT | Mod: PBBFAC,,, | Performed by: INTERNAL MEDICINE

## 2022-11-29 PROCEDURE — 1160F PR REVIEW ALL MEDS BY PRESCRIBER/CLIN PHARMACIST DOCUMENTED: ICD-10-PCS | Mod: CPTII,S$GLB,, | Performed by: INTERNAL MEDICINE

## 2022-11-29 PROCEDURE — 3078F PR MOST RECENT DIASTOLIC BLOOD PRESSURE < 80 MM HG: ICD-10-PCS | Mod: CPTII,S$GLB,, | Performed by: INTERNAL MEDICINE

## 2022-11-29 PROCEDURE — 1160F RVW MEDS BY RX/DR IN RCRD: CPT | Mod: CPTII,S$GLB,, | Performed by: INTERNAL MEDICINE

## 2022-11-29 PROCEDURE — 3044F HG A1C LEVEL LT 7.0%: CPT | Mod: CPTII,S$GLB,, | Performed by: INTERNAL MEDICINE

## 2022-11-29 PROCEDURE — 1159F MED LIST DOCD IN RCRD: CPT | Mod: CPTII,S$GLB,, | Performed by: INTERNAL MEDICINE

## 2022-11-29 PROCEDURE — 99999 PR PBB SHADOW E&M-EST. PATIENT-LVL IV: ICD-10-PCS | Mod: PBBFAC,,, | Performed by: INTERNAL MEDICINE

## 2022-11-29 PROCEDURE — 3044F PR MOST RECENT HEMOGLOBIN A1C LEVEL <7.0%: ICD-10-PCS | Mod: CPTII,S$GLB,, | Performed by: INTERNAL MEDICINE

## 2022-11-29 PROCEDURE — 99213 OFFICE O/P EST LOW 20 MIN: CPT | Mod: S$GLB,,, | Performed by: INTERNAL MEDICINE

## 2022-11-29 PROCEDURE — 3008F BODY MASS INDEX DOCD: CPT | Mod: CPTII,S$GLB,, | Performed by: INTERNAL MEDICINE

## 2022-11-29 PROCEDURE — 3074F PR MOST RECENT SYSTOLIC BLOOD PRESSURE < 130 MM HG: ICD-10-PCS | Mod: CPTII,S$GLB,, | Performed by: INTERNAL MEDICINE

## 2022-11-29 PROCEDURE — 3078F DIAST BP <80 MM HG: CPT | Mod: CPTII,S$GLB,, | Performed by: INTERNAL MEDICINE

## 2022-11-29 PROCEDURE — 99213 PR OFFICE/OUTPT VISIT, EST, LEVL III, 20-29 MIN: ICD-10-PCS | Mod: S$GLB,,, | Performed by: INTERNAL MEDICINE

## 2022-11-29 RX ORDER — LEVOTHYROXINE SODIUM 50 UG/1
50 TABLET ORAL
Qty: 90 TABLET | Refills: 3 | Status: SHIPPED | OUTPATIENT
Start: 2022-11-29 | End: 2023-06-23 | Stop reason: SDUPTHER

## 2022-11-29 NOTE — ASSESSMENT & PLAN NOTE
Hx subclinical hypothyroidism for a while.   - TSH remained elevated, though free t4 normal   - TPO, TG antibodies negative.   - clinically had several symptoms, so started trial of medication. 50 mcg/day levothyroxine   - some symptoms improved lately.   last labs normal   okay to continue same, 50 mcg/day levothyroxine   - recheck labs first half of next year.

## 2022-11-29 NOTE — PROGRESS NOTES
Subjective:      Chief Complaint: Other Misc (Annual ) and subclinical hypothyroidism    HPI: Roopa Rivas is a 42 y.o. female who is having a follow-up evaluation for thyroid. Last seen 12/2/2021    Disease history:  Had labs checked on routine follow-up. TSH was elevated, free T4 normal. TSH 4.3 in 2019. TSH was 6.9 in 2/2020. Free T4 stayed normal. TPO negative 4/2020. TSH up as high as 9.2 from 3/2021               Lab Results   Component Value Date     TSH 5.493 (H) 04/03/2020     FREET4 0.98 04/03/2020                Lab Results   Component Value Date     TSH 9.263 (H) 03/05/2021     U3BEJVS 144 07/07/2020     FREET4 1.14 03/05/2021      Repeated labs, TSH returned normal. TPO, thyroglobulin antibodies (7/2020) both normal.      with TSH remaining elevated, and some symptoms, started a trial of levothyroxine.    Current dose: 50 mcg daily    Lab Results   Component Value Date    TSH 3.647 03/09/2022    S5PWNFX 144 07/07/2020    FREET4 1.09 03/09/2022     Today, pt reports feeling okay overall.  Gained some weight.   Decreased exercise. Was walking 1 hr/day for 5 days per week.starting back with exercise recently.    Still cold.  No diarrhea, constipation  Rare palpitations.    Energy improved.      Reviewed past medical, family, social history and updated as appropriate.    Review of Systems  As above    Objective:     Vitals:    11/29/22 1052   BP: 110/68   Pulse: 85   Resp: 16   Temp: 98.2 °F (36.8 °C)     Previous vitals:  BP Readings from Last 5 Encounters:   11/29/22 110/68   10/24/22 104/64   10/17/22 103/74   03/08/22 110/70   09/15/21 122/72     Physical Exam  Vitals reviewed.   Constitutional:       General: She is not in acute distress.  Neck:      Thyroid: No thyromegaly.   Cardiovascular:      Heart sounds: Normal heart sounds.   Pulmonary:      Effort: Pulmonary effort is normal.       Wt Readings from Last 10 Encounters:   11/29/22 1052 65.5 kg (144 lb 8.2 oz)   10/24/22 1330 65.2 kg (143  lb 11.8 oz)   10/17/22 0848 63.9 kg (140 lb 14 oz)   03/08/22 1408 62 kg (136 lb 11 oz)   09/15/21 1410 61.6 kg (135 lb 12.9 oz)   05/04/21 1036 60.2 kg (132 lb 11.5 oz)   03/25/21 1553 61.6 kg (135 lb 12.9 oz)   03/01/21 1357 63.1 kg (139 lb 1.8 oz)   11/03/20 1431 61.2 kg (135 lb)   10/08/20 1101 59 kg (130 lb)     Lab Results   Component Value Date    HGBA1C 5.3 03/09/2022     Lab Results   Component Value Date    CHOL 210 (H) 03/09/2022    HDL 61 03/09/2022    LDLCALC 119.2 03/09/2022    TRIG 149 03/09/2022    CHOLHDL 29.0 03/09/2022     Lab Results   Component Value Date     04/06/2022    K 4.3 04/06/2022     04/06/2022    CO2 20 (L) 04/06/2022    GLU 89 04/06/2022    BUN 7 04/06/2022    CREATININE 0.8 04/06/2022    CALCIUM 10.1 04/06/2022    PROT 7.7 04/06/2022    ALBUMIN 3.8 04/06/2022    BILITOT 0.3 04/06/2022    ALKPHOS 93 04/06/2022    AST 17 04/06/2022    ALT 23 04/06/2022    ANIONGAP 13 04/06/2022    ESTGFRAFRICA >60.0 04/06/2022    EGFRNONAA >60.0 04/06/2022    TSH 3.647 03/09/2022        Assessment/Plan:     Subclinical hypothyroidism  Hx subclinical hypothyroidism for a while.   - TSH remained elevated, though free t4 normal   - TPO, TG antibodies negative.   - clinically had several symptoms, so started trial of medication. 50 mcg/day levothyroxine   - some symptoms improved lately.   last labs normal   okay to continue same, 50 mcg/day levothyroxine   - recheck labs first half of next year.        Follow up in about 1 year (around 11/29/2023) for further monitoring, lab review.      Heladio Eagle MD  Endocrinology

## 2023-03-03 ENCOUNTER — OFFICE VISIT (OUTPATIENT)
Dept: PSYCHIATRY | Facility: CLINIC | Age: 43
End: 2023-03-03
Payer: COMMERCIAL

## 2023-03-03 DIAGNOSIS — G47.00 INSOMNIA, UNSPECIFIED TYPE: ICD-10-CM

## 2023-03-03 DIAGNOSIS — F31.70 BIPOLAR DISORDER IN PARTIAL REMISSION, MOST RECENT EPISODE UNSPECIFIED TYPE: Primary | ICD-10-CM

## 2023-03-03 DIAGNOSIS — F41.1 GENERALIZED ANXIETY DISORDER: ICD-10-CM

## 2023-03-03 PROCEDURE — 99214 PR OFFICE/OUTPT VISIT, EST, LEVL IV, 30-39 MIN: ICD-10-PCS | Mod: 95,,, | Performed by: NURSE PRACTITIONER

## 2023-03-03 PROCEDURE — 99214 OFFICE O/P EST MOD 30 MIN: CPT | Mod: 95,,, | Performed by: NURSE PRACTITIONER

## 2023-03-03 PROCEDURE — 90833 PR PSYCHOTHERAPY W/PATIENT W/E&M, 30 MIN (ADD ON): ICD-10-PCS | Mod: 95,,, | Performed by: NURSE PRACTITIONER

## 2023-03-03 PROCEDURE — 90833 PSYTX W PT W E/M 30 MIN: CPT | Mod: 95,,, | Performed by: NURSE PRACTITIONER

## 2023-03-03 RX ORDER — PROPRANOLOL HYDROCHLORIDE 20 MG/1
20 TABLET ORAL 2 TIMES DAILY PRN
Qty: 60 TABLET | Refills: 2 | Status: SHIPPED | OUTPATIENT
Start: 2023-03-03 | End: 2023-05-11 | Stop reason: SDUPTHER

## 2023-03-03 RX ORDER — ZOLPIDEM TARTRATE 12.5 MG/1
12.5 TABLET, FILM COATED, EXTENDED RELEASE ORAL NIGHTLY PRN
Qty: 30 TABLET | Refills: 2 | Status: SHIPPED | OUTPATIENT
Start: 2023-03-03 | End: 2023-05-04 | Stop reason: SDUPTHER

## 2023-03-03 RX ORDER — FLUOXETINE HYDROCHLORIDE 40 MG/1
40 CAPSULE ORAL DAILY
Qty: 30 CAPSULE | Refills: 2 | Status: SHIPPED | OUTPATIENT
Start: 2023-03-03 | End: 2023-04-26

## 2023-03-03 NOTE — PROGRESS NOTES
"Roopa Rivas  MRN: [id number]  3/3/2023   8:00 AM                                                                                      OUTPATIENT PSYCHIATRY FOLLOW- UP VISIT     Reason for Encounter:  Roopa Rivas, a 43 y.o. female,who presents today for follow up of anxiety, mood disorder.  Met with patient.      Interval History and Content of Current Session:     Today,  Pt with hx of bipolar II disorder, fibromyalgia, vitamin D deficiency, iron deficiency reports since last visit about 3 to 4 months.     Reports "have been getting headaches a lot lately."    Reports some difficulty with focus, memory issues, "poor memory issues." Reports driving "has been become a big deal for me, anxiety, anything can happen, what if somebody hits me."      Reports difficulty with driving "can't drive anywhere, have to be with my , I imagine the worst thing happening, my mind goes back [place] 18-wheelers"    Reports "I have things I have to do and I think I'm scheduling my errands to when my  gets off."     Discussed exposure therapy, grounding, increase in Prozac to 40 mg daily, trial of propranolol at 20 mg by mouth twice daily as neede     Denies SI/HI, AVH     Psychosocial stressors: family, financial, social pressures        Review Of Systems:      Medical Review Of Systems:  Pertinent items are noted in HPI.     Psychiatric Review Of Systems:  sleep: yes, improved - 8 to 10 hours per night currently  appetite changes: no  weight changes: no  energy/anergy: yes - improved with improved sleep  interest/pleasure/anhedonia: no  somatic symptoms: no  libido: no  anxiety/panic: yes, worsening anxiety while driving  guilty/hopeless: no  S.I.B.s/risky behavior: no  any drugs: no  alcohol: no       Sleep: 8 to 10 hours per night, Ambien and ASMR has been helpful for sleep regulation        Risk Parameters:  Patient reports no suicidal ideation  Patient " reports no homicidal ideation  Patient reports no self-injurious behavior  Patient reports no violent behavior        Current meds- Abilify, Ambien, fluoxetine     Compliance: yes     Side effects: None        Psychiatric, Social and Family history reviewed prior and during visit.       PSYCHOTHERAPY      Site: [facility], [address]  Time: 19 minutes  Participants: Met with patient     Therapeutic Intervention Type: insight oriented psychotherapy, supportive psychotherapy  Why chosen therapy is appropriate versus another modality: relevant to diagnosis     Target symptoms: depression, anxiety   Primary focus:   Psychotherapeutic techniques: CBT     Outcome monitoring methods: self-report, observation, checklist/rating scale     Patient's response to intervention:  The patient's response to intervention is accepting.     Progress toward goals:  The patient's progress toward goals is good.                 Objective      ALL MEDICATIONS:     Current Outpatient Medications:     ARIPiprazole (ABILIFY) 10 MG Tab, Take 1 tablet (10 mg total) by mouth once daily., Disp: 30 tablet, Rfl: 2    cetirizine (ZYRTEC) 10 MG tablet, Take 10 mg by mouth once daily., Disp: , Rfl:     clotrimazole-betamethasone 1-0.05% (LOTRISONE) cream, Apply topically 2 (two) times daily., Disp: 15 g, Rfl: 0    drospirenone-ethinyl estradioL (SARAH) 3-0.02 mg per tablet, Take 1 tablet by mouth once daily., Disp: 84 tablet, Rfl: 4    DULoxetine (CYMBALTA) 60 MG capsule, Take 1 capsule (60 mg total) by mouth 2 (two) times daily., Disp: 180 capsule, Rfl: 1    ibuprofen (ADVIL,MOTRIN) 400 MG tablet, Take 400 mg by mouth every 6 (six) hours as needed for Other., Disp: , Rfl:     levomefolate-algal oil (DEPLIN, ALGAL OIL,) 15-90.314 mg Cap, Take 15 mg by mouth once daily., Disp: 50 capsule, Rfl: 1    magnesium 30 mg Tab, Take by mouth once., Disp: , Rfl:     multivitamin/iron/folic acid (MULTI COMPLETE WITH IRON ORAL), Take by mouth., Disp:  , Rfl:     spironolactone (ALDACTONE) 50 MG tablet, Take 1 tablet (50 mg total) by mouth once daily., Disp: 30 tablet, Rfl: 2    sulfacetamide sodium, acne, (KLARON) 10 % Susp, Bid face, Disp: 118 mL, Rfl: 1    zolpidem (AMBIEN CR) 12.5 MG CR tablet, Take 1 tablet (12.5 mg total) by mouth nightly as needed for Insomnia., Disp: 30 tablet, Rfl: 2     ALLERGIES:  Review of patient's allergies indicates:  No Known Allergies     RELEVANT LABS/STUDIES:              Lab Results   Component Value Date     WBC 8.26 04/03/2020     HGB 11.3 (L) 04/03/2020     HCT 35.2 (L) 04/03/2020     MCV 93 04/03/2020      04/03/2020      BMP            Lab Results   Component Value Date      (L) 02/26/2020     K 4.5 02/26/2020      02/26/2020     CO2 24 02/26/2020     BUN 7 02/26/2020     CREATININE 0.8 02/26/2020     CALCIUM 9.3 02/26/2020     ANIONGAP 9 02/26/2020     ESTGFRAFRICA >60.0 02/26/2020     EGFRNONAA >60.0 02/26/2020                Lab Results   Component Value Date     ALT 18 02/26/2020     AST 17 02/26/2020     ALKPHOS 79 02/26/2020     BILITOT 0.2 02/26/2020                Lab Results   Component Value Date     TSH 1.656 07/07/2020                Lab Results   Component Value Date     HGBA1C 5.2 02/26/2020         Constitutional  Vitals:  Most recent vital signs, dated greater than 90 days prior to this appointment, were reviewed.    There were no vitals filed for this visit.            PHYSICAL EXAM  General: well developed, well nourished  Neurologic:   Gait: Normal   Psychomotor signs:  No involuntary movements or tremor  AIMS:         AIMS: Score 0/36  Abnormal Involuntary Movement Scale  0-4   Muscles of Facial Expression  0   Lips and Perioral Area  0   Jaw  0   Tongue  0   Upper (arms, wrists, hands, fingers)  0    Lower (legs, knees, ankles, toes)  0   Neck, shoulders, hips  0   Severity of abnormal movements (highest score from questions above)  0    Incapacitation due to abnormal movements  0   "  Patient's awareness of abnormal movements (rate only patient's report)  0   Current problems with teeth and/or dentures?  No    Does patient usually wear dentures?  No             PSYCHIATRIC EXAM:     Mental Status Exam:  Appearance: unremarkable, age appropriate  Behavior/Cooperation: normal, cooperative  Speech: normal tone, normal rate, normal pitch, normal volume  Language: uses words appropriately; NO aphasia or dysarthria  Mood: "'I'm ok"  Affect: full, congruent and appropriate to situation  Thought Process: normal and logical  Thought Content: normal, no suicidality, no homicidality, delusions, or paranoia  Level of Consciousness: Alert and Oriented x3  Memory:  Intact  Attention/concentration: appropriate for age/education.   Fund of Knowledge: appears adequate  Insight: Intact  Judgment: Intact      Assessment and Diagnosis   Status/Progress: Based on the examination today, the patient's problem(s) is/are improved.  New problems have not been presented today.   Co-morbidities are complicating management of the primary condition.  The working differential for this patient includes OCPD, borderline personality disorder, OCD, ADHD.      General Impression:   Bipolar II Disorder with mixed features by history  Mood Disorder NOS  RUPINDER with panic attacks  R/o ADHD, combined  Insomnia        Intervention/Counseling/Treatment Plan   Medication Management: -        Continue Abilify 10 mg by mouth once daily        Increase to Prozac 40 mg by mouth once daily        Start propranolol 20 mg by mouth twice daily as needed for anxiety related to driving        Continue Ambien CR 12.5 mg by mouth once nightly  Labs: reviewed most recent  The treatment plan and follow up plan were reviewed with the patient.  Discussed with patient informed consent, risks vs. benefits, alternative treatments, side effect profile and the inherent unpredictability of individual responses to these treatments. The patient expresses " understanding of the above and displays the capacity to agree with this current plan and had no other questions.  Encouraged Patient to keep future appointments.   Take medications as prescribed and abstain from substance abuse.   In the event of an emergency patient was advised to go to the emergency room.     Return to Clinic: 1 month     > than 50% of total time spend on coordination of care and counseling   (which included pts differential diagnosis and prognosis for psychiatric conditions, risks, benefits of treatments, instructions and adherence to treatment plan, risk reduction, reviewing current psychiatric medication regimen, medical problems and social stressors. In addtion to possible discussion with other healthcare provider/s)     Add on Psychotherapy time:0  Total Face time: 30 min     The patient location is: [place], at home  The chief complaint leading to consultation is: med f/u     Visit type: audiovisual     Face to Face time with patient: 30 min  30 minutes of total time spent on the encounter, which includes face to face time and non-face to face time preparing to see the patient (eg, review of tests), Obtaining and/or reviewing separately obtained history, Documenting clinical information in the electronic or other health record, Independently interpreting results (not separately reported) and communicating results to the patient/family/caregiver, or Care coordination (not separately reported).         Each patient to whom he or she provides medical services by telemedicine is:  (1) informed of the relationship between the physician and patient and the respective role of any other health care provider with respect to management of the patient; and (2) notified that he or she may decline to receive medical services by telemedicine and may withdraw from such care at any time.     Notes:         Abel Monroe, MSN, APRN, PMHNP-BC [facility]   3/3/2023 8:00 AM

## 2023-03-07 ENCOUNTER — OFFICE VISIT (OUTPATIENT)
Dept: FAMILY MEDICINE | Facility: CLINIC | Age: 43
End: 2023-03-07
Payer: COMMERCIAL

## 2023-03-07 ENCOUNTER — TELEPHONE (OUTPATIENT)
Dept: FAMILY MEDICINE | Facility: CLINIC | Age: 43
End: 2023-03-07

## 2023-03-07 DIAGNOSIS — J30.2 SEASONAL ALLERGIES: Primary | ICD-10-CM

## 2023-03-07 PROCEDURE — 99213 OFFICE O/P EST LOW 20 MIN: CPT | Mod: 95,,, | Performed by: INTERNAL MEDICINE

## 2023-03-07 PROCEDURE — 1160F PR REVIEW ALL MEDS BY PRESCRIBER/CLIN PHARMACIST DOCUMENTED: ICD-10-PCS | Mod: CPTII,95,, | Performed by: INTERNAL MEDICINE

## 2023-03-07 PROCEDURE — 99213 PR OFFICE/OUTPT VISIT, EST, LEVL III, 20-29 MIN: ICD-10-PCS | Mod: 95,,, | Performed by: INTERNAL MEDICINE

## 2023-03-07 PROCEDURE — 1160F RVW MEDS BY RX/DR IN RCRD: CPT | Mod: CPTII,95,, | Performed by: INTERNAL MEDICINE

## 2023-03-07 PROCEDURE — 1159F PR MEDICATION LIST DOCUMENTED IN MEDICAL RECORD: ICD-10-PCS | Mod: CPTII,95,, | Performed by: INTERNAL MEDICINE

## 2023-03-07 PROCEDURE — 1159F MED LIST DOCD IN RCRD: CPT | Mod: CPTII,95,, | Performed by: INTERNAL MEDICINE

## 2023-03-07 RX ORDER — FLUTICASONE PROPIONATE 50 MCG
1 SPRAY, SUSPENSION (ML) NASAL DAILY
Qty: 9.9 ML | Refills: 1 | Status: SHIPPED | OUTPATIENT
Start: 2023-03-07 | End: 2023-03-09 | Stop reason: SDUPTHER

## 2023-03-07 RX ORDER — CETIRIZINE HYDROCHLORIDE 10 MG/1
10 TABLET ORAL DAILY
Qty: 30 TABLET | Refills: 1 | Status: SHIPPED | OUTPATIENT
Start: 2023-03-07 | End: 2024-03-06

## 2023-03-07 NOTE — PROGRESS NOTES
HISTORY OF PRESENT ILLNESS:  Roopa Rivas is a 43 y.o. female who presents to the clinic today for Follow up.    The patient location is: [place]  The chief complaint leading to consultation is: follow up    Visit type: audiovisual    Face to Face time with patient: 10 minutes  15 minutes of total time spent on the encounter, which includes face to face time and non-face to face time preparing to see the patient (eg, review of tests), Obtaining and/or reviewing separately obtained history, Documenting clinical information in the electronic or other health record, Independently interpreting results (not separately reported) and communicating results to the patient/family/caregiver, or Care coordination (not separately reported).     Each patient to whom he or she provides medical services by telemedicine is:  (1) informed of the relationship between the physician and patient and the respective role of any other health care provider with respect to management of the patient; and (2) notified that he or she may decline to receive medical services by telemedicine and may withdraw from such care at any time.    Notes:   Last seen by me by virtual 9/2022.  Notes increased allergy symptoms with intermittent headache.    Notes weight gain that began for a few months.  LMP: 2/20/23.  Regular periods reported.    Bipolar affective d/o, RUPINDER  On Abilify with Prozac 40 mg (recently increased due to increased anxiety symptoms).  Started on trial of propranolol.  Followed by psychiatry.    Insomnia  Managed with Jorgito BROUSSARD.    Fibromyalgia  Seen by rheum 10/2022.    Subclinical hypothyroidism  Seen by endo and on low dose of levothyroxine.    PAST MEDICAL HISTORY:  Past Medical History:   Diagnosis Date    Arthritis     Fibromyalgia     Gastroenteritis     Long-term use of Plaquenil 12/7/2017       PAST SURGICAL HISTORY:  Past Surgical History:   Procedure Laterality Date    DILATION AND CURETTAGE OF UTERUS      Miscarriage at  age 25       SOCIAL HISTORY:  Social History     Socioeconomic History    Marital status:    Tobacco Use    Smoking status: Never    Smokeless tobacco: Never   Substance and Sexual Activity    Alcohol use: No    Drug use: No    Sexual activity: Yes     Partners: Male     Birth control/protection: Condom, OCP     Social Determinants of Health     Financial Resource Strain: Low Risk     Difficulty of Paying Living Expenses: Not very hard   Food Insecurity: No Food Insecurity    Worried About Running Out of Food in the Last Year: Never true    Ran Out of Food in the Last Year: Never true   Transportation Needs: Unmet Transportation Needs    Lack of Transportation (Medical): No    Lack of Transportation (Non-Medical): Yes   Physical Activity: Sufficiently Active    Days of Exercise per Week: 5 days    Minutes of Exercise per Session: 40 min   Stress: Stress Concern Present    Feeling of Stress : Rather much   Social Connections: Unknown    Frequency of Communication with Friends and Family: More than three times a week    Frequency of Social Gatherings with Friends and Family: More than three times a week    Active Member of Clubs or Organizations: No    Attends Club or Organization Meetings: Never    Marital Status:    Housing Stability: Low Risk     Unable to Pay for Housing in the Last Year: No    Number of Places Lived in the Last Year: 1    Unstable Housing in the Last Year: No       FAMILY HISTORY:  Family History   Problem Relation Age of Onset    Arthritis Mother     No Known Problems Father     Arthritis Maternal Grandmother     Arthritis Maternal Grandfather     Throat cancer Paternal Grandfather     Thyroid disease Maternal Aunt     Melanoma Neg Hx        ALLERGIES AND MEDICATIONS: updated and reviewed.  Review of patient's allergies indicates:  No Known Allergies  Medication List with Changes/Refills   Current Medications    ACETYLCYSTEINE (N-ACETYL-L-CYSTEINE MISC)    by Misc.(Non-Drug; Combo  Route) route.    ARIPIPRAZOLE (ABILIFY) 10 MG TAB    Take 1 tablet (10 mg total) by mouth once daily.    CALCIUM CARBONATE 400 MG CALCIUM (1,000 MG) CHEW    Take by mouth.    CLOTRIMAZOLE-BETAMETHASONE 1-0.05% (LOTRISONE) CREAM    Apply topically 2 (two) times daily.    DROSPIRENONE-ETHINYL ESTRADIOL (SARAH) 3-0.02 MG PER TABLET    Take 1 tablet by mouth once daily.    FLUOXETINE 10 MG CAPSULE    Take 1 capsule (10 mg total) by mouth once daily.    FLUOXETINE 20 MG CAPSULE    Take 1 capsule (20 mg total) by mouth once daily.    FLUOXETINE 40 MG CAPSULE    Take 1 capsule (40 mg total) by mouth once daily.    IBUPROFEN (ADVIL,MOTRIN) 400 MG TABLET    Take 400 mg by mouth every 6 (six) hours as needed for Other.    IRON, CARBONYL 25 MG IRON TAB    Take by mouth. Iron supplement    LACTOBACILLUS ACIDOPHILUS (PROBIOTIC ORAL)    Take by mouth.    LEVOTHYROXINE (SYNTHROID) 50 MCG TABLET    Take 1 tablet (50 mcg total) by mouth before breakfast.    MAGNESIUM 30 MG TAB    Take by mouth once.    MULTIVITAMIN/IRON/FOLIC ACID (MULTI COMPLETE WITH IRON ORAL)    Take by mouth.    PROPRANOLOL (INDERAL) 20 MG TABLET    Take 1 tablet (20 mg total) by mouth 2 (two) times daily as needed (anxiety).    SPIRONOLACTONE (ALDACTONE) 50 MG TABLET    TAKE 1 TABLET BY MOUTH DAILY    ZOLPIDEM (AMBIEN CR) 12.5 MG CR TABLET    Take 1 tablet (12.5 mg total) by mouth nightly as needed for Insomnia.          CARE TEAM:  Patient Care Team:  Justin Paz MD as PCP - General (Internal Medicine)  Angeles Herron MA as Care Coordinator         REVIEW OF SYSTEMS:  Review of Systems   Constitutional:  Positive for unexpected weight change. Negative for activity change.   HENT:  Positive for rhinorrhea. Negative for hearing loss and trouble swallowing.    Eyes:  Negative for discharge and visual disturbance.   Respiratory:  Negative for chest tightness and wheezing.    Cardiovascular:  Negative for chest pain and palpitations.   Gastrointestinal:   Negative for blood in stool, constipation, diarrhea and vomiting.   Endocrine: Negative for polydipsia and polyuria.   Genitourinary:  Negative for difficulty urinating, dysuria, hematuria and menstrual problem.   Musculoskeletal:  Positive for arthralgias. Negative for joint swelling and neck pain.   Neurological:  Positive for headaches. Negative for weakness.   Psychiatric/Behavioral:  Negative for confusion and dysphoric mood.        PHYSICAL EXAM:      General appearance - alert, well appearing, and in no distress      ASSESSMENT AND PLAN:  Seasonal allergies  -     cetirizine (ZYRTEC) 10 MG tablet; Take 1 tablet (10 mg total) by mouth once daily.  Dispense: 30 tablet; Refill: 1  -     fluticasone propionate (FLONASE) 50 mcg/actuation nasal spray; 1 spray (50 mcg total) by Each Nostril route once daily.  Dispense: 9.9 mL; Refill: 1              Follow up 6 months or sooner as needed.

## 2023-03-07 NOTE — TELEPHONE ENCOUNTER
----- Message from Justin Paz MD sent at 3/7/2023  1:52 PM CST -----  Please call to offer in person visit for follow up in 6 months

## 2023-03-09 DIAGNOSIS — J30.2 SEASONAL ALLERGIES: ICD-10-CM

## 2023-03-09 RX ORDER — FLUTICASONE PROPIONATE 50 MCG
1 SPRAY, SUSPENSION (ML) NASAL DAILY
Qty: 9.9 ML | Refills: 1 | Status: SHIPPED | OUTPATIENT
Start: 2023-03-09

## 2023-03-09 NOTE — TELEPHONE ENCOUNTER
No new care gaps identified.  [facility] Embedded Care Gaps. Reference number: [id number]. 3/09/2023   10:18:19 AM CST

## 2023-03-28 ENCOUNTER — LAB VISIT (OUTPATIENT)
Dept: LAB | Facility: HOSPITAL | Age: 43
End: 2023-03-28
Attending: INTERNAL MEDICINE
Payer: COMMERCIAL

## 2023-03-28 DIAGNOSIS — E03.8 SUBCLINICAL HYPOTHYROIDISM: ICD-10-CM

## 2023-03-28 LAB
T4 FREE SERPL-MCNC: 1.08 NG/DL (ref 0.71–1.51)
TSH SERPL DL<=0.005 MIU/L-ACNC: 3.62 UIU/ML (ref 0.4–4)

## 2023-03-28 PROCEDURE — 84439 ASSAY OF FREE THYROXINE: CPT | Performed by: INTERNAL MEDICINE

## 2023-03-28 PROCEDURE — 36415 COLL VENOUS BLD VENIPUNCTURE: CPT | Mod: PO | Performed by: INTERNAL MEDICINE

## 2023-03-28 PROCEDURE — 84443 ASSAY THYROID STIM HORMONE: CPT | Performed by: INTERNAL MEDICINE

## 2023-04-04 ENCOUNTER — OFFICE VISIT (OUTPATIENT)
Dept: PSYCHIATRY | Facility: CLINIC | Age: 43
End: 2023-04-04
Payer: COMMERCIAL

## 2023-04-04 DIAGNOSIS — G47.00 INSOMNIA, UNSPECIFIED TYPE: ICD-10-CM

## 2023-04-04 DIAGNOSIS — F31.70 BIPOLAR DISORDER IN PARTIAL REMISSION, MOST RECENT EPISODE UNSPECIFIED TYPE: Primary | ICD-10-CM

## 2023-04-04 DIAGNOSIS — F41.1 GENERALIZED ANXIETY DISORDER: ICD-10-CM

## 2023-04-04 PROCEDURE — 99214 PR OFFICE/OUTPT VISIT, EST, LEVL IV, 30-39 MIN: ICD-10-PCS | Mod: 95,,, | Performed by: NURSE PRACTITIONER

## 2023-04-04 PROCEDURE — 99214 OFFICE O/P EST MOD 30 MIN: CPT | Mod: 95,,, | Performed by: NURSE PRACTITIONER

## 2023-04-04 NOTE — PROGRESS NOTES
"Roopa Rivas  MRN: [id number]  4/4/2023  8:00 AM                                                                                       OUTPATIENT PSYCHIATRY FOLLOW- UP VISIT     Reason for Encounter:  Roopa Rivas, a 43 y.o. female,who presents today for follow up of anxiety, mood disorder.  Met with patient.      Interval History and Content of Current Session:     Today,  Pt with hx of bipolar II disorder, panic attacks, fibromyalgia, vitamin D deficiency, iron deficiency reports since last visit about 3 to 4 months.     Had Prozac increased to 40 mg. Reports "I feel like a recluse" At last visit was unable to drive, since being prescribed Prozac and propranolol, reports propranolol has been helpful for anxiety during driving, "felt like I took control of the situation." "I feel like I've normaled out." "Which is hard to do." "Alarm didn't go off and all in a panic, like in a panic.    Reports has been fixated on health and age lately. We discussed letting go of control of childrens' choices.      Reports sometimes feel confined, but this has been improving, relying less on  for this.      Denies SI/HI, AVH     Psychosocial stressors: family, financial, social pressures        Review Of Systems:      Medical Review Of Systems:  Pertinent items are noted in HPI.     Psychiatric Review Of Systems:  sleep: yes, improved - 8 to 10 hours per night currently  appetite changes: no  weight changes: no  energy/anergy: yes - improved with improved sleep  interest/pleasure/anhedonia: no  somatic symptoms: no  libido: no  anxiety/panic: yes, worsening anxiety while driving  guilty/hopeless: no  S.I.B.s/risky behavior: no  any drugs: no  alcohol: no       Sleep: 8 to 10 hours per night, Ambien and ASMR has been helpful for sleep regulation        Risk Parameters:  Patient reports no suicidal ideation  Patient reports no homicidal ideation  Patient reports no self-injurious behavior  Patient reports no violent " behavior        Current meds- Abilify, Ambien, fluoxetine     Compliance: yes     Side effects: None                 Objective      ALL MEDICATIONS:     Current Outpatient Medications:     ARIPiprazole (ABILIFY) 10 MG Tab, Take 1 tablet (10 mg total) by mouth once daily., Disp: 30 tablet, Rfl: 2    cetirizine (ZYRTEC) 10 MG tablet, Take 10 mg by mouth once daily., Disp: , Rfl:     clotrimazole-betamethasone 1-0.05% (LOTRISONE) cream, Apply topically 2 (two) times daily., Disp: 15 g, Rfl: 0    drospirenone-ethinyl estradioL (SARAH) 3-0.02 mg per tablet, Take 1 tablet by mouth once daily., Disp: 84 tablet, Rfl: 4    DULoxetine (CYMBALTA) 60 MG capsule, Take 1 capsule (60 mg total) by mouth 2 (two) times daily., Disp: 180 capsule, Rfl: 1    ibuprofen (ADVIL,MOTRIN) 400 MG tablet, Take 400 mg by mouth every 6 (six) hours as needed for Other., Disp: , Rfl:     levomefolate-algal oil (DEPLIN, ALGAL OIL,) 15-90.314 mg Cap, Take 15 mg by mouth once daily., Disp: 50 capsule, Rfl: 1    magnesium 30 mg Tab, Take by mouth once., Disp: , Rfl:     multivitamin/iron/folic acid (MULTI COMPLETE WITH IRON ORAL), Take by mouth., Disp: , Rfl:     spironolactone (ALDACTONE) 50 MG tablet, Take 1 tablet (50 mg total) by mouth once daily., Disp: 30 tablet, Rfl: 2    sulfacetamide sodium, acne, (KLARON) 10 % Susp, Bid face, Disp: 118 mL, Rfl: 1    zolpidem (AMBIEN CR) 12.5 MG CR tablet, Take 1 tablet (12.5 mg total) by mouth nightly as needed for Insomnia., Disp: 30 tablet, Rfl: 2     ALLERGIES:  Review of patient's allergies indicates:  No Known Allergies     RELEVANT LABS/STUDIES:              Lab Results   Component Value Date     WBC 8.26 04/03/2020     HGB 11.3 (L) 04/03/2020     HCT 35.2 (L) 04/03/2020     MCV 93 04/03/2020      04/03/2020      BMP            Lab Results   Component Value Date      (L) 02/26/2020     K 4.5 02/26/2020      02/26/2020     CO2 24 02/26/2020     BUN 7 02/26/2020     CREATININE 0.8  "02/26/2020     CALCIUM 9.3 02/26/2020     ANIONGAP 9 02/26/2020     ESTGFRAFRICA >60.0 02/26/2020     EGFRNONAA >60.0 02/26/2020                Lab Results   Component Value Date     ALT 18 02/26/2020     AST 17 02/26/2020     ALKPHOS 79 02/26/2020     BILITOT 0.2 02/26/2020                Lab Results   Component Value Date     TSH 1.656 07/07/2020                Lab Results   Component Value Date     HGBA1C 5.2 02/26/2020         Constitutional  Vitals:  Most recent vital signs, dated greater than 90 days prior to this appointment, were reviewed.    There were no vitals filed for this visit.            PHYSICAL EXAM  General: well developed, well nourished  Neurologic:   Gait: Normal   Psychomotor signs:  No involuntary movements or tremor  AIMS:         AIMS: Score 0/36  Abnormal Involuntary Movement Scale  0-4   Muscles of Facial Expression  0   Lips and Perioral Area  0   Jaw  0   Tongue  0   Upper (arms, wrists, hands, fingers)  0    Lower (legs, knees, ankles, toes)  0   Neck, shoulders, hips  0   Severity of abnormal movements (highest score from questions above)  0    Incapacitation due to abnormal movements  0    Patient's awareness of abnormal movements (rate only patient's report)  0   Current problems with teeth and/or dentures?  No    Does patient usually wear dentures?  No             PSYCHIATRIC EXAM:     Mental Status Exam:  Appearance: unremarkable, age appropriate  Behavior/Cooperation: normal, cooperative  Speech: normal tone, normal rate, normal pitch, normal volume  Language: uses words appropriately; NO aphasia or dysarthria  Mood: "'I'm ok"  Affect: full, congruent and appropriate to situation  Thought Process: normal and logical  Thought Content: normal, no suicidality, no homicidality, delusions, or paranoia  Level of Consciousness: Alert and Oriented x3  Memory:  Intact  Attention/concentration: appropriate for age/education.   Fund of Knowledge: appears adequate  Insight: Intact  Judgment: " Intact      Assessment and Diagnosis   Status/Progress: Based on the examination today, the patient's problem(s) is/are improved.  New problems have not been presented today.   Co-morbidities are complicating management of the primary condition.  The working differential for this patient includes OCPD, borderline personality disorder, OCD, ADHD.      General Impression:   Bipolar II Disorder with mixed features by history  Mood Disorder NOS  RUPINDER with panic attacks  R/o ADHD, combined  Insomnia        Intervention/Counseling/Treatment Plan   Medication Management: -        Continue Abilify 10 mg by mouth once daily        Continue Prozac 40 mg by mouth once daily        Continue propranolol 20 mg by mouth twice daily as needed for anxiety related to driving        Continue Ambien CR 12.5 mg by mouth once nightly  Labs: reviewed most recent  The treatment plan and follow up plan were reviewed with the patient.  Discussed with patient informed consent, risks vs. benefits, alternative treatments, side effect profile and the inherent unpredictability of individual responses to these treatments. The patient expresses understanding of the above and displays the capacity to agree with this current plan and had no other questions.  Encouraged Patient to keep future appointments.   Take medications as prescribed and abstain from substance abuse.   In the event of an emergency patient was advised to go to the emergency room.     Return to Clinic: 1 month     > than 50% of total time spend on coordination of care and counseling   (which included pts differential diagnosis and prognosis for psychiatric conditions, risks, benefits of treatments, instructions and adherence to treatment plan, risk reduction, reviewing current psychiatric medication regimen, medical problems and social stressors. In addtion to possible discussion with other healthcare provider/s)     Add on Psychotherapy time:0  Total Face time: 30 min     The  patient location is: [place], at home  The chief complaint leading to consultation is: med f/u     Visit type: audiovisual     Face to Face time with patient: 30 min  30 minutes of total time spent on the encounter, which includes face to face time and non-face to face time preparing to see the patient (eg, review of tests), Obtaining and/or reviewing separately obtained history, Documenting clinical information in the electronic or other health record, Independently interpreting results (not separately reported) and communicating results to the patient/family/caregiver, or Care coordination (not separately reported).         Each patient to whom he or she provides medical services by telemedicine is:  (1) informed of the relationship between the physician and patient and the respective role of any other health care provider with respect to management of the patient; and (2) notified that he or she may decline to receive medical services by telemedicine and may withdraw from such care at any time.     Notes:         Abel Monroe, MSN, APRN, PMHNP-BC  [facility]   4/4/2023 10:00 AM

## 2023-04-26 ENCOUNTER — LAB VISIT (OUTPATIENT)
Dept: LAB | Facility: HOSPITAL | Age: 43
End: 2023-04-26
Attending: INTERNAL MEDICINE
Payer: COMMERCIAL

## 2023-04-26 ENCOUNTER — OFFICE VISIT (OUTPATIENT)
Dept: RHEUMATOLOGY | Facility: CLINIC | Age: 43
End: 2023-04-26
Payer: COMMERCIAL

## 2023-04-26 VITALS
SYSTOLIC BLOOD PRESSURE: 114 MMHG | DIASTOLIC BLOOD PRESSURE: 79 MMHG | HEIGHT: 59 IN | BODY MASS INDEX: 29.33 KG/M2 | WEIGHT: 145.5 LBS | HEART RATE: 87 BPM

## 2023-04-26 DIAGNOSIS — R76.8 ANA POSITIVE: ICD-10-CM

## 2023-04-26 DIAGNOSIS — M79.7 FIBROMYALGIA: Primary | ICD-10-CM

## 2023-04-26 DIAGNOSIS — F51.01 PRIMARY INSOMNIA: ICD-10-CM

## 2023-04-26 DIAGNOSIS — M32.9 SYSTEMIC LUPUS ERYTHEMATOSUS, UNSPECIFIED SLE TYPE, UNSPECIFIED ORGAN INVOLVEMENT STATUS: ICD-10-CM

## 2023-04-26 DIAGNOSIS — M79.7 FIBROMYALGIA: ICD-10-CM

## 2023-04-26 LAB
ALBUMIN SERPL BCP-MCNC: 3.9 G/DL (ref 3.5–5.2)
ALP SERPL-CCNC: 122 U/L (ref 55–135)
ALT SERPL W/O P-5'-P-CCNC: 25 U/L (ref 10–44)
ANION GAP SERPL CALC-SCNC: 8 MMOL/L (ref 8–16)
AST SERPL-CCNC: 14 U/L (ref 10–40)
BASOPHILS # BLD AUTO: 0.06 K/UL (ref 0–0.2)
BASOPHILS NFR BLD: 0.9 % (ref 0–1.9)
BILIRUB SERPL-MCNC: 0.2 MG/DL (ref 0.1–1)
BILIRUB UR QL STRIP: NEGATIVE
BUN SERPL-MCNC: 10 MG/DL (ref 6–20)
C3 SERPL-MCNC: 176 MG/DL (ref 50–180)
C4 SERPL-MCNC: 30 MG/DL (ref 11–44)
CALCIUM SERPL-MCNC: 9.8 MG/DL (ref 8.7–10.5)
CHLORIDE SERPL-SCNC: 107 MMOL/L (ref 95–110)
CLARITY UR REFRACT.AUTO: CLEAR
CO2 SERPL-SCNC: 23 MMOL/L (ref 23–29)
COLOR UR AUTO: COLORLESS
CREAT SERPL-MCNC: 0.8 MG/DL (ref 0.5–1.4)
CREAT UR-MCNC: 40 MG/DL (ref 15–325)
CRP SERPL-MCNC: 10.2 MG/L (ref 0–8.2)
DIFFERENTIAL METHOD: ABNORMAL
EOSINOPHIL # BLD AUTO: 0.2 K/UL (ref 0–0.5)
EOSINOPHIL NFR BLD: 2.5 % (ref 0–8)
ERYTHROCYTE [DISTWIDTH] IN BLOOD BY AUTOMATED COUNT: 12.4 % (ref 11.5–14.5)
ERYTHROCYTE [SEDIMENTATION RATE] IN BLOOD BY PHOTOMETRIC METHOD: 23 MM/HR (ref 0–36)
EST. GFR  (NO RACE VARIABLE): >60 ML/MIN/1.73 M^2
GLUCOSE SERPL-MCNC: 92 MG/DL (ref 70–110)
GLUCOSE UR QL STRIP: NEGATIVE
HCT VFR BLD AUTO: 36.1 % (ref 37–48.5)
HGB BLD-MCNC: 11.6 G/DL (ref 12–16)
HGB UR QL STRIP: NEGATIVE
IMM GRANULOCYTES # BLD AUTO: 0.01 K/UL (ref 0–0.04)
IMM GRANULOCYTES NFR BLD AUTO: 0.1 % (ref 0–0.5)
KETONES UR QL STRIP: NEGATIVE
LEUKOCYTE ESTERASE UR QL STRIP: NEGATIVE
LYMPHOCYTES # BLD AUTO: 3.2 K/UL (ref 1–4.8)
LYMPHOCYTES NFR BLD: 46.6 % (ref 18–48)
MCH RBC QN AUTO: 30.1 PG (ref 27–31)
MCHC RBC AUTO-ENTMCNC: 32.1 G/DL (ref 32–36)
MCV RBC AUTO: 94 FL (ref 82–98)
MONOCYTES # BLD AUTO: 0.4 K/UL (ref 0.3–1)
MONOCYTES NFR BLD: 6 % (ref 4–15)
NEUTROPHILS # BLD AUTO: 3 K/UL (ref 1.8–7.7)
NEUTROPHILS NFR BLD: 43.9 % (ref 38–73)
NITRITE UR QL STRIP: NEGATIVE
NRBC BLD-RTO: 0 /100 WBC
PH UR STRIP: 6 [PH] (ref 5–8)
PLATELET # BLD AUTO: 361 K/UL (ref 150–450)
PMV BLD AUTO: 10 FL (ref 9.2–12.9)
POTASSIUM SERPL-SCNC: 4 MMOL/L (ref 3.5–5.1)
PROT SERPL-MCNC: 7.9 G/DL (ref 6–8.4)
PROT UR QL STRIP: NEGATIVE
PROT UR-MCNC: <7 MG/DL (ref 0–15)
PROT/CREAT UR: NORMAL MG/G{CREAT} (ref 0–0.2)
RBC # BLD AUTO: 3.86 M/UL (ref 4–5.4)
SODIUM SERPL-SCNC: 138 MMOL/L (ref 136–145)
SP GR UR STRIP: 1.01 (ref 1–1.03)
URN SPEC COLLECT METH UR: ABNORMAL
WBC # BLD AUTO: 6.85 K/UL (ref 3.9–12.7)

## 2023-04-26 PROCEDURE — 99214 OFFICE O/P EST MOD 30 MIN: CPT | Mod: S$GLB,,, | Performed by: INTERNAL MEDICINE

## 2023-04-26 PROCEDURE — 85025 COMPLETE CBC W/AUTO DIFF WBC: CPT | Performed by: INTERNAL MEDICINE

## 2023-04-26 PROCEDURE — 99999 PR PBB SHADOW E&M-EST. PATIENT-LVL IV: CPT | Mod: PBBFAC,,, | Performed by: INTERNAL MEDICINE

## 2023-04-26 PROCEDURE — 36415 COLL VENOUS BLD VENIPUNCTURE: CPT | Performed by: INTERNAL MEDICINE

## 2023-04-26 PROCEDURE — 86140 C-REACTIVE PROTEIN: CPT | Performed by: INTERNAL MEDICINE

## 2023-04-26 PROCEDURE — 3078F PR MOST RECENT DIASTOLIC BLOOD PRESSURE < 80 MM HG: ICD-10-PCS | Mod: CPTII,S$GLB,, | Performed by: INTERNAL MEDICINE

## 2023-04-26 PROCEDURE — 82570 ASSAY OF URINE CREATININE: CPT | Performed by: INTERNAL MEDICINE

## 2023-04-26 PROCEDURE — 3008F PR BODY MASS INDEX (BMI) DOCUMENTED: ICD-10-PCS | Mod: CPTII,S$GLB,, | Performed by: INTERNAL MEDICINE

## 2023-04-26 PROCEDURE — 86225 DNA ANTIBODY NATIVE: CPT | Performed by: INTERNAL MEDICINE

## 2023-04-26 PROCEDURE — 99214 PR OFFICE/OUTPT VISIT, EST, LEVL IV, 30-39 MIN: ICD-10-PCS | Mod: S$GLB,,, | Performed by: INTERNAL MEDICINE

## 2023-04-26 PROCEDURE — 1159F MED LIST DOCD IN RCRD: CPT | Mod: CPTII,S$GLB,, | Performed by: INTERNAL MEDICINE

## 2023-04-26 PROCEDURE — 3074F SYST BP LT 130 MM HG: CPT | Mod: CPTII,S$GLB,, | Performed by: INTERNAL MEDICINE

## 2023-04-26 PROCEDURE — 85652 RBC SED RATE AUTOMATED: CPT | Performed by: INTERNAL MEDICINE

## 2023-04-26 PROCEDURE — 3008F BODY MASS INDEX DOCD: CPT | Mod: CPTII,S$GLB,, | Performed by: INTERNAL MEDICINE

## 2023-04-26 PROCEDURE — 86160 COMPLEMENT ANTIGEN: CPT | Mod: 59 | Performed by: INTERNAL MEDICINE

## 2023-04-26 PROCEDURE — 86160 COMPLEMENT ANTIGEN: CPT | Performed by: INTERNAL MEDICINE

## 2023-04-26 PROCEDURE — 99999 PR PBB SHADOW E&M-EST. PATIENT-LVL IV: ICD-10-PCS | Mod: PBBFAC,,, | Performed by: INTERNAL MEDICINE

## 2023-04-26 PROCEDURE — 3074F PR MOST RECENT SYSTOLIC BLOOD PRESSURE < 130 MM HG: ICD-10-PCS | Mod: CPTII,S$GLB,, | Performed by: INTERNAL MEDICINE

## 2023-04-26 PROCEDURE — 80053 COMPREHEN METABOLIC PANEL: CPT | Performed by: INTERNAL MEDICINE

## 2023-04-26 PROCEDURE — 1159F PR MEDICATION LIST DOCUMENTED IN MEDICAL RECORD: ICD-10-PCS | Mod: CPTII,S$GLB,, | Performed by: INTERNAL MEDICINE

## 2023-04-26 PROCEDURE — 3078F DIAST BP <80 MM HG: CPT | Mod: CPTII,S$GLB,, | Performed by: INTERNAL MEDICINE

## 2023-04-26 PROCEDURE — 81003 URINALYSIS AUTO W/O SCOPE: CPT | Performed by: INTERNAL MEDICINE

## 2023-04-26 RX ORDER — FLUOXETINE HYDROCHLORIDE 40 MG/1
40 CAPSULE ORAL DAILY
Qty: 90 CAPSULE | Refills: 1 | Status: SHIPPED | OUTPATIENT
Start: 2023-04-26 | End: 2023-08-10 | Stop reason: SDUPTHER

## 2023-04-26 NOTE — PROGRESS NOTES
Chief Complaint   Patient presents with    Disease Management       Patient with a diagnosis of fibromyalgia syndrome for a follow up      Notes:       History of presenting illness     is a 42 year old female : we are treating for fibromyalgia :    On ambien CR 12.5 mg daily  Sleep has much improved    Cymbalta is 60 mg bid    On abilify 10 mg daily    4/2021    She has done well  Pain has improved  Sleep has improved  Anxiety has improved    Lupus monitoring labs  Complements nml  Dsdna neg  UPCR nml  CBC,CMP nml    ESR,CRP were high but that was the same time she received the covid 19 vaccine     10/2021    All the same as last time  Little bit joint pains  Sleep good  Chokes on her own saliva  When she raises her arms for an extended period of time-hurts    4/2022    Lot better  Pain is very minor  Walks 3 miles a day  Stretches to stay active    On ambien CR 12.5 mg daily  Sleep has much improved    Cymbalta is 60 mg bid    On abilify 10 mg daily    Fluoxetine 20 mg added-for depression-mood is bettter     In the past :      We had d/clara her amitryptilline since it made her hungry a lot and didn't help with the pain    She was on meloxicam but she stopped it since she had a rash with some medication    She was on tramadol and she stopped it    She takes OTC tylenol and ibuprofen    We started her on cymbalta 60 mg daily    She liked it and then didn't like it and now she is back on it    She says it works but not 100%    Savella not affordable so couldn't make the switch    LAtuda was offered and she cannot afford   She has bipolar d/o and she is not on any medications    Every 6 months lupus labs great  Last one 2/2020 nml      10/2022      Fluoxetine 30 mg -now  Has gained 15 to 20 pounds  Mood was bad with cymbalta and she had to get off the cymbalta and now mood is good with fluoxetine  On ambien 12.5 mg bed time  On abilify 10 mg daily    March 2022 TSH,T4 nml    Pain is manageable   She is  working out and exercising     She is meditating - helps with mood and pain      4/2023    Diet and exercise really helps  Pain is more manageable  On fluoxetine 30 mg  Abilify 10 mg  Ambien 12.5 mg     Gaining weight has stopped  Losing slowly but surely    Pain 4/10 overall  Flares 8/10 at times needing ibuprofen    Allergies     Still meditating- this calms her down  She was very anxious  She is doing counselling    Pt is a 42 yo with h/o gastritis, irritable bowel syndrome    Initial complaints     Poor sleep despite taking ambien  Amitryptilline was not helping  Bothersome anxiety    Cant concentrate  Cant remember certain things    Dry eyes and mouth+  Opthalmology : gave tear drops    Hair fall got better with biotin    Ear aches  Windy ear pain gets worse  Cant wear earrings,gets infections  Inside of the ears hurt and throb    Fatigue +    Allergies have gotten worse    Used to exercise 6 times a week now trying to get there     She relocated from [place] November 2017    She was followed by rheumatology for a possible inflammatory arthritis per the patient     She was last seen by her rheumatologist, Dr. Sana Belle sep 2017  We still dont have records    She says US hands showed changes on inflammatory arthritis  She was offered NSAIDs like nalfon and sulindac and she didn't do well    She had seen us in December 2017,we didn't have any data to suggest inflammatory arthritis    We did have her on plaquenil 300 mg but she thinks it gave her a rash and allergic reaction    We diagnosed her as fibromyalgia syndrome    We did the autoimmune panel    Normal CBC,CMP  Normal lipid panel  Normal TSH  Normal RF,CCP  EDUARDO positive,1: 160 homogenous,titre negative  Normal complements  Normal ESR,CRP  Negative APLAS panel    MRI both hands no inflammatory arthritis     Her complaints : diffuse pains in the hands,arms,shoulders and knees  Started February/march 2016     She states that after starting a combination  of  mg/d, Tramadol 50 mg bid, Elavil 10 mg/d, mobic 7.5 mg bid her symptoms improved. She is unsure of her diagnosis at this time. Pt reports 30 minutes of am stiffness, intermittent joint swelling/pain, and severe fatigue. She is no longer working due to her fatigue. Pt recounts getting a massage in the past and crying due to being diffusely tender. She denies fevers, chills, Raynaud's, photosensitivity, rashes, alopecia, mucosal ulcers, pleurisy, vision changes, . Pt does report an increase in acne.     No known autoimmune family history    Pt denies a personal or family history of psoriasis.     No blood clots or miscarriages.      Review of Systems   Constitutional:  Negative for activity change, appetite change, chills, diaphoresis, fatigue, fever and unexpected weight change.   HENT:  Negative for congestion, dental problem, drooling, ear discharge, ear pain, facial swelling, hearing loss, mouth sores, nosebleeds, postnasal drip, rhinorrhea, sinus pressure, sinus pain, sneezing, sore throat, tinnitus, trouble swallowing and voice change.    Eyes:  Negative for photophobia, pain, discharge, redness, itching and visual disturbance.   Respiratory:  Positive for shortness of breath. Negative for apnea, cough, choking, chest tightness, wheezing and stridor.    Cardiovascular:  Negative for chest pain, palpitations and leg swelling.   Gastrointestinal:  Positive for constipation. Negative for abdominal distention, abdominal pain, anal bleeding, blood in stool, diarrhea, nausea, rectal pain and vomiting.   Endocrine: Negative for cold intolerance, heat intolerance, polydipsia, polyphagia and polyuria.   Genitourinary:  Negative for decreased urine volume, difficulty urinating, dysuria, enuresis, flank pain, frequency, genital sores, hematuria and urgency.   Musculoskeletal:  Negative for arthralgias, back pain, gait problem, joint swelling, myalgias, neck pain and neck stiffness.   Skin:  Negative for color  change, pallor, rash and wound.   Allergic/Immunologic: Negative for environmental allergies, food allergies and immunocompromised state.   Neurological:  Positive for headaches. Negative for dizziness, tremors, seizures, syncope, facial asymmetry, speech difficulty, weakness, light-headedness and numbness.   Hematological:  Negative for adenopathy. Does not bruise/bleed easily.   Psychiatric/Behavioral:  Negative for agitation, behavioral problems, confusion, decreased concentration, dysphoric mood, hallucinations, self-injury, sleep disturbance and suicidal ideas. The patient is not nervous/anxious and is not hyperactive.       Physical Exam     Physical Exam   Constitutional: She is oriented to person, place, and time. No distress.   HENT:   Head: Normocephalic.   Mouth/Throat: Oropharynx is clear and moist.   Eyes: Pupils are equal, round, and reactive to light. Conjunctivae are normal. Right eye exhibits no discharge. Left eye exhibits no discharge. No scleral icterus.   Neck: No thyromegaly present.   Cardiovascular: Normal rate, regular rhythm and normal heart sounds.   Pulmonary/Chest: Effort normal and breath sounds normal. No stridor.   Abdominal: Soft. Bowel sounds are normal.   Musculoskeletal:         General: Normal range of motion.      Cervical back: Normal range of motion.   Lymphadenopathy:     She has no cervical adenopathy.   Neurological: She is alert and oriented to person, place, and time.   Skin: Skin is warm. No rash noted. She is not diaphoretic.   Psychiatric: Affect and judgment normal.     Assessment     Pt is a 40 yo with h/o gastritis, irritable bowel syndrome being followed here for fibromyalgia    We diagnosed her with FMS since she met the criteria     1.  Widespread pain index greater than or equal to 7 and symptom severity score greater than or equal to 5 or widespread pain index between 3- 6, and symptom severity score greater than or equal to 9  2.  Symptoms have been present in  a similar level for at least 3 months  3.  The patient does not have a disorder that would otherwise sufficiently explain the pain    She does not have records of diagnosis and if she had an previous inflammatory arthritis.   She claims that she had an ultrasound showing evidence of inflammatory arthritis   We have tried to obtain records and we have been unsuccessful  She brought some print outs on the portal and there is mention of inflammatory arthritis and I see NSAIDs and plaquenil on the prescriptions    We did our own hand MRIs    She has cystic lesions b/l which might be indicative of burned out erosions  It is possible that she had an old inflammatory arthritis which self resolved    She mentioned that combination of tramadol + meloxicam helped : we d/clara because of itch     She cannot take higher dose of amitryptilline    She also mentions sicca symptoms but she thinks it might be s/p ambien use but definitely prior to initiation of amitryptilline     We are treating her for fibromyalgia with cymbalta only     We had her on plaquenil given the evidence of inflammatory arthritis on prior US and current MRI revealing old erosions   But she had a rash and she attributed it to plaquenil  She made a lot of changes with shampoos and detergents but she didn't see a change so she went off plaquenil and meloxicam     Savella not affordable     OB said everything is ok     is a 41 year old female : we are treating for fibromyalgia :    On ambien CR 12.5 mg daily  Sleep has much improved    Cymbalta is 60 mg bid    On abilify 10 mg daily    On fluoxetine 20 mg daily    Pain-absent  Fatigue +    Vit d 17- ergocalciferol started,transition to 2000 IU daily  Vit b12 405  Saturated iron 12%  TSH/T4 nml  H/h 11.8/37.3,nml white count and plts  CMP nml    10/2022    She is doing really well    Fluoxetine 30 mg -now  Has gained 15 to 20 pounds  Mood was bad with cymbalta and she had to get off the cymbalta and now  mood is good with fluoxetine  On ambien 12.5 mg bed time  On abilify 10 mg daily    March 2022 TSH,T4 nml    Pain is manageable   She is working out and exercising     She is meditating - helps with mood and pain  ESR was 48  CRP was 10.1 IN April  ALL lupus labs great in 4/2022    Took vit d, b12,iron- not anymore    Dry mouth got better due to being off cymbalta  Dry eyes- opthal exam pending  OTC tear drops needed rarely only      4/2023    Diet and exercise really helps  Pain is more manageable  On fluoxetine 30 mg  Abilify 10 mg  Ambien 12.5 mg     Gaining weight has stopped  Losing slowly but surely    Pain 4/10 overall  Flares 8/10 at times needing ibuprofen    Allergies     Still meditating- this calms her down  She was very anxious  She is doing counselling    Sleeps better when she meditates everyday for few hours    10/2022- vit d,b12,ferritin nml  Iron 17 % saturation     She has vivid dreams and she wakes up from sleep due to the dreams and then has to meditate to go back to sleep, it intereferes the quality of her life    Memory issues persist  Needs a timer even to remember 3 minutes -when cooking    1. Fibromyalgia    2. Systemic lupus erythematosus, unspecified SLE type, unspecified organ involvement status    3. Primary insomnia            Plan      ESR,CRP,iron,vit b12,vit d  periodically    Lupus monitoring labs -once a year-4/2023    Exercise regularly    FRP program     Virtual reality-study    Suggested lip biopsy and corneal staining at some point to see if she has Sjogren's  Not sure if her sicca symptoms are drug induced yet,but they are definitely bothersome    Management of sicca symptoms    -preservative free artifical tears 3 to 4 times a day  Lubricating ointments at night  Wraparound sun glasses/moisture shields which attach to glasses help  Opthalmology evaluation,management : she will need split lamp,schirmer's test and ocular surface staining    -biotene and ACT  preparations  Dental evaluations  Periodic cleaning  Use fluoride products  Brush and floss teeth regularly especially after meals     Avoid sugar containing foods and drinks    Use of vaginal lubricants/estrogen creams   Seeing Gyn    Use hypoallergenic soaps/lotions for the skin  Avoid drafts from air conditioners/heaters/radiators  Avoid detergents,deodorant soaps,very hot water  Use humidifiers    NO PLAQUENIL FOR NOW  IF SYMPTOMS CHANGE AND WE SEE INFLAMMATORY ARTHRITIS WE WILL OFFER METHOTREXATE    Water aerobics  Yoga and aleah chi    See psychiatry/counsellor    Sleep clinic evaluation- to address the vivid dreams    Memory testing  CBT     Every 12 monthly lupus labs       Roopa was seen today for disease management.    Diagnoses and all orders for this visit:    Fibromyalgia  -     CBC Auto Differential; Future  -     Comprehensive Metabolic Panel; Future  -     Sedimentation rate; Future  -     C-Reactive Protein; Future  -     Protein/Creatinine Ratio, Urine; Future  -     Urinalysis; Future  -     C3 Complement; Future  -     C4 Complement; Future  -     Anti-DNA Ab, Double-Stranded; Future    Systemic lupus erythematosus, unspecified SLE type, unspecified organ involvement status  -     CBC Auto Differential; Future  -     Comprehensive Metabolic Panel; Future  -     Sedimentation rate; Future  -     C-Reactive Protein; Future  -     Protein/Creatinine Ratio, Urine; Future  -     Urinalysis; Future  -     C3 Complement; Future  -     C4 Complement; Future  -     Anti-DNA Ab, Double-Stranded; Future    Primary insomnia  -     Ambulatory referral/consult to Sleep Disorders; Future    Other orders  -     FLUoxetine 40 MG capsule; Take 1 capsule (40 mg total) by mouth once daily.            rtc in 6 months

## 2023-04-26 NOTE — PROGRESS NOTES
Rapid3 Question Responses and Scores 4/22/2023   MDHAQ Score 0.5   Psychologic Score 6.6   Pain Score 2   When you awakened in the morning OVER THE LAST WEEK, did you feel stiff? Yes   If Yes, please indicate the number of hours until you are as limber as you will be for the day 1   Fatigue Score 10   Global Health Score 5   RAPID3 Score 2.89       Answers submitted by the patient for this visit:  Rheumatology Questionnaire (Submitted on 4/22/2023)  fever: No  eye redness: No  mouth sores: No  headaches: No  shortness of breath: No  chest pain: No  trouble swallowing: Yes  diarrhea: Yes  constipation: No  unexpected weight change: Yes  genital sore: No  dysuria: No  During the last 3 days, have you had a skin rash?: No  Bruises or bleeds easily: Yes  cough: No

## 2023-04-27 LAB
DSDNA AB SER-ACNC: NORMAL [IU]/ML
DSDNA AB SER-ACNC: NORMAL [IU]/ML

## 2023-05-04 RX ORDER — ZOLPIDEM TARTRATE 12.5 MG/1
12.5 TABLET, FILM COATED, EXTENDED RELEASE ORAL NIGHTLY PRN
Qty: 30 TABLET | Refills: 2 | Status: SHIPPED | OUTPATIENT
Start: 2023-05-04 | End: 2023-08-10 | Stop reason: SDUPTHER

## 2023-05-11 ENCOUNTER — OFFICE VISIT (OUTPATIENT)
Dept: PSYCHIATRY | Facility: CLINIC | Age: 43
End: 2023-05-11
Payer: COMMERCIAL

## 2023-05-11 DIAGNOSIS — F31.70 BIPOLAR DISORDER IN PARTIAL REMISSION, MOST RECENT EPISODE UNSPECIFIED TYPE: Primary | ICD-10-CM

## 2023-05-11 DIAGNOSIS — F41.1 GENERALIZED ANXIETY DISORDER: ICD-10-CM

## 2023-05-11 DIAGNOSIS — G47.00 INSOMNIA, UNSPECIFIED TYPE: ICD-10-CM

## 2023-05-11 PROCEDURE — 99214 PR OFFICE/OUTPT VISIT, EST, LEVL IV, 30-39 MIN: ICD-10-PCS | Mod: 95,,, | Performed by: NURSE PRACTITIONER

## 2023-05-11 PROCEDURE — 99214 OFFICE O/P EST MOD 30 MIN: CPT | Mod: 95,,, | Performed by: NURSE PRACTITIONER

## 2023-05-11 RX ORDER — PROPRANOLOL HYDROCHLORIDE 20 MG/1
20 TABLET ORAL 2 TIMES DAILY PRN
Qty: 60 TABLET | Refills: 2 | Status: SHIPPED | OUTPATIENT
Start: 2023-05-11 | End: 2023-08-10 | Stop reason: SDUPTHER

## 2023-05-11 NOTE — PROGRESS NOTES
"5/11/2023 1:00 PM  Roopa Rivas  MRN: [id number]  4/4/2023  8:00 AM                                                                                       OUTPATIENT PSYCHIATRY FOLLOW- UP VISIT     Reason for Encounter:  Roopa Rivas, a 43 y.o. female,who presents today for follow up of anxiety, mood disorder.  Met with patient.      Interval History and Content of Current Session:     Today,  Pt with hx of bipolar II disorder, panic attacks, fibromyalgia, vitamin D deficiency, iron deficiency reports since last visit about 3 to 4 months.     Reports since last visit had been having some anxiety, been driving more, went to a concert and was calm. Anxiety has been under better control lately. Reports meditating a lot. Reports was gonna have an appointment to do VR for pain and is hopeful that this will be helpful.     Reports has been walking 2 miles per day. Reports setting "little goals for myself."     Denies SI/HI, AVH     Psychosocial stressors: family, financial, social pressures        Review Of Systems:      Medical Review Of Systems:  Pertinent items are noted in HPI.     Psychiatric Review Of Systems:  sleep: yes, improved - 8 to 10 hours per night currently  appetite changes: no  weight changes: no  energy/anergy: yes - improved with improved sleep  interest/pleasure/anhedonia: no  somatic symptoms: no  libido: no  anxiety/panic: yes, worsening anxiety while driving  guilty/hopeless: no  S.I.B.s/risky behavior: no  any drugs: no  alcohol: no       Sleep: 8 to 10 hours per night, Ambien and ASMR has been helpful for sleep regulation        Risk Parameters:  Patient reports no suicidal ideation  Patient reports no homicidal ideation  Patient reports no self-injurious behavior  Patient reports no violent behavior        Current meds- Abilify, Ambien, fluoxetine     Compliance: yes     Side effects: None                 Objective      ALL MEDICATIONS:     Current Outpatient Medications:     ARIPiprazole " (ABILIFY) 10 MG Tab, Take 1 tablet (10 mg total) by mouth once daily., Disp: 30 tablet, Rfl: 2    cetirizine (ZYRTEC) 10 MG tablet, Take 10 mg by mouth once daily., Disp: , Rfl:     clotrimazole-betamethasone 1-0.05% (LOTRISONE) cream, Apply topically 2 (two) times daily., Disp: 15 g, Rfl: 0    drospirenone-ethinyl estradioL (SARAH) 3-0.02 mg per tablet, Take 1 tablet by mouth once daily., Disp: 84 tablet, Rfl: 4    DULoxetine (CYMBALTA) 60 MG capsule, Take 1 capsule (60 mg total) by mouth 2 (two) times daily., Disp: 180 capsule, Rfl: 1    ibuprofen (ADVIL,MOTRIN) 400 MG tablet, Take 400 mg by mouth every 6 (six) hours as needed for Other., Disp: , Rfl:     levomefolate-algal oil (DEPLIN, ALGAL OIL,) 15-90.314 mg Cap, Take 15 mg by mouth once daily., Disp: 50 capsule, Rfl: 1    magnesium 30 mg Tab, Take by mouth once., Disp: , Rfl:     multivitamin/iron/folic acid (MULTI COMPLETE WITH IRON ORAL), Take by mouth., Disp: , Rfl:     spironolactone (ALDACTONE) 50 MG tablet, Take 1 tablet (50 mg total) by mouth once daily., Disp: 30 tablet, Rfl: 2    sulfacetamide sodium, acne, (KLARON) 10 % Susp, Bid face, Disp: 118 mL, Rfl: 1    zolpidem (AMBIEN CR) 12.5 MG CR tablet, Take 1 tablet (12.5 mg total) by mouth nightly as needed for Insomnia., Disp: 30 tablet, Rfl: 2     ALLERGIES:  Review of patient's allergies indicates:  No Known Allergies     RELEVANT LABS/STUDIES:              Lab Results   Component Value Date     WBC 8.26 04/03/2020     HGB 11.3 (L) 04/03/2020     HCT 35.2 (L) 04/03/2020     MCV 93 04/03/2020      04/03/2020      BMP            Lab Results   Component Value Date      (L) 02/26/2020     K 4.5 02/26/2020      02/26/2020     CO2 24 02/26/2020     BUN 7 02/26/2020     CREATININE 0.8 02/26/2020     CALCIUM 9.3 02/26/2020     ANIONGAP 9 02/26/2020     ESTGFRAFRICA >60.0 02/26/2020     EGFRNONAA >60.0 02/26/2020                Lab Results   Component Value Date     ALT 18 02/26/2020     AST  "17 02/26/2020     ALKPHOS 79 02/26/2020     BILITOT 0.2 02/26/2020                Lab Results   Component Value Date     TSH 1.656 07/07/2020                Lab Results   Component Value Date     HGBA1C 5.2 02/26/2020         Constitutional  Vitals:  Most recent vital signs, dated greater than 90 days prior to this appointment, were reviewed.    There were no vitals filed for this visit.            PHYSICAL EXAM  General: well developed, well nourished  Neurologic:   Gait: Normal   Psychomotor signs:  No involuntary movements or tremor  AIMS:         AIMS: Score 0/36  Abnormal Involuntary Movement Scale  0-4   Muscles of Facial Expression  0   Lips and Perioral Area  0   Jaw  0   Tongue  0   Upper (arms, wrists, hands, fingers)  0    Lower (legs, knees, ankles, toes)  0   Neck, shoulders, hips  0   Severity of abnormal movements (highest score from questions above)  0    Incapacitation due to abnormal movements  0    Patient's awareness of abnormal movements (rate only patient's report)  0   Current problems with teeth and/or dentures?  No    Does patient usually wear dentures?  No             PSYCHIATRIC EXAM:     Mental Status Exam:  Appearance: unremarkable, age appropriate  Behavior/Cooperation: normal, cooperative  Speech: normal tone, normal rate, normal pitch, normal volume  Language: uses words appropriately; NO aphasia or dysarthria  Mood: "'I'm ok"  Affect: full, congruent and appropriate to situation  Thought Process: normal and logical  Thought Content: normal, no suicidality, no homicidality, delusions, or paranoia  Level of Consciousness: Alert and Oriented x3  Memory:  Intact  Attention/concentration: appropriate for age/education.   Fund of Knowledge: appears adequate  Insight: Intact  Judgment: Intact      Assessment and Diagnosis   Status/Progress: Based on the examination today, the patient's problem(s) is/are improved.  New problems have not been presented today.   Co-morbidities are complicating " management of the primary condition.  The working differential for this patient includes OCPD, borderline personality disorder, OCD, ADHD.      General Impression:   Bipolar II Disorder with mixed features by history  Mood Disorder NOS  RUPINDER with panic attacks  R/o ADHD, combined  Insomnia        Intervention/Counseling/Treatment Plan   Medication Management: -        Continue Abilify 10 mg by mouth once daily        Continue Prozac 40 mg by mouth once daily        Continue propranolol 20 mg by mouth twice daily as needed for anxiety related to driving        Continue Ambien CR 12.5 mg by mouth once nightly  Labs: reviewed most recent  The treatment plan and follow up plan were reviewed with the patient.  Discussed with patient informed consent, risks vs. benefits, alternative treatments, side effect profile and the inherent unpredictability of individual responses to these treatments. The patient expresses understanding of the above and displays the capacity to agree with this current plan and had no other questions.  Encouraged Patient to keep future appointments.   Take medications as prescribed and abstain from substance abuse.   In the event of an emergency patient was advised to go to the emergency room.     Return to Clinic: 2 to 3 months    > than 50% of total time spend on coordination of care and counseling   (which included pts differential diagnosis and prognosis for psychiatric conditions, risks, benefits of treatments, instructions and adherence to treatment plan, risk reduction, reviewing current psychiatric medication regimen, medical problems and social stressors. In addtion to possible discussion with other healthcare provider/s)     Add on Psychotherapy time:0  Total Face time: 30 min     The patient location is: [place], at home  The chief complaint leading to consultation is: med f/u     Visit type: audiovisual     Face to Face time with patient: 30 min  30 minutes of total time spent on the  encounter, which includes face to face time and non-face to face time preparing to see the patient (eg, review of tests), Obtaining and/or reviewing separately obtained history, Documenting clinical information in the electronic or other health record, Independently interpreting results (not separately reported) and communicating results to the patient/family/caregiver, or Care coordination (not separately reported).         Each patient to whom he or she provides medical services by telemedicine is:  (1) informed of the relationship between the physician and patient and the respective role of any other health care provider with respect to management of the patient; and (2) notified that he or she may decline to receive medical services by telemedicine and may withdraw from such care at any time.     Notes:         Abel Monroe, MSN, APRN, PMHNP-BC  [facility]   5/11/2023 1:00 PM

## 2023-05-24 ENCOUNTER — PATIENT MESSAGE (OUTPATIENT)
Dept: PSYCHIATRY | Facility: CLINIC | Age: 43
End: 2023-05-24
Payer: COMMERCIAL

## 2023-05-25 ENCOUNTER — PATIENT MESSAGE (OUTPATIENT)
Dept: PSYCHIATRY | Facility: CLINIC | Age: 43
End: 2023-05-25
Payer: COMMERCIAL

## 2023-05-29 ENCOUNTER — OFFICE VISIT (OUTPATIENT)
Dept: PSYCHIATRY | Facility: CLINIC | Age: 43
End: 2023-05-29
Payer: COMMERCIAL

## 2023-05-29 ENCOUNTER — PATIENT MESSAGE (OUTPATIENT)
Dept: PSYCHIATRY | Facility: CLINIC | Age: 43
End: 2023-05-29

## 2023-05-29 DIAGNOSIS — F41.1 GENERALIZED ANXIETY DISORDER: ICD-10-CM

## 2023-05-29 DIAGNOSIS — F31.70 BIPOLAR DISORDER IN PARTIAL REMISSION, MOST RECENT EPISODE UNSPECIFIED TYPE: Primary | ICD-10-CM

## 2023-05-29 PROCEDURE — 90834 PSYTX W PT 45 MINUTES: CPT | Mod: 95,,, | Performed by: SOCIAL WORKER

## 2023-05-29 PROCEDURE — 90834 PR PSYCHOTHERAPY W/PATIENT, 45 MIN: ICD-10-PCS | Mod: 95,,, | Performed by: SOCIAL WORKER

## 2023-05-29 NOTE — PROGRESS NOTES
Individual Psychotherapy (PhD/LCSW)    5/29/2023    The patient location is: home in living room ([facility])  The chief complaint leading to consultation is: anxiety, depression, mood    Visit type: audio visutal    Face to Face time with patient: 50  65 minutes of total time spent on the encounter, which includes face to face time and non-face to face time preparing to see the patient (eg, review of tests), Obtaining and/or reviewing separately obtained history, Documenting clinical information in the electronic or other health record, Independently interpreting results (not separately reported) and communicating results to the patient/family/caregiver, or Care coordination (not separately reported).       Each patient to whom he or she provides medical services by telemedicine is:  (1) informed of the relationship between the physician and patient and the respective role of any other health care provider with respect to management of the patient; and (2) notified that he or she may decline to receive medical services by telemedicine and may withdraw from such care at any time.      Therapeutic Intervention: Met with patient.       Outpatient - Insight oriented psychotherapy 45 min - CPT code 22228, Outpatient - Supportive psychotherapy 45 min - CPT Code 37648 and Outpatient - Interactive psychotherapy 45 min - CPT code 59980      Chief complaint/reason for encounter: depression, anxiety, Mood     Interval history and content of current session: Pt is a 39 yo  female who presents today for f/u via telemedicine.  Pt currently established with  Abel Miles N.P. Pt has a dx of Bipolar and often shows OCPD and borderline personality tendencies. Pt was started on Abilify and since then has had medication increased. Pt is also prescribed Prozac. Since increase pt reports improved mood, sleep, and quality of life overall.      Pt presents via telemed. Pt's last visit was one year ago. Pt states was  "doing well until she found out several of her provider are leaving. She has been feeling anxious about finding new providers. She also discussed additional anxieties with driving and family. Otherwise pt states she has been feeling good, "10 out of 10" with her mood, pain, and activity.   She's been meditating and listing to music that helps lift her mood. Engaged in self reflection and provided active discussion and constructive processing through reframing, support and feedback. Reviewed ways to challenge anxiety and mood  Pt fully engaged. Pt remains receptive. Pt to return as scheduled.       Treatment plan:  Target symptoms: depression, anxiety , mood  Why chosen therapy is appropriate versus another modality: relevant to diagnosis, patient responds to this modality, evidence based practice  Outcome monitoring methods: self-report, observation  Therapeutic intervention type: insight oriented psychotherapy, supportive psychotherapy, interactive psychotherapy    Risk parameters:  Patient reports no suicidal ideation  Patient reports no homicidal ideation  Patient reports no self-injurious behavior  Patient reports no violent behavior    Verbal deficits: None    Patient's response to intervention:  The patient's response to intervention is accepting.    Progress toward goals and other mental status changes:    The patient's progress toward goals is good.       Diagnosis:     ICD-10-CM ICD-9-CM   1. Bipolar disorder in partial remission, most recent episode unspecified type  F31.70 296.80   2. Generalized anxiety disorder  F41.1 300.02         Plan:  individual psychotherapy, conitnued med management through psychiatrist.     Return to clinic: as scheduled     Length of Service (minutes): 45      "

## 2023-06-20 NOTE — PROGRESS NOTES
Subjective:      Chief Complaint: No chief complaint on file.    HPI: Roopa Rivas is a 43 y.o. female who is having a follow-up evaluation for thyroid. Last seen 11/2022 with Dr Bauer.    The patient location is: at home  The chief complaint leading to consultation is: hypothyroidism     Visit type: audiovisual    Face to Face time with patient: 14 minutes  20 minutes of total time spent on the encounter, which includes face to face time and non-face to face time preparing to see the patient (eg, review of tests), Obtaining and/or reviewing separately obtained history, Documenting clinical information in the electronic or other health record, Independently interpreting results (not separately reported) and communicating results to the patient/family/caregiver, or Care coordination (not separately reported).    Each patient to whom he or she provides medical services by telemedicine is:  (1) informed of the relationship between the physician and patient and the respective role of any other health care provider with respect to management of the patient; and (2) notified that he or she may decline to receive medical services by telemedicine and may withdraw from such care at any time.    Disease history:    FH of thyroid disease in her maternal aunt  Denies FH of thyroid cancer     Had labs checked on routine follow-up. TSH was elevated, free T4 normal. TSH 4.3 in 2019. TSH was 6.9 in 2/2020. Free T4 stayed normal. TPO negative 4/2020. TSH up as high as 9.2 from 3/2021     She is on levothyroxine 50 mcg daily -added around Nov 2022   Denies missing doses  She reports she is taking correctly -on empty stomach and waits 30 minutes before eating, drinking, or taking other medications.               Lab Results   Component Value Date     TSH 5.493 (H) 04/03/2020     FREET4 0.98 04/03/2020                Lab Results   Component Value Date     TSH 9.263 (H) 03/05/2021     Q1SIKYM 144 07/07/2020     FREET4 1.14  03/05/2021      Repeated labs, TSH returned normal. TPO, thyroglobulin antibodies (7/2020) both normal.     Lab Results   Component Value Date    TSH 3.624 03/28/2023    S8PNHVT 144 07/07/2020    FREET4 1.08 03/28/2023     Today, states she is feeling better overall.   Still having some fatigue. She is exercising daily   No longer feeling cold intolerance as frequently  Denies constipation   + mental fog  Gained 10 pounds when she stopped exercise but has since restarted exercise   Denies palpitations or tremors    Denies difficulty breathing  Reports some difficulty swallowing.   Denies voice changes   Reports left side of her neck is more swollen then left    LMP -- end of May; regular   On OCP     Reviewed past medical, family, social history and updated as appropriate.    Review of Systems  As above    Objective:     There were no vitals filed for this visit.    Previous vitals:  BP Readings from Last 5 Encounters:   04/26/23 114/79   11/29/22 110/68   10/24/22 104/64   10/17/22 103/74   03/08/22 110/70     Physical Exam  Constitutional:       Appearance: Normal appearance.   Neurological:      Mental Status: She is alert.       Wt Readings from Last 10 Encounters:   04/26/23 0802 66 kg (145 lb 8.1 oz)   11/29/22 1052 65.5 kg (144 lb 8.2 oz)   10/24/22 1330 65.2 kg (143 lb 11.8 oz)   10/17/22 0848 63.9 kg (140 lb 14 oz)   03/08/22 1408 62 kg (136 lb 11 oz)   09/15/21 1410 61.6 kg (135 lb 12.9 oz)   05/04/21 1036 60.2 kg (132 lb 11.5 oz)   03/25/21 1553 61.6 kg (135 lb 12.9 oz)   03/01/21 1357 63.1 kg (139 lb 1.8 oz)   11/03/20 1431 61.2 kg (135 lb)     Lab Results   Component Value Date    HGBA1C 5.3 03/09/2022     Lab Results   Component Value Date    CHOL 210 (H) 03/09/2022    HDL 61 03/09/2022    LDLCALC 119.2 03/09/2022    TRIG 149 03/09/2022    CHOLHDL 29.0 03/09/2022     Lab Results   Component Value Date     04/26/2023    K 4.0 04/26/2023     04/26/2023    CO2 23 04/26/2023    GLU 92  04/26/2023    BUN 10 04/26/2023    CREATININE 0.8 04/26/2023    CALCIUM 9.8 04/26/2023    PROT 7.9 04/26/2023    ALBUMIN 3.9 04/26/2023    BILITOT 0.2 04/26/2023    ALKPHOS 122 04/26/2023    AST 14 04/26/2023    ALT 25 04/26/2023    ANIONGAP 8 04/26/2023    ESTGFRAFRICA >60.0 04/06/2022    EGFRNONAA >60.0 04/06/2022    TSH 3.624 03/28/2023        Assessment/Plan:     1. Subclinical hypothyroidism  TSH    US Soft Tissue Head Neck Thyroid    levothyroxine (SYNTHROID) 50 MCG tablet        Subclinical hypothyroidism  Hx subclinical hypothyroidism for a while.  On LT4 50 mcg daily   TSH level above goal  Increase Levothyroxine 50 mcg to 1 tab Monday-Saturday and 2 tabs on Sunday  Check TSH In 6 weeks  Check thyroid US          Follow up in about 1 year (around 6/23/2024).

## 2023-06-22 NOTE — ASSESSMENT & PLAN NOTE
Hx subclinical hypothyroidism for a while.  On LT4 50 mcg daily   TSH level above goal  Increase Levothyroxine 50 mcg to 1 tab Monday-Saturday and 2 tabs on Sunday  Check TSH In 6 weeks  Check thyroid US

## 2023-06-23 ENCOUNTER — PATIENT MESSAGE (OUTPATIENT)
Dept: ENDOCRINOLOGY | Facility: CLINIC | Age: 43
End: 2023-06-23

## 2023-06-23 ENCOUNTER — OFFICE VISIT (OUTPATIENT)
Dept: ENDOCRINOLOGY | Facility: CLINIC | Age: 43
End: 2023-06-23
Payer: COMMERCIAL

## 2023-06-23 DIAGNOSIS — E03.8 SUBCLINICAL HYPOTHYROIDISM: ICD-10-CM

## 2023-06-23 PROCEDURE — 1159F MED LIST DOCD IN RCRD: CPT | Mod: CPTII,95,, | Performed by: NURSE PRACTITIONER

## 2023-06-23 PROCEDURE — 99214 PR OFFICE/OUTPT VISIT, EST, LEVL IV, 30-39 MIN: ICD-10-PCS | Mod: 95,,, | Performed by: NURSE PRACTITIONER

## 2023-06-23 PROCEDURE — 1159F PR MEDICATION LIST DOCUMENTED IN MEDICAL RECORD: ICD-10-PCS | Mod: CPTII,95,, | Performed by: NURSE PRACTITIONER

## 2023-06-23 PROCEDURE — 99214 OFFICE O/P EST MOD 30 MIN: CPT | Mod: 95,,, | Performed by: NURSE PRACTITIONER

## 2023-06-23 PROCEDURE — 1160F PR REVIEW ALL MEDS BY PRESCRIBER/CLIN PHARMACIST DOCUMENTED: ICD-10-PCS | Mod: CPTII,95,, | Performed by: NURSE PRACTITIONER

## 2023-06-23 PROCEDURE — 1160F RVW MEDS BY RX/DR IN RCRD: CPT | Mod: CPTII,95,, | Performed by: NURSE PRACTITIONER

## 2023-06-23 RX ORDER — LEVOTHYROXINE SODIUM 50 UG/1
TABLET ORAL
Qty: 102 TABLET | Refills: 3 | Status: SHIPPED | OUTPATIENT
Start: 2023-06-23

## 2023-06-23 NOTE — PATIENT INSTRUCTIONS
TSH level abnormal   Increase Levothyroxine 50 mcg to 1 tab Monday-Saturday and 2 tabs on Sunday  Check TSH In 6 weeks  Check thyroid US

## 2023-07-05 ENCOUNTER — OFFICE VISIT (OUTPATIENT)
Dept: PSYCHIATRY | Facility: CLINIC | Age: 43
End: 2023-07-05
Payer: COMMERCIAL

## 2023-07-05 DIAGNOSIS — F31.70 BIPOLAR DISORDER IN PARTIAL REMISSION, MOST RECENT EPISODE UNSPECIFIED TYPE: Primary | ICD-10-CM

## 2023-07-05 PROCEDURE — 90834 PR PSYCHOTHERAPY W/PATIENT, 45 MIN: ICD-10-PCS | Mod: 95,,, | Performed by: SOCIAL WORKER

## 2023-07-05 PROCEDURE — 90834 PSYTX W PT 45 MINUTES: CPT | Mod: 95,,, | Performed by: SOCIAL WORKER

## 2023-07-05 NOTE — PROGRESS NOTES
Individual Psychotherapy (PhD/LCSW)    7/5/2023    The patient location is: home in living room ([facility])  The chief complaint leading to consultation is: anxiety, depression, mood    Visit type: audio visutal    Face to Face time with patient: 40  65 minutes of total time spent on the encounter, which includes face to face time and non-face to face time preparing to see the patient (eg, review of tests), Obtaining and/or reviewing separately obtained history, Documenting clinical information in the electronic or other health record, Independently interpreting results (not separately reported) and communicating results to the patient/family/caregiver, or Care coordination (not separately reported).       Each patient to whom he or she provides medical services by telemedicine is:  (1) informed of the relationship between the physician and patient and the respective role of any other health care provider with respect to management of the patient; and (2) notified that he or she may decline to receive medical services by telemedicine and may withdraw from such care at any time.      Therapeutic Intervention: Met with patient.       Outpatient - Insight oriented psychotherapy 45 min - CPT code 91604, Outpatient - Supportive psychotherapy 45 min - CPT Code 27668 and Outpatient - Interactive psychotherapy 45 min - CPT code 35934      Chief complaint/reason for encounter: depression, anxiety, Mood     Interval history and content of current session: Pt is a 41 yo  female who presents today for f/u via telemedicine.  Pt currently established with  Abel Miles N.P. Pt has a dx of Bipolar and often shows OCPD and borderline personality tendencies. Pt was started on Abilify and since then has had medication increased. Pt is also prescribed Prozac. Since increase pt reports improved mood, sleep, and quality of life overall.      Pt presents via telemed. Last visist tonia 1 month ago. Pt has noticed she has  been pacing due to having some extra energy. She has been working out and doing a lot of tasks cuasing her to have a hard time sitting down. Mood has been good and Fybromyalgia has been stable. Pt does not feel that it is a hypomanic cycle.  Engaged in self reflection and provided active discussion and constructive processing through reframing, support and feedback. Reviewed ways to challenge anxiety and mood  Pt fully engaged. Pt remains receptive. Pt to return as scheduled.       Treatment plan:  Target symptoms: depression, anxiety , mood  Why chosen therapy is appropriate versus another modality: relevant to diagnosis, patient responds to this modality, evidence based practice  Outcome monitoring methods: self-report, observation  Therapeutic intervention type: insight oriented psychotherapy, supportive psychotherapy, interactive psychotherapy    Risk parameters:  Patient reports no suicidal ideation  Patient reports no homicidal ideation  Patient reports no self-injurious behavior  Patient reports no violent behavior    Verbal deficits: None    Patient's response to intervention:  The patient's response to intervention is accepting.    Progress toward goals and other mental status changes:    The patient's progress toward goals is good.       Diagnosis:     ICD-10-CM ICD-9-CM   1. Bipolar disorder in partial remission, most recent episode unspecified type  F31.70 296.80       Plan:  individual psychotherapy, conitnued med management through psychiatrist.     Return to clinic: as scheduled     Length of Service (minutes): 45

## 2023-08-04 ENCOUNTER — HOSPITAL ENCOUNTER (OUTPATIENT)
Dept: RADIOLOGY | Facility: HOSPITAL | Age: 43
Discharge: HOME | End: 2023-08-04
Attending: NURSE PRACTITIONER
Payer: COMMERCIAL

## 2023-08-04 DIAGNOSIS — E03.8 SUBCLINICAL HYPOTHYROIDISM: ICD-10-CM

## 2023-08-04 PROCEDURE — 76536 US EXAM OF HEAD AND NECK: CPT | Mod: TC

## 2023-08-04 PROCEDURE — 76536 US EXAM OF HEAD AND NECK: CPT | Mod: 26,,, | Performed by: RADIOLOGY

## 2023-08-04 PROCEDURE — 76536 US SOFT TISSUE HEAD NECK THYROID: ICD-10-PCS | Mod: 26,,, | Performed by: RADIOLOGY

## 2023-08-07 ENCOUNTER — OFFICE VISIT (OUTPATIENT)
Dept: PSYCHIATRY | Facility: CLINIC | Age: 43
End: 2023-08-07
Payer: COMMERCIAL

## 2023-08-07 DIAGNOSIS — F31.70 BIPOLAR DISORDER IN PARTIAL REMISSION, MOST RECENT EPISODE UNSPECIFIED TYPE: Primary | ICD-10-CM

## 2023-08-07 DIAGNOSIS — F41.1 GENERALIZED ANXIETY DISORDER: ICD-10-CM

## 2023-08-07 PROCEDURE — 90834 PSYTX W PT 45 MINUTES: CPT | Mod: 95,,, | Performed by: SOCIAL WORKER

## 2023-08-07 PROCEDURE — 90834 PR PSYCHOTHERAPY W/PATIENT, 45 MIN: ICD-10-PCS | Mod: 95,,, | Performed by: SOCIAL WORKER

## 2023-08-07 NOTE — PROGRESS NOTES
Individual Psychotherapy (PhD/LCSW)/Temination    8/7/2023    The patient location is: home in living room ([facility])  The chief complaint leading to consultation is: anxiety, depression, mood    Visit type: audio visutal    Face to Face time with patient: 40  65 minutes of total time spent on the encounter, which includes face to face time and non-face to face time preparing to see the patient (eg, review of tests), Obtaining and/or reviewing separately obtained history, Documenting clinical information in the electronic or other health record, Independently interpreting results (not separately reported) and communicating results to the patient/family/caregiver, or Care coordination (not separately reported).       Each patient to whom he or she provides medical services by telemedicine is:  (1) informed of the relationship between the physician and patient and the respective role of any other health care provider with respect to management of the patient; and (2) notified that he or she may decline to receive medical services by telemedicine and may withdraw from such care at any time.      Therapeutic Intervention: Met with patient.       Outpatient - Insight oriented psychotherapy 45 min - CPT code 10725, Outpatient - Supportive psychotherapy 45 min - CPT Code 14534 and Outpatient - Interactive psychotherapy 45 min - CPT code 51122      Chief complaint/reason for encounter: depression, anxiety, Mood     Interval history and content of current session: Pt is a 41 yo  female who presents today for f/u via telemedicine.  Pt currently established with  Abel Miles N.P. Pt has a dx of Bipolar and often shows OCPD and borderline personality tendencies. Pt was started on Abilify and since then has had medication increased. Pt is also prescribed Prozac. Since increase pt reports improved mood, sleep, and quality of life overall.      Pt presents via telemed. Last visist twas 1 month ago. Reviewed  ongoing stressors of the week. Pt has been anxious about traveling to [place] to see her new granddaughter. Pt also recently got a kitten which she feels has improved her mood greatly.  Engaged in active discussion and constructive processing. Assisted with dentification of thought processes and continued to review methods to promote self worth. Reviewed ways to help reduce and manage anxiety/stress, and depression through a mixture of CBT/DBT.  Continued to offer platform for support, validation, and re-framing.  Pt fully engaged.     Termination: Provider informed pt of upcoming departure in August from [facility]. Provide both internal and external resources for continuation of care options. Discussed that provider will be available to be contacted via [employer] portal until August 11th. Pt verbalized understanding    Treatment plan:  Target symptoms: depression, anxiety , mood  Why chosen therapy is appropriate versus another modality: relevant to diagnosis, patient responds to this modality, evidence based practice  Outcome monitoring methods: self-report, observation  Therapeutic intervention type: insight oriented psychotherapy, supportive psychotherapy, interactive psychotherapy    Risk parameters:  Patient reports no suicidal ideation  Patient reports no homicidal ideation  Patient reports no self-injurious behavior  Patient reports no violent behavior    Verbal deficits: None    Patient's response to intervention:  The patient's response to intervention is accepting.    Progress toward goals and other mental status changes:    The patient's progress toward goals is good.       Diagnosis:     ICD-10-CM ICD-9-CM   1. Bipolar disorder in partial remission, most recent episode unspecified type  F31.70 296.80   2. Generalized anxiety disorder  F41.1 300.02         Plan:  individual psychotherapy, conitnued med management through psychiatrist.     Return to clinic: as scheduled     Length of Service  (minutes): 45

## 2023-08-09 NOTE — PROGRESS NOTES
Outpatient Psychiatry Follow-Up Visit (MD/NP)    8/9/2023 11:39 AM  oRopa Rivas  1980  [id number]    Clinical Status of Patient:  Outpatient (Ambulatory)    Chief Complaint:  Roopa Rivas, a 43 y.o. female,who presents today for follow up of mood swings and anxiety.  Met with patient.        Interval History/Subjective Report/Content of Current Session:     The pt was last seen by RENA Sfoia NP in this department on 5/11/2023. The pt is new to me. The plan at that time:      Medication Management: -        Continue Abilify 10 mg by mouth once daily        Continue Prozac 40 mg by mouth once daily        Continue propranolol 20 mg by mouth twice daily as needed for anxiety related to driving        Continue Ambien CR 12.5 mg by mouth once nightly          Today the pt reports she is doing very well. She denies depressed mood, amotivation, anhedonia, and hopelessness. Anxiety is manageable. Has not needed to take much propranolol. Mainly only drives in her area in [place]. Denies any sxs of rufus or hypomania.  They are getting ready to go on vacation, and while she is looking forward to it, she has been a bit stressed about it. She will be in the car for 12 hours and she has to figure out how she will transport her new kitten. Bought a 2 month old kitten that she loves. States having a pet has really been helping her mental health. In addition, she is going to be a new grandmother.  Has been working out regularly and this makes her feel good.    Her therapist is leaving the practice, but she is planning to follow her to her new private practice.    ASEs from psych meds: denies    Pt denies recurrent thoughts of death and denies SI/HI. Denies AVH, paranoia and delusions. No objective s/sx of psychosis or rufus.     Patient verbalized motivation for compliance with medications and all other elements of treatment plan.         Psychotherapy:  Target symptoms: anxiety , mood swings  Why chosen therapy is  appropriate versus another modality: relevant to diagnosis, patient responds to this modality  Outcome monitoring methods: self-report, observation  Therapeutic intervention type: supportive psychotherapy  Topics discussed/themes: building skills sets for symptom management  The patient's response to the intervention is accepting. The patient's progress toward treatment goals is good.   Duration of intervention: 11 minutes.      Psychotropic medication review  Previous Trials-  Seroquel - severe dry eyes, hurt to blink, no peripheral vision, memory loss  Lexapro - early 2000s, stopped taking due to insurance  Cymbalta      Current meds-  Prozac  Abilify  Propranolol  Ambien CR    History:       Past Medical, Psychiatric, Family and Social History: The patient's past medical, psychiatric, family and social history, allergies, current medications, past surgical history, and problem list have been reviewed and updated as appropriate within the electronic medical record.         Additional historical information includes:   Past psych hospitalizations: denies  Past suicide attempts: denies    Seizure: denies  Head trauma/TBI: denies  Access to a firearm: yes -  Pt was instructed to keep the firearm in a safe, locked container and to store the bullets separately.      Review of Systems       Review of Systems   Constitutional:  Negative for chills, fever and malaise/fatigue.   Respiratory:  Negative for cough and shortness of breath.    Cardiovascular:  Negative for chest pain and palpitations.   Gastrointestinal:  Negative for abdominal pain, diarrhea and vomiting.   Genitourinary:  Negative for dysuria and hematuria.   Musculoskeletal:  Negative for falls and myalgias.   Skin:  Negative for rash.   Neurological:  Negative for tremors, seizures and headaches.   Psychiatric/Behavioral:          See HPI         Compliance: yes        Risk Parameters:  Patient reports no suicidal ideation  Patient reports no homicidal  "ideation  Patient reports no self-injurious behavior  Patient reports no violent behavior    Exam (detailed: at least 9 elements; comprehensive: all 15 elements)     Constitutional  Vitals:  Most recent vital signs, dated less than 90 days prior to this appointment, were reviewed.   Vitals:    08/10/23 0802   BP: 116/72   Pulse: 80   Weight: 63.1 kg (139 lb 1.8 oz)   Height: 4' 11" (1.499 m)              Musculoskeletal  Muscle Strength/Tone:  no dyskinesia, no dystonia, no tremor, no tic   Gait & Station:  non-ataxic     Psychiatric      Appearance:  unremarkable, age appropriate, well nourished, casually dressed, neatly groomed, overweight   Behavior:  normal, friendly and cooperative, eye contact normal     Speech:  no latency; no press   Mood & Affect:  euthymic  congruent and appropriate   Thought Process:  normal and logical   Associations:  intact   Thought Content:  normal, no suicidality, no homicidality, delusions, or paranoia   Insight:  intact, has awareness of illness   Judgement: behavior is adequate to circumstances, age appropriate   Orientation:  grossly intact   Memory: intact for content of interview   Language: grossly intact   Attention Span & Concentration:  able to focus   Fund of Knowledge:  intact and appropriate to age and level of education       Medications:  Outpatient Encounter Medications as of 8/10/2023   Medication Sig Dispense Refill    acetylcysteine (N-ACETYL-L-CYSTEINE MISC) by Misc.(Non-Drug; Combo Route) route.      ARIPiprazole (ABILIFY) 10 MG Tab Take 1 tablet (10 mg total) by mouth once daily. 90 tablet 3    calcium carbonate 400 mg calcium (1,000 mg) Chew Take by mouth.      cetirizine (ZYRTEC) 10 MG tablet Take 1 tablet (10 mg total) by mouth once daily. 30 tablet 1    clotrimazole-betamethasone 1-0.05% (LOTRISONE) cream Apply topically 2 (two) times daily. 15 g 0    drospirenone-ethinyl estradioL (SARAH) 3-0.02 mg per tablet Take 1 tablet by mouth once daily. 90 tablet 3    " FLUoxetine 40 MG capsule Take 1 capsule (40 mg total) by mouth once daily. 90 capsule 1    fluticasone propionate (FLONASE) 50 mcg/actuation nasal spray 1 spray (50 mcg total) by Each Nostril route once daily. 9.9 mL 1    ibuprofen (ADVIL,MOTRIN) 400 MG tablet Take 400 mg by mouth every 6 (six) hours as needed for Other.      iron, carbonyl 25 mg iron Tab Take by mouth. Iron supplement      Lactobacillus acidophilus (PROBIOTIC ORAL) Take by mouth.      levothyroxine (SYNTHROID) 50 MCG tablet Take 1 tab Monday-Saturday and 2 tabs on Sunday 102 tablet 3    magnesium 30 mg Tab Take by mouth once.      multivitamin/iron/folic acid (MULTI COMPLETE WITH IRON ORAL) Take by mouth.      propranoloL (INDERAL) 20 MG tablet Take 1 tablet (20 mg total) by mouth 2 (two) times daily as needed (anxiety). 60 tablet 2    spironolactone (ALDACTONE) 50 MG tablet TAKE 1 TABLET BY MOUTH DAILY 90 tablet 3    zolpidem (AMBIEN CR) 12.5 MG CR tablet Take 1 tablet (12.5 mg total) by mouth nightly as needed for Insomnia. 30 tablet 2     No facility-administered encounter medications on file as of 8/10/2023.       Allergy:  Review of patient's allergies indicates:  No Known Allergies      Assessment and Diagnosis   Status/Progress: Based on the examination today, the patient's problem(s) is/are well controlled.  New problems have not been presented today.   Co-morbidities are not complicating management of the primary condition.            General Impression:         ICD-10-CM ICD-9-CM   1. Bipolar disorder in partial remission, most recent episode unspecified type  F31.70 296.80   2. Generalized anxiety disorder  F41.1 300.02   3. Insomnia, unspecified type  G47.00 780.52         R/O  OCPD  Borderline personality d/o  OCD  ADHD      Intervention/Counseling/Treatment Plan     Medication Management:  Continue Abilify 10 mg by mouth once daily  Continue Prozac 40 mg by mouth once daily  Continue propranolol 20 mg by mouth twice daily as needed  for anxiety related to driving  Continue Ambien CR 12.5 mg by mouth once nightly  AIMS today: 0  Next AIMS due 2/2024  Continue with individual therapy   Labs: reviewed most recent  The treatment plan and follow up plan were reviewed with the patient.  Discussed with patient informed consent, risks vs. benefits, alternative treatments, side effect profile and the inherent unpredictability of individual responses to these treatments. The patient expresses understanding of the above and displays the capacity to agree with this current plan and had no other questions.  Encouraged Patient to keep future appointments.   Take medications as prescribed and abstain from substance abuse.   Pt was told to present to ED or call 911 for SI/HI plan or intent, psychosis, or other psychiatric or medical emergency, and pt agrees to this and verbalized understanding.      Return to Clinic: 3 months, or sooner if needed        Face to Face time with patient: 29 minutes  Total time: 60 minutes of total time spent on the encounter, which includes face to face time and non-face to face time preparing to see the patient (eg, review of tests), Obtaining and/or reviewing separately obtained history, Documenting clinical information in the electronic or other health record, Independently interpreting results (not separately reported) and communicating results to the patient/family/caregiver, or Care coordination (not separately reported).       Linda Stephens, MSN, APRN, PMHNP-BC [facility]

## 2023-08-10 ENCOUNTER — OFFICE VISIT (OUTPATIENT)
Dept: PSYCHIATRY | Facility: CLINIC | Age: 43
End: 2023-08-10
Payer: COMMERCIAL

## 2023-08-10 VITALS
BODY MASS INDEX: 28.05 KG/M2 | SYSTOLIC BLOOD PRESSURE: 116 MMHG | HEIGHT: 59 IN | DIASTOLIC BLOOD PRESSURE: 72 MMHG | HEART RATE: 80 BPM | WEIGHT: 139.13 LBS

## 2023-08-10 DIAGNOSIS — G47.00 INSOMNIA, UNSPECIFIED TYPE: ICD-10-CM

## 2023-08-10 DIAGNOSIS — F41.1 GENERALIZED ANXIETY DISORDER: ICD-10-CM

## 2023-08-10 DIAGNOSIS — F31.70 BIPOLAR DISORDER IN PARTIAL REMISSION, MOST RECENT EPISODE UNSPECIFIED TYPE: Primary | ICD-10-CM

## 2023-08-10 PROCEDURE — 3078F DIAST BP <80 MM HG: CPT | Mod: CPTII,S$GLB,, | Performed by: NURSE PRACTITIONER

## 2023-08-10 PROCEDURE — 99999 PR PBB SHADOW E&M-EST. PATIENT-LVL III: ICD-10-PCS | Mod: PBBFAC,,, | Performed by: NURSE PRACTITIONER

## 2023-08-10 PROCEDURE — 3008F PR BODY MASS INDEX (BMI) DOCUMENTED: ICD-10-PCS | Mod: CPTII,S$GLB,, | Performed by: NURSE PRACTITIONER

## 2023-08-10 PROCEDURE — 3008F BODY MASS INDEX DOCD: CPT | Mod: CPTII,S$GLB,, | Performed by: NURSE PRACTITIONER

## 2023-08-10 PROCEDURE — 99214 OFFICE O/P EST MOD 30 MIN: CPT | Mod: S$GLB,,, | Performed by: NURSE PRACTITIONER

## 2023-08-10 PROCEDURE — 3078F PR MOST RECENT DIASTOLIC BLOOD PRESSURE < 80 MM HG: ICD-10-PCS | Mod: CPTII,S$GLB,, | Performed by: NURSE PRACTITIONER

## 2023-08-10 PROCEDURE — 3074F SYST BP LT 130 MM HG: CPT | Mod: CPTII,S$GLB,, | Performed by: NURSE PRACTITIONER

## 2023-08-10 PROCEDURE — 3074F PR MOST RECENT SYSTOLIC BLOOD PRESSURE < 130 MM HG: ICD-10-PCS | Mod: CPTII,S$GLB,, | Performed by: NURSE PRACTITIONER

## 2023-08-10 PROCEDURE — 99999 PR PBB SHADOW E&M-EST. PATIENT-LVL III: CPT | Mod: PBBFAC,,, | Performed by: NURSE PRACTITIONER

## 2023-08-10 PROCEDURE — 1160F PR REVIEW ALL MEDS BY PRESCRIBER/CLIN PHARMACIST DOCUMENTED: ICD-10-PCS | Mod: CPTII,S$GLB,, | Performed by: NURSE PRACTITIONER

## 2023-08-10 PROCEDURE — 99214 PR OFFICE/OUTPT VISIT, EST, LEVL IV, 30-39 MIN: ICD-10-PCS | Mod: S$GLB,,, | Performed by: NURSE PRACTITIONER

## 2023-08-10 PROCEDURE — 1159F MED LIST DOCD IN RCRD: CPT | Mod: CPTII,S$GLB,, | Performed by: NURSE PRACTITIONER

## 2023-08-10 PROCEDURE — 1160F RVW MEDS BY RX/DR IN RCRD: CPT | Mod: CPTII,S$GLB,, | Performed by: NURSE PRACTITIONER

## 2023-08-10 PROCEDURE — 1159F PR MEDICATION LIST DOCUMENTED IN MEDICAL RECORD: ICD-10-PCS | Mod: CPTII,S$GLB,, | Performed by: NURSE PRACTITIONER

## 2023-08-10 RX ORDER — PROPRANOLOL HYDROCHLORIDE 20 MG/1
20 TABLET ORAL 2 TIMES DAILY PRN
Qty: 60 TABLET | Refills: 2 | Status: SHIPPED | OUTPATIENT
Start: 2023-08-10 | End: 2023-11-06 | Stop reason: SDUPTHER

## 2023-08-10 RX ORDER — FLUOXETINE HYDROCHLORIDE 40 MG/1
40 CAPSULE ORAL DAILY
Qty: 90 CAPSULE | Refills: 0 | Status: SHIPPED | OUTPATIENT
Start: 2023-08-10 | End: 2023-11-06 | Stop reason: SDUPTHER

## 2023-08-10 RX ORDER — ARIPIPRAZOLE 10 MG/1
10 TABLET ORAL DAILY
Qty: 90 TABLET | Refills: 0 | Status: SHIPPED | OUTPATIENT
Start: 2023-08-10 | End: 2023-11-06 | Stop reason: SDUPTHER

## 2023-08-10 RX ORDER — ZOLPIDEM TARTRATE 12.5 MG/1
12.5 TABLET, FILM COATED, EXTENDED RELEASE ORAL NIGHTLY PRN
Qty: 30 TABLET | Refills: 3 | Status: SHIPPED | OUTPATIENT
Start: 2023-08-10 | End: 2023-11-06 | Stop reason: SDUPTHER

## 2023-08-14 ENCOUNTER — PATIENT OUTREACH (OUTPATIENT)
Dept: ADMINISTRATIVE | Facility: HOSPITAL | Age: 43
End: 2023-08-14
Payer: COMMERCIAL

## 2023-08-14 ENCOUNTER — PATIENT MESSAGE (OUTPATIENT)
Dept: ADMINISTRATIVE | Facility: HOSPITAL | Age: 43
End: 2023-08-14
Payer: COMMERCIAL

## 2023-08-14 DIAGNOSIS — Z12.31 BREAST CANCER SCREENING BY MAMMOGRAM: Primary | ICD-10-CM

## 2023-09-06 ENCOUNTER — OFFICE VISIT (OUTPATIENT)
Dept: FAMILY MEDICINE | Facility: CLINIC | Age: 43
End: 2023-09-06
Payer: COMMERCIAL

## 2023-09-06 ENCOUNTER — LAB VISIT (OUTPATIENT)
Dept: LAB | Facility: HOSPITAL | Age: 43
End: 2023-09-06
Payer: COMMERCIAL

## 2023-09-06 VITALS
TEMPERATURE: 98 F | SYSTOLIC BLOOD PRESSURE: 108 MMHG | RESPIRATION RATE: 18 BRPM | HEIGHT: 59 IN | OXYGEN SATURATION: 99 % | HEART RATE: 89 BPM | DIASTOLIC BLOOD PRESSURE: 66 MMHG | WEIGHT: 135.56 LBS | BODY MASS INDEX: 27.33 KG/M2

## 2023-09-06 DIAGNOSIS — D64.9 ANEMIA, UNSPECIFIED TYPE: ICD-10-CM

## 2023-09-06 DIAGNOSIS — Z00.00 WELLNESS EXAMINATION: Primary | ICD-10-CM

## 2023-09-06 DIAGNOSIS — F41.9 ANXIETY: ICD-10-CM

## 2023-09-06 DIAGNOSIS — F31.32 BIPOLAR AFFECTIVE DISORDER, CURRENTLY DEPRESSED, MODERATE: ICD-10-CM

## 2023-09-06 DIAGNOSIS — R19.7 DIARRHEA, UNSPECIFIED TYPE: ICD-10-CM

## 2023-09-06 DIAGNOSIS — Z00.00 WELLNESS EXAMINATION: ICD-10-CM

## 2023-09-06 DIAGNOSIS — R10.13 EPIGASTRIC ABDOMINAL PAIN: ICD-10-CM

## 2023-09-06 LAB
ALBUMIN SERPL BCP-MCNC: 3.7 G/DL (ref 3.5–5.2)
ALP SERPL-CCNC: 94 U/L (ref 55–135)
ALT SERPL W/O P-5'-P-CCNC: 19 U/L (ref 10–44)
ANION GAP SERPL CALC-SCNC: 10 MMOL/L (ref 8–16)
AST SERPL-CCNC: 12 U/L (ref 10–40)
BASOPHILS # BLD AUTO: 0.06 K/UL (ref 0–0.2)
BASOPHILS NFR BLD: 0.8 % (ref 0–1.9)
BILIRUB SERPL-MCNC: 0.2 MG/DL (ref 0.1–1)
BUN SERPL-MCNC: 12 MG/DL (ref 6–20)
CALCIUM SERPL-MCNC: 9.1 MG/DL (ref 8.7–10.5)
CHLORIDE SERPL-SCNC: 107 MMOL/L (ref 95–110)
CHOLEST SERPL-MCNC: 193 MG/DL (ref 120–199)
CHOLEST/HDLC SERPL: 3.9 {RATIO} (ref 2–5)
CO2 SERPL-SCNC: 20 MMOL/L (ref 23–29)
CREAT SERPL-MCNC: 0.9 MG/DL (ref 0.5–1.4)
DIFFERENTIAL METHOD: ABNORMAL
EOSINOPHIL # BLD AUTO: 0.1 K/UL (ref 0–0.5)
EOSINOPHIL NFR BLD: 1.7 % (ref 0–8)
ERYTHROCYTE [DISTWIDTH] IN BLOOD BY AUTOMATED COUNT: 13 % (ref 11.5–14.5)
EST. GFR  (NO RACE VARIABLE): >60 ML/MIN/1.73 M^2
ESTIMATED AVG GLUCOSE: 103 MG/DL (ref 68–131)
GLUCOSE SERPL-MCNC: 90 MG/DL (ref 70–110)
HBA1C MFR BLD: 5.2 % (ref 4–5.6)
HCT VFR BLD AUTO: 36.9 % (ref 37–48.5)
HDLC SERPL-MCNC: 50 MG/DL (ref 40–75)
HDLC SERPL: 25.9 % (ref 20–50)
HGB BLD-MCNC: 11.9 G/DL (ref 12–16)
IMM GRANULOCYTES # BLD AUTO: 0.01 K/UL (ref 0–0.04)
IMM GRANULOCYTES NFR BLD AUTO: 0.1 % (ref 0–0.5)
IRON SERPL-MCNC: 116 UG/DL (ref 30–160)
LDLC SERPL CALC-MCNC: 115.4 MG/DL (ref 63–159)
LYMPHOCYTES # BLD AUTO: 2.9 K/UL (ref 1–4.8)
LYMPHOCYTES NFR BLD: 37.5 % (ref 18–48)
MCH RBC QN AUTO: 30.4 PG (ref 27–31)
MCHC RBC AUTO-ENTMCNC: 32.2 G/DL (ref 32–36)
MCV RBC AUTO: 94 FL (ref 82–98)
MONOCYTES # BLD AUTO: 0.4 K/UL (ref 0.3–1)
MONOCYTES NFR BLD: 4.9 % (ref 4–15)
NEUTROPHILS # BLD AUTO: 4.3 K/UL (ref 1.8–7.7)
NEUTROPHILS NFR BLD: 55 % (ref 38–73)
NONHDLC SERPL-MCNC: 143 MG/DL
NRBC BLD-RTO: 0 /100 WBC
PLATELET # BLD AUTO: 389 K/UL (ref 150–450)
PMV BLD AUTO: 10.3 FL (ref 9.2–12.9)
POTASSIUM SERPL-SCNC: 4.8 MMOL/L (ref 3.5–5.1)
PROT SERPL-MCNC: 7.4 G/DL (ref 6–8.4)
RBC # BLD AUTO: 3.91 M/UL (ref 4–5.4)
SATURATED IRON: 21 % (ref 20–50)
SODIUM SERPL-SCNC: 137 MMOL/L (ref 136–145)
TOTAL IRON BINDING CAPACITY: 545 UG/DL (ref 250–450)
TRANSFERRIN SERPL-MCNC: 368 MG/DL (ref 200–375)
TRIGL SERPL-MCNC: 138 MG/DL (ref 30–150)
WBC # BLD AUTO: 7.82 K/UL (ref 3.9–12.7)

## 2023-09-06 PROCEDURE — 36415 COLL VENOUS BLD VENIPUNCTURE: CPT | Mod: PO

## 2023-09-06 PROCEDURE — 3078F DIAST BP <80 MM HG: CPT | Mod: CPTII,S$GLB,,

## 2023-09-06 PROCEDURE — 99999 PR PBB SHADOW E&M-EST. PATIENT-LVL V: CPT | Mod: PBBFAC,,,

## 2023-09-06 PROCEDURE — 1159F PR MEDICATION LIST DOCUMENTED IN MEDICAL RECORD: ICD-10-PCS | Mod: CPTII,S$GLB,,

## 2023-09-06 PROCEDURE — 3008F BODY MASS INDEX DOCD: CPT | Mod: CPTII,S$GLB,,

## 2023-09-06 PROCEDURE — 84466 ASSAY OF TRANSFERRIN: CPT

## 2023-09-06 PROCEDURE — 99999 PR PBB SHADOW E&M-EST. PATIENT-LVL V: ICD-10-PCS | Mod: PBBFAC,,,

## 2023-09-06 PROCEDURE — 85025 COMPLETE CBC W/AUTO DIFF WBC: CPT

## 2023-09-06 PROCEDURE — 1160F PR REVIEW ALL MEDS BY PRESCRIBER/CLIN PHARMACIST DOCUMENTED: ICD-10-PCS | Mod: CPTII,S$GLB,,

## 2023-09-06 PROCEDURE — 3008F PR BODY MASS INDEX (BMI) DOCUMENTED: ICD-10-PCS | Mod: CPTII,S$GLB,,

## 2023-09-06 PROCEDURE — 99396 PREV VISIT EST AGE 40-64: CPT | Mod: S$GLB,,,

## 2023-09-06 PROCEDURE — 83540 ASSAY OF IRON: CPT

## 2023-09-06 PROCEDURE — 3074F PR MOST RECENT SYSTOLIC BLOOD PRESSURE < 130 MM HG: ICD-10-PCS | Mod: CPTII,S$GLB,,

## 2023-09-06 PROCEDURE — 80053 COMPREHEN METABOLIC PANEL: CPT

## 2023-09-06 PROCEDURE — 99396 PR PREVENTIVE VISIT,EST,40-64: ICD-10-PCS | Mod: S$GLB,,,

## 2023-09-06 PROCEDURE — 1160F RVW MEDS BY RX/DR IN RCRD: CPT | Mod: CPTII,S$GLB,,

## 2023-09-06 PROCEDURE — 82728 ASSAY OF FERRITIN: CPT

## 2023-09-06 PROCEDURE — 83036 HEMOGLOBIN GLYCOSYLATED A1C: CPT

## 2023-09-06 PROCEDURE — 3078F PR MOST RECENT DIASTOLIC BLOOD PRESSURE < 80 MM HG: ICD-10-PCS | Mod: CPTII,S$GLB,,

## 2023-09-06 PROCEDURE — 80061 LIPID PANEL: CPT

## 2023-09-06 PROCEDURE — 3074F SYST BP LT 130 MM HG: CPT | Mod: CPTII,S$GLB,,

## 2023-09-06 PROCEDURE — 1159F MED LIST DOCD IN RCRD: CPT | Mod: CPTII,S$GLB,,

## 2023-09-06 RX ORDER — DICYCLOMINE HYDROCHLORIDE 10 MG/1
10 CAPSULE ORAL 4 TIMES DAILY PRN
Qty: 120 CAPSULE | Refills: 1 | Status: SHIPPED | OUTPATIENT
Start: 2023-09-06 | End: 2023-11-05

## 2023-09-06 NOTE — PATIENT INSTRUCTIONS
Calming Blends Diverticulitis Marlee Blas,     If you are due for any health screening(s) below please notify me so we can arrange them to be ordered and scheduled to maintain your health. Most healthy patients complete it. Don't lose out on improving your health.     All of your core healthy metrics are met.

## 2023-09-06 NOTE — PROGRESS NOTES
Subjective:       Patient ID: Roopa Rivas is a 43 y.o. female.    Chief Complaint: Annual Exam    Presents to the clinic for annual wellness exam and to establish care.     She does complain of GI symptoms. Has been ongoing since she started a job in [place] some time ago that had high levels of stress. She feels like her symptoms are stress related. Occurring every other day. She reports epigastric discomfort with a knot/spasm feeling along with bloating. She has taken omeprazole and felt some improvement of symptoms with this. Denies heartburn symptoms. She has also tried simethicone and and IBS drug which made her pass out previously neither of these provided relief. She is experiencing frequent diarrhea.     Recent thyroid labs normal.    Psych conditions managed by psychiatry. Overall doing well with this.     Her mammogram is scheduled.         Past Medical History:   Diagnosis Date    Arthritis     Fibromyalgia     Gastroenteritis     Long-term use of Plaquenil 12/07/2017       Review of patient's allergies indicates:  No Known Allergies      Current Outpatient Medications:     acetylcysteine (N-ACETYL-L-CYSTEINE MISC), by Misc.(Non-Drug; Combo Route) route., Disp: , Rfl:     ARIPiprazole (ABILIFY) 10 MG Tab, Take 1 tablet (10 mg total) by mouth once daily., Disp: 90 tablet, Rfl: 0    calcium carbonate 400 mg calcium (1,000 mg) Chew, Take by mouth., Disp: , Rfl:     clotrimazole-betamethasone 1-0.05% (LOTRISONE) cream, Apply topically 2 (two) times daily., Disp: 15 g, Rfl: 0    drospirenone-ethinyl estradioL (SARAH) 3-0.02 mg per tablet, Take 1 tablet by mouth once daily., Disp: 90 tablet, Rfl: 3    FLUoxetine 40 MG capsule, Take 1 capsule (40 mg total) by mouth once daily., Disp: 90 capsule, Rfl: 0    fluticasone propionate (FLONASE) 50 mcg/actuation nasal spray, 1 spray (50 mcg total) by Each Nostril route once daily., Disp: 9.9 mL, Rfl: 1    ibuprofen (ADVIL,MOTRIN) 400 MG tablet, Take 400 mg by mouth every  6 (six) hours as needed for Other., Disp: , Rfl:     Lactobacillus acidophilus (PROBIOTIC ORAL), Take by mouth., Disp: , Rfl:     levothyroxine (SYNTHROID) 50 MCG tablet, Take 1 tab Monday-Saturday and 2 tabs on Sunday, Disp: 102 tablet, Rfl: 3    magnesium 30 mg Tab, Take by mouth once., Disp: , Rfl:     multivitamin/iron/folic acid (MULTI COMPLETE WITH IRON ORAL), Take by mouth., Disp: , Rfl:     propranoloL (INDERAL) 20 MG tablet, Take 1 tablet (20 mg total) by mouth 2 (two) times daily as needed (anxiety)., Disp: 60 tablet, Rfl: 2    spironolactone (ALDACTONE) 50 MG tablet, TAKE 1 TABLET BY MOUTH DAILY, Disp: 90 tablet, Rfl: 3    zolpidem (AMBIEN CR) 12.5 MG CR tablet, Take 1 tablet (12.5 mg total) by mouth nightly as needed for Insomnia., Disp: 30 tablet, Rfl: 3    cetirizine (ZYRTEC) 10 MG tablet, Take 1 tablet (10 mg total) by mouth once daily., Disp: 30 tablet, Rfl: 1    dicyclomine (BENTYL) 10 MG capsule, Take 1 capsule (10 mg total) by mouth 4 (four) times daily as needed (stomach spasms)., Disp: 120 capsule, Rfl: 1    iron, carbonyl 25 mg iron Tab, Take by mouth. Iron supplement, Disp: , Rfl:     Review of Systems   Constitutional:  Negative for activity change, appetite change, chills, diaphoresis, fatigue, fever and unexpected weight change.   HENT:  Negative for congestion, ear pain, postnasal drip, rhinorrhea, sinus pressure, sneezing, sore throat and trouble swallowing.    Eyes:  Negative for pain, itching and visual disturbance.   Respiratory:  Negative for cough, chest tightness, shortness of breath and wheezing.    Cardiovascular:  Negative for chest pain, palpitations and leg swelling.   Gastrointestinal:  Positive for abdominal distention, abdominal pain and diarrhea. Negative for blood in stool, constipation, nausea and vomiting.   Endocrine: Negative for cold intolerance and heat intolerance.   Genitourinary:  Negative for difficulty urinating, dysuria, frequency, hematuria and urgency.  "  Musculoskeletal:  Negative for arthralgias, back pain, myalgias and neck pain.   Skin:  Negative for color change, pallor, rash and wound.   Neurological:  Negative for dizziness, syncope, weakness, numbness and headaches.   Hematological:  Negative for adenopathy.   Psychiatric/Behavioral:  Negative for behavioral problems. The patient is nervous/anxious.        Objective:      /66 (BP Location: Right arm, Patient Position: Sitting, BP Method: Medium (Manual))   Pulse 89   Temp 98.2 °F (36.8 °C) (Oral)   Resp 18   Ht 4' 11" (1.499 m)   Wt 61.5 kg (135 lb 9.3 oz)   LMP 08/01/2023   SpO2 99%   BMI 27.38 kg/m²   Physical Exam  Vitals reviewed.   Constitutional:       General: She is not in acute distress.     Appearance: Normal appearance. She is normal weight. She is not ill-appearing, toxic-appearing or diaphoretic.   HENT:      Head: Normocephalic.      Right Ear: External ear normal.      Left Ear: External ear normal.      Nose: Nose normal. No congestion or rhinorrhea.      Mouth/Throat:      Mouth: Mucous membranes are moist.      Pharynx: Oropharynx is clear.   Eyes:      General: No scleral icterus.        Right eye: No discharge.         Left eye: No discharge.      Extraocular Movements: Extraocular movements intact.      Conjunctiva/sclera: Conjunctivae normal.   Cardiovascular:      Rate and Rhythm: Normal rate and regular rhythm.      Pulses: Normal pulses.      Heart sounds: Normal heart sounds. No murmur heard.     No friction rub. No gallop.   Pulmonary:      Effort: Pulmonary effort is normal. No respiratory distress.      Breath sounds: Normal breath sounds. No wheezing, rhonchi or rales.   Chest:      Chest wall: No tenderness.   Abdominal:      General: There is no distension.      Tenderness: There is no abdominal tenderness. There is no guarding.   Musculoskeletal:         General: No swelling, tenderness or deformity. Normal range of motion.      Cervical back: Normal range of " motion.      Right lower leg: No edema.      Left lower leg: No edema.   Skin:     General: Skin is warm and dry.      Capillary Refill: Capillary refill takes less than 2 seconds.      Coloration: Skin is not jaundiced.      Findings: No bruising, erythema, lesion or rash.   Neurological:      Mental Status: She is alert and oriented to person, place, and time.      Gait: Gait normal.   Psychiatric:         Mood and Affect: Mood normal.         Behavior: Behavior normal.         Thought Content: Thought content normal.         Judgment: Judgment normal.         Assessment:       1. Wellness examination    2. Bipolar affective disorder, currently depressed, moderate    3. Anxiety    4. Anemia, unspecified type    5. Epigastric abdominal pain    6. Diarrhea, unspecified type        Plan:       Wellness examination  -     CBC W/ AUTO DIFFERENTIAL; Future; Expected date: 09/06/2023  -     IRON AND TIBC; Future; Expected date: 09/06/2023  -     FERRITIN; Future; Expected date: 09/06/2023  -     Comprehensive Metabolic Panel; Future; Expected date: 09/06/2023  -     Hemoglobin A1C; Future; Expected date: 09/06/2023  -     Lipid Panel; Future; Expected date: 09/06/2023    Bipolar affective disorder, currently depressed, moderate        -    Stable. Continue meds. Followed by psych. Will continue to monitor.     Anxiety        -    As above.    Anemia, unspecified type  -     CBC W/ AUTO DIFFERENTIAL; Future; Expected date: 09/06/2023  -     IRON AND TIBC; Future; Expected date: 09/06/2023  -     FERRITIN; Future; Expected date: 09/06/2023    Epigastric abdominal pain  -     dicyclomine (BENTYL) 10 MG capsule; Take 1 capsule (10 mg total) by mouth 4 (four) times daily as needed (stomach spasms).  Dispense: 120 capsule; Refill: 1  -     Ambulatory referral/consult to Gastroenterology; Future; Expected date: 09/13/2023        -    Diverticulitis Tea.     Diarrhea, unspecified type  -     Ambulatory referral/consult to  Gastroenterology; Future; Expected date: 09/13/2023          6 months MD to establish        Antoni Juan PA-C  Family Medicine Physician Assistant       I spent a total of 20 minutes on the day of the visit.This includes face to face time and non-face to face time preparing to see the patient (eg, review of tests), obtaining and/or reviewing separately obtained history, documenting clinical information in the electronic or other health record, independently interpreting results and communicating results to the patient/family/caregiver, or care coordinator.      We have addressed [4] Moderate: 1 or more chronic illnesses with exacerbation, progression, or side effects of treatment / 2 or more stable chronic illnesses / 1 undiagnosed new problem with uncertain prognosis / 1 acute illness with systemic symptoms / 1 acute complicated injury  The complexity of the data reviewed and analyzed for this visit was [3] Limited (Reviewed prior external note, ordered unique testing or reviewed the results of each unique test)   The risk of complications and/or morbidity or mortality are [4] Moderate risk (I.e. prescription drug management / decision regarding minor surgery with identified pt or procedure risk factors / decision regarding elective major surgery without identified pt or procedure risk factors / diagnosis or treatment significantly limited by social determinants of health)   The level of Medical Decision Making for this visit is [4] Moderate

## 2023-09-07 ENCOUNTER — LAB VISIT (OUTPATIENT)
Dept: LAB | Facility: HOSPITAL | Age: 43
End: 2023-09-07
Payer: COMMERCIAL

## 2023-09-07 ENCOUNTER — OFFICE VISIT (OUTPATIENT)
Dept: GASTROENTEROLOGY | Facility: CLINIC | Age: 43
End: 2023-09-07
Payer: COMMERCIAL

## 2023-09-07 VITALS
SYSTOLIC BLOOD PRESSURE: 100 MMHG | HEART RATE: 79 BPM | DIASTOLIC BLOOD PRESSURE: 64 MMHG | WEIGHT: 135.81 LBS | BODY MASS INDEX: 27.38 KG/M2 | HEIGHT: 59 IN

## 2023-09-07 DIAGNOSIS — R12 HEARTBURN: ICD-10-CM

## 2023-09-07 DIAGNOSIS — Z87.898 HISTORY OF CHEST PAIN: ICD-10-CM

## 2023-09-07 DIAGNOSIS — R14.0 BLOATING: ICD-10-CM

## 2023-09-07 DIAGNOSIS — R10.10 PAIN OF UPPER ABDOMEN: Primary | ICD-10-CM

## 2023-09-07 DIAGNOSIS — R10.13 EPIGASTRIC ABDOMINAL PAIN: ICD-10-CM

## 2023-09-07 DIAGNOSIS — R14.2 BELCHING: ICD-10-CM

## 2023-09-07 DIAGNOSIS — R19.4 CHANGE IN BOWEL HABITS: ICD-10-CM

## 2023-09-07 DIAGNOSIS — R19.7 DIARRHEA, UNSPECIFIED TYPE: ICD-10-CM

## 2023-09-07 DIAGNOSIS — K21.9 GASTROESOPHAGEAL REFLUX DISEASE, UNSPECIFIED WHETHER ESOPHAGITIS PRESENT: ICD-10-CM

## 2023-09-07 DIAGNOSIS — R12 WATERBRASH: ICD-10-CM

## 2023-09-07 DIAGNOSIS — R14.3 FLATUS: ICD-10-CM

## 2023-09-07 LAB
FERRITIN SERPL-MCNC: 131 NG/ML (ref 20–300)
IGA SERPL-MCNC: 177 MG/DL (ref 40–350)
LIPASE SERPL-CCNC: 17 U/L (ref 4–60)

## 2023-09-07 PROCEDURE — 3078F PR MOST RECENT DIASTOLIC BLOOD PRESSURE < 80 MM HG: ICD-10-PCS | Mod: CPTII,S$GLB,,

## 2023-09-07 PROCEDURE — 1159F PR MEDICATION LIST DOCUMENTED IN MEDICAL RECORD: ICD-10-PCS | Mod: CPTII,S$GLB,,

## 2023-09-07 PROCEDURE — 3074F SYST BP LT 130 MM HG: CPT | Mod: CPTII,S$GLB,,

## 2023-09-07 PROCEDURE — 86364 TISS TRNSGLTMNASE EA IG CLAS: CPT

## 2023-09-07 PROCEDURE — 99214 PR OFFICE/OUTPT VISIT, EST, LEVL IV, 30-39 MIN: ICD-10-PCS | Mod: S$GLB,,,

## 2023-09-07 PROCEDURE — 3074F PR MOST RECENT SYSTOLIC BLOOD PRESSURE < 130 MM HG: ICD-10-PCS | Mod: CPTII,S$GLB,,

## 2023-09-07 PROCEDURE — 82784 ASSAY IGA/IGD/IGG/IGM EACH: CPT

## 2023-09-07 PROCEDURE — 1159F MED LIST DOCD IN RCRD: CPT | Mod: CPTII,S$GLB,,

## 2023-09-07 PROCEDURE — 3078F DIAST BP <80 MM HG: CPT | Mod: CPTII,S$GLB,,

## 2023-09-07 PROCEDURE — 3008F PR BODY MASS INDEX (BMI) DOCUMENTED: ICD-10-PCS | Mod: CPTII,S$GLB,,

## 2023-09-07 PROCEDURE — 3044F HG A1C LEVEL LT 7.0%: CPT | Mod: CPTII,S$GLB,,

## 2023-09-07 PROCEDURE — 99214 OFFICE O/P EST MOD 30 MIN: CPT | Mod: S$GLB,,,

## 2023-09-07 PROCEDURE — 3008F BODY MASS INDEX DOCD: CPT | Mod: CPTII,S$GLB,,

## 2023-09-07 PROCEDURE — 99999 PR PBB SHADOW E&M-EST. PATIENT-LVL V: CPT | Mod: PBBFAC,,,

## 2023-09-07 PROCEDURE — 99999 PR PBB SHADOW E&M-EST. PATIENT-LVL V: ICD-10-PCS | Mod: PBBFAC,,,

## 2023-09-07 PROCEDURE — 83690 ASSAY OF LIPASE: CPT

## 2023-09-07 PROCEDURE — 3044F PR MOST RECENT HEMOGLOBIN A1C LEVEL <7.0%: ICD-10-PCS | Mod: CPTII,S$GLB,,

## 2023-09-07 RX ORDER — OMEPRAZOLE 40 MG/1
40 CAPSULE, DELAYED RELEASE ORAL DAILY
Qty: 90 CAPSULE | Refills: 1 | Status: SHIPPED | OUTPATIENT
Start: 2023-09-07 | End: 2024-02-28 | Stop reason: SDUPTHER

## 2023-09-07 NOTE — PROGRESS NOTES
Subjective:       Patient ID: Roopa Rivas is a 43 y.o. female Body mass index is 27.43 kg/m².    Chief Complaint: Abdominal Pain    This patient is new to me.  Referring Provider: Antoni Juan for epigastric abdominal pain/diarrhea .             Gastroesophageal Reflux  She complains of abdominal pain, belching, chest pain (c/o of chest pain that radiates to shoulders, denies current chest pain, states has not seen cardiology in a long time), heartburn and water brash. She reports no dysphagia, no early satiety, no globus sensation, no hoarse voice or no nausea. This is a chronic problem. The problem has been gradually worsening. The heartburn wakes her from sleep. The heartburn does not limit her activity. The symptoms are aggravated by lying down, certain foods and caffeine. Pertinent negatives include no anemia, fatigue, melena or weight loss (working out 5 days a week). Risk factors: denies intake of NSAIDS. She has tried a PPI (started on Omeprazole 20mg OTC daily about 1 week ago) for the symptoms. The treatment provided mild relief. Past procedures do not include an abdominal ultrasound, an EGD, esophageal manometry, esophageal pH monitoring, H. pylori antibody titer or a UGI.   Abdominal Pain  This is a chronic problem. Episode onset: last month abdominal pain worsened. The problem occurs constantly (almost daily,). Duration: occurs more in the evenings, stress worsens pain. Pain location: starts in the chest area and radiates to upper abdomen. Pain scale: denies current pain. The quality of the pain is burning (feels like a spasm last about 15-20 minutes). The abdominal pain radiates to the epigastric region, LUQ and RUQ. Associated symptoms include belching, diarrhea (denies antbx use, recent ill contact, had recent travel to [place],did experience a med change stopped cymbalta a year ago & states after stopping rates stool type 6-7 on bristol scale,while on cymbalta was constipated,  has 1-2 bm per  day) and flatus (feels gassy and bloated with the abdominal pain not attributed to dietary intake). Pertinent negatives include no constipation, fever, hematochezia, hematuria, melena, nausea, vomiting or weight loss (working out 5 days a week). The pain is aggravated by eating. The pain is relieved by Certain positions (omeprazole is helping with abdominal pain, leaning forward helps with pain too). She has tried proton pump inhibitors for the symptoms. The treatment provided moderate relief. Her past medical history is significant for GERD. There is no history of abdominal surgery, colon cancer, Crohn's disease, irritable bowel syndrome, PUD or ulcerative colitis.       Review of Systems   Constitutional:  Negative for fatigue, fever, unexpected weight change and weight loss (working out 5 days a week).   HENT:  Negative for hoarse voice.    Cardiovascular:  Positive for chest pain (c/o of chest pain that radiates to shoulders, denies current chest pain, states has not seen cardiology in a long time).   Gastrointestinal:  Positive for abdominal pain, diarrhea (denies antbx use, recent ill contact, had recent travel to [place],did experience a med change stopped cymbalta a year ago & states after stopping rates stool type 6-7 on bristol scale,while on cymbalta was constipated,  has 1-2 bm per day), flatus (feels gassy and bloated with the abdominal pain not attributed to dietary intake) and heartburn. Negative for abdominal distention, anal bleeding, blood in stool, constipation, dysphagia, hematochezia, melena, nausea, rectal pain and vomiting.   Genitourinary:  Negative for hematuria.         Patient's last menstrual period was 08/01/2023.  Past Medical History:   Diagnosis Date    Arthritis     Fibromyalgia     Gastroenteritis     GERD (gastroesophageal reflux disease)     Long-term use of Plaquenil 12/07/2017     Past Surgical History:   Procedure Laterality Date    COLONOSCOPY      2016    DILATION AND CURETTAGE  OF UTERUS      Miscarriage at age 25     Family History   Problem Relation Age of Onset    Arthritis Mother     No Known Problems Father     Thyroid disease Maternal Aunt     Arthritis Maternal Grandmother     Arthritis Maternal Grandfather     Throat cancer Paternal Grandfather     Melanoma Neg Hx     Colon cancer Neg Hx     Crohn's disease Neg Hx     Esophageal cancer Neg Hx     Liver cancer Neg Hx     Rectal cancer Neg Hx     Stomach cancer Neg Hx     Ulcerative colitis Neg Hx      Social History     Tobacco Use    Smoking status: Never    Smokeless tobacco: Never   Substance Use Topics    Alcohol use: No    Drug use: No     Wt Readings from Last 10 Encounters:   09/07/23 61.6 kg (135 lb 12.9 oz)   09/06/23 61.5 kg (135 lb 9.3 oz)   04/26/23 66 kg (145 lb 8.1 oz)   11/29/22 65.5 kg (144 lb 8.2 oz)   10/24/22 65.2 kg (143 lb 11.8 oz)   10/17/22 63.9 kg (140 lb 14 oz)   03/08/22 62 kg (136 lb 11 oz)   09/15/21 61.6 kg (135 lb 12.9 oz)   05/04/21 60.2 kg (132 lb 11.5 oz)   03/25/21 61.6 kg (135 lb 12.9 oz)     Lab Results   Component Value Date    WBC 7.82 09/06/2023    HGB 11.9 (L) 09/06/2023    HCT 36.9 (L) 09/06/2023    MCV 94 09/06/2023     09/06/2023     CMP  Sodium   Date Value Ref Range Status   09/06/2023 137 136 - 145 mmol/L Final     Potassium   Date Value Ref Range Status   09/06/2023 4.8 3.5 - 5.1 mmol/L Final     Chloride   Date Value Ref Range Status   09/06/2023 107 95 - 110 mmol/L Final     CO2   Date Value Ref Range Status   09/06/2023 20 (L) 23 - 29 mmol/L Final     Glucose   Date Value Ref Range Status   09/06/2023 90 70 - 110 mg/dL Final     BUN   Date Value Ref Range Status   09/06/2023 12 6 - 20 mg/dL Final     Creatinine   Date Value Ref Range Status   09/06/2023 0.9 0.5 - 1.4 mg/dL Final     Calcium   Date Value Ref Range Status   09/06/2023 9.1 8.7 - 10.5 mg/dL Final     Total Protein   Date Value Ref Range Status   09/06/2023 7.4 6.0 - 8.4 g/dL Final     Albumin   Date Value Ref  "Range Status   09/06/2023 3.7 3.5 - 5.2 g/dL Final     Total Bilirubin   Date Value Ref Range Status   09/06/2023 0.2 0.1 - 1.0 mg/dL Final     Comment:     For infants and newborns, interpretation of results should be based  on gestational age, weight and in agreement with clinical  observations.    Premature Infant recommended reference ranges:  Up to 24 hours.............<8.0 mg/dL  Up to 48 hours............<12.0 mg/dL  3-5 days..................<15.0 mg/dL  6-29 days.................<15.0 mg/dL       Alkaline Phosphatase   Date Value Ref Range Status   09/06/2023 94 55 - 135 U/L Final     AST   Date Value Ref Range Status   09/06/2023 12 10 - 40 U/L Final     ALT   Date Value Ref Range Status   09/06/2023 19 10 - 44 U/L Final     Anion Gap   Date Value Ref Range Status   09/06/2023 10 8 - 16 mmol/L Final     eGFR if    Date Value Ref Range Status   04/06/2022 >60.0 >60 mL/min/1.73 m^2 Final     eGFR if non    Date Value Ref Range Status   04/06/2022 >60.0 >60 mL/min/1.73 m^2 Final     Comment:     Calculation used to obtain the estimated glomerular filtration  rate (eGFR) is the CKD-EPI equation.        No results found for: "LIPASE"  No results found for: "LIPASERES"  Lab Results   Component Value Date    TSH 1.566 08/04/2023       Reviewed prior medical records including radiology report of office visit FERNANDO Juan 9/6/23 & endoscopy history (see surgical history/procedures).    Objective:      Physical Exam  Vitals and nursing note reviewed.   Constitutional:       Appearance: Normal appearance. She is normal weight.   Cardiovascular:      Rate and Rhythm: Normal rate and regular rhythm.      Heart sounds: Normal heart sounds.   Pulmonary:      Breath sounds: Normal breath sounds.   Abdominal:      General: Bowel sounds are normal.      Palpations: Abdomen is soft.      Tenderness: There is abdominal tenderness in the epigastric area.   Skin:     General: Skin is warm and dry. "      Coloration: Skin is not jaundiced.   Neurological:      Mental Status: She is alert and oriented to person, place, and time.   Psychiatric:         Mood and Affect: Mood normal.         Behavior: Behavior normal.         Assessment:       1. Pain of upper abdomen    2. Gastroesophageal reflux disease, unspecified whether esophagitis present    3. Belching    4. Heartburn    5. Waterbrash    6. Bloating    7. Flatus    8. Change in bowel habits    9. Diarrhea, unspecified type    10. History of chest pain    11. Epigastric abdominal pain        Plan:       Pain of upper abdomen  -   START  omeprazole (PRILOSEC) 40 MG capsule; Take 1 capsule (40 mg total) by mouth Daily.  Dispense: 90 capsule; Refill: 1  Take PPI 30min-1hr before eating breakfast  -      Abdomen Complete; Future; Expected date: 09/07/2023  -     Case Request Endoscopy: EGD (ESOPHAGOGASTRODUODENOSCOPY)  -     Lipase; Future; Expected date: 09/07/2023  - schedule EGD, discussed procedure with the patient, including risks and benefits, patient verbalized understanding    Gastroesophageal reflux disease, unspecified whether esophagitis present, Belching, Heartburn, Waterbrash  -   START  omeprazole (PRILOSEC) 40 MG capsule; Take 1 capsule (40 mg total) by mouth Daily.  Dispense: 90 capsule; Refill: 1  -     Case Request Endoscopy: EGD (ESOPHAGOGASTRODUODENOSCOPY)  -discussed about the different types of medications used to treat reflux and how to use them, antacids can be used PRN for breakthrough heartburn symptoms by reducing stomach acid that is already produced, H2 blockers work by limiting the amount acid production, & PPI's work to block acid production and are taken daily, patient verbalized understanding.  -Educated patient on lifestyle modifications to help control/reduce reflux/abdominal pain including: avoid large meals, avoid eating within 2-3 hours of bedtime (avoid late night eating & lying down soon after eating), elevate head of bed  if nocturnal symptoms are present, smoking cessation (if current smoker), & weight loss (if overweight).   -Educated to avoid known foods which trigger reflux symptoms & to minimize/avoid high-fat foods, chocolate, caffeine, citrus, alcohol, & tomato products.  -Advised to avoid/limit use of NSAID's, since they can cause GI upset, bleeding, and/or ulcers. If needed, take with food.     Bloating, Flatus  -     TISSUE TRANSGLUTAMINASE (TTG), IGA; Future; Expected date: 09/07/2023  -     IGA; Future; Expected date: 09/07/2023  - testing for H. Pylori typically performed during EGD  - recommend OTC simethicone as directed, such as Phazyme or Gas-x  - recommend low gas diet: Reduce or eliminate these foods from your diet: Broccoli, Cauliflower, Trenary sprouts, Cabbage, Cooked dried beans, Carbonated beverages (sparkling water, soda, beer, champagne)  Other Causes Of Excess Gas Include:   1) EATING TOO FAST or TALKING WHILE YOU CHEW may cause you to swallow air. This increases the amount of gas in the stomach and may worsen your symptoms.  --> Chew each mouthful completely before swallowing. Take your time.  2) OVEREATING may increase the feeling of being bloated and cause more gas.  --> When you are full, stop eating.    Change in bowel habits  -     Case Request Endoscopy: COLONOSCOPY  - schedule Colonoscopy, discussed procedure with the patient, including risks and benefits, patient verbalized understanding    Diarrhea, unspecified type  -     recommended stool tests, patient declined at moment  -     Case Request Endoscopy: COLONOSCOPY  -  recommend OTC probiotic, such as Florastor or Culturelle, taken as directed on packaging  -  avoid lactose, alcohol, & caffeine  -  avoid known triggers    History of chest pain  -     Ambulatory referral/consult to Cardiology; Future; Expected date: 09/14/2023  - follow-up with PCP &/or cardiologist for continued evaluation and management   - if experiencing symptoms of headache,  chest pain, shortness of breath, and/or blurred vision, recommend going to ER for further evaluation and management        Follow up in about 4 weeks (around 10/5/2023), or if symptoms worsen or fail to improve.      If no improvement in symptoms or symptoms worsen, call/follow-up at clinic or go to ER.       [facility] - GASTROENTEROLOGY  [facility], [place]     Dictation software program was used for this note. Please expect some simple typographical  errors in this note.    Encounter includes face to face time and non-face to face time preparing to see the patient (eg, review of tests), obtaining and/or reviewing separately obtained history, documenting clinical information in the electronic or other health record, independently interpreting results (not separately reported) and communicating results to the patient/family/caregiver, or care coordination (not separately reported).

## 2023-09-11 LAB — TTG IGA SER-ACNC: 0.3 U/ML

## 2023-09-20 ENCOUNTER — HOSPITAL ENCOUNTER (OUTPATIENT)
Dept: RADIOLOGY | Facility: HOSPITAL | Age: 43
Discharge: HOME | End: 2023-09-20
Payer: COMMERCIAL

## 2023-09-20 DIAGNOSIS — R10.10 PAIN OF UPPER ABDOMEN: ICD-10-CM

## 2023-09-20 PROCEDURE — 76700 US EXAM ABDOM COMPLETE: CPT | Mod: TC

## 2023-09-20 PROCEDURE — 76700 US EXAM ABDOM COMPLETE: CPT | Mod: 26,,, | Performed by: RADIOLOGY

## 2023-09-20 PROCEDURE — 76700 US ABDOMEN COMPLETE: ICD-10-PCS | Mod: 26,,, | Performed by: RADIOLOGY

## 2023-09-21 DIAGNOSIS — K80.20 CALCULUS OF GALLBLADDER WITHOUT CHOLECYSTITIS WITHOUT OBSTRUCTION: Primary | ICD-10-CM

## 2023-09-25 ENCOUNTER — OFFICE VISIT (OUTPATIENT)
Dept: SURGERY | Facility: CLINIC | Age: 43
End: 2023-09-25
Payer: COMMERCIAL

## 2023-09-25 VITALS
HEART RATE: 88 BPM | DIASTOLIC BLOOD PRESSURE: 71 MMHG | HEIGHT: 59 IN | BODY MASS INDEX: 27.42 KG/M2 | WEIGHT: 136 LBS | SYSTOLIC BLOOD PRESSURE: 110 MMHG | TEMPERATURE: 98 F

## 2023-09-25 DIAGNOSIS — K80.20 CALCULUS OF GALLBLADDER WITHOUT CHOLECYSTITIS WITHOUT OBSTRUCTION: ICD-10-CM

## 2023-09-25 PROCEDURE — 1159F PR MEDICATION LIST DOCUMENTED IN MEDICAL RECORD: ICD-10-PCS | Mod: CPTII,S$GLB,, | Performed by: STUDENT IN AN ORGANIZED HEALTH CARE EDUCATION/TRAINING PROGRAM

## 2023-09-25 PROCEDURE — 99203 PR OFFICE/OUTPT VISIT, NEW, LEVL III, 30-44 MIN: ICD-10-PCS | Mod: S$GLB,,, | Performed by: STUDENT IN AN ORGANIZED HEALTH CARE EDUCATION/TRAINING PROGRAM

## 2023-09-25 PROCEDURE — 99999 PR PBB SHADOW E&M-EST. PATIENT-LVL V: ICD-10-PCS | Mod: PBBFAC,,, | Performed by: STUDENT IN AN ORGANIZED HEALTH CARE EDUCATION/TRAINING PROGRAM

## 2023-09-25 PROCEDURE — 3044F PR MOST RECENT HEMOGLOBIN A1C LEVEL <7.0%: ICD-10-PCS | Mod: CPTII,S$GLB,, | Performed by: STUDENT IN AN ORGANIZED HEALTH CARE EDUCATION/TRAINING PROGRAM

## 2023-09-25 PROCEDURE — 3078F PR MOST RECENT DIASTOLIC BLOOD PRESSURE < 80 MM HG: ICD-10-PCS | Mod: CPTII,S$GLB,, | Performed by: STUDENT IN AN ORGANIZED HEALTH CARE EDUCATION/TRAINING PROGRAM

## 2023-09-25 PROCEDURE — 99999 PR PBB SHADOW E&M-EST. PATIENT-LVL V: CPT | Mod: PBBFAC,,, | Performed by: STUDENT IN AN ORGANIZED HEALTH CARE EDUCATION/TRAINING PROGRAM

## 2023-09-25 PROCEDURE — 1159F MED LIST DOCD IN RCRD: CPT | Mod: CPTII,S$GLB,, | Performed by: STUDENT IN AN ORGANIZED HEALTH CARE EDUCATION/TRAINING PROGRAM

## 2023-09-25 PROCEDURE — 3078F DIAST BP <80 MM HG: CPT | Mod: CPTII,S$GLB,, | Performed by: STUDENT IN AN ORGANIZED HEALTH CARE EDUCATION/TRAINING PROGRAM

## 2023-09-25 PROCEDURE — 3044F HG A1C LEVEL LT 7.0%: CPT | Mod: CPTII,S$GLB,, | Performed by: STUDENT IN AN ORGANIZED HEALTH CARE EDUCATION/TRAINING PROGRAM

## 2023-09-25 PROCEDURE — 3008F PR BODY MASS INDEX (BMI) DOCUMENTED: ICD-10-PCS | Mod: CPTII,S$GLB,, | Performed by: STUDENT IN AN ORGANIZED HEALTH CARE EDUCATION/TRAINING PROGRAM

## 2023-09-25 PROCEDURE — 3074F SYST BP LT 130 MM HG: CPT | Mod: CPTII,S$GLB,, | Performed by: STUDENT IN AN ORGANIZED HEALTH CARE EDUCATION/TRAINING PROGRAM

## 2023-09-25 PROCEDURE — 3074F PR MOST RECENT SYSTOLIC BLOOD PRESSURE < 130 MM HG: ICD-10-PCS | Mod: CPTII,S$GLB,, | Performed by: STUDENT IN AN ORGANIZED HEALTH CARE EDUCATION/TRAINING PROGRAM

## 2023-09-25 PROCEDURE — 3008F BODY MASS INDEX DOCD: CPT | Mod: CPTII,S$GLB,, | Performed by: STUDENT IN AN ORGANIZED HEALTH CARE EDUCATION/TRAINING PROGRAM

## 2023-09-25 PROCEDURE — 99203 OFFICE O/P NEW LOW 30 MIN: CPT | Mod: S$GLB,,, | Performed by: STUDENT IN AN ORGANIZED HEALTH CARE EDUCATION/TRAINING PROGRAM

## 2023-09-25 NOTE — PROGRESS NOTES
History & Physical    SUBJECTIVE:     History of Present Illness:  Patient is a 43 y.o. female presents with  vague abdominal pain in the epigastric region, bloating, chest pain.  She was referred to me after an ultrasound showed gallstones without secondary signs of cholecystitis.    Chief Complaint   Patient presents with    Consult     Gallbladder       Review of patient's allergies indicates:  No Known Allergies    Current Outpatient Medications   Medication Sig Dispense Refill    ARIPiprazole (ABILIFY) 10 MG Tab Take 1 tablet (10 mg total) by mouth once daily. 90 tablet 0    calcium carbonate 400 mg calcium (1,000 mg) Chew Take by mouth.      dicyclomine (BENTYL) 10 MG capsule Take 1 capsule (10 mg total) by mouth 4 (four) times daily as needed (stomach spasms). 120 capsule 1    drospirenone-ethinyl estradioL (SARAH) 3-0.02 mg per tablet Take 1 tablet by mouth once daily. 90 tablet 3    FLUoxetine 40 MG capsule Take 1 capsule (40 mg total) by mouth once daily. 90 capsule 0    Lactobacillus acidophilus (PROBIOTIC ORAL) Take by mouth.      levothyroxine (SYNTHROID) 50 MCG tablet Take 1 tab Monday-Saturday and 2 tabs on Sunday 102 tablet 3    multivitamin/iron/folic acid (MULTI COMPLETE WITH IRON ORAL) Take by mouth.      omeprazole (PRILOSEC) 40 MG capsule Take 1 capsule (40 mg total) by mouth Daily. 90 capsule 1    spironolactone (ALDACTONE) 50 MG tablet TAKE 1 TABLET BY MOUTH DAILY 90 tablet 3    zolpidem (AMBIEN CR) 12.5 MG CR tablet Take 1 tablet (12.5 mg total) by mouth nightly as needed for Insomnia. 30 tablet 3    acetylcysteine (N-ACETYL-L-CYSTEINE MISC) by Misc.(Non-Drug; Combo Route) route.      cetirizine (ZYRTEC) 10 MG tablet Take 1 tablet (10 mg total) by mouth once daily. (Patient not taking: Reported on 9/25/2023) 30 tablet 1    clotrimazole-betamethasone 1-0.05% (LOTRISONE) cream Apply topically 2 (two) times daily. (Patient not taking: Reported on 9/25/2023) 15 g 0    fluticasone propionate  (FLONASE) 50 mcg/actuation nasal spray 1 spray (50 mcg total) by Each Nostril route once daily. (Patient not taking: Reported on 9/25/2023) 9.9 mL 1    ibuprofen (ADVIL,MOTRIN) 400 MG tablet Take 400 mg by mouth every 6 (six) hours as needed for Other.      iron, carbonyl 25 mg iron Tab Take by mouth. Iron supplement      magnesium 30 mg Tab Take by mouth once.      propranoloL (INDERAL) 20 MG tablet Take 1 tablet (20 mg total) by mouth 2 (two) times daily as needed (anxiety). (Patient not taking: Reported on 9/25/2023) 60 tablet 2     No current facility-administered medications for this visit.       Past Medical History:   Diagnosis Date    Arthritis     Fibromyalgia     Gastroenteritis     GERD (gastroesophageal reflux disease)     Long-term use of Plaquenil 12/07/2017     Past Surgical History:   Procedure Laterality Date    COLONOSCOPY      2016    DILATION AND CURETTAGE OF UTERUS      Miscarriage at age 25     Family History   Problem Relation Age of Onset    Arthritis Mother     No Known Problems Father     Thyroid disease Maternal Aunt     Arthritis Maternal Grandmother     Arthritis Maternal Grandfather     Throat cancer Paternal Grandfather     Melanoma Neg Hx     Colon cancer Neg Hx     Crohn's disease Neg Hx     Esophageal cancer Neg Hx     Liver cancer Neg Hx     Rectal cancer Neg Hx     Stomach cancer Neg Hx     Ulcerative colitis Neg Hx      Social History     Tobacco Use    Smoking status: Never    Smokeless tobacco: Never   Substance Use Topics    Alcohol use: No    Drug use: No        Review of Systems:  Review of Systems   Constitutional:  Negative for fever.   HENT: Negative.     Eyes: Negative.    Respiratory:  Positive for chest tightness.    Cardiovascular: Negative.    Gastrointestinal:  Positive for abdominal distention and abdominal pain.   Endocrine: Negative.    Genitourinary: Negative.    Musculoskeletal: Negative.    Skin: Negative.    Allergic/Immunologic: Negative.    Neurological:  "Negative.    Hematological: Negative.    Psychiatric/Behavioral: Negative.         OBJECTIVE:     Vital Signs (Most Recent)  Temp: 97.6 °F (36.4 °C) (09/25/23 1339)  Pulse: 88 (09/25/23 1339)  BP: 110/71 (09/25/23 1339)  4' 11" (1.499 m)  61.7 kg (136 lb 0.4 oz)     Physical Exam:  Physical Exam  Constitutional:       General: She is not in acute distress.     Appearance: Normal appearance. She is not ill-appearing, toxic-appearing or diaphoretic.   HENT:      Head: Normocephalic.      Nose: Nose normal.   Eyes:      Conjunctiva/sclera: Conjunctivae normal.   Cardiovascular:      Rate and Rhythm: Normal rate and regular rhythm.   Pulmonary:      Effort: Pulmonary effort is normal.   Abdominal:      Palpations: Abdomen is soft.   Musculoskeletal:         General: Normal range of motion.      Cervical back: Normal range of motion.   Skin:     General: Skin is warm.   Neurological:      General: No focal deficit present.      Mental Status: She is alert.   Psychiatric:         Mood and Affect: Mood normal.         Diagnostic Results:    Gallstones without secondary signs of cholecystitis    ASSESSMENT/PLAN:      43-year-old female with GERD symptoms who also has gallstones seen on recent imaging.    PLAN:    We did discuss that her symptoms may be related to the gallbladder.  We discussed that cholecystectomy is not a guarantee that symptoms will be improved after surgery.  No clear indications for additional imaging or testing.  She already has an EGD set up with GI to evaluate for gastritis and other pathology.  Patient ultimately elected to postpone possible surgical intervention till after EGD.  She will call to set up follow-up for surgery after her procedure.           "

## 2023-10-06 ENCOUNTER — HOSPITAL ENCOUNTER (OUTPATIENT)
Dept: RADIOLOGY | Facility: CLINIC | Age: 43
Discharge: HOME | End: 2023-10-06
Attending: INTERNAL MEDICINE
Payer: COMMERCIAL

## 2023-10-06 DIAGNOSIS — Z12.31 BREAST CANCER SCREENING BY MAMMOGRAM: ICD-10-CM

## 2023-10-06 PROCEDURE — 77067 SCR MAMMO BI INCL CAD: CPT | Mod: TC,PO

## 2023-10-06 PROCEDURE — 77063 MAMMO DIGITAL SCREENING BILAT WITH TOMO: ICD-10-PCS | Mod: 26,,, | Performed by: RADIOLOGY

## 2023-10-06 PROCEDURE — 77063 BREAST TOMOSYNTHESIS BI: CPT | Mod: 26,,, | Performed by: RADIOLOGY

## 2023-10-06 PROCEDURE — 77067 MAMMO DIGITAL SCREENING BILAT WITH TOMO: ICD-10-PCS | Mod: 26,,, | Performed by: RADIOLOGY

## 2023-10-06 PROCEDURE — 77067 SCR MAMMO BI INCL CAD: CPT | Mod: 26,,, | Performed by: RADIOLOGY

## 2023-10-13 DIAGNOSIS — N92.6 IRREGULAR PERIODS/MENSTRUAL CYCLES: ICD-10-CM

## 2023-10-16 RX ORDER — DROSPIRENONE AND ETHINYL ESTRADIOL 0.02-3(28)
1 KIT ORAL DAILY
Qty: 90 TABLET | Refills: 3 | Status: SHIPPED | OUTPATIENT
Start: 2023-10-16

## 2023-10-26 ENCOUNTER — OFFICE VISIT (OUTPATIENT)
Dept: OBSTETRICS AND GYNECOLOGY | Facility: CLINIC | Age: 43
End: 2023-10-26
Payer: COMMERCIAL

## 2023-10-26 ENCOUNTER — OFFICE VISIT (OUTPATIENT)
Dept: DERMATOLOGY | Facility: CLINIC | Age: 43
End: 2023-10-26
Payer: COMMERCIAL

## 2023-10-26 VITALS
BODY MASS INDEX: 26.98 KG/M2 | HEIGHT: 59 IN | WEIGHT: 133.81 LBS | SYSTOLIC BLOOD PRESSURE: 108 MMHG | DIASTOLIC BLOOD PRESSURE: 70 MMHG

## 2023-10-26 VITALS — WEIGHT: 136 LBS | HEIGHT: 59 IN | BODY MASS INDEX: 27.42 KG/M2

## 2023-10-26 DIAGNOSIS — D22.9 MULTIPLE BENIGN NEVI: ICD-10-CM

## 2023-10-26 DIAGNOSIS — Z01.419 WELL WOMAN EXAM WITH ROUTINE GYNECOLOGICAL EXAM: Primary | ICD-10-CM

## 2023-10-26 DIAGNOSIS — L70.0 ACNE VULGARIS: Primary | ICD-10-CM

## 2023-10-26 DIAGNOSIS — D18.01 CHERRY ANGIOMA: ICD-10-CM

## 2023-10-26 DIAGNOSIS — R61 CHRONIC NIGHT SWEATS: ICD-10-CM

## 2023-10-26 DIAGNOSIS — Z12.83 SKIN CANCER SCREENING: ICD-10-CM

## 2023-10-26 DIAGNOSIS — L81.1 MELASMA: ICD-10-CM

## 2023-10-26 PROCEDURE — 1160F RVW MEDS BY RX/DR IN RCRD: CPT | Mod: CPTII,S$GLB,, | Performed by: DERMATOLOGY

## 2023-10-26 PROCEDURE — 3044F PR MOST RECENT HEMOGLOBIN A1C LEVEL <7.0%: ICD-10-PCS | Mod: CPTII,S$GLB,, | Performed by: DERMATOLOGY

## 2023-10-26 PROCEDURE — 1160F RVW MEDS BY RX/DR IN RCRD: CPT | Mod: CPTII,S$GLB,, | Performed by: STUDENT IN AN ORGANIZED HEALTH CARE EDUCATION/TRAINING PROGRAM

## 2023-10-26 PROCEDURE — 3008F PR BODY MASS INDEX (BMI) DOCUMENTED: ICD-10-PCS | Mod: CPTII,S$GLB,, | Performed by: STUDENT IN AN ORGANIZED HEALTH CARE EDUCATION/TRAINING PROGRAM

## 2023-10-26 PROCEDURE — 3078F DIAST BP <80 MM HG: CPT | Mod: CPTII,S$GLB,, | Performed by: STUDENT IN AN ORGANIZED HEALTH CARE EDUCATION/TRAINING PROGRAM

## 2023-10-26 PROCEDURE — 99214 OFFICE O/P EST MOD 30 MIN: CPT | Mod: S$GLB,,, | Performed by: DERMATOLOGY

## 2023-10-26 PROCEDURE — 3044F HG A1C LEVEL LT 7.0%: CPT | Mod: CPTII,S$GLB,, | Performed by: STUDENT IN AN ORGANIZED HEALTH CARE EDUCATION/TRAINING PROGRAM

## 2023-10-26 PROCEDURE — 3044F HG A1C LEVEL LT 7.0%: CPT | Mod: CPTII,S$GLB,, | Performed by: DERMATOLOGY

## 2023-10-26 PROCEDURE — 3074F SYST BP LT 130 MM HG: CPT | Mod: CPTII,S$GLB,, | Performed by: STUDENT IN AN ORGANIZED HEALTH CARE EDUCATION/TRAINING PROGRAM

## 2023-10-26 PROCEDURE — 1159F PR MEDICATION LIST DOCUMENTED IN MEDICAL RECORD: ICD-10-PCS | Mod: CPTII,S$GLB,, | Performed by: STUDENT IN AN ORGANIZED HEALTH CARE EDUCATION/TRAINING PROGRAM

## 2023-10-26 PROCEDURE — 3008F BODY MASS INDEX DOCD: CPT | Mod: CPTII,S$GLB,, | Performed by: STUDENT IN AN ORGANIZED HEALTH CARE EDUCATION/TRAINING PROGRAM

## 2023-10-26 PROCEDURE — 1160F PR REVIEW ALL MEDS BY PRESCRIBER/CLIN PHARMACIST DOCUMENTED: ICD-10-PCS | Mod: CPTII,S$GLB,, | Performed by: STUDENT IN AN ORGANIZED HEALTH CARE EDUCATION/TRAINING PROGRAM

## 2023-10-26 PROCEDURE — 1160F PR REVIEW ALL MEDS BY PRESCRIBER/CLIN PHARMACIST DOCUMENTED: ICD-10-PCS | Mod: CPTII,S$GLB,, | Performed by: DERMATOLOGY

## 2023-10-26 PROCEDURE — 99999 PR PBB SHADOW E&M-EST. PATIENT-LVL IV: CPT | Mod: PBBFAC,,, | Performed by: STUDENT IN AN ORGANIZED HEALTH CARE EDUCATION/TRAINING PROGRAM

## 2023-10-26 PROCEDURE — 1159F MED LIST DOCD IN RCRD: CPT | Mod: CPTII,S$GLB,, | Performed by: DERMATOLOGY

## 2023-10-26 PROCEDURE — 99999 PR PBB SHADOW E&M-EST. PATIENT-LVL IV: ICD-10-PCS | Mod: PBBFAC,,, | Performed by: STUDENT IN AN ORGANIZED HEALTH CARE EDUCATION/TRAINING PROGRAM

## 2023-10-26 PROCEDURE — 99396 PREV VISIT EST AGE 40-64: CPT | Mod: S$GLB,,, | Performed by: STUDENT IN AN ORGANIZED HEALTH CARE EDUCATION/TRAINING PROGRAM

## 2023-10-26 PROCEDURE — 3044F PR MOST RECENT HEMOGLOBIN A1C LEVEL <7.0%: ICD-10-PCS | Mod: CPTII,S$GLB,, | Performed by: STUDENT IN AN ORGANIZED HEALTH CARE EDUCATION/TRAINING PROGRAM

## 2023-10-26 PROCEDURE — 3074F PR MOST RECENT SYSTOLIC BLOOD PRESSURE < 130 MM HG: ICD-10-PCS | Mod: CPTII,S$GLB,, | Performed by: STUDENT IN AN ORGANIZED HEALTH CARE EDUCATION/TRAINING PROGRAM

## 2023-10-26 PROCEDURE — 1159F PR MEDICATION LIST DOCUMENTED IN MEDICAL RECORD: ICD-10-PCS | Mod: CPTII,S$GLB,, | Performed by: DERMATOLOGY

## 2023-10-26 PROCEDURE — 99396 PR PREVENTIVE VISIT,EST,40-64: ICD-10-PCS | Mod: S$GLB,,, | Performed by: STUDENT IN AN ORGANIZED HEALTH CARE EDUCATION/TRAINING PROGRAM

## 2023-10-26 PROCEDURE — 3078F PR MOST RECENT DIASTOLIC BLOOD PRESSURE < 80 MM HG: ICD-10-PCS | Mod: CPTII,S$GLB,, | Performed by: STUDENT IN AN ORGANIZED HEALTH CARE EDUCATION/TRAINING PROGRAM

## 2023-10-26 PROCEDURE — 99214 PR OFFICE/OUTPT VISIT, EST, LEVL IV, 30-39 MIN: ICD-10-PCS | Mod: S$GLB,,, | Performed by: DERMATOLOGY

## 2023-10-26 PROCEDURE — 3008F PR BODY MASS INDEX (BMI) DOCUMENTED: ICD-10-PCS | Mod: CPTII,S$GLB,, | Performed by: DERMATOLOGY

## 2023-10-26 PROCEDURE — 3008F BODY MASS INDEX DOCD: CPT | Mod: CPTII,S$GLB,, | Performed by: DERMATOLOGY

## 2023-10-26 PROCEDURE — 1159F MED LIST DOCD IN RCRD: CPT | Mod: CPTII,S$GLB,, | Performed by: STUDENT IN AN ORGANIZED HEALTH CARE EDUCATION/TRAINING PROGRAM

## 2023-10-26 RX ORDER — TRETINOIN 0.25 MG/G
CREAM TOPICAL NIGHTLY
Qty: 20 G | Refills: 2 | Status: SHIPPED | OUTPATIENT
Start: 2023-10-26

## 2023-10-26 RX ORDER — SPIRONOLACTONE 50 MG/1
TABLET, FILM COATED ORAL
Qty: 90 TABLET | Refills: 3 | Status: SHIPPED | OUTPATIENT
Start: 2023-10-26

## 2023-10-26 NOTE — PROGRESS NOTES
"[facility]    Subjective:   Chief Complaint:    Chief Complaint   Patient presents with    Annual Exam       Patient's last menstrual period was 10/18/2023 (exact date).  Contraception: OCP.  HRT: None.    10/26/2023  Roopa Rivas is a 43 y.o.  who presents for an annual exam.  She does not want STD screening.  She participates in regular exercise: lifts weights.  She does not smoke.  She is taking a multivitamin.  She denies any domestic violence.    She reports cold flashes, and night sweats after she awakes from sleeping.  Symptoms have been present for 1 year.  She noticed symptoms worsened after stopping Cymbalta for fibromyalgia.  She is not currently on any medications for fibromyalgia as she is currently asymptomatic.      She also reports spotting that started 1 day ago described as "brown junk".  Denies any   Pelvic pain, dyspareunia or gynecologic conditions.        Last Pap: 10-24-22. Denies any history of abnormal pap smears. Results: negative for lesions or malignancy.  Last mammogram: 10-6-23. Results: normal--routine follow-up in 12 months.     FH:  Breast cancer: none.  Colon cancer: none.  Endometrial cancer: none.  Ovarian cancer: none.      OB History    Para Term  AB Living   5 4 4   1 4   SAB IAB Ectopic Multiple Live Births   1       4      # Outcome Date GA Lbr Bhanu/2nd Weight Sex Delivery Anes PTL Lv   5 Term 01/08/10    M Vag-Spont   DIANNA   4 Term 09    F Vag-Spont   DIANNA   3 Term 00    F Vag-Spont   DIANNA   2 Term 97    F Vag-Spont   DIANNA   1 SAB               Obstetric Comments    x 4   Gynhx/ irregular every 35-60/5.    On ocp   H/o chlamydia in , s/p treatment   Denies abnl pap, last pap  neg/hpv neg   MMG 10/2023 Neg       Past Medical History:   Diagnosis Date    Arthritis     Fibromyalgia     Gastroenteritis     GERD (gastroesophageal reflux disease)     Long-term use of Plaquenil 2017     Past Surgical " History:   Procedure Laterality Date    COLONOSCOPY      2016    DILATION AND CURETTAGE OF UTERUS      Miscarriage at age 25     Review of patient's allergies indicates:  No Known Allergies    Social History     Socioeconomic History    Marital status:     Number of children: 4   Tobacco Use    Smoking status: Never    Smokeless tobacco: Never   Substance and Sexual Activity    Alcohol use: No    Drug use: No    Sexual activity: Yes     Partners: Male     Birth control/protection: Condom, OCP     Social Determinants of Health     Financial Resource Strain: Low Risk  (10/23/2023)    Overall Financial Resource Strain (CARDIA)     Difficulty of Paying Living Expenses: Not hard at all   Food Insecurity: No Food Insecurity (10/23/2023)    Hunger Vital Sign     Worried About Running Out of Food in the Last Year: Never true     Ran Out of Food in the Last Year: Never true   Transportation Needs: No Transportation Needs (10/23/2023)    PRAPARE - Transportation     Lack of Transportation (Medical): No     Lack of Transportation (Non-Medical): No   Physical Activity: Sufficiently Active (10/23/2023)    Exercise Vital Sign     Days of Exercise per Week: 5 days     Minutes of Exercise per Session: 40 min   Stress: Stress Concern Present (10/23/2023)    [employer] [place] [employer] - Occupational Stress Questionnaire     Feeling of Stress : To some extent   Social Connections: Unknown (10/23/2023)    Social Connection and Isolation Panel [NHANES]     Frequency of Communication with Friends and Family: Three times a week     Frequency of Social Gatherings with Friends and Family: Never     Active Member of Clubs or Organizations: No     Attends Club or Organization Meetings: Never     Marital Status:    Housing Stability: Low Risk  (10/23/2023)    Housing Stability Vital Sign     Unable to Pay for Housing in the Last Year: No     Number of Places Lived in the Last Year: 1     Unstable Housing in the Last  Year: No       Family History   Problem Relation Age of Onset    Arthritis Mother     No Known Problems Father     Thyroid disease Maternal Aunt     Arthritis Maternal Grandmother     Arthritis Maternal Grandfather     Throat cancer Paternal Grandfather     Melanoma Neg Hx     Colon cancer Neg Hx     Crohn's disease Neg Hx     Esophageal cancer Neg Hx     Liver cancer Neg Hx     Rectal cancer Neg Hx     Stomach cancer Neg Hx     Ulcerative colitis Neg Hx        Medications:  Current Outpatient Medications on File Prior to Visit   Medication Sig Dispense Refill Last Dose    acetylcysteine (N-ACETYL-L-CYSTEINE MISC) by Misc.(Non-Drug; Combo Route) route.   Taking    ARIPiprazole (ABILIFY) 10 MG Tab Take 1 tablet (10 mg total) by mouth once daily. 90 tablet 0 Taking    calcium carbonate 400 mg calcium (1,000 mg) Chew Take by mouth.   Taking    clotrimazole-betamethasone 1-0.05% (LOTRISONE) cream Apply topically 2 (two) times daily. 15 g 0 Taking    dicyclomine (BENTYL) 10 MG capsule Take 1 capsule (10 mg total) by mouth 4 (four) times daily as needed (stomach spasms). 120 capsule 1 Taking    drospirenone-ethinyl estradioL (SARAH) 3-0.02 mg per tablet Take 1 tablet by mouth once daily. 90 tablet 3 Taking    FLUoxetine 40 MG capsule Take 1 capsule (40 mg total) by mouth once daily. 90 capsule 0 Taking    fluticasone propionate (FLONASE) 50 mcg/actuation nasal spray 1 spray (50 mcg total) by Each Nostril route once daily. 9.9 mL 1 Taking    hydroquinone 8 % Emul Compound hydroquinone 12% / kojic acid 6% / vitamin C 1% / niacinamide 2% cream. Apply a pea-sized amount to entire face qhs. Do not use for longer than 16 weeks in a row. 30 g 1 Taking    ibuprofen (ADVIL,MOTRIN) 400 MG tablet Take 400 mg by mouth every 6 (six) hours as needed for Other.   Taking    iron, carbonyl 25 mg iron Tab Take by mouth. Iron supplement   Taking    Lactobacillus acidophilus (PROBIOTIC ORAL) Take by mouth.   Taking    levothyroxine (SYNTHROID)  "50 MCG tablet Take 1 tab Monday-Saturday and 2 tabs on Sunday 102 tablet 3 Taking    magnesium 30 mg Tab Take by mouth once.   Taking    multivitamin/iron/folic acid (MULTI COMPLETE WITH IRON ORAL) Take by mouth.   Taking    omeprazole (PRILOSEC) 40 MG capsule Take 1 capsule (40 mg total) by mouth Daily. 90 capsule 1 Taking    propranoloL (INDERAL) 20 MG tablet Take 1 tablet (20 mg total) by mouth 2 (two) times daily as needed (anxiety). 60 tablet 2 Taking    spironolactone (ALDACTONE) 50 MG tablet TAKE 1 TABLET BY MOUTH DAILY 90 tablet 3 Taking    tretinoin (RETIN-A) 0.025 % cream Apply topically every evening. 20 g 2 Taking    zolpidem (AMBIEN CR) 12.5 MG CR tablet Take 1 tablet (12.5 mg total) by mouth nightly as needed for Insomnia. 30 tablet 3 Taking    cetirizine (ZYRTEC) 10 MG tablet Take 1 tablet (10 mg total) by mouth once daily. (Patient not taking: Reported on 9/25/2023) 30 tablet 1     [DISCONTINUED] spironolactone (ALDACTONE) 50 MG tablet TAKE 1 TABLET BY MOUTH DAILY 90 tablet 3        Review of Systems   Constitutional: Negative for appetite change, fever and unexpected weight change.   Respiratory: Negative for cough and shortness of breath.    Cardiovascular: Negative for chest pain and palpitations.   Gastrointestinal: Negative for abdominal distention, constipation, nausea and vomiting.   Genitourinary: Negative for dyspareunia, dysuria, hematuria and pelvic pain.        GYN ROS per HPI.   Musculoskeletal: Negative for gait problem and myalgias.   Skin: Negative for rash.   Neurological: Negative for dizziness, light-headedness and headaches.   Psychiatric/Behavioral: The patient is not nervous/anxious.      Objective:   /70 (BP Location: Left arm, Patient Position: Sitting, BP Method: Medium (Manual))   Ht 4' 11" (1.499 m)   Wt 60.7 kg (133 lb 13.1 oz)   LMP 10/18/2023 (Exact Date)   BMI 27.03 kg/m²     Physical Exam  Vitals reviewed. Exam conducted with a chaperone present.   HENT:      " Head: Normocephalic and atraumatic.   Cardiovascular:      Rate and Rhythm: Normal rate and regular rhythm.   Pulmonary:      Effort: Pulmonary effort is normal. No accessory muscle usage or respiratory distress.   Chest:      Chest wall: No tenderness.   Breasts:     Breasts are symmetrical.      Right: No inverted nipple, mass, nipple discharge, skin change or tenderness.      Left: No inverted nipple, mass, nipple discharge, skin change or tenderness.   Abdominal:      General: Abdomen is flat.      Tenderness: There is no abdominal tenderness. There is no guarding.   Genitourinary:     General: Normal vulva.      Pubic Area: No rash.       Labia:         Right: No tenderness.         Left: No tenderness.       Urethra: No prolapse.      Vagina: Normal. No tenderness.      Cervix: No cervical motion tenderness, erythema or cervical bleeding.      Uterus: Not tender.       Adnexa:         Right: No mass or tenderness.          Left: No mass or tenderness.     Musculoskeletal:      Right lower leg: No edema.      Left lower leg: No edema.   Lymphadenopathy:      Upper Body:      Right upper body: No axillary adenopathy.      Left upper body: No axillary adenopathy.   Neurological:      General: No focal deficit present.      Mental Status: She is alert. Mental status is at baseline.          Assessment:     1. Well woman exam with routine gynecological exam    2. Chronic night sweats      Plan:     43 y.o. female presents today for her annual exam.     1. Well woman exam with routine gynecological exam    2. Chronic night sweats      Follow up in about 1 year (around 10/26/2024) for annual exam or sooner if any GYN issues arise.    The above was reviewed and discussed with the patient.    Annual exam and screening issues based on the patient's age and family history were discussed.     Discussed that the average age of menopause is 51 but normal is anywhere from 40-60 years old.  Symptoms include but are not  limited to hot flashes, night sweats, sleep disturbances, weight gain, and irregular cycles/menstrual changes.  Discussed with the patient that she may be in perimenopause which is the stage right before menopause or in menopause.  Perimenopausal patient may experience the same symptoms as patients who are in menopause.  Menopause definition can only be given if a patient goes 12 months without a cycle.  We   discussed ordering labs but this would require her to be off the birth control pill for approximately 3-4.  She is not interested in stopping birth control at this time.  Explained that labs would be inaccurate if we belen them while she is taking OCP.     If she desires lab work in the future, she was instructed to contact the clinic further evaluation.      - Pap and HP: recent pap smear (10-24-22) was normal.  Discussed that a pap smear was not indicated at today's visit according to ACOG guidelines.    - MMG: 10-6-23 repeat in 1 year.  - Colonoscopy N/A.  - Tobacco cessation N/A.  - DEXA N/A.  - Counseled to continue taking daily multivitamin. If patient is of reproductive age and not on contraception, to take prenatal vitamin. Patient has been counseled on the vitamin D and calcium requirements per ACOG recommendations.    Age    Calcium(mg/day)    Vitamin D (IU/day)  9-18     1300                       600  19-50   1000                       600  51-70   1200                       600  >70       1200                       800      The patient's questions were answered, and she agrees with the current plan.     The patient was counseled today on the new ACS guidelines for cervical cytology screening as well as the current recommendations for breast cancer screening. She was counseled to follow up with her PCP for other routine health maintenance.       Kaitlin Dorantes PA-C  10/26/2023

## 2023-10-26 NOTE — PROGRESS NOTES
Subjective:      Patient ID:  Roopa Rivas is a 43 y.o. female who presents for   Chief Complaint   Patient presents with    Skin Check     UBSC      LOV 10/24/22- Melasma, Adult Acne  On [facility] lightening cream: [id number]% kojic acid 6% niacinamide 2% vitamin C 1% in proprietary base   Tinted sunscreen    Patient here today for UBSC    Derm Hx:  Denies Phx of NMSC  Denies Fhx of MM    Current Outpatient Medications:   ·  acetylcysteine (N-ACETYL-L-CYSTEINE MISC), by Misc.(Non-Drug; Combo Route) route., Disp: , Rfl:   ·  ARIPiprazole (ABILIFY) 10 MG Tab, Take 1 tablet (10 mg total) by mouth once daily., Disp: 90 tablet, Rfl: 0  ·  calcium carbonate 400 mg calcium (1,000 mg) Chew, Take by mouth., Disp: , Rfl:   ·  clotrimazole-betamethasone 1-0.05% (LOTRISONE) cream, Apply topically 2 (two) times daily., Disp: 15 g, Rfl: 0  ·  dicyclomine (BENTYL) 10 MG capsule, Take 1 capsule (10 mg total) by mouth 4 (four) times daily as needed (stomach spasms)., Disp: 120 capsule, Rfl: 1  ·  drospirenone-ethinyl estradioL (SARAH) 3-0.02 mg per tablet, Take 1 tablet by mouth once daily., Disp: 90 tablet, Rfl: 3  ·  FLUoxetine 40 MG capsule, Take 1 capsule (40 mg total) by mouth once daily., Disp: 90 capsule, Rfl: 0  ·  fluticasone propionate (FLONASE) 50 mcg/actuation nasal spray, 1 spray (50 mcg total) by Each Nostril route once daily., Disp: 9.9 mL, Rfl: 1  ·  ibuprofen (ADVIL,MOTRIN) 400 MG tablet, Take 400 mg by mouth every 6 (six) hours as needed for Other., Disp: , Rfl:   ·  iron, carbonyl 25 mg iron Tab, Take by mouth. Iron supplement, Disp: , Rfl:   ·  Lactobacillus acidophilus (PROBIOTIC ORAL), Take by mouth., Disp: , Rfl:   ·  levothyroxine (SYNTHROID) 50 MCG tablet, Take 1 tab Monday-Saturday and 2 tabs on Sunday, Disp: 102 tablet, Rfl: 3  ·  magnesium 30 mg Tab, Take by mouth once., Disp: , Rfl:   ·  multivitamin/iron/folic acid (MULTI COMPLETE WITH IRON ORAL), Take by mouth., Disp: , Rfl:   ·  omeprazole  (PRILOSEC) 40 MG capsule, Take 1 capsule (40 mg total) by mouth Daily., Disp: 90 capsule, Rfl: 1  ·  propranoloL (INDERAL) 20 MG tablet, Take 1 tablet (20 mg total) by mouth 2 (two) times daily as needed (anxiety)., Disp: 60 tablet, Rfl: 2  ·  spironolactone (ALDACTONE) 50 MG tablet, TAKE 1 TABLET BY MOUTH DAILY, Disp: 90 tablet, Rfl: 3  ·  zolpidem (AMBIEN CR) 12.5 MG CR tablet, Take 1 tablet (12.5 mg total) by mouth nightly as needed for Insomnia., Disp: 30 tablet, Rfl: 3  ·  cetirizine (ZYRTEC) 10 MG tablet, Take 1 tablet (10 mg total) by mouth once daily. (Patient not taking: Reported on 9/25/2023), Disp: 30 tablet, Rfl: 1        Review of Systems   Constitutional:  Negative for fever, chills and fatigue.   Respiratory:  Negative for cough and shortness of breath.    Gastrointestinal:  Negative for nausea and vomiting.   Skin:  Positive for activity-related sunscreen use. Negative for daily sunscreen use.       Objective:   Physical Exam   Constitutional: She appears well-developed and well-nourished. No distress.   Neurological: She is alert and oriented to person, place, and time. She is not disoriented.   Psychiatric: She has a normal mood and affect.   Skin:   Areas Examined (abnormalities noted in diagram):   Scalp / Hair Palpated and Inspected  Head / Face Inspection Performed  Neck Inspection Performed  Chest / Axilla Inspection Performed  Abdomen Inspection Performed  Back Inspection Performed  RUE Inspected  LUE Inspection Performed  Nails and Digits Inspection Performed                 Diagram Legend     Erythematous scaling macule/papule c/w actinic keratosis       Vascular papule c/w angioma      Pigmented verrucoid papule/plaque c/w seborrheic keratosis      Yellow umbilicated papule c/w sebaceous hyperplasia      Irregularly shaped tan macule c/w lentigo     1-2 mm smooth white papules consistent with Milia      Movable subcutaneous cyst with punctum c/w epidermal inclusion cyst      Subcutaneous  movable cyst c/w pilar cyst      Firm pink to brown papule c/w dermatofibroma      Pedunculated fleshy papule(s) c/w skin tag(s)      Evenly pigmented macule c/w junctional nevus     Mildly variegated pigmented, slightly irregular-bordered macule c/w mildly atypical nevus      Flesh colored to evenly pigmented papule c/w intradermal nevus       Pink pearly papule/plaque c/w basal cell carcinoma      Erythematous hyperkeratotic cursted plaque c/w SCC      Surgical scar with no sign of skin cancer recurrence      Open and closed comedones      Inflammatory papules and pustules      Verrucoid papule consistent consistent with wart     Erythematous eczematous patches and plaques     Dystrophic onycholytic nail with subungual debris c/w onychomycosis     Umbilicated papule    Erythematous-base heme-crusted tan verrucoid plaque consistent with inflamed seborrheic keratosis     Erythematous Silvery Scaling Plaque c/w Psoriasis     See annotation      Assessment / Plan:        Acne vulgaris  -     spironolactone (ALDACTONE) 50 MG tablet; TAKE 1 TABLET BY MOUTH DAILY  Dispense: 90 tablet; Refill: 3  -     tretinoin (RETIN-A) 0.025 % cream; Apply topically every evening.  Dispense: 20 g; Refill: 2  Controlled with above regimen, continue    Melasma  -     hydroquinone 8 % Emul; Compound hydroquinone 12% / kojic acid 6% / vitamin C 1% / niacinamide 2% cream. Apply a pea-sized amount to entire face qhs. Do not use for longer than 16 weeks in a row.  Dispense: 30 g; Refill: 1  Not at goal  Add tretinoin, may layer or alternate use    Multiple benign nevi  Careful dermoscopy evaluation of nevi performed with none identified as needing biopsy today  Monitor for new mole or moles that are becoming bigger, darker, irritated, or developing irregular borders.     Cherry angioma  This is a benign vascular lesion. Reassurance given. No treatment required.     Skin cancer screening  Upper body skin examination performed today including  at least 9 points as noted in physical examination. No lesions suspicious for malignancy noted.               Follow up in about 6 months (around 4/26/2024), or if symptoms worsen or fail to improve.

## 2023-10-30 ENCOUNTER — TELEPHONE (OUTPATIENT)
Dept: DERMATOLOGY | Facility: CLINIC | Age: 43
End: 2023-10-30
Payer: COMMERCIAL

## 2023-11-06 ENCOUNTER — OFFICE VISIT (OUTPATIENT)
Dept: PSYCHIATRY | Facility: CLINIC | Age: 43
End: 2023-11-06
Payer: COMMERCIAL

## 2023-11-06 DIAGNOSIS — G47.00 INSOMNIA, UNSPECIFIED TYPE: ICD-10-CM

## 2023-11-06 DIAGNOSIS — F41.1 GENERALIZED ANXIETY DISORDER: ICD-10-CM

## 2023-11-06 DIAGNOSIS — F31.70 BIPOLAR DISORDER IN PARTIAL REMISSION, MOST RECENT EPISODE UNSPECIFIED TYPE: Primary | ICD-10-CM

## 2023-11-06 PROCEDURE — 1160F PR REVIEW ALL MEDS BY PRESCRIBER/CLIN PHARMACIST DOCUMENTED: ICD-10-PCS | Mod: CPTII,95,, | Performed by: NURSE PRACTITIONER

## 2023-11-06 PROCEDURE — 3044F PR MOST RECENT HEMOGLOBIN A1C LEVEL <7.0%: ICD-10-PCS | Mod: CPTII,95,, | Performed by: NURSE PRACTITIONER

## 2023-11-06 PROCEDURE — 99214 OFFICE O/P EST MOD 30 MIN: CPT | Mod: 95,,, | Performed by: NURSE PRACTITIONER

## 2023-11-06 PROCEDURE — 1160F RVW MEDS BY RX/DR IN RCRD: CPT | Mod: CPTII,95,, | Performed by: NURSE PRACTITIONER

## 2023-11-06 PROCEDURE — 1159F PR MEDICATION LIST DOCUMENTED IN MEDICAL RECORD: ICD-10-PCS | Mod: CPTII,95,, | Performed by: NURSE PRACTITIONER

## 2023-11-06 PROCEDURE — 3044F HG A1C LEVEL LT 7.0%: CPT | Mod: CPTII,95,, | Performed by: NURSE PRACTITIONER

## 2023-11-06 PROCEDURE — 1159F MED LIST DOCD IN RCRD: CPT | Mod: CPTII,95,, | Performed by: NURSE PRACTITIONER

## 2023-11-06 PROCEDURE — 99214 PR OFFICE/OUTPT VISIT, EST, LEVL IV, 30-39 MIN: ICD-10-PCS | Mod: 95,,, | Performed by: NURSE PRACTITIONER

## 2023-11-06 RX ORDER — ARIPIPRAZOLE 10 MG/1
10 TABLET ORAL DAILY
Qty: 90 TABLET | Refills: 0 | Status: SHIPPED | OUTPATIENT
Start: 2023-11-06 | End: 2024-02-02 | Stop reason: SDUPTHER

## 2023-11-06 RX ORDER — PROPRANOLOL HYDROCHLORIDE 20 MG/1
20 TABLET ORAL 2 TIMES DAILY PRN
Qty: 180 TABLET | Refills: 0 | Status: SHIPPED | OUTPATIENT
Start: 2023-11-06 | End: 2024-03-06

## 2023-11-06 RX ORDER — ZOLPIDEM TARTRATE 12.5 MG/1
12.5 TABLET, FILM COATED, EXTENDED RELEASE ORAL NIGHTLY PRN
Qty: 30 TABLET | Refills: 3 | Status: SHIPPED | OUTPATIENT
Start: 2023-11-06 | End: 2024-02-07 | Stop reason: SDUPTHER

## 2023-11-06 RX ORDER — FLUOXETINE HYDROCHLORIDE 40 MG/1
40 CAPSULE ORAL DAILY
Qty: 90 CAPSULE | Refills: 0 | Status: SHIPPED | OUTPATIENT
Start: 2023-11-06 | End: 2024-02-07 | Stop reason: SDUPTHER

## 2023-11-06 NOTE — PROGRESS NOTES
Outpatient Psychiatry Follow-Up Visit (MD/NP) - Telemedicine Visit    11/6/2023 11:39 AM  Roopa Rivas  1980  [id number]        The patient location is: Patient reported that their location at the time of this visit was in the [facility]     Visit type: audiovisual    Each patient to whom he or she provides medical services by telemedicine is:  (1) informed of the relationship between the physician and patient and the respective role of any other health care provider with respect to management of the patient; and (2) notified that he or she may decline to receive medical services by telemedicine and may withdraw from such care at any time.      Clinical Status of Patient:  Outpatient (Ambulatory)    Chief Complaint:  Roopa Rivas, a 43 y.o. female,who presents today for follow up of mood swings and anxiety.  Met with patient.        Interval History/Subjective Report/Content of Current Session:     Today the pt reports she is doing well. She denies depressed mood, amotivation, anhedonia, and hopelessness.    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 She doesn't feel that she received adequate training and is very worried she will make a mistake. As a result, her anxiety has been elevated. Has been needing propranolol more often.  Denies sxs of rufus and hypomania.    ASEs from psych meds: denies    Pt denies recurrent thoughts of death and denies SI/HI. Denies AVH, paranoia and delusions. No objective s/sx of psychosis or rufus.     Patient verbalized motivation for compliance with medications and all other elements of treatment plan.         Psychotherapy:  Target symptoms: anxiety , mood swings  Why chosen therapy is appropriate versus another modality: relevant to diagnosis, patient responds to this modality  Outcome monitoring methods: self-report, observation  Therapeutic intervention type: supportive psychotherapy  Topics discussed/themes: work stress, building skills sets for  symptom management  The patient's response to the intervention is accepting. The patient's progress toward treatment goals is good.   Duration of intervention: 7 minutes.      Psychotropic medication review  Previous Trials-  Seroquel - severe dry eyes, hurt to blink, no peripheral vision, memory loss  Lexapro - early 2000s, stopped taking due to insurance  Cymbalta      Current meds-  Prozac  Abilify  Propranolol  Ambien CR    History:       Past Medical, Psychiatric, Family and Social History: The patient's past medical, psychiatric, family and social history, allergies, current medications, past surgical history, and problem list have been reviewed and updated as appropriate within the electronic medical record.         Additional historical information includes:   Past psych hospitalizations: denies  Past suicide attempts: denies    Seizure: denies  Head trauma/TBI: denies  Access to a firearm: yes -  Pt was instructed to keep the firearm in a safe, locked container and to store the bullets separately.      Review of Systems       Review of Systems   Constitutional:  Negative for chills, fever and malaise/fatigue.   Respiratory:  Negative for cough and shortness of breath.    Cardiovascular:  Negative for chest pain and palpitations.   Gastrointestinal:  Negative for abdominal pain, diarrhea and vomiting.   Genitourinary:  Negative for dysuria and hematuria.   Musculoskeletal:  Negative for falls and myalgias.   Skin:  Negative for rash.   Neurological:  Negative for tremors, seizures and headaches.   Psychiatric/Behavioral:          See HPI         Compliance: yes        Risk Parameters:  Patient reports no suicidal ideation  Patient reports no homicidal ideation  Patient reports no self-injurious behavior  Patient reports no violent behavior    Exam (detailed: at least 9 elements; comprehensive: all 15 elements)     Constitutional  Vitals:  Most recent vital signs, dated less than 90 days prior to this  appointment, were reviewed.   There were no vitals filed for this visit.         Musculoskeletal  Muscle Strength/Tone:  not examined - TEN due to virtual visit   Gait & Station:  TEN due to virtual visit     Psychiatric      Appearance:  unremarkable, age appropriate, well nourished, casually dressed, neatly groomed, overweight   Behavior:  normal, friendly and cooperative, eye contact normal     Speech:  no latency; no press   Mood & Affect:  euthymic  congruent and appropriate   Thought Process:  normal and logical   Associations:  intact   Thought Content:  normal, no suicidality, no homicidality, delusions, or paranoia   Insight:  intact, has awareness of illness   Judgement: behavior is adequate to circumstances, age appropriate   Orientation:  grossly intact   Memory: intact for content of interview   Language: grossly intact   Attention Span & Concentration:  able to focus   Fund of Knowledge:  intact and appropriate to age and level of education       Medications:  Outpatient Encounter Medications as of 2023   Medication Sig Dispense Refill    acetylcysteine (N-ACETYL-L-CYSTEINE MISC) by Misc.(Non-Drug; Combo Route) route.      ARIPiprazole (ABILIFY) 10 MG Tab Take 1 tablet (10 mg total) by mouth once daily. 90 tablet 0    calcium carbonate 400 mg calcium (1,000 mg) Chew Take by mouth.      cetirizine (ZYRTEC) 10 MG tablet Take 1 tablet (10 mg total) by mouth once daily. (Patient not taking: Reported on 2023) 30 tablet 1    clotrimazole-betamethasone 1-0.05% (LOTRISONE) cream Apply topically 2 (two) times daily. 15 g 0    [] dicyclomine (BENTYL) 10 MG capsule Take 1 capsule (10 mg total) by mouth 4 (four) times daily as needed (stomach spasms). 120 capsule 1    drospirenone-ethinyl estradioL (SARAH) 3-0.02 mg per tablet Take 1 tablet by mouth once daily. 90 tablet 3    FLUoxetine 40 MG capsule Take 1 capsule (40 mg total) by mouth once daily. 90 capsule 0    fluticasone propionate (FLONASE)  50 mcg/actuation nasal spray 1 spray (50 mcg total) by Each Nostril route once daily. 9.9 mL 1    hydroquinone 8 % Emul Compound hydroquinone 12% / kojic acid 6% / vitamin C 1% / niacinamide 2% cream. Apply a pea-sized amount to entire face qhs. Do not use for longer than 16 weeks in a row. 30 g 1    ibuprofen (ADVIL,MOTRIN) 400 MG tablet Take 400 mg by mouth every 6 (six) hours as needed for Other.      iron, carbonyl 25 mg iron Tab Take by mouth. Iron supplement      Lactobacillus acidophilus (PROBIOTIC ORAL) Take by mouth.      levothyroxine (SYNTHROID) 50 MCG tablet Take 1 tab Monday-Saturday and 2 tabs on Sunday 102 tablet 3    magnesium 30 mg Tab Take by mouth once.      multivitamin/iron/folic acid (MULTI COMPLETE WITH IRON ORAL) Take by mouth.      omeprazole (PRILOSEC) 40 MG capsule Take 1 capsule (40 mg total) by mouth Daily. 90 capsule 1    propranoloL (INDERAL) 20 MG tablet Take 1 tablet (20 mg total) by mouth 2 (two) times daily as needed (anxiety). 60 tablet 2    spironolactone (ALDACTONE) 50 MG tablet TAKE 1 TABLET BY MOUTH DAILY 90 tablet 3    tretinoin (RETIN-A) 0.025 % cream Apply topically every evening. 20 g 2    zolpidem (AMBIEN CR) 12.5 MG CR tablet Take 1 tablet (12.5 mg total) by mouth nightly as needed for Insomnia. 30 tablet 3    [DISCONTINUED] drospirenone-ethinyl estradioL (SARAH) 3-0.02 mg per tablet Take 1 tablet by mouth once daily. 90 tablet 3    [DISCONTINUED] spironolactone (ALDACTONE) 50 MG tablet TAKE 1 TABLET BY MOUTH DAILY 90 tablet 3     No facility-administered encounter medications on file as of 11/6/2023.       Allergy:  Review of patient's allergies indicates:  No Known Allergies      Assessment and Diagnosis   Status/Progress: Based on the examination today, the patient's problem(s) is/are well controlled.  New problems have not been presented today.   Co-morbidities are not complicating management of the primary condition.        General Impression:         ICD-10-CM  ICD-9-CM   1. Bipolar disorder in partial remission, most recent episode unspecified type  F31.70 296.80   2. Generalized anxiety disorder  F41.1 300.02   3. Insomnia, unspecified type  G47.00 780.52         R/O  OCPD  Borderline personality d/o  OCD  ADHD      Intervention/Counseling/Treatment Plan     Medication Management:  Continue Abilify 10 mg by mouth once daily  Continue Prozac 40 mg by mouth once daily  Continue propranolol 20 mg by mouth twice daily as needed for anxiety related to driving  Continue Ambien CR 12.5 mg by mouth once nightly prn insomnia. Pt was told not drive or to take this med with ETOH or other sedating meds/substance. Pt verbalized understanding.  Next AIMS due 2/2024  Continue with individual therapy   Labs: reviewed most recent  The treatment plan and follow up plan were reviewed with the patient.  Discussed with patient informed consent, risks vs. benefits, alternative treatments, side effect profile and the inherent unpredictability of individual responses to these treatments. The patient expresses understanding of the above and displays the capacity to agree with this current plan and had no other questions.  Encouraged Patient to keep future appointments.   Take medications as prescribed and abstain from substance abuse.   Pt was told to present to ED or call 911 for SI/HI plan or intent, psychosis, or other psychiatric or medical emergency, and pt agrees to this and verbalized understanding.      Return to Clinic: 3 months, or sooner if needed        Face to Face time with patient: 20 minutes  Total time: 30 minutes of total time spent on the encounter, which includes face to face time and non-face to face time preparing to see the patient (eg, review of tests), Obtaining and/or reviewing separately obtained history, Documenting clinical information in the electronic or other health record, Independently interpreting results (not separately reported) and communicating results to  the patient/family/caregiver, or Care coordination (not separately reported).       Linda Stephens, MSN, APRN, PMHNP-BC  [facility]

## 2023-11-28 ENCOUNTER — TELEPHONE (OUTPATIENT)
Dept: GASTROENTEROLOGY | Facility: CLINIC | Age: 43
End: 2023-11-28
Payer: COMMERCIAL

## 2023-11-28 NOTE — TELEPHONE ENCOUNTER
----- Message from Lyndsay Paredes sent at 11/28/2023  8:14 AM CST -----  Regarding: cancel procedure  Contact: patient  Type: Needs Medical Advice  Who Called:  patient  Symptoms (please be specific):  called into work  How long has patient had these symptoms:    Pharmacy name and phone #:    Best Call Back Number: [phone]     Additional Information: call to r/s thanks!

## 2023-12-05 ENCOUNTER — OFFICE VISIT (OUTPATIENT)
Dept: PODIATRY | Facility: CLINIC | Age: 43
End: 2023-12-05
Payer: COMMERCIAL

## 2023-12-05 VITALS — BODY MASS INDEX: 26.85 KG/M2 | HEIGHT: 59 IN | WEIGHT: 133.19 LBS

## 2023-12-05 DIAGNOSIS — M72.2 PLANTAR FASCIITIS: Primary | ICD-10-CM

## 2023-12-05 DIAGNOSIS — M79.671 RIGHT FOOT PAIN: ICD-10-CM

## 2023-12-05 PROCEDURE — 99203 PR OFFICE/OUTPT VISIT, NEW, LEVL III, 30-44 MIN: ICD-10-PCS | Mod: S$GLB,,, | Performed by: PODIATRIST

## 2023-12-05 PROCEDURE — 1159F MED LIST DOCD IN RCRD: CPT | Mod: CPTII,S$GLB,, | Performed by: PODIATRIST

## 2023-12-05 PROCEDURE — 99203 OFFICE O/P NEW LOW 30 MIN: CPT | Mod: S$GLB,,, | Performed by: PODIATRIST

## 2023-12-05 PROCEDURE — 3044F PR MOST RECENT HEMOGLOBIN A1C LEVEL <7.0%: ICD-10-PCS | Mod: CPTII,S$GLB,, | Performed by: PODIATRIST

## 2023-12-05 PROCEDURE — 1160F PR REVIEW ALL MEDS BY PRESCRIBER/CLIN PHARMACIST DOCUMENTED: ICD-10-PCS | Mod: CPTII,S$GLB,, | Performed by: PODIATRIST

## 2023-12-05 PROCEDURE — 3008F BODY MASS INDEX DOCD: CPT | Mod: CPTII,S$GLB,, | Performed by: PODIATRIST

## 2023-12-05 PROCEDURE — 99999 PR PBB SHADOW E&M-EST. PATIENT-LVL IV: CPT | Mod: PBBFAC,,, | Performed by: PODIATRIST

## 2023-12-05 PROCEDURE — 1159F PR MEDICATION LIST DOCUMENTED IN MEDICAL RECORD: ICD-10-PCS | Mod: CPTII,S$GLB,, | Performed by: PODIATRIST

## 2023-12-05 PROCEDURE — 3008F PR BODY MASS INDEX (BMI) DOCUMENTED: ICD-10-PCS | Mod: CPTII,S$GLB,, | Performed by: PODIATRIST

## 2023-12-05 PROCEDURE — 99999 PR PBB SHADOW E&M-EST. PATIENT-LVL IV: ICD-10-PCS | Mod: PBBFAC,,, | Performed by: PODIATRIST

## 2023-12-05 PROCEDURE — 3044F HG A1C LEVEL LT 7.0%: CPT | Mod: CPTII,S$GLB,, | Performed by: PODIATRIST

## 2023-12-05 PROCEDURE — 1160F RVW MEDS BY RX/DR IN RCRD: CPT | Mod: CPTII,S$GLB,, | Performed by: PODIATRIST

## 2023-12-05 RX ORDER — METHYLPREDNISOLONE 4 MG/1
TABLET ORAL
Qty: 21 EACH | Refills: 0 | Status: SHIPPED | OUTPATIENT
Start: 2023-12-05 | End: 2023-12-26

## 2023-12-05 NOTE — PROGRESS NOTES
"  [address] [facility] [place] [place] [address]  Phone: [phone]   [phone]    Patient's PCP:Tory, Primary Doctor  Referring Provider: Aaarefroula Self    Subjective:      Chief Complaint:: Heel Pain (Bilateral, worse in right, travels into the arch)    RADHA Rivas is a 43 y.o. female who presents today with a complaint of bilateral heel pain, worse in right, travels into the arch. The current episode started about 2 moths ago, has gotten worse in the last 2 days.  The symptoms include stabbing pain and pressure. Probable cause of complaint unknown .  The symptoms are aggravated by first few steps getting up and prolonged walking and standing . The problem has worsened. Treatment to date have included soaked with epsom and elevation, otc pain medication  which provided some relief.       Vitals:    12/05/23 1118   Weight: 60.4 kg (133 lb 2.5 oz)   Height: 4' 11" (1.499 m)   PainSc:   7      Shoe Size: 5-5.5    Past Surgical History:   Procedure Laterality Date    COLONOSCOPY      2016    DILATION AND CURETTAGE OF UTERUS      Miscarriage at age 25     Past Medical History:   Diagnosis Date    Arthritis     Fibromyalgia     Gastroenteritis     GERD (gastroesophageal reflux disease)     Long-term use of Plaquenil 12/07/2017     Family History   Problem Relation Age of Onset    Arthritis Mother     No Known Problems Father     Thyroid disease Maternal Aunt     Arthritis Maternal Grandmother     Arthritis Maternal Grandfather     Throat cancer Paternal Grandfather     Melanoma Neg Hx     Colon cancer Neg Hx     Crohn's disease Neg Hx     Esophageal cancer Neg Hx     Liver cancer Neg Hx     Rectal cancer Neg Hx     Stomach cancer Neg Hx     Ulcerative colitis Neg Hx         Social History:   Marital Status:   Alcohol History:  reports no history of alcohol use.  Tobacco History:  reports that she has never smoked. She has never used smokeless tobacco.  Drug History:  reports no history of drug " use.    Review of patient's allergies indicates:  No Known Allergies    Current Outpatient Medications   Medication Sig Dispense Refill    acetylcysteine (N-ACETYL-L-CYSTEINE MISC) by Misc.(Non-Drug; Combo Route) route.      ARIPiprazole (ABILIFY) 10 MG Tab Take 1 tablet (10 mg total) by mouth once daily. 90 tablet 0    calcium carbonate 400 mg calcium (1,000 mg) Chew Take by mouth.      cetirizine (ZYRTEC) 10 MG tablet Take 1 tablet (10 mg total) by mouth once daily. (Patient not taking: Reported on 9/25/2023) 30 tablet 1    clotrimazole-betamethasone 1-0.05% (LOTRISONE) cream Apply topically 2 (two) times daily. 15 g 0    drospirenone-ethinyl estradioL (SARAH) 3-0.02 mg per tablet Take 1 tablet by mouth once daily. 90 tablet 3    FLUoxetine 40 MG capsule Take 1 capsule (40 mg total) by mouth once daily. 90 capsule 0    fluticasone propionate (FLONASE) 50 mcg/actuation nasal spray 1 spray (50 mcg total) by Each Nostril route once daily. 9.9 mL 1    hydroquinone 8 % Emul Compound hydroquinone 12% / kojic acid 6% / vitamin C 1% / niacinamide 2% cream. Apply a pea-sized amount to entire face qhs. Do not use for longer than 16 weeks in a row. 30 g 1    ibuprofen (ADVIL,MOTRIN) 400 MG tablet Take 400 mg by mouth every 6 (six) hours as needed for Other.      iron, carbonyl 25 mg iron Tab Take by mouth. Iron supplement      Lactobacillus acidophilus (PROBIOTIC ORAL) Take by mouth.      levothyroxine (SYNTHROID) 50 MCG tablet Take 1 tab Monday-Saturday and 2 tabs on Sunday 102 tablet 3    magnesium 30 mg Tab Take by mouth once.      methylPREDNISolone (MEDROL DOSEPACK) 4 mg tablet use as directed 21 each 0    multivitamin/iron/folic acid (MULTI COMPLETE WITH IRON ORAL) Take by mouth.      omeprazole (PRILOSEC) 40 MG capsule Take 1 capsule (40 mg total) by mouth Daily. 90 capsule 1    propranoloL (INDERAL) 20 MG tablet Take 1 tablet (20 mg total) by mouth 2 (two) times daily as needed (anxiety). 180 tablet 0    spironolactone  (ALDACTONE) 50 MG tablet TAKE 1 TABLET BY MOUTH DAILY 90 tablet 3    tretinoin (RETIN-A) 0.025 % cream Apply topically every evening. 20 g 2    zolpidem (AMBIEN CR) 12.5 MG CR tablet Take 1 tablet (12.5 mg total) by mouth nightly as needed for Insomnia. 30 tablet 3     No current facility-administered medications for this visit.       Review of Systems   Constitutional:  Negative for chills, fatigue, fever and unexpected weight change.   HENT:  Negative for hearing loss and trouble swallowing.    Eyes:  Negative for photophobia and visual disturbance.   Respiratory:  Negative for cough, shortness of breath and wheezing.    Cardiovascular:  Negative for chest pain, palpitations and leg swelling.   Gastrointestinal:  Negative for abdominal pain and nausea.   Genitourinary:  Negative for dysuria and frequency.   Musculoskeletal:  Negative for arthralgias, back pain, gait problem, joint swelling, myalgias and neck pain.   Skin:  Negative for rash and wound.   Neurological:  Negative for tremors, seizures, weakness, numbness and headaches.   Hematological:  Does not bruise/bleed easily.         Objective:        Physical Exam:   Foot Exam    General  General Appearance: appears stated age and healthy   Orientation: alert and oriented to person, place, and time   Affect: appropriate   Gait: antalgic       Right Foot/Ankle     Inspection and Palpation  Ecchymosis: none  Tenderness: plantar fascia   Swelling: none   Arch: normal  Skin Exam: skin intact; no drainage, no ulcer and no erythema   Neurovascular  Dorsalis pedis: 2+  Posterior tibial: 2+  Capillary Refill: 2+  Varicose veins: not present  Saphenous nerve sensation: normal  Tibial nerve sensation: normal  Superficial peroneal nerve sensation: normal  Deep peroneal nerve sensation: normal  Sural nerve sensation: normal    Edema  Type of edema: non-pitting    Muscle Strength  Ankle dorsiflexion: 5  Ankle plantar flexion: 5  Ankle inversion: 5  Ankle eversion:  5  Great toe extension: 5  Great toe flexion: 5    Range of Motion    Passive  Ankle dorsiflexion: 5      Tests  Anterior drawer: negative   Talar tilt: negative   PT Tinel's sign: negative    Paresthesia: negative  Comments  Pain on palpation of the infeior medial heel and central plantar heel. No pain present  with side to side compression of the calcaneus. Negative tinnel's sign  at the tarsal tunnel. Negative Gardner's nerve pain. Negative Calcaneal nerve pain. No soft tissue masses. Pain absent  with dorsiflexion of the ankle. No edema, erythema, or ecchymosis noted.           Physical Exam  Cardiovascular:      Pulses:           Dorsalis pedis pulses are 2+ on the right side.        Posterior tibial pulses are 2+ on the right side.   Feet:      Right foot:      Skin integrity: No ulcer or erythema.               Right Ankle/Foot Exam     Comments:  Pain on palpation of the infeior medial heel and central plantar heel. No pain present  with side to side compression of the calcaneus. Negative tinnel's sign  at the tarsal tunnel. Negative Gardner's nerve pain. Negative Calcaneal nerve pain. No soft tissue masses. Pain absent  with dorsiflexion of the ankle. No edema, erythema, or ecchymosis noted.           Muscle Strength   Right Lower Extremity   Ankle Dorsiflexion:  5   Plantar flexion:  5/5    Vascular Exam     Right Pulses  Dorsalis Pedis:      2+  Posterior Tibial:      2+           Imaging: none            Assessment:       1. Plantar fasciitis    2. Right foot pain      Plan:   Plantar fasciitis  -     methylPREDNISolone (MEDROL DOSEPACK) 4 mg tablet; use as directed  Dispense: 21 each; Refill: 0    Right foot pain  -     methylPREDNISolone (MEDROL DOSEPACK) 4 mg tablet; use as directed  Dispense: 21 each; Refill: 0      Follow up if symptoms worsen or fail to improve.    Procedures        Discussed different treatment options for heel pain. I gave written and verbal instructions on heel cord stretching and  this was demonstrated for the patient. Patient expressed understanding. Discussed wearing appropriate shoe gear and avoiding flats, slippers, sandals, barefoot, and sockfeet. Recommended arch supports. My recommendation for OTC supports is Spenco polysorb replacement insoles or patient may elect more aggressive treatment with prescription arch supports. We also discussed cortisone injections and NSAID therapy.       Counseling:     I provided patient education verbally regarding:   Patient diagnosis, treatment options, as well as alternatives, risks, and benefits.     This note was created using Dragon voice recognition software that occasionally misinterpreted phrases or words.

## 2023-12-05 NOTE — LETTER
December 5, 2023      [facility]  [address]  [facility] [address]  Phone: [phone]  Fax: [phone]       Patient: Roopa Rivas   YOB: 1980  Date of Visit: 12/05/2023    To Whom It May Concern:    Omar Rivas  was at [facility] on 12/05/2023. The patient may return to work on 12/06/2023 with no restrictions. Please allow patient to wear tennis shoes while at work. If you have any questions or concerns, or if I can be of further assistance, please do not hesitate to contact me.    Sincerely, Electronically Signed by: FRANCISCO Spaulding MA

## 2023-12-06 NOTE — PATIENT INSTRUCTIONS

## 2023-12-11 ENCOUNTER — TELEPHONE (OUTPATIENT)
Dept: GASTROENTEROLOGY | Facility: CLINIC | Age: 43
End: 2023-12-11
Payer: COMMERCIAL

## 2023-12-11 NOTE — TELEPHONE ENCOUNTER
----- Message from Tamra Tobin sent at 12/11/2023  9:05 AM CST -----  Contact: self  Type: Sooner Appointment Request        Caller is requesting a sooner appointment. Caller declined first available appointment listed below. Caller will not accept being placed on the waitlist and is requesting a message be sent to doctor.        Name of Caller: Patient   Best Call Back Number: [phone]  Additional Information: Pt states she will be out of town has to cancel procedure. Plz call to confirm cancellation.

## 2023-12-11 NOTE — TELEPHONE ENCOUNTER
Call placed to Ms. Pereira in regards to message received. No answer, left message to return call.

## 2024-02-02 DIAGNOSIS — F31.70 BIPOLAR DISORDER IN PARTIAL REMISSION, MOST RECENT EPISODE UNSPECIFIED TYPE: ICD-10-CM

## 2024-02-02 RX ORDER — ARIPIPRAZOLE 10 MG/1
10 TABLET ORAL DAILY
Qty: 30 TABLET | Refills: 0 | Status: SHIPPED | OUTPATIENT
Start: 2024-02-02 | End: 2024-02-07 | Stop reason: SDUPTHER

## 2024-02-07 ENCOUNTER — OFFICE VISIT (OUTPATIENT)
Dept: PSYCHIATRY | Facility: CLINIC | Age: 44
End: 2024-02-07
Payer: COMMERCIAL

## 2024-02-07 DIAGNOSIS — F31.70 BIPOLAR DISORDER IN PARTIAL REMISSION, MOST RECENT EPISODE UNSPECIFIED TYPE: Primary | ICD-10-CM

## 2024-02-07 DIAGNOSIS — G47.00 INSOMNIA, UNSPECIFIED TYPE: ICD-10-CM

## 2024-02-07 DIAGNOSIS — F41.1 GENERALIZED ANXIETY DISORDER: ICD-10-CM

## 2024-02-07 PROCEDURE — 90833 PSYTX W PT W E/M 30 MIN: CPT | Mod: 95,,, | Performed by: NURSE PRACTITIONER

## 2024-02-07 PROCEDURE — 99214 OFFICE O/P EST MOD 30 MIN: CPT | Mod: 95,,, | Performed by: NURSE PRACTITIONER

## 2024-02-07 PROCEDURE — 1160F RVW MEDS BY RX/DR IN RCRD: CPT | Mod: CPTII,95,, | Performed by: NURSE PRACTITIONER

## 2024-02-07 PROCEDURE — 1159F MED LIST DOCD IN RCRD: CPT | Mod: CPTII,95,, | Performed by: NURSE PRACTITIONER

## 2024-02-07 RX ORDER — ZOLPIDEM TARTRATE 12.5 MG/1
12.5 TABLET, FILM COATED, EXTENDED RELEASE ORAL NIGHTLY PRN
Qty: 30 TABLET | Refills: 3 | Status: SHIPPED | OUTPATIENT
Start: 2024-02-07 | End: 2024-06-06

## 2024-02-07 RX ORDER — FLUOXETINE HYDROCHLORIDE 40 MG/1
40 CAPSULE ORAL DAILY
Qty: 90 CAPSULE | Refills: 0 | Status: SHIPPED | OUTPATIENT
Start: 2024-02-07 | End: 2024-05-07 | Stop reason: SDUPTHER

## 2024-02-07 RX ORDER — ARIPIPRAZOLE 10 MG/1
10 TABLET ORAL DAILY
Qty: 90 TABLET | Refills: 0 | Status: SHIPPED | OUTPATIENT
Start: 2024-02-07 | End: 2024-05-07 | Stop reason: SDUPTHER

## 2024-02-07 NOTE — PROGRESS NOTES
"Outpatient Psychiatry Follow-Up Visit (MD/NP) - Telemedicine Visit    2/7/2024 11:39 AM  Roopa Rivas  1980  [id number]        The patient location is: Patient reported that their location at the time of this visit was in the [facility]     Visit type: audiovisual    Each patient to whom he or she provides medical services by telemedicine is:  (1) informed of the relationship between the physician and patient and the respective role of any other health care provider with respect to management of the patient; and (2) notified that he or she may decline to receive medical services by telemedicine and may withdraw from such care at any time.      Clinical Status of Patient:  Outpatient (Ambulatory)    Chief Complaint:  Roopa Rivas, a 43 y.o. female,who presents today for follow up of mood swings and anxiety.  Met with patient.        Interval History/Subjective Report/Content of Current Session:     Today the pt reports she is doing ok. She denies depressed mood, amotivation, anhedonia, and hopelessness.  She states that she "feels sad, not depressed" as she is grieving the loss of good friend to stomach cancer.  She feels guilty that she didn't get to say goodbye to him because she didn't realize how sick he was. He was very optimistic. She plans to go visit his mother.  Her 26 year daughter and her 5 month old son are now living with the pt and her . 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 She had to go to [place] twice to get her daughter. The first time she didn't want to come to [place].   Pt has not been sleeping well. Feels this is 2/2 stress. Feels she will sleep better once she is "over this hump". She is out of her routine.   Uses propranolol prn.    Denies sxs of rufus and hypomania.    ASEs from psych meds: denies    Pt denies recurrent thoughts of death and denies SI/HI. Denies AVH, paranoia and delusions. No objective s/sx of psychosis or rufus.     Patient verbalized " motivation for compliance with medications and all other elements of treatment plan.         Psychotherapy:  Target symptoms: anxiety , mood swings  Why chosen therapy is appropriate versus another modality: relevant to diagnosis, patient responds to this modality  Outcome monitoring methods: self-report, observation  Therapeutic intervention type: supportive psychotherapy  Topics discussed/themes: illness/death of a loved one, building skills sets for symptom management  The patient's response to the intervention is accepting. The patient's progress toward treatment goals is good.   Duration of intervention: 17 minutes.      Psychotropic medication review  Previous Trials-  Seroquel - severe dry eyes, hurt to blink, no peripheral vision, memory loss  Lexapro - early 2000s, stopped taking due to insurance  Cymbalta      Current meds-  Prozac  Abilify  Propranolol  Ambien CR    History:       Past Medical, Psychiatric, Family and Social History: The patient's past medical, psychiatric, family and social history, allergies, current medications, past surgical history, and problem list have been reviewed and updated as appropriate within the electronic medical record.         Additional historical information includes:   Past psych hospitalizations: denies  Past suicide attempts: denies    Seizure: denies  Head trauma/TBI: denies  Access to a firearm: yes -  Pt was instructed to keep the firearm in a safe, locked container and to store the bullets separately.      Review of Systems       Review of Systems   Constitutional:  Negative for chills, fever and malaise/fatigue.   Respiratory:  Negative for cough and shortness of breath.    Cardiovascular:  Negative for chest pain and palpitations.   Gastrointestinal:  Negative for abdominal pain, diarrhea and vomiting.   Genitourinary:  Negative for dysuria and hematuria.   Musculoskeletal:  Negative for falls and myalgias.   Skin:  Negative for rash.   Neurological:  Negative  for tremors, seizures and headaches.   Psychiatric/Behavioral:          See HPI         Compliance: yes        Risk Parameters:  Patient reports no suicidal ideation  Patient reports no homicidal ideation  Patient reports no self-injurious behavior  Patient reports no violent behavior    Exam (detailed: at least 9 elements; comprehensive: all 15 elements)     Constitutional  Vitals:  Most recent vital signs, dated less than 90 days prior to this appointment, were reviewed.   There were no vitals filed for this visit.         Musculoskeletal  Muscle Strength/Tone:  not examined - TEN due to virtual visit   Gait & Station:  TEN due to virtual visit     Psychiatric      Appearance:  unremarkable, age appropriate, well nourished, casually dressed, neatly groomed, overweight   Behavior:  normal, friendly and cooperative, eye contact normal     Speech:  no latency; no press   Mood & Affect:  euthymic  congruent and appropriate   Thought Process:  normal and logical   Associations:  intact   Thought Content:  normal, no suicidality, no homicidality, delusions, or paranoia   Insight:  intact, has awareness of illness   Judgement: behavior is adequate to circumstances, age appropriate   Orientation:  grossly intact   Memory: intact for content of interview   Language: grossly intact   Attention Span & Concentration:  able to focus   Fund of Knowledge:  intact and appropriate to age and level of education       Medications:  Outpatient Encounter Medications as of 2/7/2024   Medication Sig Dispense Refill    acetylcysteine (N-ACETYL-L-CYSTEINE MISC) by Misc.(Non-Drug; Combo Route) route.      ARIPiprazole (ABILIFY) 10 MG Tab Take 1 tablet (10 mg total) by mouth once daily. 30 tablet 0    calcium carbonate 400 mg calcium (1,000 mg) Chew Take by mouth.      cetirizine (ZYRTEC) 10 MG tablet Take 1 tablet (10 mg total) by mouth once daily. (Patient not taking: Reported on 9/25/2023) 30 tablet 1    clotrimazole-betamethasone  1-0.05% (LOTRISONE) cream Apply topically 2 (two) times daily. 15 g 0    drospirenone-ethinyl estradioL (SARAH) 3-0.02 mg per tablet Take 1 tablet by mouth once daily. 90 tablet 3    FLUoxetine 40 MG capsule Take 1 capsule (40 mg total) by mouth once daily. 90 capsule 0    fluticasone propionate (FLONASE) 50 mcg/actuation nasal spray 1 spray (50 mcg total) by Each Nostril route once daily. 9.9 mL 1    hydroquinone 8 % Emul Compound hydroquinone 12% / kojic acid 6% / vitamin C 1% / niacinamide 2% cream. Apply a pea-sized amount to entire face qhs. Do not use for longer than 16 weeks in a row. 30 g 1    ibuprofen (ADVIL,MOTRIN) 400 MG tablet Take 400 mg by mouth every 6 (six) hours as needed for Other.      iron, carbonyl 25 mg iron Tab Take by mouth. Iron supplement      Lactobacillus acidophilus (PROBIOTIC ORAL) Take by mouth.      levothyroxine (SYNTHROID) 50 MCG tablet Take 1 tab Monday-Saturday and 2 tabs on Sunday 102 tablet 3    magnesium 30 mg Tab Take by mouth once.      multivitamin/iron/folic acid (MULTI COMPLETE WITH IRON ORAL) Take by mouth.      omeprazole (PRILOSEC) 40 MG capsule Take 1 capsule (40 mg total) by mouth Daily. 90 capsule 1    propranoloL (INDERAL) 20 MG tablet Take 1 tablet (20 mg total) by mouth 2 (two) times daily as needed (anxiety). 180 tablet 0    spironolactone (ALDACTONE) 50 MG tablet TAKE 1 TABLET BY MOUTH DAILY 90 tablet 3    tretinoin (RETIN-A) 0.025 % cream Apply topically every evening. 20 g 2    zolpidem (AMBIEN CR) 12.5 MG CR tablet Take 1 tablet (12.5 mg total) by mouth nightly as needed for Insomnia. 30 tablet 3    [DISCONTINUED] ARIPiprazole (ABILIFY) 10 MG Tab Take 1 tablet (10 mg total) by mouth once daily. 90 tablet 0     No facility-administered encounter medications on file as of 2/7/2024.       Allergy:  Review of patient's allergies indicates:  No Known Allergies      Assessment and Diagnosis   Status/Progress: Based on the examination today, the patient's problem(s)  is/are inadequately controlled.  New problems have not been presented today.   Co-morbidities are not complicating management of the primary condition.        General Impression:         ICD-10-CM ICD-9-CM   1. Bipolar disorder in partial remission, most recent episode unspecified type  F31.70 296.80   2. Generalized anxiety disorder  F41.1 300.02   3. Insomnia, unspecified type  G47.00 780.52         R/O  OCPD  Borderline personality d/o  OCD  ADHD      Intervention/Counseling/Treatment Plan     Medication Management:  Continue Abilify 10 mg by mouth once daily  Continue Prozac 40 mg by mouth once daily  Continue propranolol 20 mg by mouth twice daily as needed for anxiety related to driving  Continue Ambien CR 12.5 mg by mouth once nightly prn insomnia. Pt was told not drive or to take this med with ETOH or other sedating meds/substance. Pt verbalized understanding.  Continue with individual therapy   Labs: reviewed most recent  The treatment plan and follow up plan were reviewed with the patient.  Discussed with patient informed consent, risks vs. benefits, alternative treatments, side effect profile and the inherent unpredictability of individual responses to these treatments. The patient expresses understanding of the above and displays the capacity to agree with this current plan and had no other questions.  Encouraged Patient to keep future appointments.   Take medications as prescribed and abstain from substance abuse.   Pt was told to present to ED or call 911 for SI/HI plan or intent, psychosis, or other psychiatric or medical emergency, and pt agrees to this and verbalized understanding.      Return to Clinic: 3 months, or sooner if needed        Face to Face time with patient: 27 minutes  Total time: 32 minutes of total time spent on the encounter, which includes face to face time and non-face to face time preparing to see the patient (eg, review of tests), Obtaining and/or reviewing separately obtained  history, Documenting clinical information in the electronic or other health record, Independently interpreting results (not separately reported) and communicating results to the patient/family/caregiver, or Care coordination (not separately reported).       Linda Stephens, MSN, APRN, PMHNP-BC [facility]

## 2024-02-28 DIAGNOSIS — R10.13 EPIGASTRIC ABDOMINAL PAIN: ICD-10-CM

## 2024-02-28 DIAGNOSIS — R10.10 PAIN OF UPPER ABDOMEN: ICD-10-CM

## 2024-02-28 DIAGNOSIS — K21.9 GASTROESOPHAGEAL REFLUX DISEASE, UNSPECIFIED WHETHER ESOPHAGITIS PRESENT: ICD-10-CM

## 2024-02-29 RX ORDER — OMEPRAZOLE 40 MG/1
40 CAPSULE, DELAYED RELEASE ORAL DAILY
Qty: 90 CAPSULE | Refills: 1 | Status: SHIPPED | OUTPATIENT
Start: 2024-02-29 | End: 2024-08-27

## 2024-03-06 ENCOUNTER — OFFICE VISIT (OUTPATIENT)
Dept: FAMILY MEDICINE | Facility: CLINIC | Age: 44
End: 2024-03-06
Payer: COMMERCIAL

## 2024-03-06 VITALS
TEMPERATURE: 98 F | BODY MASS INDEX: 26.54 KG/M2 | OXYGEN SATURATION: 99 % | SYSTOLIC BLOOD PRESSURE: 100 MMHG | DIASTOLIC BLOOD PRESSURE: 64 MMHG | HEIGHT: 59 IN | WEIGHT: 131.63 LBS | HEART RATE: 85 BPM

## 2024-03-06 DIAGNOSIS — F31.32 BIPOLAR AFFECTIVE DISORDER, CURRENTLY DEPRESSED, MODERATE: ICD-10-CM

## 2024-03-06 DIAGNOSIS — G47.01 INSOMNIA DUE TO MEDICAL CONDITION: ICD-10-CM

## 2024-03-06 DIAGNOSIS — Z76.89 ENCOUNTER TO ESTABLISH CARE: Primary | ICD-10-CM

## 2024-03-06 DIAGNOSIS — F41.9 ANXIETY: ICD-10-CM

## 2024-03-06 DIAGNOSIS — E03.8 SUBCLINICAL HYPOTHYROIDISM: ICD-10-CM

## 2024-03-06 PROCEDURE — 3008F BODY MASS INDEX DOCD: CPT | Mod: CPTII,S$GLB,, | Performed by: STUDENT IN AN ORGANIZED HEALTH CARE EDUCATION/TRAINING PROGRAM

## 2024-03-06 PROCEDURE — 3074F SYST BP LT 130 MM HG: CPT | Mod: CPTII,S$GLB,, | Performed by: STUDENT IN AN ORGANIZED HEALTH CARE EDUCATION/TRAINING PROGRAM

## 2024-03-06 PROCEDURE — 1160F RVW MEDS BY RX/DR IN RCRD: CPT | Mod: CPTII,S$GLB,, | Performed by: STUDENT IN AN ORGANIZED HEALTH CARE EDUCATION/TRAINING PROGRAM

## 2024-03-06 PROCEDURE — 3078F DIAST BP <80 MM HG: CPT | Mod: CPTII,S$GLB,, | Performed by: STUDENT IN AN ORGANIZED HEALTH CARE EDUCATION/TRAINING PROGRAM

## 2024-03-06 PROCEDURE — 99214 OFFICE O/P EST MOD 30 MIN: CPT | Mod: S$GLB,,, | Performed by: STUDENT IN AN ORGANIZED HEALTH CARE EDUCATION/TRAINING PROGRAM

## 2024-03-06 PROCEDURE — 1159F MED LIST DOCD IN RCRD: CPT | Mod: CPTII,S$GLB,, | Performed by: STUDENT IN AN ORGANIZED HEALTH CARE EDUCATION/TRAINING PROGRAM

## 2024-03-06 PROCEDURE — 99999 PR PBB SHADOW E&M-EST. PATIENT-LVL V: CPT | Mod: PBBFAC,,, | Performed by: STUDENT IN AN ORGANIZED HEALTH CARE EDUCATION/TRAINING PROGRAM

## 2024-03-06 RX ORDER — DICYCLOMINE HYDROCHLORIDE 10 MG/1
10 CAPSULE ORAL
COMMUNITY
End: 2024-04-03 | Stop reason: SDUPTHER

## 2024-03-06 NOTE — PROGRESS NOTES
[facility] Note    Subjective:  Chief Complaint:   Chief Complaint   Patient presents with    Follow-up    Establish Care       History of Present Illness:  Roopa is here for establishing care   Feels well today     Reports night sweats, vivid dreams x several months   Started after d/c cymbalta   Denies f/c, unexpected weight loss     Bipolar  Aripiprazole 10  Fluoxetine 40   Managed by psychiatry     Anxiety  Propranolol 20 prn     Insomnia  Zolpidem 12.5     Hypothyroid  Levothyroxine 50 M-Sat, 100 Sun     Reflux   Esophageal spasms   Omeprazole 40  Failed discontinuation   EGD needing to be scheduled     Acne  Spironolactone 50     Irregular menses   Contraception: Mone  Recommend COVID vaccinations.     Screening  Health Maintenance         Date Due Completion Date    COVID-19 Vaccine (4 - 2023-24 season) 09/01/2023 11/22/2021    Mammogram 10/06/2024 10/6/2023    Hemoglobin A1c (Diabetic Prevention Screening) 09/06/2026 9/6/2023    Cervical Cancer Screening 10/24/2027 10/24/2022    TETANUS VACCINE 02/08/2029 2/8/2019          Immunization History   Administered Date(s) Administered    COVID-19, MRNA, LN-S, PF (Pfizer) (Purple Cap) 03/30/2021, 04/20/2021, 11/22/2021    Influenza 02/08/2019    Influenza - Quadrivalent - MDCK - PF 11/22/2022    Influenza - Quadrivalent - PF *Preferred* (6 months and older) 02/08/2019, 10/29/2019, 09/08/2020, 10/06/2021    Influenza - Trivalent (ADULT) 10/29/2019    Influenza Split 10/29/2019    Tdap 02/08/2019     The 10-year ASCVD risk score (Aaron ROBERTS, et al., 2019) is: 0.5%    Values used to calculate the score:      Age: 44 years      Sex: Female      Is Non- : No      Diabetic: No      Tobacco smoker: No      Systolic Blood Pressure: 100 mmHg      Is BP treated: No      HDL Cholesterol: 50 mg/dL      Total Cholesterol: 193 mg/dL    Allergies:  Review of patient's allergies indicates:  No Known Allergies    Home Medications:  Current  Outpatient Medications on File Prior to Visit   Medication Sig    acetylcysteine (N-ACETYL-L-CYSTEINE MISC) by Misc.(Non-Drug; Combo Route) route.    ARIPiprazole (ABILIFY) 10 MG Tab Take 1 tablet (10 mg total) by mouth once daily.    calcium carbonate 400 mg calcium (1,000 mg) Chew Take by mouth.    cetirizine (ZYRTEC) 10 MG tablet Take 1 tablet (10 mg total) by mouth once daily.    clotrimazole-betamethasone 1-0.05% (LOTRISONE) cream Apply topically 2 (two) times daily.    dicyclomine (BENTYL) 10 MG capsule Take 10 mg by mouth 4 (four) times daily before meals and nightly.    drospirenone-ethinyl estradioL (SARAH) 3-0.02 mg per tablet Take 1 tablet by mouth once daily.    FLUoxetine 40 MG capsule Take 1 capsule (40 mg total) by mouth once daily.    fluticasone propionate (FLONASE) 50 mcg/actuation nasal spray 1 spray (50 mcg total) by Each Nostril route once daily.    hydroquinone 8 % Emul Compound hydroquinone 12% / kojic acid 6% / vitamin C 1% / niacinamide 2% cream. Apply a pea-sized amount to entire face qhs. Do not use for longer than 16 weeks in a row.    ibuprofen (ADVIL,MOTRIN) 400 MG tablet Take 400 mg by mouth every 6 (six) hours as needed for Other.    Lactobacillus acidophilus (PROBIOTIC ORAL) Take by mouth.    levothyroxine (SYNTHROID) 50 MCG tablet Take 1 tab Monday-Saturday and 2 tabs on Sunday    multivitamin/iron/folic acid (MULTI COMPLETE WITH IRON ORAL) Take by mouth.    omeprazole (PRILOSEC) 40 MG capsule Take 1 capsule (40 mg total) by mouth Daily.    propranoloL (INDERAL) 20 MG tablet Take 1 tablet (20 mg total) by mouth 2 (two) times daily as needed (anxiety).    spironolactone (ALDACTONE) 50 MG tablet TAKE 1 TABLET BY MOUTH DAILY    tretinoin (RETIN-A) 0.025 % cream Apply topically every evening.    zolpidem (AMBIEN CR) 12.5 MG CR tablet Take 1 tablet (12.5 mg total) by mouth nightly as needed for Insomnia.    iron, carbonyl 25 mg iron Tab Take by mouth. Iron supplement    magnesium 30 mg Tab  Take by mouth once.     No current facility-administered medications on file prior to visit.       Past Medical History:   Diagnosis Date    Arthritis     Fibromyalgia     Gastroenteritis     GERD (gastroesophageal reflux disease)     Long-term use of Plaquenil 12/07/2017     Past Surgical History:   Procedure Laterality Date    COLONOSCOPY      2016    DILATION AND CURETTAGE OF UTERUS      Miscarriage at age 25     Family History   Problem Relation Age of Onset    Arthritis Mother     No Known Problems Father     Thyroid disease Maternal Aunt     Arthritis Maternal Grandmother     Arthritis Maternal Grandfather     Throat cancer Paternal Grandfather     Melanoma Neg Hx     Colon cancer Neg Hx     Crohn's disease Neg Hx     Esophageal cancer Neg Hx     Liver cancer Neg Hx     Rectal cancer Neg Hx     Stomach cancer Neg Hx     Ulcerative colitis Neg Hx      Social History     Tobacco Use    Smoking status: Never    Smokeless tobacco: Never   Substance Use Topics    Alcohol use: No    Drug use: No            The patient's past medical history, surgical history, social history, family history, allergies and medications have been reviewed.    Review of Systems     10 point review of systems was conducted and only the pertinent positives and pertinent negatives are noted above in the HPI section.    Physical Examination  General appearance: alert, cooperative, no distress  HEENT: normocephalic, atraumatic, PERRLA   Neck: trachea midline, no LAD, no thyromegaly, no neck stiffness  Lungs: clear to auscultation, no wheezes, rales or rhonchi, symmetric air entry  Heart: normal rate, regular rhythm, normal S1, S2, no murmurs, rubs, clicks or gallops  Abdomen: soft, nontender, nondistended, no rigidity, rebound, or guarding.   Skin: No rashes or abnormal skin lesions, no apparent jaundice or bruising  Extremities: Full ROM of all extremities, peripheral pulses normal, no unilateral leg swelling or calf tenderness  "  Neurological:alert, oriented, normal speech, no new focal findings or movement disorder noted from baseline      BP Readings from Last 3 Encounters:   03/06/24 100/64   10/26/23 108/70   09/25/23 110/71     Wt Readings from Last 3 Encounters:   03/06/24 59.7 kg (131 lb 9.8 oz)   12/05/23 60.4 kg (133 lb 2.5 oz)   10/26/23 60.7 kg (133 lb 13.1 oz)     /64 (BP Location: Left arm, Patient Position: Sitting, BP Method: Medium (Manual))   Pulse 85   Temp 97.9 °F (36.6 °C) (Oral)   Ht 4' 11" (1.499 m)   Wt 59.7 kg (131 lb 9.8 oz)   SpO2 99%   BMI 26.58 kg/m²       274}  Laboratory: I have reviewed old labs below:       274}    Lab Results   Component Value Date    WBC 7.82 09/06/2023    HGB 11.9 (L) 09/06/2023    HCT 36.9 (L) 09/06/2023    MCV 94 09/06/2023     09/06/2023     09/06/2023    K 4.8 09/06/2023     09/06/2023    CALCIUM 9.1 09/06/2023    CO2 20 (L) 09/06/2023    GLU 90 09/06/2023    BUN 12 09/06/2023    CREATININE 0.9 09/06/2023    EGFRNORACEVR >60.0 09/06/2023    ANIONGAP 10 09/06/2023    PROT 7.4 09/06/2023    ALBUMIN 3.7 09/06/2023    BILITOT 0.2 09/06/2023    ALKPHOS 94 09/06/2023    ALT 19 09/06/2023    AST 12 09/06/2023    CHOL 193 09/06/2023    TRIG 138 09/06/2023    HDL 50 09/06/2023    LDLCALC 115.4 09/06/2023    TSH 1.566 08/04/2023    HGBA1C 5.2 09/06/2023      Lab reviewed by me: Particular labs of significance that I will monitor, workup, or treat to improve are mentioned below in diagnostic impression remarks.    Imaging/EKG: I have reviewed the pertinent results and my findings are noted in remarks.     274}    CC:   Chief Complaint   Patient presents with    Follow-up    Establish Care           274}    Assessment/Plan  Roopa Rivas is a 44 y.o. female who presents to clinic with:  1. Encounter to establish care    2. Bipolar affective disorder, currently depressed, moderate    3. Anxiety    4. Insomnia due to medical condition    5. Subclinical hypothyroidism "          274}  Diagnostic Impression Remarks       44 y.o. female who presents to clinic today for est care    This is the extent of this pleasant patient's concerns at this present time.  I also discussed the importance of close follow up to discuss labs, change or modify her medications if needed, monitor side effects, and further evaluation of medical problems.     Additional workup planned: see labs ordered below.    See below for labs and meds ordered with associated diagnosis      1. Encounter to establish care    2. Bipolar affective disorder, currently depressed, moderate  Stable. Managed by psych. Discussed possibility of swapping fluoxetine with venlafaxine for perimenopausal symptoms     3. Anxiety  Stable. Continue management with psych     4. Insomnia due to medical condition  Stable. Continue regimen     5. Subclinical hypothyroidism  Stable. Labs due in Sept       RT annually or PRN     [place]iana Kaba MD, [facility]   Family Medicine Physician  03/06/2024      If you are due for any health screening(s) below please notify me so we can arrange them to be ordered and scheduled to maintain your health.     You are up to date for your primary preventive health care, and there are no reminders at this time.                    The following information is provided to all patients.  This information is to help you find resources for any of the problems found today that may be affecting your health:                Living healthy guide: www.[facility].[place].gov       Understanding Diabetes: www.diabetes.org       Eating healthy: www.cdc.gov/healthyweight      CDC home safety checklist: www.cdc.gov/steadi/patient.html      Agency on Aging: www.goea.[place].[facility]       Alcoholics anonymous (AA): www.aa.org      Physical Activity: www.[place].nih.gov/ez8pctw       Tobacco use: www.quitwithusla.org

## 2024-03-06 NOTE — PATIENT INSTRUCTIONS
Willis Blas,     If you are due for any health screening(s) below please notify me so we can arrange them to be ordered and scheduled. Most healthy patients at your age complete them, but you are free to accept or refuse.     If you can't do it, I'll definitely understand. If you can, I'd certainly appreciate it!    All of your core healthy metrics are met.

## 2024-04-03 ENCOUNTER — OFFICE VISIT (OUTPATIENT)
Dept: GASTROENTEROLOGY | Facility: CLINIC | Age: 44
End: 2024-04-03
Payer: COMMERCIAL

## 2024-04-03 VITALS
WEIGHT: 130.94 LBS | SYSTOLIC BLOOD PRESSURE: 111 MMHG | BODY MASS INDEX: 26.45 KG/M2 | DIASTOLIC BLOOD PRESSURE: 67 MMHG | HEART RATE: 81 BPM

## 2024-04-03 DIAGNOSIS — R10.10 PAIN OF UPPER ABDOMEN: Primary | ICD-10-CM

## 2024-04-03 DIAGNOSIS — R14.0 BLOATING: ICD-10-CM

## 2024-04-03 DIAGNOSIS — Z87.898 HISTORY OF CHEST PAIN: ICD-10-CM

## 2024-04-03 DIAGNOSIS — K80.20 GALLSTONES: ICD-10-CM

## 2024-04-03 DIAGNOSIS — K21.9 GASTROESOPHAGEAL REFLUX DISEASE, UNSPECIFIED WHETHER ESOPHAGITIS PRESENT: ICD-10-CM

## 2024-04-03 DIAGNOSIS — Z12.11 SCREENING FOR COLON CANCER: ICD-10-CM

## 2024-04-03 PROCEDURE — 3074F SYST BP LT 130 MM HG: CPT | Mod: CPTII,S$GLB,,

## 2024-04-03 PROCEDURE — 1159F MED LIST DOCD IN RCRD: CPT | Mod: CPTII,S$GLB,,

## 2024-04-03 PROCEDURE — 99999 PR PBB SHADOW E&M-EST. PATIENT-LVL IV: CPT | Mod: PBBFAC,,,

## 2024-04-03 PROCEDURE — 99213 OFFICE O/P EST LOW 20 MIN: CPT | Mod: S$GLB,,,

## 2024-04-03 PROCEDURE — 3008F BODY MASS INDEX DOCD: CPT | Mod: CPTII,S$GLB,,

## 2024-04-03 PROCEDURE — 3078F DIAST BP <80 MM HG: CPT | Mod: CPTII,S$GLB,,

## 2024-04-03 RX ORDER — DICYCLOMINE HYDROCHLORIDE 10 MG/1
10 CAPSULE ORAL 2 TIMES DAILY PRN
Qty: 60 CAPSULE | Refills: 1 | Status: SHIPPED | OUTPATIENT
Start: 2024-04-03 | End: 2024-06-02

## 2024-04-03 NOTE — PROGRESS NOTES
Subjective:       Patient ID: Roopa Rivas is a 44 y.o. female Body mass index is 26.45 kg/m².    Chief Complaint: No chief complaint on file.    This patient is new to me.  Referring Provider: No ref. provider found for epigastric abdominal pain/diarrhea .       Gastroesophageal Reflux  She complains of abdominal pain and chest pain (c/o of chest pain that radiates to shoulders, denies current chest pain, states has not seen cardiology in a long time states has an appt with cardiology). She reports no belching, no choking, no coughing, no dysphagia, no early satiety, no globus sensation, no heartburn, no hoarse voice, no nausea or no water brash. This is a chronic problem. The problem has been gradually improving (since starting on Prilosec 40 mg orally daily GERD symptoms have improved). The heartburn wakes her from sleep. The heartburn does not limit her activity. The symptoms are aggravated by lying down, certain foods and caffeine. Pertinent negatives include no anemia, fatigue, melena or weight loss (working out 5 days a week). Risk factors: denies intake of NSAIDS. She has tried a PPI (Currently taking omeprazole 40 mg orally daily) for the symptoms. The treatment provided moderate relief. Past procedures do not include an abdominal ultrasound, an EGD (Patient has not had an EGD in the past), esophageal manometry, esophageal pH monitoring, H. pylori antibody titer or a UGI.   Abdominal Pain  This is a chronic problem. The problem occurs every several days. Duration: occurs more in the evenings, stress worsens pain. The problem has been waxing and waning (Patient reports history of fibromyalgia). Pain location: starts in the chest area and radiates to upper abdomen. Pain scale: denies current pain. The quality of the pain is burning (feels like a spasm last about 15-20 minutes). The abdominal pain radiates to the epigastric region, LUQ and RUQ. Associated symptoms include flatus (feels gassy and bloated with  the abdominal pain not attributed to dietary intake, celiac blood work negative). Pertinent negatives include no belching, constipation, diarrhea (denies diarrhea or constipation, has a BM daily, rates stool type 4 through 5 on [place] stool scale, no straining, feels empty, since starting on probiotics her bowel habits have improved greatly), fever, hematochezia, hematuria, melena, nausea, vomiting or weight loss (working out 5 days a week). Associated symptoms comments: Ultrasound of the abdomen showed cholelithiasis, patient was referred to general surgery seen by Dr. Guillermo blackman,  Patient ultimately elected to postpone possible surgical intervention till after EGD.  Patient has not completed EGD  -patient was scheduled for a colonoscopy due to irregular bowel habits, patient reports bowel habits are normal denies any hematochezia, lower abdominal pain, denies any family history of colon cancer, would like to hold off on colonoscopy at the moment  -patient has had a colonoscopy in the past in 2016, denies any history of colon polyps. The pain is aggravated by eating (eating in general, worsened pain and has noticed that eating bananas can worsened pain too). The pain is relieved by Certain positions (omeprazole and 10mg oral bentyl is helping with abdominal pain). She has tried proton pump inhibitors for the symptoms. The treatment provided moderate relief. Prior diagnostic workup includes ultrasound. Her past medical history is significant for GERD. There is no history of abdominal surgery, colon cancer, Crohn's disease, gallstones, irritable bowel syndrome, pancreatitis, PUD or ulcerative colitis. Patient's medical history does not include kidney stones and UTI.       Review of Systems   Constitutional:  Negative for fatigue, fever, unexpected weight change and weight loss (working out 5 days a week).   HENT:  Negative for hoarse voice.    Respiratory:  Negative for cough and choking.    Cardiovascular:   Positive for chest pain (c/o of chest pain that radiates to shoulders, denies current chest pain, states has not seen cardiology in a long time states has an appt with cardiology).   Gastrointestinal:  Positive for abdominal pain and flatus (feels gassy and bloated with the abdominal pain not attributed to dietary intake, celiac blood work negative). Negative for abdominal distention, anal bleeding, blood in stool, constipation, diarrhea (denies diarrhea or constipation, has a BM daily, rates stool type 4 through 5 on [place] stool scale, no straining, feels empty, since starting on probiotics her bowel habits have improved greatly), dysphagia, heartburn, hematochezia, melena, nausea, rectal pain and vomiting.   Genitourinary:  Negative for hematuria.         No LMP recorded. (Menstrual status: Birth Control).  Past Medical History:   Diagnosis Date    Arthritis     Fibromyalgia     Gastroenteritis     GERD (gastroesophageal reflux disease)     Long-term use of Plaquenil 12/07/2017     Past Surgical History:   Procedure Laterality Date    COLONOSCOPY      2016    DILATION AND CURETTAGE OF UTERUS      Miscarriage at age 25     Family History   Problem Relation Age of Onset    Arthritis Mother     No Known Problems Father     Thyroid disease Maternal Aunt     Arthritis Maternal Grandmother     Arthritis Maternal Grandfather     Throat cancer Paternal Grandfather     Melanoma Neg Hx     Colon cancer Neg Hx     Crohn's disease Neg Hx     Esophageal cancer Neg Hx     Liver cancer Neg Hx     Rectal cancer Neg Hx     Stomach cancer Neg Hx     Ulcerative colitis Neg Hx      Social History     Tobacco Use    Smoking status: Never    Smokeless tobacco: Never   Substance Use Topics    Alcohol use: No    Drug use: No     Wt Readings from Last 10 Encounters:   04/03/24 59.4 kg (130 lb 15.3 oz)   03/06/24 59.7 kg (131 lb 9.8 oz)   12/05/23 60.4 kg (133 lb 2.5 oz)   10/26/23 60.7 kg (133 lb 13.1 oz)   10/26/23 61.7 kg (136 lb)    09/25/23 61.7 kg (136 lb 0.4 oz)   09/07/23 61.6 kg (135 lb 12.9 oz)   09/06/23 61.5 kg (135 lb 9.3 oz)   04/26/23 66 kg (145 lb 8.1 oz)   11/29/22 65.5 kg (144 lb 8.2 oz)     Lab Results   Component Value Date    WBC 7.82 09/06/2023    HGB 11.9 (L) 09/06/2023    HCT 36.9 (L) 09/06/2023    MCV 94 09/06/2023     09/06/2023     CMP  Sodium   Date Value Ref Range Status   09/06/2023 137 136 - 145 mmol/L Final     Potassium   Date Value Ref Range Status   09/06/2023 4.8 3.5 - 5.1 mmol/L Final     Chloride   Date Value Ref Range Status   09/06/2023 107 95 - 110 mmol/L Final     CO2   Date Value Ref Range Status   09/06/2023 20 (L) 23 - 29 mmol/L Final     Glucose   Date Value Ref Range Status   09/06/2023 90 70 - 110 mg/dL Final     BUN   Date Value Ref Range Status   09/06/2023 12 6 - 20 mg/dL Final     Creatinine   Date Value Ref Range Status   09/06/2023 0.9 0.5 - 1.4 mg/dL Final     Calcium   Date Value Ref Range Status   09/06/2023 9.1 8.7 - 10.5 mg/dL Final     Total Protein   Date Value Ref Range Status   09/06/2023 7.4 6.0 - 8.4 g/dL Final     Albumin   Date Value Ref Range Status   09/06/2023 3.7 3.5 - 5.2 g/dL Final     Total Bilirubin   Date Value Ref Range Status   09/06/2023 0.2 0.1 - 1.0 mg/dL Final     Comment:     For infants and newborns, interpretation of results should be based  on gestational age, weight and in agreement with clinical  observations.    Premature Infant recommended reference ranges:  Up to 24 hours.............<8.0 mg/dL  Up to 48 hours............<12.0 mg/dL  3-5 days..................<15.0 mg/dL  6-29 days.................<15.0 mg/dL       Alkaline Phosphatase   Date Value Ref Range Status   09/06/2023 94 55 - 135 U/L Final     AST   Date Value Ref Range Status   09/06/2023 12 10 - 40 U/L Final     ALT   Date Value Ref Range Status   09/06/2023 19 10 - 44 U/L Final     Anion Gap   Date Value Ref Range Status   09/06/2023 10 8 - 16 mmol/L Final     eGFR if   "  Date Value Ref Range Status   04/06/2022 >60.0 >60 mL/min/1.73 m^2 Final     eGFR if non    Date Value Ref Range Status   04/06/2022 >60.0 >60 mL/min/1.73 m^2 Final     Comment:     Calculation used to obtain the estimated glomerular filtration  rate (eGFR) is the CKD-EPI equation.        Lab Results   Component Value Date    LIPASE 17 09/07/2023     No results found for: "LIPASERES"  Lab Results   Component Value Date    TSH 1.566 08/04/2023       Reviewed prior medical records including radiology report of office visit FERNANDO Juan 9/6/23 , ultrasound abdomen 09/07/2023 & endoscopy history (see surgical history/procedures).    Objective:      Physical Exam  Vitals and nursing note reviewed.   Constitutional:       Appearance: Normal appearance. She is normal weight.   Cardiovascular:      Rate and Rhythm: Normal rate and regular rhythm.      Heart sounds: Normal heart sounds.   Pulmonary:      Breath sounds: Normal breath sounds.   Abdominal:      General: Bowel sounds are normal.      Palpations: Abdomen is soft.      Tenderness: There is no abdominal tenderness.   Skin:     General: Skin is warm and dry.      Coloration: Skin is not jaundiced.   Neurological:      Mental Status: She is alert and oriented to person, place, and time.   Psychiatric:         Mood and Affect: Mood normal.         Behavior: Behavior normal.         Assessment:       1. Pain of upper abdomen    2. Gastroesophageal reflux disease, unspecified whether esophagitis present    3. Bloating    4. Gallstones    5. History of chest pain          Plan:       Pain of upper abdomen  -   CONTINUE omeprazole (PRILOSEC) 40 MG capsule; Take 1 capsule (40 mg total) by mouth Daily.   Take PPI 30min-1hr before eating breakfast  -follow GERD lifestyle modifications  -continue Bentyl 10 mg as needed for pain  -     Case Request Endoscopy: EGD (ESOPHAGOGASTRODUODENOSCOPY)  - schedule EGD, discussed procedure with the patient, including " risks and benefits, patient verbalized understanding    Gastroesophageal reflux disease, unspecified whether esophagitis present  -Continue PPI  -     Case Request Endoscopy: EGD (ESOPHAGOGASTRODUODENOSCOPY)  - schedule EGD, discussed procedure with patient, including risks and benefits, patient verbalized understanding  -Educated patient on lifestyle modifications to help control/reduce reflux/abdominal pain including: avoid large meals, avoid eating within 2-3 hours of bedtime (avoid late night eating & lying down soon after eating), elevate head of bed if nocturnal symptoms are present, smoking cessation (if current smoker), & weight loss (if overweight).   -Educated to avoid known foods which trigger reflux symptoms & to minimize/avoid high-fat foods, chocolate, caffeine, citrus, alcohol, & tomato products.  -Advised to avoid/limit use of NSAID's, since they can cause GI upset, bleeding, and/or ulcers. If needed, take with food.     Bloating  - schedule EGD, discussed procedure with patient, including risks and benefits, patient verbalized understanding  - testing for H. Pylori typically performed during EGD  - recommend OTC simethicone as directed, such as Phazyme or Gas-x  - recommend low gas diet: Reduce or eliminate these foods from your diet: Broccoli, Cauliflower, [place] sprouts, Cabbage, Cooked dried beans, Carbonated beverages (sparkling water, soda, beer, champagne)  Other Causes Of Excess Gas Include:   1) EATING TOO FAST or TALKING WHILE YOU CHEW may cause you to swallow air. This increases the amount of gas in the stomach and may worsen your symptoms.  --> Chew each mouthful completely before swallowing. Take your time.  2) OVEREATING may increase the feeling of being bloated and cause more gas.  --> When you are full, stop eating.    Gallstones  - recommend low fat diet  - discussed diagnosis & that it can cause symptoms in some patients, while other patients with it can be asymptomatic; recommend  continue recommendations as discussed during our visit and if symptoms persist after other testing has been completed, recommend to follow up with general surgery patient verbalized understanding    Screening for colon cancer   -Screening colonoscopy due on 3/2025   -if symptoms that require colonoscopy sooner a colonoscopy can be completed then for evaluation     History of chest pain  - follow-up with PCP &/or cardiologist for continued evaluation and management   - if experiencing symptoms of headache, chest pain, shortness of breath, and/or blurred vision, recommend going to ER for further evaluation and management        Follow up if symptoms worsen or fail to improve.      If no improvement in symptoms or symptoms worsen, call/follow-up at clinic or go to ER.       [facility] - GASTROENTEROLOGY  [facility], [place]     Dictation software program was used for this note. Please expect some simple typographical  errors in this note.    Encounter includes face to face time and non-face to face time preparing to see the patient (eg, review of tests), obtaining and/or reviewing separately obtained history, documenting clinical information in the electronic or other health record, independently interpreting results (not separately reported) and communicating results to the patient/family/caregiver, or care coordination (not separately reported).

## 2024-05-07 ENCOUNTER — OFFICE VISIT (OUTPATIENT)
Dept: PSYCHIATRY | Facility: CLINIC | Age: 44
End: 2024-05-07
Payer: COMMERCIAL

## 2024-05-07 DIAGNOSIS — F31.70 BIPOLAR DISORDER IN PARTIAL REMISSION, MOST RECENT EPISODE UNSPECIFIED TYPE: Primary | ICD-10-CM

## 2024-05-07 DIAGNOSIS — G47.00 INSOMNIA, UNSPECIFIED TYPE: ICD-10-CM

## 2024-05-07 DIAGNOSIS — F41.1 GENERALIZED ANXIETY DISORDER: ICD-10-CM

## 2024-05-07 PROCEDURE — 99214 OFFICE O/P EST MOD 30 MIN: CPT | Mod: 95,,, | Performed by: NURSE PRACTITIONER

## 2024-05-07 PROCEDURE — 1159F MED LIST DOCD IN RCRD: CPT | Mod: CPTII,95,, | Performed by: NURSE PRACTITIONER

## 2024-05-07 PROCEDURE — 1160F RVW MEDS BY RX/DR IN RCRD: CPT | Mod: CPTII,95,, | Performed by: NURSE PRACTITIONER

## 2024-05-07 RX ORDER — ARIPIPRAZOLE 15 MG/1
15 TABLET ORAL DAILY
Qty: 90 TABLET | Refills: 0 | Status: SHIPPED | OUTPATIENT
Start: 2024-05-07 | End: 2024-08-05

## 2024-05-07 RX ORDER — FLUOXETINE HYDROCHLORIDE 40 MG/1
40 CAPSULE ORAL DAILY
Qty: 90 CAPSULE | Refills: 0 | Status: SHIPPED | OUTPATIENT
Start: 2024-05-07 | End: 2024-08-05

## 2024-05-07 NOTE — PROGRESS NOTES
"Outpatient Psychiatry Follow-Up Visit (MD/NP) - Telemedicine Visit    5/7/2024 11:39 AM  Roopa Rivas  1980  [id number]        The patient location is: Patient reported that their location at the time of this visit was in the [facility]     Visit type: audiovisual    Each patient to whom he or she provides medical services by telemedicine is:  (1) informed of the relationship between the physician and patient and the respective role of any other health care provider with respect to management of the patient; and (2) notified that he or she may decline to receive medical services by telemedicine and may withdraw from such care at any time.      Clinical Status of Patient:  Outpatient (Ambulatory)    Chief Complaint:  Roopa Rivas, a 44 y.o. female,who presents today for follow up of mood swings and anxiety.  Met with patient.        Interval History/Subjective Report/Content of Current Session:     Today the pt reports states her mood has been "pretty steady". However, she goes on to say she has episodes of depression where she has to force herself to get out of bed and take a bath. She reports that during these times, she feels "exhausted". She notes these episodes last a few days and occur about once every 6 weeks. She also has periods of hypomania where she feels "like I can do anything". Denies engaging in any risky bxs. Anxiety has been manageable. Sleeping well with Ambien CR.  She has been working out.   She notes that she has not felt as content as she did since her daughter and grandson came to live with her.  We discussed scheduling an appt for individual therapy. She used to see STEPH Copeland LCSW and plans to call and schedule an appt.  Discussed that she is not on any meds for bipolar depression. She verbalized understanding but states she is not interested in trying a new med today. However, she is willing to adjust the dose of her current meds.    ASEs from psych meds: denies    Pt is " future oriented and denies recurrent thoughts of death and denies SI/HI. Denies AVH, paranoia and delusions. No objective s/sx of psychosis or rufus.     Patient verbalized motivation for compliance with medications and all other elements of treatment plan.         Psychotherapy:  Target symptoms: anxiety , mood swings  Why chosen therapy is appropriate versus another modality: relevant to diagnosis, patient responds to this modality  Outcome monitoring methods: self-report, observation  Therapeutic intervention type: supportive psychotherapy  Topics discussed/themes: relationships difficulties, building skills sets for symptom management  The patient's response to the intervention is accepting. The patient's progress toward treatment goals is good, fair.   Duration of intervention: 7 minutes.      Psychotropic medication review  Previous Trials-  Seroquel - severe dry eyes, hurt to blink, no peripheral vision, memory loss  Lexapro - early 2000s, stopped taking due to insurance  Cymbalta      Current meds-  Prozac  Abilify  Propranolol  Ambien CR    History:       Past Medical, Psychiatric, Family and Social History: The patient's past medical, psychiatric, family and social history, allergies, current medications, past surgical history, and problem list have been reviewed and updated as appropriate within the electronic medical record.         Additional historical information includes:   Past psych hospitalizations: denies  Past suicide attempts: denies    Seizure: denies  Head trauma/TBI: denies  Access to a firearm: yes -  Pt was instructed to keep the firearm in a safe, locked container and to store the bullets separately.      Review of Systems       Review of Systems   Constitutional:  Negative for chills, fever and malaise/fatigue.   Respiratory:  Negative for cough and shortness of breath.    Cardiovascular:  Negative for chest pain and palpitations.   Gastrointestinal:  Negative for abdominal pain,  diarrhea and vomiting.   Genitourinary:  Negative for dysuria and hematuria.   Musculoskeletal:  Negative for falls and myalgias.   Skin:  Negative for rash.   Neurological:  Negative for tremors, seizures and headaches.   Psychiatric/Behavioral:          See HPI         Compliance: yes        Risk Parameters:  Patient reports no suicidal ideation  Patient reports no homicidal ideation  Patient reports no self-injurious behavior  Patient reports no violent behavior    Exam (detailed: at least 9 elements; comprehensive: all 15 elements)     Constitutional  Vitals:  Most recent vital signs, dated less than 90 days prior to this appointment, were reviewed.   There were no vitals filed for this visit.         Musculoskeletal  Muscle Strength/Tone:  not examined - TEN due to virtual visit   Gait & Station:  TEN due to virtual visit     Psychiatric      Appearance:  unremarkable, age appropriate, well nourished, casually dressed, neatly groomed, overweight   Behavior:  normal, friendly and cooperative, eye contact normal     Speech:  no latency; no press   Mood & Affect:  euthymic  congruent and appropriate   Thought Process:  normal and logical   Associations:  intact   Thought Content:  normal, no suicidality, no homicidality, delusions, or paranoia   Insight:  intact, has awareness of illness   Judgement: behavior is adequate to circumstances, age appropriate   Orientation:  grossly intact   Memory: intact for content of interview   Language: grossly intact   Attention Span & Concentration:  able to focus   Fund of Knowledge:  intact and appropriate to age and level of education       Medications:  Outpatient Encounter Medications as of 5/7/2024   Medication Sig Dispense Refill    acetylcysteine (N-ACETYL-L-CYSTEINE MISC) by Misc.(Non-Drug; Combo Route) route.      ARIPiprazole (ABILIFY) 10 MG Tab Take 1 tablet (10 mg total) by mouth once daily. 90 tablet 0    calcium carbonate 400 mg calcium (1,000 mg) Chew Take by  mouth.      cetirizine (ZYRTEC) 10 MG tablet Take 1 tablet (10 mg total) by mouth once daily. 30 tablet 1    clotrimazole-betamethasone 1-0.05% (LOTRISONE) cream Apply topically 2 (two) times daily. 15 g 0    dicyclomine (BENTYL) 10 MG capsule Take 1 capsule (10 mg total) by mouth 2 (two) times daily as needed (abdominal pain). 60 capsule 1    drospirenone-ethinyl estradioL (SARAH) 3-0.02 mg per tablet Take 1 tablet by mouth once daily. 90 tablet 3    FLUoxetine 40 MG capsule Take 1 capsule (40 mg total) by mouth once daily. 90 capsule 0    fluticasone propionate (FLONASE) 50 mcg/actuation nasal spray 1 spray (50 mcg total) by Each Nostril route once daily. 9.9 mL 1    hydroquinone 8 % Emul Compound hydroquinone 12% / kojic acid 6% / vitamin C 1% / niacinamide 2% cream. Apply a pea-sized amount to entire face qhs. Do not use for longer than 16 weeks in a row. 30 g 1    ibuprofen (ADVIL,MOTRIN) 400 MG tablet Take 400 mg by mouth every 6 (six) hours as needed for Other.      iron, carbonyl 25 mg iron Tab Take by mouth. Iron supplement      Lactobacillus acidophilus (PROBIOTIC ORAL) Take by mouth.      levothyroxine (SYNTHROID) 50 MCG tablet Take 1 tab Monday-Saturday and 2 tabs on Sunday 102 tablet 3    magnesium 30 mg Tab Take by mouth once.      multivitamin/iron/folic acid (MULTI COMPLETE WITH IRON ORAL) Take by mouth.      omeprazole (PRILOSEC) 40 MG capsule Take 1 capsule (40 mg total) by mouth Daily. 90 capsule 1    propranoloL (INDERAL) 20 MG tablet Take 1 tablet (20 mg total) by mouth 2 (two) times daily as needed (anxiety). 180 tablet 0    spironolactone (ALDACTONE) 50 MG tablet TAKE 1 TABLET BY MOUTH DAILY 90 tablet 3    tretinoin (RETIN-A) 0.025 % cream Apply topically every evening. 20 g 2    zolpidem (AMBIEN CR) 12.5 MG CR tablet Take 1 tablet (12.5 mg total) by mouth nightly as needed for Insomnia. 30 tablet 3     No facility-administered encounter medications on file as of 5/7/2024.       Allergy:  Review  of patient's allergies indicates:  No Known Allergies      Assessment and Diagnosis   Status/Progress: Based on the examination today, the patient's problem(s) is/are inadequately controlled.  New problems have not been presented today.   Co-morbidities are not complicating management of the primary condition.        General Impression:         ICD-10-CM ICD-9-CM   1. Bipolar disorder in partial remission, most recent episode unspecified type  F31.70 296.80   2. Generalized anxiety disorder  F41.1 300.02   3. Insomnia, unspecified type  G47.00 780.52         R/O  OCPD  Borderline personality d/o  OCD  ADHD      Intervention/Counseling/Treatment Plan     Medication Management:  Increase Abilify to 15 mg by mouth once daily  Continue Prozac 40 mg by mouth once daily  Continue propranolol 20 mg by mouth twice daily as needed for anxiety related to driving  Continue Ambien CR 12.5 mg by mouth once nightly prn insomnia. Pt was told not drive or to take this med with ETOH or other sedating meds/substance. Pt verbalized understanding.  Continue with individual therapy   Labs: reviewed most recent  The treatment plan and follow up plan were reviewed with the patient.  Discussed with patient informed consent, risks vs. benefits, alternative treatments, side effect profile and the inherent unpredictability of individual responses to these treatments. The patient expresses understanding of the above and displays the capacity to agree with this current plan and had no other questions.  Encouraged Patient to keep future appointments.   Take medications as prescribed and abstain from substance abuse.   Pt was told to present to ED or call 911 for SI/HI plan or intent, psychosis, or other psychiatric or medical emergency, and pt agrees to this and verbalized understanding.      Return to Clinic: 3 months, or sooner if needed        Face to Face time with patient: 24 minutes  Total time: 29 minutes of total time spent on the  encounter, which includes face to face time and non-face to face time preparing to see the patient (eg, review of tests), Obtaining and/or reviewing separately obtained history, Documenting clinical information in the electronic or other health record, Independently interpreting results (not separately reported) and communicating results to the patient/family/caregiver, or Care coordination (not separately reported).       Linda Stephens, MSN, APRN, PMHNP-BC  [facility]

## 2024-05-28 DIAGNOSIS — L81.1 MELASMA: ICD-10-CM

## 2024-06-06 ENCOUNTER — ANESTHESIA EVENT (OUTPATIENT)
Dept: ENDOSCOPY | Facility: HOSPITAL | Age: 44
End: 2024-06-06
Payer: COMMERCIAL

## 2024-06-06 ENCOUNTER — ANESTHESIA (OUTPATIENT)
Dept: ENDOSCOPY | Facility: HOSPITAL | Age: 44
End: 2024-06-06
Payer: COMMERCIAL

## 2024-06-06 ENCOUNTER — HOSPITAL ENCOUNTER (OUTPATIENT)
Facility: HOSPITAL | Age: 44
Discharge: HOME | End: 2024-06-06
Attending: INTERNAL MEDICINE | Admitting: INTERNAL MEDICINE
Payer: COMMERCIAL

## 2024-06-06 VITALS
TEMPERATURE: 98 F | DIASTOLIC BLOOD PRESSURE: 68 MMHG | HEIGHT: 59 IN | RESPIRATION RATE: 16 BRPM | HEART RATE: 70 BPM | SYSTOLIC BLOOD PRESSURE: 98 MMHG | WEIGHT: 130 LBS | OXYGEN SATURATION: 100 % | BODY MASS INDEX: 26.21 KG/M2

## 2024-06-06 DIAGNOSIS — K80.20 GALLSTONES: Primary | ICD-10-CM

## 2024-06-06 DIAGNOSIS — R10.10 PAIN OF UPPER ABDOMEN: ICD-10-CM

## 2024-06-06 LAB
B-HCG UR QL: NEGATIVE
CTP QC/QA: YES

## 2024-06-06 PROCEDURE — 27201012 HC FORCEPS, HOT/COLD, DISP: Performed by: INTERNAL MEDICINE

## 2024-06-06 PROCEDURE — 25000003 PHARM REV CODE 250: Performed by: INTERNAL MEDICINE

## 2024-06-06 PROCEDURE — 43239 EGD BIOPSY SINGLE/MULTIPLE: CPT | Mod: 59 | Performed by: INTERNAL MEDICINE

## 2024-06-06 PROCEDURE — 37000008 HC ANESTHESIA 1ST 15 MINUTES: Performed by: INTERNAL MEDICINE

## 2024-06-06 PROCEDURE — 63600175 PHARM REV CODE 636 W HCPCS: Performed by: NURSE ANESTHETIST, CERTIFIED REGISTERED

## 2024-06-06 PROCEDURE — 25000003 PHARM REV CODE 250: Performed by: NURSE ANESTHETIST, CERTIFIED REGISTERED

## 2024-06-06 PROCEDURE — 43239 EGD BIOPSY SINGLE/MULTIPLE: CPT | Mod: 59,,, | Performed by: INTERNAL MEDICINE

## 2024-06-06 PROCEDURE — 81025 URINE PREGNANCY TEST: CPT | Performed by: INTERNAL MEDICINE

## 2024-06-06 PROCEDURE — 43251 EGD REMOVE LESION SNARE: CPT | Performed by: INTERNAL MEDICINE

## 2024-06-06 PROCEDURE — 43251 EGD REMOVE LESION SNARE: CPT | Mod: ,,, | Performed by: INTERNAL MEDICINE

## 2024-06-06 PROCEDURE — 27201089 HC SNARE, DISP (ANY): Performed by: INTERNAL MEDICINE

## 2024-06-06 RX ORDER — PROPOFOL 10 MG/ML
VIAL (ML) INTRAVENOUS
Status: DISCONTINUED | OUTPATIENT
Start: 2024-06-06 | End: 2024-06-06

## 2024-06-06 RX ORDER — DICYCLOMINE HYDROCHLORIDE 10 MG/1
10 CAPSULE ORAL
COMMUNITY

## 2024-06-06 RX ORDER — LIDOCAINE HYDROCHLORIDE 20 MG/ML
INJECTION INTRAVENOUS
Status: DISCONTINUED | OUTPATIENT
Start: 2024-06-06 | End: 2024-06-06

## 2024-06-06 RX ORDER — SODIUM CHLORIDE 9 MG/ML
INJECTION, SOLUTION INTRAVENOUS CONTINUOUS
Status: DISCONTINUED | OUTPATIENT
Start: 2024-06-06 | End: 2024-06-06 | Stop reason: HOSPADM

## 2024-06-06 RX ADMIN — PROPOFOL 50 MG: 10 INJECTION, EMULSION INTRAVENOUS at 11:06

## 2024-06-06 RX ADMIN — SODIUM CHLORIDE: 9 INJECTION, SOLUTION INTRAVENOUS at 11:06

## 2024-06-06 RX ADMIN — PROPOFOL 100 MG: 10 INJECTION, EMULSION INTRAVENOUS at 11:06

## 2024-06-06 RX ADMIN — LIDOCAINE HYDROCHLORIDE 100 MG: 20 INJECTION, SOLUTION INTRAVENOUS at 11:06

## 2024-06-06 NOTE — ANESTHESIA PREPROCEDURE EVALUATION
06/06/2024  Roopa Rivas is a 44 y.o., female.      Pre-op Assessment    I have reviewed the Patient Summary Reports.     I have reviewed the Nursing Notes. I have reviewed the NPO Status.   I have reviewed the Medications.     Review of Systems  Cardiovascular:  Cardiovascular Normal                                            Pulmonary:  Pulmonary Normal                       Hepatic/GI:     GERD   Abd pain          Neurological:    Neuromuscular Disease,       fibromyalgia                            Endocrine:   Hypothyroidism          Psych:   anxiety depression                Physical Exam  General: Well nourished    Airway:  Mallampati: II   Neck ROM: Normal ROM    Dental:  Intact        Anesthesia Plan  Type of Anesthesia, risks & benefits discussed:    Anesthesia Type: Gen Natural Airway  Intra-op Monitoring Plan: Standard ASA Monitors  Induction:  IV  Informed Consent: Informed consent signed with the Patient and all parties understand the risks and agree with anesthesia plan.  All questions answered.   ASA Score: 2    Ready For Surgery From Anesthesia Perspective.     .

## 2024-06-06 NOTE — TRANSFER OF CARE
"Anesthesia Transfer of Care Note    Patient: Roopa Rivas    Procedure(s) Performed: Procedure(s) (LRB):  EGD (ESOPHAGOGASTRODUODENOSCOPY) (N/A)    Patient location: GI    Anesthesia Type: general    Transport from OR: Transported from OR on room air with adequate spontaneous ventilation    Post pain: adequate analgesia    Post assessment: no apparent anesthetic complications and tolerated procedure well    Post vital signs: stable    Level of consciousness: awake, alert and oriented    Nausea/Vomiting: no nausea/vomiting    Complications: none    Transfer of care protocol was followed    Last vitals: Visit Vitals  BP (!) 99/58 (BP Location: Left arm, Patient Position: Lying)   Pulse 75   Temp 37.2 °C (99 °F) (Skin)   Resp 16   Ht 4' 11" (1.499 m)   Wt 59 kg (130 lb)   LMP 05/14/2024   SpO2 100%   Breastfeeding No   BMI 26.26 kg/m²     "

## 2024-06-06 NOTE — PROVATION PATIENT INSTRUCTIONS
Discharge Summary/Instructions after an Endoscopic Procedure  Patient Name: Roopa Rivas  Patient MRN: [id number]  Patient YOB: 1980  Thursday, June 6, 2024  Jacquie Guadalupe MD  Dear patient,  As a result of recent federal legislation (The Federal Cures Act), you may   receive lab or pathology results from your procedure in your [facility]   account before your physician is able to contact you. Your physician or   their representative will relay the results to you with their   recommendations at their soonest availability.  Thank you,  RESTRICTIONS:  During your procedure today, you received medications for sedation.  These   medications may affect your judgment, balance and coordination.  Therefore,   for 24 hours, you have the following restrictions:   - DO NOT drive a car, operate machinery, make legal/financial decisions,   sign important papers or drink alcohol.    ACTIVITY:  Today: no heavy lifting, straining or running due to procedural   sedation/anesthesia.  The following day: return to full activity including work.  DIET:  Eat and drink normally unless instructed otherwise.     TREATMENT FOR COMMON SIDE EFFECTS:  - Mild abdominal pain, nausea, belching, bloating or excessive gas:  rest,   eat lightly and use a heating pad.  - Sore Throat: treat with throat lozenges and/or gargle with warm salt   water.  - Because air was used during the procedure, expelling large amounts of air   from your rectum or belching is normal.  - If a bowel prep was taken, you may not have a bowel movement for 1-3 days.    This is normal.  SYMPTOMS TO WATCH FOR AND REPORT TO YOUR PHYSICIAN:  1. Abdominal pain or bloating, other than gas cramps.  2. Chest pain.  3. Back pain.  4. Signs of infection such as: chills or fever occurring within 24 hours   after the procedure.  5. Rectal bleeding, which would show as bright red, maroon, or black stools.   (A tablespoon of blood from the rectum is not serious,  especially if   hemorrhoids are present.)  6. Vomiting.  7. Weakness or dizziness.  GO DIRECTLY TO THE NEAREST EMERGENCY ROOM IF YOU HAVE ANY OF THE FOLLOWING:      Difficulty breathing              Chills and/or fever over 101 F   Persistent vomiting and/or vomiting blood   Severe abdominal pain   Severe chest pain   Black, tarry stools   Bleeding- more than one tablespoon   Any other symptom or condition that you feel may need urgent attention  Your doctor recommends these additional instructions:  If any biopsies were taken, your doctors clinic will contact you in 1 to 2   weeks with any results.  - Await pathology results.   - Discharge patient to home (with escort).   - Patient has a contact number available for emergencies.  The signs and   symptoms of potential delayed complications were discussed with the   patient.  Return to normal activities tomorrow.  Written discharge   instructions were provided to the patient.   - Resume previous diet.   - Continue present medications.   -If pain continues consider trial of Bentyl and referral to surgeon for   cholecystectomy vs HIDA with EF  For questions, problems or results please call your physician - Jacquie Guadalupe MD at Work:  [phone].  [facility], EMERGENCY ROOM PHONE NUMBER: [phone]  IF A COMPLICATION OR EMERGENCY SITUATION ARISES AND YOU ARE UNABLE TO REACH   YOUR PHYSICIAN - GO DIRECTLY TO THE EMERGENCY ROOM.  Jacquie Guadalupe MD  6/6/2024 11:53:44 AM  This report has been verified and signed electronically.  Dear patient,  As a result of recent federal legislation (The Federal Cures Act), you may   receive lab or pathology results from your procedure in your [facility]   account before your physician is able to contact you. Your physician or   their representative will relay the results to you with their   recommendations at their soonest availability.  Thank you,  PROVATION

## 2024-06-06 NOTE — H&P
[facility] Note    CC: Abdominal pain    HPI 44 y.o. female presents for evaluation of upper abdominal pain    Past Medical History:   Diagnosis Date    Arthritis     Fibromyalgia     Gastroenteritis     GERD (gastroesophageal reflux disease)     Long-term use of Plaquenil 12/07/2017       Allergies and Medications reviewed     Review of Systems  General ROS: negative for - chills, fever or weight loss  Cardiovascular ROS: no chest pain or dyspnea on exertion  Gastrointestinal ROS: + abdominal pain    Physical Examination  There were no vitals taken for this visit.  General appearance: alert, cooperative, no distress  HENT: Normocephalic, atraumatic, neck symmetrical, no nasal discharge, sclera anicteric   Lungs: clear to auscultation bilaterally, symmetric chest wall expansion bilaterally  Heart: regular rate and rhythm without rub; no displacement of the PMI   Abdomen: soft  Extremities: extremities symmetric; no clubbing, cyanosis, or edema        Labs:  Lab Results   Component Value Date    WBC 7.82 09/06/2023    HGB 11.9 (L) 09/06/2023    HCT 36.9 (L) 09/06/2023    MCV 94 09/06/2023     09/06/2023       Assessment:   44 y.o. female presents for eGD    Plan:  -Proceed to EGD    Jacquie Guadalupe MD  [facility]  [address], [address]  [place] [address]  Office: [phone]  Fax: [phone]    1.61

## 2024-06-06 NOTE — ANESTHESIA POSTPROCEDURE EVALUATION
Anesthesia Post Evaluation    Patient: Roopa Rivas    Procedure(s) Performed: Procedure(s) (LRB):  EGD (ESOPHAGOGASTRODUODENOSCOPY) (N/A)    Final Anesthesia Type: general      Patient location during evaluation: PACU  Patient participation: Yes- Able to Participate  Level of consciousness: awake and alert and oriented  Post-procedure vital signs: reviewed and stable  Pain management: adequate  Airway patency: patent    PONV status at discharge: No PONV  Anesthetic complications: no      Cardiovascular status: blood pressure returned to baseline and stable  Respiratory status: unassisted and spontaneous ventilation  Hydration status: euvolemic  Follow-up not needed.              Vitals Value Taken Time   BP 98/68 06/06/24 1218   Temp 36.7 °C (98 °F) 06/06/24 1157   Pulse 70 06/06/24 1218   Resp 16 06/06/24 1218   SpO2 100 % 06/06/24 1218         No case tracking events are documented in the log.      Pain/Ellen Score: Ellen Score: 10 (6/6/2024 12:18 PM)

## 2024-06-28 ENCOUNTER — PATIENT MESSAGE (OUTPATIENT)
Dept: FAMILY MEDICINE | Facility: CLINIC | Age: 44
End: 2024-06-28
Payer: COMMERCIAL

## 2024-07-01 ENCOUNTER — OFFICE VISIT (OUTPATIENT)
Dept: PSYCHIATRY | Facility: CLINIC | Age: 44
End: 2024-07-01
Payer: COMMERCIAL

## 2024-07-01 DIAGNOSIS — F31.81 BIPOLAR II DISORDER: Primary | ICD-10-CM

## 2024-07-01 DIAGNOSIS — G47.00 INSOMNIA, UNSPECIFIED TYPE: ICD-10-CM

## 2024-07-01 DIAGNOSIS — F41.1 GENERALIZED ANXIETY DISORDER: ICD-10-CM

## 2024-07-01 DIAGNOSIS — F31.70 BIPOLAR DISORDER IN PARTIAL REMISSION, MOST RECENT EPISODE UNSPECIFIED TYPE: ICD-10-CM

## 2024-07-01 PROCEDURE — 99214 OFFICE O/P EST MOD 30 MIN: CPT | Mod: 95,,, | Performed by: NURSE PRACTITIONER

## 2024-07-01 PROCEDURE — 1160F RVW MEDS BY RX/DR IN RCRD: CPT | Mod: CPTII,95,, | Performed by: NURSE PRACTITIONER

## 2024-07-01 PROCEDURE — 1159F MED LIST DOCD IN RCRD: CPT | Mod: CPTII,95,, | Performed by: NURSE PRACTITIONER

## 2024-07-01 RX ORDER — FLUOXETINE HYDROCHLORIDE 40 MG/1
40 CAPSULE ORAL DAILY
Qty: 90 CAPSULE | Refills: 0 | Status: SHIPPED | OUTPATIENT
Start: 2024-07-01 | End: 2024-09-29

## 2024-07-01 RX ORDER — ZOLPIDEM TARTRATE 12.5 MG/1
12.5 TABLET, FILM COATED, EXTENDED RELEASE ORAL NIGHTLY PRN
Qty: 30 TABLET | Refills: 3 | Status: SHIPPED | OUTPATIENT
Start: 2024-07-01 | End: 2024-10-29

## 2024-07-01 RX ORDER — PROPRANOLOL HYDROCHLORIDE 20 MG/1
20 TABLET ORAL 2 TIMES DAILY PRN
Qty: 180 TABLET | Refills: 0 | Status: SHIPPED | OUTPATIENT
Start: 2024-07-01 | End: 2024-09-29

## 2024-07-01 RX ORDER — ARIPIPRAZOLE 15 MG/1
15 TABLET ORAL DAILY
Qty: 90 TABLET | Refills: 0 | Status: SHIPPED | OUTPATIENT
Start: 2024-07-01 | End: 2024-09-29

## 2024-07-01 NOTE — PROGRESS NOTES
"Outpatient Psychiatry Follow-Up Visit (MD/NP) - Telemedicine Visit    7/1/2024 11:39 AM  Roopa Rivas  1980  [id number]        The patient location is: Patient reported that their location at the time of this visit was in the [facility]     Visit type: audiovisual    Each patient to whom he or she provides medical services by telemedicine is:  (1) informed of the relationship between the physician and patient and the respective role of any other health care provider with respect to management of the patient; and (2) notified that he or she may decline to receive medical services by telemedicine and may withdraw from such care at any time.      Clinical Status of Patient:  Outpatient (Ambulatory)    Chief Complaint:  Roopa Rivas, a 44 y.o. female,who presents today for follow up of mood swings and anxiety.  Met with patient.        Interval History/Subjective Report/Content of Current Session:     Pt states she is feeling "a lot better". States her daughter moved out and she feels like a weight has been lifted off her shoulders. States her daughter was toxic. Her appetite has improved, mood has improved, and she has more energy.  Anxiety has been "up and down".  Has been walking on the treadmill and wt lifting.  Denies any sxs of rufus or hypomania but endorses excessive spending and hoarding. Discussed a referral for CBT and she plans to schedule an appt with STEPH Copeland LCSW.  Sleeping well with Jorgito Rascon from psych meds: denies    Pt is future oriented and denies recurrent thoughts of death and denies SI/HI. Denies AVH, paranoia and delusions. No objective s/sx of psychosis or rufus.     Patient verbalized motivation for compliance with medications and all other elements of treatment plan.     Denies any EPS/TD and none observed.      Psychotherapy:  Target symptoms: anxiety , mood swings  Why chosen therapy is appropriate versus another modality: relevant to diagnosis, patient responds to " this modality  Outcome monitoring methods: self-report, observation  Therapeutic intervention type: supportive psychotherapy  Topics discussed/themes: relationships difficulties, building skills sets for symptom management  The patient's response to the intervention is accepting. The patient's progress toward treatment goals is good, fair.   Duration of intervention: 5 minutes.      Psychotropic medication review  Previous Trials-  Seroquel - severe dry eyes, hurt to blink, no peripheral vision, memory loss  Lexapro - early 2000s, stopped taking due to insurance  Cymbalta      Current meds-  Prozac  Abilify  Propranolol  Ambien CR    History:       Past Medical, Psychiatric, Family and Social History: The patient's past medical, psychiatric, family and social history, allergies, current medications, past surgical history, and problem list have been reviewed and updated as appropriate within the electronic medical record.         Additional historical information includes:   Past psych hospitalizations: denies  Past suicide attempts: denies    Seizure: denies  Head trauma/TBI: denies  Access to a firearm: yes -  Pt was instructed to keep the firearm in a safe, locked container and to store the bullets separately.      Review of Systems       Review of Systems   Constitutional:  Negative for chills, fever and malaise/fatigue.   Respiratory:  Negative for cough and shortness of breath.    Cardiovascular:  Negative for chest pain and palpitations.   Gastrointestinal:  Negative for abdominal pain, diarrhea and vomiting.   Genitourinary:  Negative for dysuria and hematuria.   Musculoskeletal:  Negative for falls and myalgias.   Skin:  Negative for rash.   Neurological:  Negative for tremors, seizures and headaches.   Psychiatric/Behavioral:          See HPI         Compliance: yes        Risk Parameters:  Patient reports no suicidal ideation  Patient reports no homicidal ideation  Patient reports no self-injurious  behavior  Patient reports no violent behavior    Exam (detailed: at least 9 elements; comprehensive: all 15 elements)     Constitutional  Vitals:  Most recent vital signs, dated less than 90 days prior to this appointment, were reviewed.   There were no vitals filed for this visit.         Musculoskeletal  Muscle Strength/Tone:  not examined - TEN due to virtual visit   Gait & Station:  TEN due to virtual visit     Psychiatric      Appearance:  unremarkable, age appropriate, well nourished, casually dressed, neatly groomed, overweight   Behavior:  normal, friendly and cooperative, eye contact normal     Speech:  no latency; no press   Mood & Affect:  euthymic  congruent and appropriate   Thought Process:  normal and logical   Associations:  intact   Thought Content:  normal, no suicidality, no homicidality, delusions, or paranoia   Insight:  intact, has awareness of illness   Judgement: behavior is adequate to circumstances, age appropriate   Orientation:  grossly intact   Memory: intact for content of interview   Language: grossly intact   Attention Span & Concentration:  able to focus   Fund of Knowledge:  intact and appropriate to age and level of education       Medications:  Outpatient Encounter Medications as of 2024   Medication Sig Dispense Refill    acetylcysteine (N-ACETYL-L-CYSTEINE MISC) by Misc.(Non-Drug; Combo Route) route.      ARIPiprazole (ABILIFY) 15 MG Tab Take 1 tablet (15 mg total) by mouth once daily. 90 tablet 0    calcium carbonate 400 mg calcium (1,000 mg) Chew Take by mouth.      cetirizine (ZYRTEC) 10 MG tablet Take 1 tablet (10 mg total) by mouth once daily. 30 tablet 1    clotrimazole-betamethasone 1-0.05% (LOTRISONE) cream Apply topically 2 (two) times daily. 15 g 0    [] dicyclomine (BENTYL) 10 MG capsule Take 1 capsule (10 mg total) by mouth 2 (two) times daily as needed (abdominal pain). 60 capsule 1    dicyclomine (BENTYL) 10 MG capsule Take 10 mg by mouth as needed.       drospirenone-ethinyl estradioL (SARAH) 3-0.02 mg per tablet Take 1 tablet by mouth once daily. 90 tablet 3    FLUoxetine 40 MG capsule Take 1 capsule (40 mg total) by mouth once daily. 90 capsule 0    fluticasone propionate (FLONASE) 50 mcg/actuation nasal spray 1 spray (50 mcg total) by Each Nostril route once daily. 9.9 mL 1    hydroquinone 8 % Emul Compound hydroquinone 12% / kojic acid 6% / vitamin C 1% / niacinamide 2% cream. Apply a pea-sized amount to entire face qhs. Do not use for longer than 16 weeks in a row. 30 g 1    ibuprofen (ADVIL,MOTRIN) 400 MG tablet Take 400 mg by mouth every 6 (six) hours as needed for Other.      iron, carbonyl 25 mg iron Tab Take by mouth. Iron supplement      Lactobacillus acidophilus (PROBIOTIC ORAL) Take by mouth.      levothyroxine (SYNTHROID) 50 MCG tablet Take 1 tab Monday-Saturday and 2 tabs on Sunday 102 tablet 3    magnesium 30 mg Tab Take by mouth once.      multivitamin/iron/folic acid (MULTI COMPLETE WITH IRON ORAL) Take by mouth.      omeprazole (PRILOSEC) 40 MG capsule Take 1 capsule (40 mg total) by mouth Daily. 90 capsule 1    propranoloL (INDERAL) 20 MG tablet Take 1 tablet (20 mg total) by mouth 2 (two) times daily as needed (anxiety). 180 tablet 0    spironolactone (ALDACTONE) 50 MG tablet TAKE 1 TABLET BY MOUTH DAILY 90 tablet 3    tretinoin (RETIN-A) 0.025 % cream Apply topically every evening. 20 g 2    zolpidem (AMBIEN CR) 12.5 MG CR tablet Take 1 tablet (12.5 mg total) by mouth nightly as needed for Insomnia. 30 tablet 3     No facility-administered encounter medications on file as of 7/1/2024.       Allergy:  Review of patient's allergies indicates:  No Known Allergies      Assessment and Diagnosis   Status/Progress: Based on the examination today, the patient's problem(s) is/are inadequately controlled.  New problems have not been presented today.   Co-morbidities are not complicating management of the primary condition.        General Impression:          ICD-10-CM ICD-9-CM   1. Bipolar II disorder  F31.81 296.89   2. Generalized anxiety disorder  F41.1 300.02   3. Insomnia, unspecified type  G47.00 780.52           R/O  OCPD  Borderline personality d/o  OCD  ADHD      Intervention/Counseling/Treatment Plan     Medication Management:  Continue Abilify 15 mg by mouth once daily  Continue Prozac 40 mg by mouth once daily  Continue propranolol 20 mg by mouth twice daily as needed for anxiety related to driving  Continue Ambien CR 12.5 mg by mouth once nightly prn insomnia. Pt was told not drive or to take this med with ETOH or other sedating meds/substance. Pt verbalized understanding.  Refer for CBT  Labs: reviewed most recent  Antipsychotics: I discussed with the patient the risks of extrapyramidal side effects (dystonia, akathisia, parkinsonism), metabolic syndrome (inlcuding hyperglycemia and hyperlipidemia), Neuroleptic Malignant Syndrome (fever, muscle rigidity, autonomic instability), orthostatic hypotension, and tardive dyskinesia with antipsychotic use. Pt was told to report these symptoms right away. Pt was notified that it is possible for these movements to become permanent. The risks and benefits of medication were discussed with the patient. The patient expresses understanding of the above and displays the capacity to agree with this treatment given said understanding. Patient also agrees that, currently, the benefits outweigh the risks and would like to pursue treatment at this time.  The treatment plan and follow up plan were reviewed with the patient.  Discussed with patient informed consent, risks vs. benefits, alternative treatments, side effect profile and the inherent unpredictability of individual responses to these treatments. The patient expresses understanding of the above and displays the capacity to agree with this current plan and had no other questions.  Encouraged Patient to keep future appointments.   Take medications as prescribed and  abstain from substance abuse.   Pt was told to present to ED or call 911 for SI/HI plan or intent, psychosis, or other psychiatric or medical emergency, and pt agrees to this and verbalized understanding.      Return to Clinic: 3 months, or sooner if needed        Face to Face time with patient: 24 minutes  Total time: 29 minutes of total time spent on the encounter, which includes face to face time and non-face to face time preparing to see the patient (eg, review of tests), Obtaining and/or reviewing separately obtained history, Documenting clinical information in the electronic or other health record, Independently interpreting results (not separately reported) and communicating results to the patient/family/caregiver, or Care coordination (not separately reported).       Linda Stephens, MSN, APRN, PMHNP-BC  [facility]

## 2024-07-17 ENCOUNTER — LAB VISIT (OUTPATIENT)
Dept: LAB | Facility: HOSPITAL | Age: 44
End: 2024-07-17
Attending: GENERAL PRACTICE
Payer: COMMERCIAL

## 2024-07-17 ENCOUNTER — OFFICE VISIT (OUTPATIENT)
Dept: OBSTETRICS AND GYNECOLOGY | Facility: CLINIC | Age: 44
End: 2024-07-17
Payer: COMMERCIAL

## 2024-07-17 ENCOUNTER — HOSPITAL ENCOUNTER (OUTPATIENT)
Dept: OBSTETRICS AND GYNECOLOGY | Facility: CLINIC | Age: 44
Discharge: HOME | End: 2024-07-17
Attending: GENERAL PRACTICE
Payer: COMMERCIAL

## 2024-07-17 VITALS
WEIGHT: 136.38 LBS | BODY MASS INDEX: 27.49 KG/M2 | HEIGHT: 59 IN | DIASTOLIC BLOOD PRESSURE: 80 MMHG | SYSTOLIC BLOOD PRESSURE: 122 MMHG

## 2024-07-17 DIAGNOSIS — N92.0 MENORRHAGIA WITH REGULAR CYCLE: Primary | ICD-10-CM

## 2024-07-17 DIAGNOSIS — N92.0 MENORRHAGIA WITH REGULAR CYCLE: ICD-10-CM

## 2024-07-17 DIAGNOSIS — Z30.09 OTHER GENERAL COUNSELING AND ADVICE FOR CONTRACEPTIVE MANAGEMENT: ICD-10-CM

## 2024-07-17 LAB
BASOPHILS # BLD AUTO: 0.06 K/UL (ref 0–0.2)
BASOPHILS NFR BLD: 0.8 % (ref 0–1.9)
DIFFERENTIAL METHOD BLD: ABNORMAL
EOSINOPHIL # BLD AUTO: 0.1 K/UL (ref 0–0.5)
EOSINOPHIL NFR BLD: 1.2 % (ref 0–8)
ERYTHROCYTE [DISTWIDTH] IN BLOOD BY AUTOMATED COUNT: 12.7 % (ref 11.5–14.5)
FERRITIN SERPL-MCNC: 121 NG/ML (ref 20–300)
HCT VFR BLD AUTO: 38.1 % (ref 37–48.5)
HGB BLD-MCNC: 11.9 G/DL (ref 12–16)
IMM GRANULOCYTES # BLD AUTO: 0.02 K/UL (ref 0–0.04)
IMM GRANULOCYTES NFR BLD AUTO: 0.3 % (ref 0–0.5)
LYMPHOCYTES # BLD AUTO: 2 K/UL (ref 1–4.8)
LYMPHOCYTES NFR BLD: 26.8 % (ref 18–48)
MCH RBC QN AUTO: 29.7 PG (ref 27–31)
MCHC RBC AUTO-ENTMCNC: 31.2 G/DL (ref 32–36)
MCV RBC AUTO: 95 FL (ref 82–98)
MONOCYTES # BLD AUTO: 0.4 K/UL (ref 0.3–1)
MONOCYTES NFR BLD: 5.4 % (ref 4–15)
NEUTROPHILS # BLD AUTO: 5 K/UL (ref 1.8–7.7)
NEUTROPHILS NFR BLD: 65.5 % (ref 38–73)
NRBC BLD-RTO: 0 /100 WBC
PLATELET # BLD AUTO: 377 K/UL (ref 150–450)
PMV BLD AUTO: 10.2 FL (ref 9.2–12.9)
RBC # BLD AUTO: 4.01 M/UL (ref 4–5.4)
TSH SERPL DL<=0.005 MIU/L-ACNC: 1.82 UIU/ML (ref 0.4–4)
WBC # BLD AUTO: 7.57 K/UL (ref 3.9–12.7)

## 2024-07-17 PROCEDURE — 3079F DIAST BP 80-89 MM HG: CPT | Mod: CPTII,S$GLB,, | Performed by: GENERAL PRACTICE

## 2024-07-17 PROCEDURE — 99213 OFFICE O/P EST LOW 20 MIN: CPT | Mod: S$GLB,,, | Performed by: GENERAL PRACTICE

## 2024-07-17 PROCEDURE — 3008F BODY MASS INDEX DOCD: CPT | Mod: CPTII,S$GLB,, | Performed by: GENERAL PRACTICE

## 2024-07-17 PROCEDURE — 99999 PR PBB SHADOW E&M-EST. PATIENT-LVL III: CPT | Mod: PBBFAC,,, | Performed by: GENERAL PRACTICE

## 2024-07-17 PROCEDURE — 84443 ASSAY THYROID STIM HORMONE: CPT | Performed by: GENERAL PRACTICE

## 2024-07-17 PROCEDURE — 36415 COLL VENOUS BLD VENIPUNCTURE: CPT | Mod: PO | Performed by: GENERAL PRACTICE

## 2024-07-17 PROCEDURE — 3074F SYST BP LT 130 MM HG: CPT | Mod: CPTII,S$GLB,, | Performed by: GENERAL PRACTICE

## 2024-07-17 PROCEDURE — 85025 COMPLETE CBC W/AUTO DIFF WBC: CPT | Performed by: GENERAL PRACTICE

## 2024-07-17 PROCEDURE — 82728 ASSAY OF FERRITIN: CPT | Performed by: GENERAL PRACTICE

## 2024-07-17 NOTE — PROGRESS NOTES
HISTORY OF THE PRESENT ILLNESS    2024  Roopa Rivas is a 44 y.o. here for a discussion about contraception and HMB.  Her periods aren't heavy every month, but it is bothersome to her.  Her sister had an endometrial ablation and is happy with it.    KATHY'sP's:   LMP: 24 MAY  Relationship:  15yrs years  Contraception: STEFFI and condoms  PAP Hx: no h/o abnormals  PAP: PAP neg / HPV neg / Date: 10/27/2022  MMG (10/06/23) = negative and next one scheduled     LABS & RADS   Lab Results   Component Value Date    WBC 7.82 2023    HGB 11.9 (L) 2023    HCT 36.9 (L) 2023     2023    MCV 94 2023      Lab Results   Component Value Date    TSH 1.566 2023         GYNECOLOGIC HISTORY  PAP Hx: no h/o abnormals  Genital HSV: no  STD Hx: chlamydia    Menarche: 10 yoa  Period duration: 5 days  # Heavy Days: 3  Pad/tampon ? on heavy days: 30 minutes to an hour  Intermenstrual bleeding: No  Period frequency:regular every 28-30 days    Dysmenorrhea: typical or expected menstrual cramps  Non-menstrual pelvic pressure/pain: No  Dyspareunia: No    Vasomotor Sxs: yes, typically 3 per day and yes, typically 2 per night  Vaginal dryness: yes  Poor libido on Abilify    OBSTETRIC HISTORY  Living children: 4  Vaginal deliveries: 4  IAB x 1  Plus 3 stepchildren    PAST MEDICAL HISTORY  -------------------------------------    Arthritis    Fibromyalgia    Gastroenteritis    GERD (gastroesophageal reflux disease)    Long-term use of Plaquenil   Thyroid disease  Depression / anxiety    PAST SURGICAL HISTORY  ----------------------------    Colonoscopy    2016    Dilation and curettage of uterus    Miscarriage at age 25    Esophagogastroduodenoscopy    Procedure: EGD (ESOPHAGOGASTRODUODENOSCOPY);  Surgeon: Jacquie Guadalupe MD;  Location: [facility];  Service: Endoscopy;  Laterality: N/A;     ALLERGIES  Review of patient's allergies indicates:  No Known Allergies    MEDICATIONS  Current  Outpatient Medications   Medication Instructions    acetylcysteine (N-ACETYL-L-CYSTEINE MISC) Misc.(Non-Drug; Combo Route)    ARIPiprazole (ABILIFY) 15 mg, Oral, Daily    calcium carbonate 400 mg calcium (1,000 mg) Chew Oral    cetirizine (ZYRTEC) 10 mg, Oral, Daily    clotrimazole-betamethasone 1-0.05% (LOTRISONE) cream Topical (Top), 2 times daily    dicyclomine (BENTYL) 10 mg, Oral, As needed (PRN)    drospirenone-ethinyl estradioL (SARAH) 3-0.02 mg per tablet 1 tablet, Oral, Daily    FLUoxetine 40 mg, Oral, Daily    fluticasone propionate (FLONASE) 50 mcg, Each Nostril, Daily    hydroquinone 8 % Emul Compound hydroquinone 12% / kojic acid 6% / vitamin C 1% / niacinamide 2% cream. Apply a pea-sized amount to entire face qhs. Do not use for longer than 16 weeks in a row.    ibuprofen (ADVIL,MOTRIN) 400 mg, Oral, Every 6 hours PRN    iron, carbonyl 25 mg iron Tab Oral, Iron supplement    Lactobacillus acidophilus (PROBIOTIC ORAL) Oral    levothyroxine (SYNTHROID) 50 MCG tablet Take 1 tab Monday-Saturday and 2 tabs on Sunday    magnesium 30 mg Tab Oral, Once    multivitamin/iron/folic acid (MULTI COMPLETE WITH IRON ORAL) Oral    omeprazole (PRILOSEC) 40 mg, Oral, Daily    propranoloL (INDERAL) 20 mg, Oral, 2 times daily PRN    spironolactone (ALDACTONE) 50 MG tablet TAKE 1 TABLET BY MOUTH DAILY    tretinoin (RETIN-A) 0.025 % cream Topical (Top), Nightly    zolpidem (AMBIEN CR) 12.5 mg, Oral, Nightly PRN     SOCIAL HISTORY  Lives with:  and children  Smoker: non-smoker  Alcohol: denies  Drugs: denies  Domestic Violence: no  Occupation: homemaker    FAMILY HISTORY  BLEEDING or  CLOTTING DISORDERS: none  BREAST CA: none  UTERINE CA: none  OVARIAN CA: none  COLON CA: none    --------------------------------------------------------------------------------------------------------------    PHYSICAL EXAM  VITALS:  Vitals:    07/17/24 0950   BP: 122/80     GEN = alert/oriented, nad, pleasant  HEENT = sclera anicteric,  EOM grossly normal  BREASTS = deferred, no concerns   = deferred, no concerns    ASSESSMENT AND PLAN:  44 y.o. with HMB and desires for sterilization.    rads: pelvic US  f/u results of CBC, TSH, and iron studies    Cervical Cancer screening: next cervical CA screen (PAP+HPV) due OCT 2027    Mammogram: up to date  Encouraged self breast awareness; RTC for breast concerns    RTC for f/u above studies and for EMBx    MD PEARL

## 2024-07-18 DIAGNOSIS — E03.8 SUBCLINICAL HYPOTHYROIDISM: ICD-10-CM

## 2024-07-19 RX ORDER — LEVOTHYROXINE SODIUM 50 UG/1
TABLET ORAL
Qty: 102 TABLET | Refills: 3 | Status: SHIPPED | OUTPATIENT
Start: 2024-07-19

## 2024-08-15 ENCOUNTER — OFFICE VISIT (OUTPATIENT)
Dept: OBSTETRICS AND GYNECOLOGY | Facility: CLINIC | Age: 44
End: 2024-08-15
Payer: COMMERCIAL

## 2024-08-15 VITALS
BODY MASS INDEX: 27.64 KG/M2 | DIASTOLIC BLOOD PRESSURE: 82 MMHG | WEIGHT: 137.13 LBS | RESPIRATION RATE: 16 BRPM | SYSTOLIC BLOOD PRESSURE: 104 MMHG | HEIGHT: 59 IN

## 2024-08-15 DIAGNOSIS — Z30.09 CONSULTATION FOR FEMALE STERILIZATION: ICD-10-CM

## 2024-08-15 DIAGNOSIS — N92.6 IRREGULAR PERIODS/MENSTRUAL CYCLES: Primary | ICD-10-CM

## 2024-08-15 PROCEDURE — 99999 PR PBB SHADOW E&M-EST. PATIENT-LVL V: CPT | Mod: PBBFAC,,, | Performed by: GENERAL PRACTICE

## 2024-08-15 RX ORDER — DROSPIRENONE AND ETHINYL ESTRADIOL 0.02-3(28)
1 KIT ORAL DAILY
Qty: 90 TABLET | Refills: 3 | Status: SHIPPED | OUTPATIENT
Start: 2024-08-15

## 2024-08-15 NOTE — PROGRESS NOTES
SUBJECTIVE   08/15/2024  Roopa Rivas is a 44 y.o. here for EMBx and f/u after recent blood work and pelvic US.    G'sP's:   LMP: 07 AUG  Relationship:  15yrs years  Contraception: STEFFI and condoms  PAP Hx: no h/o abnormals  PAP: PAP neg / HPV neg / Date: 10/27/2022  MMG (10/06/23) = negative and next one scheduled     OBJECTIVE   Vitals:    08/15/24 1047   BP: 104/82   Resp: 16     GEN = alert/oriented, nad, pleasant  HEENT = sclera anicteric, EOM grossly normal  BREASTS = deferred, no concerns  CV = BP and HR as per vitals  PULM = normal respiratory effort  ABD = soft, nontender, nondistended    ENDOMETRIAL BIOPSY  Informed Consent: The indication for endometrial biopsy (HMB, desires endometrial ablation) has been reviewed with the patient.  She understands the risks of the procedure (bleeding, infection, pain, uterine perforation, inadequate tissue for diagnosis) and agrees to proceed.  All of her questions have been answered.    Site Verification:  Proper patient, procedure, and site were confirmed prior to starting.    Procedure: A speculum was placed in the vagina.  Normal vagina/ CVX with no lesions, normal discharge.  The cervix was cleaned with iodine.  The pipelle was introduced, and the uterus sounded to 8cm.   One pass was made and with adequate return of tissue.  Hemostasis was observed.  The speculum was removed.  The patient tolerated the procedure well.    LABS & RADS     Lab Results   Component Value Date    TSH 1.816 2024      Lab Results   Component Value Date    WBC 7.57 2024    HGB 11.9 (L) 2024    HCT 38.1 2024     2024    MCV 95 2024       FERRITIN 121 2024     PELVIC US (24) =  Uterus 8 x 4  cm  EMS 3.1 mm  ROV 2 x 2  cm  LOV not seen       ASSESSMENT / PLAN   44 y.o. with HMB and undesired fertility.  Normal labs/rads as above.  EMBx done today.    Return precautions reviewed  Advised pelvic rest x 48hrs  RTC ~2wks to  review biopsy results and go over consents for Lx bilat salpingectomy and endometrial ablation  Cervical Cancer screening: next cervical CA screen (PAP+HPV) due OCT 2027  Mammogram: scheduled soon  Refill on STEFFI to last till surgery    MD PEARL    --------------------------    07/17/2024  Roopa Rivas is a 44 y.o. here for a discussion about contraception and HMB.  Her periods aren't heavy every month, but it is bothersome to her.  Her sister had an endometrial ablation and is happy with it.      LABS & RADS   Lab Results   Component Value Date    WBC 7.82 09/06/2023    HGB 11.9 (L) 09/06/2023    HCT 36.9 (L) 09/06/2023     09/06/2023    MCV 94 09/06/2023      Lab Results   Component Value Date    TSH 1.566 08/04/2023         GYNECOLOGIC HISTORY  PAP Hx: no h/o abnormals  Genital HSV: no  STD Hx: chlamydia    Menarche: 10 yoa  Period duration: 5 days  # Heavy Days: 3  Pad/tampon ? on heavy days: 30 minutes to an hour  Intermenstrual bleeding: No  Period frequency:regular every 28-30 days    Dysmenorrhea: typical or expected menstrual cramps  Non-menstrual pelvic pressure/pain: No  Dyspareunia: No    Vasomotor Sxs: yes, typically 3 per day and yes, typically 2 per night  Vaginal dryness: yes  Poor libido on Abilify    OBSTETRIC HISTORY  Living children: 4  Vaginal deliveries: 4  IAB x 1  Plus 3 stepchildren    PAST MEDICAL HISTORY  -------------------------------------    Arthritis    Fibromyalgia    Gastroenteritis    GERD (gastroesophageal reflux disease)    Long-term use of Plaquenil   Thyroid disease  Depression / anxiety    PAST SURGICAL HISTORY  ----------------------------    Colonoscopy    2016    Dilation and curettage of uterus    Miscarriage at age 25    Esophagogastroduodenoscopy    Procedure: EGD (ESOPHAGOGASTRODUODENOSCOPY);  Surgeon: Jacquie Guadalupe MD;  [facility]: [facility];  Service: Endoscopy;  Laterality: N/A;     ALLERGIES  Review of patient's allergies indicates:  No Known  Allergies    MEDICATIONS  Current Outpatient Medications   Medication Instructions    acetylcysteine (N-ACETYL-L-CYSTEINE MISC) Misc.(Non-Drug; Combo Route)    ARIPiprazole (ABILIFY) 15 mg, Oral, Daily    calcium carbonate 400 mg calcium (1,000 mg) Chew Oral    cetirizine (ZYRTEC) 10 mg, Oral, Daily    clotrimazole-betamethasone 1-0.05% (LOTRISONE) cream Topical (Top), 2 times daily    dicyclomine (BENTYL) 10 mg, Oral, As needed (PRN)    drospirenone-ethinyl estradioL (SARAH) 3-0.02 mg per tablet 1 tablet, Oral, Daily    FLUoxetine 40 mg, Oral, Daily    fluticasone propionate (FLONASE) 50 mcg, Each Nostril, Daily    hydroquinone 8 % Emul Compound hydroquinone 12% / kojic acid 6% / vitamin C 1% / niacinamide 2% cream. Apply a pea-sized amount to entire face qhs. Do not use for longer than 16 weeks in a row.    ibuprofen (ADVIL,MOTRIN) 400 mg, Oral, Every 6 hours PRN    iron, carbonyl 25 mg iron Tab Oral, Iron supplement    Lactobacillus acidophilus (PROBIOTIC ORAL) Oral    levothyroxine (SYNTHROID) 50 MCG tablet Take 1 tab Monday-Saturday and 2 tabs on Sunday    magnesium 30 mg Tab Oral, Once    multivitamin/iron/folic acid (MULTI COMPLETE WITH IRON ORAL) Oral    omeprazole (PRILOSEC) 40 mg, Oral, Daily    propranoloL (INDERAL) 20 mg, Oral, 2 times daily PRN    spironolactone (ALDACTONE) 50 MG tablet TAKE 1 TABLET BY MOUTH DAILY    tretinoin (RETIN-A) 0.025 % cream Topical (Top), Nightly    zolpidem (AMBIEN CR) 12.5 mg, Oral, Nightly PRN     SOCIAL HISTORY  Lives with:  and children  Smoker: non-smoker  Alcohol: denies  Drugs: denies  Domestic Violence: no  Occupation: homemaker    FAMILY HISTORY  BLEEDING or  CLOTTING DISORDERS: none  BREAST CA: none  UTERINE CA: none  OVARIAN CA: none  COLON CA: none

## 2024-08-15 NOTE — PATIENT INSTRUCTIONS
BIOPSIES (endometrial, cervical, vaginal, vulvar, etc.):    Biopsy results can take anywhere from 2 to 10 days to return from the pathologist.    If the treatment plan is simple or unchanged based on the biopsy result, I'll probably send a message in [employer].    If the treatment plan is complex and/or the biopsy result is cancer, I'll call you to discuss.  In some cases, I'll have my staff schedule an appointment for you to come in to discuss things in person.    If it's been two weeks since your biopsy, and you haven't heard from us, PLEASE REACH OUT to check on things.

## 2024-08-22 DIAGNOSIS — R10.13 EPIGASTRIC ABDOMINAL PAIN: ICD-10-CM

## 2024-08-22 DIAGNOSIS — K21.9 GASTROESOPHAGEAL REFLUX DISEASE, UNSPECIFIED WHETHER ESOPHAGITIS PRESENT: ICD-10-CM

## 2024-08-22 DIAGNOSIS — R10.10 PAIN OF UPPER ABDOMEN: ICD-10-CM

## 2024-08-22 RX ORDER — OMEPRAZOLE 40 MG/1
40 CAPSULE, DELAYED RELEASE ORAL DAILY
Qty: 90 CAPSULE | Refills: 1 | Status: SHIPPED | OUTPATIENT
Start: 2024-08-22 | End: 2025-02-18

## 2024-08-30 ENCOUNTER — OCCUPATIONAL HEALTH (OUTPATIENT)
Dept: URGENT CARE | Facility: CLINIC | Age: 44
End: 2024-08-30
Payer: COMMERCIAL

## 2024-08-30 DIAGNOSIS — Z02.83 ENCOUNTER FOR DRUG SCREENING: ICD-10-CM

## 2024-08-30 DIAGNOSIS — Z00.00 PHYSICAL EXAM: Primary | ICD-10-CM

## 2024-09-03 ENCOUNTER — TELEPHONE (OUTPATIENT)
Dept: FAMILY MEDICINE | Facility: CLINIC | Age: 44
End: 2024-09-03
Payer: COMMERCIAL

## 2024-09-06 ENCOUNTER — OFFICE VISIT (OUTPATIENT)
Dept: OBSTETRICS AND GYNECOLOGY | Facility: CLINIC | Age: 44
End: 2024-09-06
Payer: COMMERCIAL

## 2024-09-06 VITALS
RESPIRATION RATE: 16 BRPM | BODY MASS INDEX: 28.11 KG/M2 | SYSTOLIC BLOOD PRESSURE: 102 MMHG | WEIGHT: 139.44 LBS | DIASTOLIC BLOOD PRESSURE: 76 MMHG | HEIGHT: 59 IN

## 2024-09-06 DIAGNOSIS — N92.0 MENORRHAGIA WITH REGULAR CYCLE: Primary | ICD-10-CM

## 2024-09-06 DIAGNOSIS — Z30.09 OTHER GENERAL COUNSELING AND ADVICE FOR CONTRACEPTIVE MANAGEMENT: ICD-10-CM

## 2024-09-06 PROCEDURE — 99999 PR PBB SHADOW E&M-EST. PATIENT-LVL IV: CPT | Mod: PBBFAC,,, | Performed by: GENERAL PRACTICE

## 2024-09-06 NOTE — PROGRESS NOTES
SUBJECTIVE   2024  Roopa Rivas is a 44 y.o. here for a discussion about contraception and HMB.  Her periods aren't heavy every month, but it is bothersome to her.  Her sister had an endometrial ablation and is happy with it.    08/15/2024  Roopa Rivas is a 44 y.o. here for EMBx and f/u after recent blood work and pelvic US.    2024  LMP was 07 AUG, EMBx was 15 AUG, and now spotting since 25 AUG.  Otherwise no concerns today.    G'sP's:   LMP: 07 AUG  Relationship:  15yrs years  Contraception: STEFFI and condoms  PAP Hx: no h/o abnormals  PAP: PAP neg / HPV neg / Date: 10/27/2022  MMG (10/06/23) = negative and next one scheduled     OBJECTIVE   Vitals:    24   BP: 102/76   Resp: 16     GEN = alert/oriented, nad, pleasant  HEENT = sclera anicteric, EOM grossly normal  BREASTS = deferred, no concerns  CV = BP and HR as per vitals  PULM = normal respiratory effort  ABD = soft, nontender, nondistended   = (8/15/24) nefg, normal vagina/CVX, sounded to 8cm    LABS & RADS     Lab Results   Component Value Date    TSH 1.816 2024      Lab Results   Component Value Date    WBC 7.57 2024    HGB 11.9 (L) 2024    HCT 38.1 2024     2024    MCV 95 2024       FERRITIN 121 2024     PELVIC US (24) =  Uterus 8 x 4  cm  EMS 3.1 mm  ROV 2 x 2  cm  LOV not seen    EMBX (15 AUG 2024)  Fragments of inactive endometrium, no atypical hyperplasia or malignancy identified.       ASSESSMENT / PLAN   44 y.o.  with HMB and undesired fertility.    Planned surgery: Lx bilateral salpingectomy, Hx D&C, and Novasure endometrial ablation    Informed consent obtained for surgery.  The patient wishes to proceed with the above listed procedure(s).  She unserstands risks of surgery include bleeding, need for blood transfusion, infection, pain, scarring, damage to nearby organs or tissues, need for other surgery, inadequate diagnosis, inadequate treatment  of her symptoms, stroke, heart attack, blood clots such as DVT or PE, complications from anesthesia, allergic reactions to medications or devices, and death.  Specific to this procedure the patient additionally understands  the expectation is a reduction in menstrual flow, and with this expectation in mind there is a 90% satisfaction rate.  50-60% of women who have this procedure have complete amenorrhea at the 12-month marcos post-procedure.  There also is a reported 70-80% improvement in dysmenorrhea after this procedure.  10-15% of women who have an endometrial ablation ultimately have a hysterectomy (procedure failure).  Tubal-ablation pain syndrome is a rare complication that usually requires hysterectomy for definitive treatment.  Pregnancy is not recommended after an ablation.  Alternatives to the planned procedure include medication management, uterine artery embolization, hysterectomy, do nothing.  Regarding sterilization, she understands the risk of failure is extremely low if we are successful in removing both tubes entirely.  Risk of regret is low given her age and parity.    OR date: 9/23/24    Anesthesia plan: GETA    Antibiotic prophylaxis: not indicated    Medication management: morning meds with a sip of water unless otherwise directed by hospital/anesthesia staff.  The patient is NOT taking any GLP-1 agonist medications, which can cause a delay in gastric emptying.    Postoperative expectations reviewed.  Pelvic rest, lifting and activity restrictions, driving restrictions, and medications.    Cervical Cancer screening: next cervical CA screen (PAP+HPV) due OCT 2027    Mammogram: scheduled jerel KANG MD    --------------------------    GYNECOLOGIC HISTORY  PAP Hx: no h/o abnormals  Genital HSV: no  STD Hx: chlamydia    Menarche: 10 yoa  Period duration: 5 days  # Heavy Days: 3  Pad/tampon ? on heavy days: 30 minutes to an hour  Intermenstrual bleeding: No  Period frequency:regular every 28-30  days    Dysmenorrhea: typical or expected menstrual cramps  Non-menstrual pelvic pressure/pain: No  Dyspareunia: No    Vasomotor Sxs: yes, typically 3 per day and yes, typically 2 per night  Vaginal dryness: yes  Poor libido on Abilify    OBSTETRIC HISTORY  Living children: 4  Vaginal deliveries: 4  IAB x 1  Plus 3 stepchildren    PAST MEDICAL HISTORY  -------------------------------------    Arthritis    Fibromyalgia    Gastroenteritis    GERD (gastroesophageal reflux disease)    Long-term use of Plaquenil   Thyroid disease  Depression / anxiety    PAST SURGICAL HISTORY  ----------------------------    Colonoscopy    2016    Dilation and curettage of uterus    Miscarriage at age 25    Esophagogastroduodenoscopy    Procedure: EGD (ESOPHAGOGASTRODUODENOSCOPY);  Surgeon: Jacuqie Guadalupe MD;  Location: [facility];  Service: Endoscopy;  Laterality: N/A;     ALLERGIES  Review of patient's allergies indicates:  No Known Allergies    MEDICATIONS  Current Outpatient Medications   Medication Instructions    acetylcysteine (N-ACETYL-L-CYSTEINE MISC) Misc.(Non-Drug; Combo Route)    ARIPiprazole (ABILIFY) 15 mg, Oral, Daily    calcium carbonate 400 mg calcium (1,000 mg) Chew Oral    cetirizine (ZYRTEC) 10 mg, Oral, Daily    clotrimazole-betamethasone 1-0.05% (LOTRISONE) cream Topical (Top), 2 times daily    dicyclomine (BENTYL) 10 mg, Oral, As needed (PRN)    drospirenone-ethinyl estradioL (SARAH) 3-0.02 mg per tablet 1 tablet, Oral, Daily    FLUoxetine 40 mg, Oral, Daily    fluticasone propionate (FLONASE) 50 mcg, Each Nostril, Daily    hydroquinone 8 % Emul Compound hydroquinone 12% / kojic acid 6% / vitamin C 1% / niacinamide 2% cream. Apply a pea-sized amount to entire face qhs. Do not use for longer than 16 weeks in a row.    ibuprofen (ADVIL,MOTRIN) 400 mg, Oral, Every 6 hours PRN    iron, carbonyl 25 mg iron Tab Oral, Iron supplement    Lactobacillus acidophilus (PROBIOTIC ORAL) Oral    levothyroxine (SYNTHROID) 50 MCG  tablet Take 1 tab Monday-Saturday and 2 tabs on Sunday    magnesium 30 mg Tab Oral, Once    multivitamin/iron/folic acid (MULTI COMPLETE WITH IRON ORAL) Oral    omeprazole (PRILOSEC) 40 mg, Oral, Daily    propranoloL (INDERAL) 20 mg, Oral, 2 times daily PRN    spironolactone (ALDACTONE) 50 MG tablet TAKE 1 TABLET BY MOUTH DAILY    tretinoin (RETIN-A) 0.025 % cream Topical (Top), Nightly    zolpidem (AMBIEN CR) 12.5 mg, Oral, Nightly PRN     SOCIAL HISTORY  Lives with:  and children  Smoker: non-smoker  Alcohol: denies  Drugs: denies  Domestic Violence: no  Occupation: homemaker    FAMILY HISTORY  BLEEDING or  CLOTTING DISORDERS: none  BREAST CA: none  UTERINE CA: none  OVARIAN CA: none  COLON CA: none

## 2024-09-06 NOTE — H&P (VIEW-ONLY)
SUBJECTIVE   2024  Roopa Rivas is a 44 y.o. here for a discussion about contraception and HMB.  Her periods aren't heavy every month, but it is bothersome to her.  Her sister had an endometrial ablation and is happy with it.    08/15/2024  Roopa Rivas is a 44 y.o. here for EMBx and f/u after recent blood work and pelvic US.    2024  LMP was 07 AUG, EMBx was 15 AUG, and now spotting since 25 AUG.  Otherwise no concerns today.    G'sP's:   LMP: 07 AUG  Relationship:  15yrs years  Contraception: STEFFI and condoms  PAP Hx: no h/o abnormals  PAP: PAP neg / HPV neg / Date: 10/27/2022  MMG (10/06/23) = negative and next one scheduled     OBJECTIVE   Vitals:    24   BP: 102/76   Resp: 16     GEN = alert/oriented, nad, pleasant  HEENT = sclera anicteric, EOM grossly normal  BREASTS = deferred, no concerns  CV = BP and HR as per vitals  PULM = normal respiratory effort  ABD = soft, nontender, nondistended   = (8/15/24) nefg, normal vagina/CVX, sounded to 8cm    LABS & RADS     Lab Results   Component Value Date    TSH 1.816 2024      Lab Results   Component Value Date    WBC 7.57 2024    HGB 11.9 (L) 2024    HCT 38.1 2024     2024    MCV 95 2024       FERRITIN 121 2024     PELVIC US (24) =  Uterus 8 x 4  cm  EMS 3.1 mm  ROV 2 x 2  cm  LOV not seen    EMBX (15 AUG 2024)  Fragments of inactive endometrium, no atypical hyperplasia or malignancy identified.       ASSESSMENT / PLAN   44 y.o.  with HMB and undesired fertility.    Planned surgery: Lx bilateral salpingectomy, Hx D&C, and Novasure endometrial ablation    Informed consent obtained for surgery.  The patient wishes to proceed with the above listed procedure(s).  She unserstands risks of surgery include bleeding, need for blood transfusion, infection, pain, scarring, damage to nearby organs or tissues, need for other surgery, inadequate diagnosis, inadequate treatment  of her symptoms, stroke, heart attack, blood clots such as DVT or PE, complications from anesthesia, allergic reactions to medications or devices, and death.  Specific to this procedure the patient additionally understands  the expectation is a reduction in menstrual flow, and with this expectation in mind there is a 90% satisfaction rate.  50-60% of women who have this procedure have complete amenorrhea at the 12-month marcos post-procedure.  There also is a reported 70-80% improvement in dysmenorrhea after this procedure.  10-15% of women who have an endometrial ablation ultimately have a hysterectomy (procedure failure).  Tubal-ablation pain syndrome is a rare complication that usually requires hysterectomy for definitive treatment.  Pregnancy is not recommended after an ablation.  Alternatives to the planned procedure include medication management, uterine artery embolization, hysterectomy, do nothing.  Regarding sterilization, she understands the risk of failure is extremely low if we are successful in removing both tubes entirely.  Risk of regret is low given her age and parity.    OR date: 9/23/24    Anesthesia plan: GETA    Antibiotic prophylaxis: not indicated    Medication management: morning meds with a sip of water unless otherwise directed by hospital/anesthesia staff.  The patient is NOT taking any GLP-1 agonist medications, which can cause a delay in gastric emptying.    Postoperative expectations reviewed.  Pelvic rest, lifting and activity restrictions, driving restrictions, and medications.    Cervical Cancer screening: next cervical CA screen (PAP+HPV) due OCT 2027    Mammogram: scheduled jerel KANG MD    --------------------------    GYNECOLOGIC HISTORY  PAP Hx: no h/o abnormals  Genital HSV: no  STD Hx: chlamydia    Menarche: 10 yoa  Period duration: 5 days  # Heavy Days: 3  Pad/tampon ? on heavy days: 30 minutes to an hour  Intermenstrual bleeding: No  Period frequency:regular every 28-30  days    Dysmenorrhea: typical or expected menstrual cramps  Non-menstrual pelvic pressure/pain: No  Dyspareunia: No    Vasomotor Sxs: yes, typically 3 per day and yes, typically 2 per night  Vaginal dryness: yes  Poor libido on Abilify    OBSTETRIC HISTORY  Living children: 4  Vaginal deliveries: 4  IAB x 1  Plus 3 stepchildren    PAST MEDICAL HISTORY  -------------------------------------    Arthritis    Fibromyalgia    Gastroenteritis    GERD (gastroesophageal reflux disease)    Long-term use of Plaquenil   Thyroid disease  Depression / anxiety    PAST SURGICAL HISTORY  ----------------------------    Colonoscopy    2016    Dilation and curettage of uterus    Miscarriage at age 25    Esophagogastroduodenoscopy    Procedure: EGD (ESOPHAGOGASTRODUODENOSCOPY);  Surgeon: Jacquie Guadalupe MD;  Location: [facility];  Service: Endoscopy;  Laterality: N/A;     ALLERGIES  Review of patient's allergies indicates:  No Known Allergies    MEDICATIONS  Current Outpatient Medications   Medication Instructions    acetylcysteine (N-ACETYL-L-CYSTEINE MISC) Misc.(Non-Drug; Combo Route)    ARIPiprazole (ABILIFY) 15 mg, Oral, Daily    calcium carbonate 400 mg calcium (1,000 mg) Chew Oral    cetirizine (ZYRTEC) 10 mg, Oral, Daily    clotrimazole-betamethasone 1-0.05% (LOTRISONE) cream Topical (Top), 2 times daily    dicyclomine (BENTYL) 10 mg, Oral, As needed (PRN)    drospirenone-ethinyl estradioL (SARAH) 3-0.02 mg per tablet 1 tablet, Oral, Daily    FLUoxetine 40 mg, Oral, Daily    fluticasone propionate (FLONASE) 50 mcg, Each Nostril, Daily    hydroquinone 8 % Emul Compound hydroquinone 12% / kojic acid 6% / vitamin C 1% / niacinamide 2% cream. Apply a pea-sized amount to entire face qhs. Do not use for longer than 16 weeks in a row.    ibuprofen (ADVIL,MOTRIN) 400 mg, Oral, Every 6 hours PRN    iron, carbonyl 25 mg iron Tab Oral, Iron supplement    Lactobacillus acidophilus (PROBIOTIC ORAL) Oral    levothyroxine (SYNTHROID) 50 MCG  tablet Take 1 tab Monday-Saturday and 2 tabs on Sunday    magnesium 30 mg Tab Oral, Once    multivitamin/iron/folic acid (MULTI COMPLETE WITH IRON ORAL) Oral    omeprazole (PRILOSEC) 40 mg, Oral, Daily    propranoloL (INDERAL) 20 mg, Oral, 2 times daily PRN    spironolactone (ALDACTONE) 50 MG tablet TAKE 1 TABLET BY MOUTH DAILY    tretinoin (RETIN-A) 0.025 % cream Topical (Top), Nightly    zolpidem (AMBIEN CR) 12.5 mg, Oral, Nightly PRN     SOCIAL HISTORY  Lives with:  and children  Smoker: non-smoker  Alcohol: denies  Drugs: denies  Domestic Violence: no  Occupation: homemaker    FAMILY HISTORY  BLEEDING or  CLOTTING DISORDERS: none  BREAST CA: none  UTERINE CA: none  OVARIAN CA: none  COLON CA: none

## 2024-09-20 ENCOUNTER — TELEPHONE (OUTPATIENT)
Dept: OBSTETRICS AND GYNECOLOGY | Facility: CLINIC | Age: 44
End: 2024-09-20
Payer: COMMERCIAL

## 2024-09-20 NOTE — TELEPHONE ENCOUNTER
Spoke with pt to ensure she spoke with someone in the finance dept pertaining to her balance before her surgery. Pt states she has spoken with someone and it has been addressed. Understanding verbalized.

## 2024-09-21 ENCOUNTER — ANESTHESIA EVENT (OUTPATIENT)
Dept: SURGERY | Facility: HOSPITAL | Age: 44
End: 2024-09-21
Payer: COMMERCIAL

## 2024-09-23 ENCOUNTER — ANESTHESIA (OUTPATIENT)
Dept: SURGERY | Facility: HOSPITAL | Age: 44
End: 2024-09-23
Payer: COMMERCIAL

## 2024-09-23 ENCOUNTER — HOSPITAL ENCOUNTER (OUTPATIENT)
Facility: HOSPITAL | Age: 44
Discharge: HOME | End: 2024-09-23
Attending: GENERAL PRACTICE | Admitting: GENERAL PRACTICE
Payer: COMMERCIAL

## 2024-09-23 DIAGNOSIS — Z01.818 PREOP TESTING: ICD-10-CM

## 2024-09-23 LAB
B-HCG UR QL: NEGATIVE
CTP QC/QA: YES

## 2024-09-23 PROCEDURE — 36000705 HC OR TIME LEV I EA ADD 15 MIN: Performed by: GENERAL PRACTICE

## 2024-09-23 PROCEDURE — 25000003 PHARM REV CODE 250: Performed by: ANESTHESIOLOGY

## 2024-09-23 PROCEDURE — 25000003 PHARM REV CODE 250: Performed by: GENERAL PRACTICE

## 2024-09-23 PROCEDURE — 94799 UNLISTED PULMONARY SVC/PX: CPT

## 2024-09-23 PROCEDURE — 63600175 PHARM REV CODE 636 W HCPCS: Mod: JZ,JG | Performed by: GENERAL PRACTICE

## 2024-09-23 PROCEDURE — 25000003 PHARM REV CODE 250: Performed by: NURSE ANESTHETIST, CERTIFIED REGISTERED

## 2024-09-23 PROCEDURE — 36000704 HC OR TIME LEV I 1ST 15 MIN: Performed by: GENERAL PRACTICE

## 2024-09-23 PROCEDURE — 71000015 HC POSTOP RECOV 1ST HR: Performed by: GENERAL PRACTICE

## 2024-09-23 PROCEDURE — 37000008 HC ANESTHESIA 1ST 15 MINUTES: Performed by: GENERAL PRACTICE

## 2024-09-23 PROCEDURE — 71000039 HC RECOVERY, EACH ADD'L HOUR: Performed by: GENERAL PRACTICE

## 2024-09-23 PROCEDURE — 81025 URINE PREGNANCY TEST: CPT | Performed by: ANESTHESIOLOGY

## 2024-09-23 PROCEDURE — 37000009 HC ANESTHESIA EA ADD 15 MINS: Performed by: GENERAL PRACTICE

## 2024-09-23 PROCEDURE — 27201423 OPTIME MED/SURG SUP & DEVICES STERILE SUPPLY: Performed by: GENERAL PRACTICE

## 2024-09-23 PROCEDURE — 71000033 HC RECOVERY, INTIAL HOUR: Performed by: GENERAL PRACTICE

## 2024-09-23 PROCEDURE — 63600175 PHARM REV CODE 636 W HCPCS: Performed by: NURSE ANESTHETIST, CERTIFIED REGISTERED

## 2024-09-23 RX ORDER — DEXAMETHASONE SODIUM PHOSPHATE 4 MG/ML
INJECTION, SOLUTION INTRA-ARTICULAR; INTRALESIONAL; INTRAMUSCULAR; INTRAVENOUS; SOFT TISSUE
Status: DISCONTINUED | OUTPATIENT
Start: 2024-09-23 | End: 2024-09-23

## 2024-09-23 RX ORDER — ROCURONIUM BROMIDE 10 MG/ML
INJECTION, SOLUTION INTRAVENOUS
Status: DISCONTINUED | OUTPATIENT
Start: 2024-09-23 | End: 2024-09-23

## 2024-09-23 RX ORDER — FENTANYL CITRATE 50 UG/ML
25 INJECTION, SOLUTION INTRAMUSCULAR; INTRAVENOUS EVERY 5 MIN PRN
Status: DISCONTINUED | OUTPATIENT
Start: 2024-09-23 | End: 2024-09-23 | Stop reason: HOSPADM

## 2024-09-23 RX ORDER — MEPERIDINE HYDROCHLORIDE 50 MG/ML
12.5 INJECTION INTRAMUSCULAR; INTRAVENOUS; SUBCUTANEOUS ONCE
Status: DISCONTINUED | OUTPATIENT
Start: 2024-09-23 | End: 2024-09-23 | Stop reason: HOSPADM

## 2024-09-23 RX ORDER — ONDANSETRON HYDROCHLORIDE 2 MG/ML
INJECTION, SOLUTION INTRAVENOUS
Status: DISCONTINUED | OUTPATIENT
Start: 2024-09-23 | End: 2024-09-23

## 2024-09-23 RX ORDER — HYDROMORPHONE HYDROCHLORIDE 2 MG/ML
0.2 INJECTION, SOLUTION INTRAMUSCULAR; INTRAVENOUS; SUBCUTANEOUS EVERY 5 MIN PRN
Status: DISCONTINUED | OUTPATIENT
Start: 2024-09-23 | End: 2024-09-23 | Stop reason: HOSPADM

## 2024-09-23 RX ORDER — DIPHENHYDRAMINE HYDROCHLORIDE 50 MG/ML
25 INJECTION INTRAMUSCULAR; INTRAVENOUS EVERY 6 HOURS PRN
Status: DISCONTINUED | OUTPATIENT
Start: 2024-09-23 | End: 2024-09-23 | Stop reason: HOSPADM

## 2024-09-23 RX ORDER — SILVER NITRATE 38.21; 12.74 MG/1; MG/1
2 STICK TOPICAL ONCE
Status: COMPLETED | OUTPATIENT
Start: 2024-09-23 | End: 2024-09-23

## 2024-09-23 RX ORDER — SODIUM CHLORIDE, SODIUM LACTATE, POTASSIUM CHLORIDE, CALCIUM CHLORIDE 600; 310; 30; 20 MG/100ML; MG/100ML; MG/100ML; MG/100ML
INJECTION, SOLUTION INTRAVENOUS CONTINUOUS
Status: DISCONTINUED | OUTPATIENT
Start: 2024-09-23 | End: 2024-09-23 | Stop reason: HOSPADM

## 2024-09-23 RX ORDER — OXYCODONE HYDROCHLORIDE 5 MG/1
5 TABLET ORAL
Status: DISCONTINUED | OUTPATIENT
Start: 2024-09-23 | End: 2024-09-23 | Stop reason: HOSPADM

## 2024-09-23 RX ORDER — ONDANSETRON HYDROCHLORIDE 2 MG/ML
4 INJECTION, SOLUTION INTRAVENOUS ONCE
Status: DISCONTINUED | OUTPATIENT
Start: 2024-09-23 | End: 2024-09-23 | Stop reason: HOSPADM

## 2024-09-23 RX ORDER — GLUCAGON 1 MG
1 KIT INJECTION
Status: DISCONTINUED | OUTPATIENT
Start: 2024-09-23 | End: 2024-09-23 | Stop reason: HOSPADM

## 2024-09-23 RX ORDER — PROMETHAZINE HYDROCHLORIDE 25 MG/ML
INJECTION, SOLUTION INTRAMUSCULAR; INTRAVENOUS
Status: DISCONTINUED | OUTPATIENT
Start: 2024-09-23 | End: 2024-09-23

## 2024-09-23 RX ORDER — PROCHLORPERAZINE EDISYLATE 5 MG/ML
5 INJECTION INTRAMUSCULAR; INTRAVENOUS EVERY 4 HOURS PRN
Status: DISCONTINUED | OUTPATIENT
Start: 2024-09-23 | End: 2024-09-23 | Stop reason: HOSPADM

## 2024-09-23 RX ORDER — LIDOCAINE HYDROCHLORIDE 20 MG/ML
INJECTION INTRAVENOUS
Status: DISCONTINUED | OUTPATIENT
Start: 2024-09-23 | End: 2024-09-23

## 2024-09-23 RX ORDER — PHENYLEPHRINE HYDROCHLORIDE 10 MG/ML
INJECTION INTRAVENOUS
Status: DISCONTINUED | OUTPATIENT
Start: 2024-09-23 | End: 2024-09-23

## 2024-09-23 RX ORDER — ACETAMINOPHEN 10 MG/ML
INJECTION, SOLUTION INTRAVENOUS
Status: DISCONTINUED | OUTPATIENT
Start: 2024-09-23 | End: 2024-09-23

## 2024-09-23 RX ORDER — SCOLOPAMINE TRANSDERMAL SYSTEM 1 MG/1
1 PATCH, EXTENDED RELEASE TRANSDERMAL
Status: DISCONTINUED | OUTPATIENT
Start: 2024-09-23 | End: 2024-09-23 | Stop reason: HOSPADM

## 2024-09-23 RX ORDER — FENTANYL CITRATE 50 UG/ML
INJECTION, SOLUTION INTRAMUSCULAR; INTRAVENOUS
Status: DISCONTINUED | OUTPATIENT
Start: 2024-09-23 | End: 2024-09-23

## 2024-09-23 RX ORDER — LIDOCAINE HYDROCHLORIDE 10 MG/ML
0.5 INJECTION, SOLUTION EPIDURAL; INFILTRATION; INTRACAUDAL; PERINEURAL ONCE
Status: DISCONTINUED | OUTPATIENT
Start: 2024-09-23 | End: 2024-09-23 | Stop reason: HOSPADM

## 2024-09-23 RX ORDER — MIDAZOLAM HYDROCHLORIDE 1 MG/ML
INJECTION INTRAMUSCULAR; INTRAVENOUS
Status: DISCONTINUED | OUTPATIENT
Start: 2024-09-23 | End: 2024-09-23

## 2024-09-23 RX ORDER — PROPOFOL 10 MG/ML
VIAL (ML) INTRAVENOUS
Status: DISCONTINUED | OUTPATIENT
Start: 2024-09-23 | End: 2024-09-23

## 2024-09-23 RX ORDER — BUPIVACAINE HYDROCHLORIDE 5 MG/ML
INJECTION, SOLUTION EPIDURAL; INTRACAUDAL
Status: DISCONTINUED | OUTPATIENT
Start: 2024-09-23 | End: 2024-09-23 | Stop reason: HOSPADM

## 2024-09-23 RX ADMIN — PROMETHAZINE HYDROCHLORIDE 6.25 MG: 25 INJECTION INTRAMUSCULAR; INTRAVENOUS at 07:09

## 2024-09-23 RX ADMIN — SILVER NITRATE APPLICATORS 2 APPLICATOR: 25; 75 STICK TOPICAL at 08:09

## 2024-09-23 RX ADMIN — PHENYLEPHRINE HYDROCHLORIDE 100 MCG: 10 INJECTION INTRAVENOUS at 07:09

## 2024-09-23 RX ADMIN — SUGAMMADEX 130 MG: 100 INJECTION, SOLUTION INTRAVENOUS at 09:09

## 2024-09-23 RX ADMIN — LIDOCAINE HYDROCHLORIDE 75 MG: 20 INJECTION, SOLUTION INTRAVENOUS at 07:09

## 2024-09-23 RX ADMIN — PHENYLEPHRINE HYDROCHLORIDE 200 MCG: 10 INJECTION INTRAVENOUS at 08:09

## 2024-09-23 RX ADMIN — SODIUM CHLORIDE, SODIUM GLUCONATE, SODIUM ACETATE, POTASSIUM CHLORIDE AND MAGNESIUM CHLORIDE: 526; 502; 368; 37; 30 INJECTION, SOLUTION INTRAVENOUS at 06:09

## 2024-09-23 RX ADMIN — ROCURONIUM BROMIDE 35 MG: 10 INJECTION, SOLUTION INTRAVENOUS at 07:09

## 2024-09-23 RX ADMIN — DEXAMETHASONE SODIUM PHOSPHATE 4 MG: 4 INJECTION, SOLUTION INTRA-ARTICULAR; INTRALESIONAL; INTRAMUSCULAR; INTRAVENOUS; SOFT TISSUE at 07:09

## 2024-09-23 RX ADMIN — ACETAMINOPHEN 945 MG: 10 INJECTION INTRAVENOUS at 07:09

## 2024-09-23 RX ADMIN — FENTANYL CITRATE 50 MCG: 50 INJECTION, SOLUTION INTRAMUSCULAR; INTRAVENOUS at 07:09

## 2024-09-23 RX ADMIN — ONDANSETRON 8 MG: 2 INJECTION INTRAMUSCULAR; INTRAVENOUS at 07:09

## 2024-09-23 RX ADMIN — OXYCODONE HYDROCHLORIDE 5 MG: 5 TABLET ORAL at 09:09

## 2024-09-23 RX ADMIN — PROPOFOL 140 MG: 10 INJECTION, EMULSION INTRAVENOUS at 07:09

## 2024-09-23 RX ADMIN — MIDAZOLAM HYDROCHLORIDE 2 MG: 1 INJECTION INTRAMUSCULAR; INTRAVENOUS at 07:09

## 2024-09-23 RX ADMIN — PHENYLEPHRINE HYDROCHLORIDE 100 MCG: 10 INJECTION INTRAVENOUS at 08:09

## 2024-09-23 RX ADMIN — SCOPOLAMINE 1 PATCH: 1.5 PATCH, EXTENDED RELEASE TRANSDERMAL at 06:09

## 2024-09-23 NOTE — ANESTHESIA PROCEDURE NOTES
Intubation    Date/Time: 9/23/2024 7:38 AM    Performed by: Xenia Padilla CRNA  Authorized by: Xenia Padilla CRNA    Intubation:     Induction:  Intravenous    Intubated:  Postinduction    Mask Ventilation:  Easy mask    Attempts:  1    Attempted By:  CRNA    Method of Intubation:  Video laryngoscopy    Blade:  Payan 3    Laryngeal View Grade: Grade I - full view of cords      Difficult Airway Encountered?: No      Complications:  None    Airway Device:  Oral endotracheal tube    Airway Device Size:  7.0    Style/Cuff Inflation:  Cuffed (inflated to minimal occlusive pressure)    Inflation Amount (mL):  5    Tube secured:  21    Secured at:  The lips    Placement Verified By:  Capnometry and Revisualization with laryngoscopy    Complicating Factors:  None    Findings Post-Intubation:  BS equal bilateral and atraumatic/condition of teeth unchanged

## 2024-09-23 NOTE — OP NOTE
OPERATIVE NOTE  09/23/2024    PREOP DX = heavy menstrual bleeding and undesired fertility    POSTOP DX = same    PROCEDURE = Hysteroscopy D&C    SURGEON = Olya Montaño MD     ASSISTANT = none    ANESTHESIA = GETA    COMPLICATIONS = uterine perforation    EBL = 10cc    FLUIDS = 1500cc crystalloid    UOP = 325cc    SPECIMENS = endometrial curetting    DRAINS = none    FINDINGS = normal-appearing uterine cavity    PROCEDURE IN DETAIL:  After informed consent was obtained, the patient was taken to the operating room where anesthesia (GETA) was administered and found to be adequate.  A surgical time-out was performed, ensuring the proper patient, procedure, and site.  The vagina was prepped with chlorhexidine, the abdomen was prepped with chloraprep, and the patient was draped in the high lithotomy position using Byron stirrups.    A speculum was placed in the vagina with visualization of the cervix.  A tenaculum was placed to the anterior lip of the cervix.  The cervix was serially dilated using Hanks dilators to accommodate the hysteroscope.  The uterus sounded to 9cm.  The hysteroscope was passed into the uterine cavity using normal saline as the distention medium, set to an intrauterine pressure of 80mmHg.  There were no distinct masses or lesions identified in the uterine cavity or cervical canal.  Both tubal ostia were readily identified.  The hysteroscope was removed from the uterus.  Fluid deficit was 50cc.    Sharp curettage was performed with appropriate return of endometrial tissue.  Using the commercial devices provided with the Novasure set, the following measurements were taken:  uterine sound 9cm, cervical length 4cm, cavity length 5cm, cavity width 4.2cm.  The NovaSure was placed inside the uterus, and the cavity width was taken.  The NovaSure generator and handheld devices were set in accordance with the patient's measurements.  The generator ran a cavity check, and the test was failed.  Several steps  were repeated and checked, and the cavity check continued to fail.  Decision was made to repeat hysteroscopy.  The hysteroscope was passed into the uterine cavity, and a defect approximately half-way between the two tubal ostia was readily identified.  There was no active bleeding.  Fluid deficit after this second hysteroscopy was ~500cc, likely due to the uterine perforation.    I stepped out of the OR to discuss this unfortunate complication with the patient's , Bebeto.  We agreed not to proceed with the bilateral salpingectomy since Roopa's primary goal is endometrial ablation, which we cannot perform today.    The tenaculum was removed, and hemostasis was achieved with pressure and silver nitrate application..  There was no active bleeding noted from the cervical os.  All instruments were removed from the vagina.  In/out catheterization of the bladder was performed.  The patient's anesthesia was reversed uneventfully.  She tolerated the procedure well and was taken to PACU in stable condition for recovery.    MD PEARL

## 2024-09-23 NOTE — TRANSFER OF CARE
"Anesthesia Transfer of Care Note    Patient: Roopa Rivas    Procedure(s) Performed: Procedure(s) (LRB):  DILATION AND CURETTAGE, UTERUS (N/A)    Patient location: PACU    Anesthesia Type: general    Transport from OR: Transported from OR on 2-3 L/min O2 by NC with adequate spontaneous ventilation    Post pain: adequate analgesia    Post assessment: no apparent anesthetic complications    Post vital signs: stable    Level of consciousness: awake    Nausea/Vomiting: no nausea/vomiting    Complications: none    Transfer of care protocol was followed      Last vitals: Visit Vitals  BP (!) 95/57 (BP Location: Right arm, Patient Position: Sitting)   Pulse 70   Temp 36.7 °C (98.1 °F) (Temporal)   Resp 16   Ht 4' 11" (1.499 m)   Wt 63 kg (139 lb)   SpO2 100%   Breastfeeding No   BMI 28.07 kg/m²     "

## 2024-09-23 NOTE — ANESTHESIA PREPROCEDURE EVALUATION
09/23/2024  Roopa Rivas is a 44 y.o., female.      Pre-op Assessment    I have reviewed the NPO Status.   I have reviewed the Medications.     Review of Systems  Anesthesia Hx:  No problems with previous Anesthesia                Social:  Non-Smoker       Cardiovascular:  Exercise tolerance: good                                           Pulmonary:  Pulmonary Normal                       Hepatic/GI:     GERD      Gerd          Neurological:    Neuromuscular Disease,                                 Neuromuscular Disease   Endocrine:   Hypothyroidism       Hypothyroidism          Psych:  Psychiatric History                  Physical Exam  General: Well nourished    Airway:  Mallampati: II / I  Mouth Opening: Normal  TM Distance: Normal  Tongue: Normal  Neck ROM: Normal ROM    Dental:  Intact    Chest/Lungs:  Clear to auscultation, Normal Respiratory Rate        Anesthesia Plan  Type of Anesthesia, risks & benefits discussed:    Anesthesia Type: Gen ETT  Intra-op Monitoring Plan: Standard ASA Monitors  Post Op Pain Control Plan: multimodal analgesia and IV/PO Opioids PRN  Induction:  IV  Airway Plan: Direct, Post-Induction  Informed Consent: Informed consent signed with the Patient and all parties understand the risks and agree with anesthesia plan.  All questions answered.   ASA Score: 2    Ready For Surgery From Anesthesia Perspective.     .

## 2024-09-23 NOTE — INTERVAL H&P NOTE
The patient has been examined and the H&P has been reviewed:    I concur with the findings and no changes have occurred since H&P was written.    Surgery risks, benefits and alternative options discussed and understood by patient/family.    Roopa Rivas is here with her  Bebeto.  She has no last-minute questions or concerns.    Lab Results   Component Value Date    PREGNANCYTES Negative 09/23/2024     -to OR for Hx D&C, Novasure endometrial ablation, and Lx bilateral salpingectomy when team ready.  -no Abx indicated    MD PEARL

## 2024-09-23 NOTE — PLAN OF CARE
Dr Montaño at bedside  spoke with patient and spouse about procedure questions answered by her.  return to waiting room.

## 2024-09-23 NOTE — ANESTHESIA POSTPROCEDURE EVALUATION
Anesthesia Post Evaluation    Patient: Roopa Rivas    Procedure(s) Performed: Procedure(s) (LRB):  DILATION AND CURETTAGE, UTERUS (N/A)    Final Anesthesia Type: general      Patient location during evaluation: PACU  Patient participation: Yes- Able to Participate  Level of consciousness: awake and alert and oriented  Post-procedure vital signs: reviewed and stable  Pain management: adequate  Airway patency: patent  SAEED mitigation strategies: Extubation while patient is awake, Multimodal analgesia, Verification of full reversal of neuromuscular block and Extubation and recovery carried out in lateral, semiupright, or other nonsupine position  PONV status at discharge: No PONV  Anesthetic complications: no      Cardiovascular status: blood pressure returned to baseline  Respiratory status: unassisted, spontaneous ventilation and room air  Hydration status: euvolemic  Follow-up not needed.              Vitals Value Taken Time   BP 99/61 09/23/24 0957   Temp  09/23/24 0957   Pulse 100 09/23/24 0957   Resp 30 09/23/24 0957   SpO2 97 % 09/23/24 0957   Vitals shown include unfiled device data.      No case tracking events are documented in the log.      Pain/Ellen Score: Pain Rating Prior to Med Admin: 1 (9/23/2024  9:35 AM)  Pain Rating Post Med Admin: 0 (9/23/2024  9:45 AM)  Ellen Score: 10 (9/23/2024  9:45 AM)

## 2024-09-23 NOTE — PLAN OF CARE
Patient able to void with no problems noted.  Able to tolerate po intake with no complaints of nausea/vomiting.  Able to ambulate with assistance.  No complaints of pain at this time.  Discharge instructions given to pt and spouse, verbalized understanding and questions answered. Medications delivered to bedside and reviewed with patient and spouse.  Handouts provided. Belongings given back to pt. IV removed- catheter intact. Discharge via wheelchair.

## 2024-09-23 NOTE — DISCHARGE SUMMARY
DISCHARGE SUMMARY    HOSPITAL COURSE:  Roopa Rivas was brought to [facility] for management of heavy menstrual bleeding and undesired fertility.  She underwent hysteroscopy with D&C.  Novasure endometrial ablation was NOT performed after a uterine perforation was noted.  She also therefore did not have a bilateral salpingectomy as had been planned.  Her recovery from the surgery was unremarkable, and she meets criteria to go home.    PENDING PATHOLOGY:  Endometrial curetting    DISCHARGE MEDICATIONS:  Ibuprofen 800mg by mouth every 8 hours as needed  for mild to moderate pain  take with food to minimize stomach upset    Senna-docusate 8.6-50, 1 tablet by mouth daily as needed for constipation    Percocet 5/325 1 tablet by mouth every 4 hours as needed  for moderate to severe pain  do not drive or make important decisions while taking  likely to cause constipation, consider increasing your water and fiber intake    Continue your other home medications (if any) as previously prescribed.    PATIENT INSTRUCTIONS:  Take it easy and rest for a few days.  Return to most normal activity and work as tolerated.  It's safe to use stairs, but depending on the extent of your surgery you might want to limit use to 1-2 times per day at first.  Eat a normal diet.  Drink plenty of water.  Pelvic rest (no sex, no tampons, nothing in the vagina) for 2 week(s) or as long as you have vaginal bleeding, whichever is longer.  It's okay to shower, but no bathing, swimming, or soaking for 2 weeks or as long as you have vaginal bleeding, whichever is longer.  It's normal to have vaginal bleeding for several weeks after this procedure.  The amount should be the same as or less than a normal period.  No strenuous activity, heavy housework, or lifting greater than 10 pounds for 1 week(s) after the procedure.  No driving, operating heavy machinery, or making important decisions  for 24 hours after the procedure,  if you are taking narcotic pain  pills (Percocet, Norco, etc.)  if your pain control doesn't allow it (Can you quickly slam on the brakes and twist to check your blind spot?)  Call or return for:  pain not controlled by your medications,  heavy bleeding,  fever (100.4 or higher),  foul-smelling vaginal discharge  24/7 [facility] Nurse Advice Line: [phone]    FOLLOW-UP:   Follow-up with Dr. Montaño as scheduled on 10/7/24.

## 2024-09-23 NOTE — DISCHARGE INSTRUCTIONS
PATIENT INSTRUCTIONS:  Take it easy and rest for a few days.  Return to most normal activity and work as tolerated.  It's safe to use stairs, but depending on the extent of your surgery you might want to limit use to 1-2 times per day at first.  Eat a normal diet.  Drink plenty of water.  Pelvic rest (no sex, no tampons, nothing in the vagina) for 2 week(s) or as long as you have vaginal bleeding, whichever is longer.  It's okay to shower, but no bathing, swimming, or soaking for 2 weeks or as long as you have vaginal bleeding, whichever is longer.  It's normal to have vaginal bleeding for several weeks after this procedure.  The amount should be the same as or less than a normal period.  No strenuous activity, heavy housework, or lifting greater than 10 pounds for 1 week(s) after the procedure.  No driving, operating heavy machinery, or making important decisions  for 24 hours after the procedure,  if you are taking narcotic pain pills (Percocet, Norco, etc.)  if your pain control doesn't allow it (Can you quickly slam on the brakes and twist to check your blind spot?)  Call or return for:  pain not controlled by your medications,  heavy bleeding,  fever (100.4 or higher),  foul-smelling vaginal discharge  24/7 [facility] Nurse Advice Line: [phone]     FOLLOW-UP:   Follow-up with Dr. Montaño as scheduled on 10/7/24.                 Discharge Instructions: After Your Surgery/Procedure  Youve just had surgery. During surgery you were given medicine called anesthesia to keep you relaxed and free of pain. After surgery you may have some pain or nausea. This is common. Here are some tips for feeling better and getting well after surgery.     Stay on schedule with your medication.   Going home  Your doctor or nurse will show you how to take care of yourself when you go home. He or she will also answer your questions. Have an adult family member or friend drive you home.      For your safety we recommend these  "precaution for the first 24 hours after your procedure:  Do not drive or use heavy equipment.  Do not make important decisions or sign legal papers.  Do not drink alcohol.  Have someone stay with you, if needed. He or she can watch for problems and help keep you safe.  Your concentration, balance, coordination, and judgement may be impaired for many hours after anesthesia.  Use caution when ambulating or standing up.     You may feel weak and "washed out" after anesthesia and surgery.      Subtle residual effects of general anesthesia or sedation with regional / local anesthesia can last more than 24 hours.  Rest for the remainder of the day or longer if your Doctor/Surgeon has advised you to do so.  Although you may feel normal within the first 24 hours, your reflexes and mental ability may be impaired without you realizing it.  You may feel dizzy, lightheaded or sleepy for 24 hours or longer.      Be sure to go to all follow-up visits with your doctor. And rest after your surgery for as long as your doctor tells you to.  Coping with pain  If you have pain after surgery, pain medicine will help you feel better. Take it as told, before pain becomes severe. Also, ask your doctor or pharmacist about other ways to control pain. This might be with heat, ice, or relaxation. And follow any other instructions your surgeon or nurse gives you.  Tips for taking pain medicine  To get the best relief possible, remember these points:  Pain medicines can upset your stomach. Taking them with a little food may help.  Most pain relievers taken by mouth need at least 20 to 30 minutes to start to work.  Taking medicine on a schedule can help you remember to take it. Try to time your medicine so that you can take it before starting an activity. This might be before you get dressed, go for a walk, or sit down for dinner.  Constipation is a common side effect of pain medicines. Call your doctor before taking any medicines such as " laxatives or stool softeners to help ease constipation. Also ask if you should skip any foods. Drinking lots of fluids and eating foods such as fruits and vegetables that are high in fiber can also help. Remember, do not take laxatives unless your surgeon has prescribed them.  Drinking alcohol and taking pain medicine can cause dizziness and slow your breathing. It can even be deadly. Do not drink alcohol while taking pain medicine.  Pain medicine can make you react more slowly to things. Do not drive or run machinery while taking pain medicine.  Your health care provider may tell you to take acetaminophen to help ease your pain. Ask him or her how much you are supposed to take each day. Acetaminophen or other pain relievers may interact with your prescription medicines or other over-the-counter (OTC) drugs. Some prescription medicines have acetaminophen and other ingredients. Using both prescription and OTC acetaminophen for pain can cause you to overdose. Read the labels on your OTC medicines with care. This will help you to clearly know the list of ingredients, how much to take, and any warnings. It may also help you not take too much acetaminophen. If you have questions or do not understand the information, ask your pharmacist or health care provider to explain it to you before you take the OTC medicine.  Managing nausea  Some people have an upset stomach after surgery. This is often because of anesthesia, pain, or pain medicine, or the stress of surgery. These tips will help you handle nausea and eat healthy foods as you get better. If you were on a special food plan before surgery, ask your doctor if you should follow it while you get better. These tips may help:  Do not push yourself to eat. Your body will tell you when to eat and how much.  Start off with clear liquids and soup. They are easier to digest.  Next try semi-solid foods, such as mashed potatoes, applesauce, and gelatin, as you feel ready.  Slowly  move to solid foods. Dont eat fatty, rich, or spicy foods at first.  Do not force yourself to have 3 large meals a day. Instead eat smaller amounts more often.  Take pain medicines with a small amount of solid food, such as crackers or toast, to avoid nausea.     Call your surgeon if  You still have pain an hour after taking medicine. The medicine may not be strong enough.  You feel too sleepy, dizzy, or groggy. The medicine may be too strong.  You have side effects like nausea, vomiting, or skin changes, such as rash, itching, or hives.       If you have obstructive sleep apnea  You were given anesthesia medicine during surgery to keep you comfortable and free of pain. After surgery, you may have more apnea spells because of this medicine and other medicines you were given. The spells may last longer than usual.   At home:  Keep using the continuous positive airway pressure (CPAP) device when you sleep. Unless your health care provider tells you not to, use it when you sleep, day or night. CPAP is a common device used to treat obstructive sleep apnea.  Talk with your provider before taking any pain medicine, muscle relaxants, or sedatives. Your provider will tell you about the possible dangers of taking these medicines.  © 6858-7842 [employer]. [address] [place] [address], [place] [address]. All rights reserved. This information is not intended as a substitute for professional medical care. Always follow your healthcare professional's instructions.                Using an Incentive Spirometer    An incentive spirometer is a device that helps you do deep breathing exercises. These exercises expand your lungs, aid in circulation, and help prevent pneumonia. Deep breathing exercises also help you breathe better and improve the function of your lungs by:  Keeping your lungs clear  Strengthening your breathing muscles  Helping prevent respiratory complications or problems  The incentive spirometer gives you  a way to take an active part in recover. A nurse or therapist will teach you breathing exercises. To do these exercises, you will breathe in through your mouth and not your nose. The incentive spirometer only works correctly if you breathe in through your mouth.  Steps to clear lungs  Step 1. Exhale normally. Then, inhale normally.  Relax and breathe out.  Step 2. Place your lips tightly around the mouthpiece.  Make sure the device is upright and not tilted.  Step 3. Inhale as much air as you can through the mouthpiece (don't breath through your nose).  Inhale slowly and deeply.  Hold your breath long enough to keep the balls or disk raised for at least 3 to 5 seconds, or as instructed by your healthcare provider.  Some spirometers have an indicator to let you know that you are breathing in too fast. If the indicator goes off, breathe in more slowly.  Step 4. Repeat the exercise regularly.  Do this exercise every hour while you're awake, or as instructed by your healthcare provider.  If you were taught deep breathing and coughing exercises, do them regularly as instructed by your healthcare provider.       We hope your stay was comfortable as you heal now, mend and rest.    For we have enjoyed taking care of you by giving your our best.    And as you get better, by regaining your health and strength;   We count it as a privilege to have served you and hope your time at [facility] was well spent.      Thank  You!!!

## 2024-09-23 NOTE — PLAN OF CARE
Patient released per anesthesia to phase 2 post op. Pt is awake and pain at acceptable level. Tolerating po fluids. No complaints of nausea. Radha pad no bleeding. Incentive spirometer instructions done. Family updated. Patient transported to post op via stretcher. Any personal items remain with patient.

## 2024-09-23 NOTE — PROGRESS NOTES
Spoke to patient and her  in PACU about inability to complete the procedures due to complication of uterine perforation.  She expressed disappointment but understands why we decided to abort the planned surgeries.  Briefly discussed options of trying again in a couple of months versus Mirena IUD.  At least today, the patient says she'd like to try again.    VITALS wnl    GEN = alert/oriented, nad, resting in bed,  present and supportive  ABD = soft, nontender, no distention    RN reports no blood on peripad    A/P Stable in PACU post-Hx with D&C, complicated by uterine perforation.    -discharge to home today per usual protocol  -RTC as scheduled on 10/7/24  -advised patient to resume/continue her STEFFI for contraception    PEARL, MD

## 2024-09-24 VITALS
TEMPERATURE: 98 F | HEART RATE: 61 BPM | DIASTOLIC BLOOD PRESSURE: 57 MMHG | BODY MASS INDEX: 28.02 KG/M2 | SYSTOLIC BLOOD PRESSURE: 93 MMHG | OXYGEN SATURATION: 94 % | HEIGHT: 59 IN | RESPIRATION RATE: 16 BRPM | WEIGHT: 139 LBS

## 2024-09-26 NOTE — PROGRESS NOTES
The pathology report is benign (no cancer or pre-cancer).  I'll see you on 10/7, and we can discuss more.    Sincerely,  Dr. Montaño

## 2024-09-28 DIAGNOSIS — F31.81 BIPOLAR II DISORDER: ICD-10-CM

## 2024-09-28 DIAGNOSIS — F41.1 GENERALIZED ANXIETY DISORDER: ICD-10-CM

## 2024-09-30 RX ORDER — ARIPIPRAZOLE 15 MG/1
15 TABLET ORAL DAILY
Qty: 30 TABLET | Refills: 0 | Status: SHIPPED | OUTPATIENT
Start: 2024-09-30 | End: 2024-10-30

## 2024-09-30 RX ORDER — PROPRANOLOL HYDROCHLORIDE 20 MG/1
20 TABLET ORAL 2 TIMES DAILY PRN
Qty: 60 TABLET | Refills: 0 | Status: SHIPPED | OUTPATIENT
Start: 2024-09-30 | End: 2024-10-30

## 2024-10-07 ENCOUNTER — OFFICE VISIT (OUTPATIENT)
Dept: OBSTETRICS AND GYNECOLOGY | Facility: CLINIC | Age: 44
End: 2024-10-07
Payer: COMMERCIAL

## 2024-10-07 VITALS
WEIGHT: 137.13 LBS | BODY MASS INDEX: 27.64 KG/M2 | SYSTOLIC BLOOD PRESSURE: 98 MMHG | HEIGHT: 59 IN | DIASTOLIC BLOOD PRESSURE: 66 MMHG | RESPIRATION RATE: 16 BRPM

## 2024-10-07 DIAGNOSIS — N92.0 MENORRHAGIA WITH REGULAR CYCLE: Primary | ICD-10-CM

## 2024-10-07 DIAGNOSIS — Z30.09 CONSULTATION FOR FEMALE STERILIZATION: ICD-10-CM

## 2024-10-07 PROCEDURE — 99999 PR PBB SHADOW E&M-EST. PATIENT-LVL IV: CPT | Mod: PBBFAC,,, | Performed by: GENERAL PRACTICE

## 2024-10-07 PROCEDURE — 3074F SYST BP LT 130 MM HG: CPT | Mod: CPTII,S$GLB,, | Performed by: GENERAL PRACTICE

## 2024-10-07 PROCEDURE — 3078F DIAST BP <80 MM HG: CPT | Mod: CPTII,S$GLB,, | Performed by: GENERAL PRACTICE

## 2024-10-07 PROCEDURE — 3008F BODY MASS INDEX DOCD: CPT | Mod: CPTII,S$GLB,, | Performed by: GENERAL PRACTICE

## 2024-10-07 PROCEDURE — 1159F MED LIST DOCD IN RCRD: CPT | Mod: CPTII,S$GLB,, | Performed by: GENERAL PRACTICE

## 2024-10-07 PROCEDURE — 99024 POSTOP FOLLOW-UP VISIT: CPT | Mod: S$GLB,,, | Performed by: GENERAL PRACTICE

## 2024-10-07 RX ORDER — SODIUM CHLORIDE 9 MG/ML
INJECTION, SOLUTION INTRAVENOUS CONTINUOUS
OUTPATIENT
Start: 2024-10-07

## 2024-10-07 NOTE — PROGRESS NOTES
Subjective  Roopa Rivas is a 44 y.o. here for a postoperative visit.  She reports doing well and is eager to reschedule her procedures.    2 weeks post-op    OR DATE = 2024   PREOP DX = heavy menstrual bleeding and undesired fertility   PROCEDURE = Hysteroscopy D&C   COMPLICATIONS = uterine perforation    She reports:  Vaginal bleeding: absent  Resumed sex: No  Bowel function normal: Yes  Bladder function normal: Yes  Requiring pain meds: No    G'sP's:   LMP: Patient's last menstrual period was 2024 (approximate).   Relationship:  15yrs years  Contraception: STEFFI and condoms  PAP Hx: no h/o abnormals  PAP: PAP neg / HPV neg / Date: 10/27/2022  MMG (10/06/23) = negative and next one scheduled     Objective:  VITALS:   Vitals:    10/07/24 1600   BP: 98/66   Resp: 16     GEN = alert/oriented, nad  HEENT = sclera anicteric, EOM grossly normal  BREASTS = deferred   = deferred    LABS & RADS      Component Value Date     TSH 1.816 2024            Lab Results   Component Value Date     WBC 7.57 2024     HGB 11.9 (L) 2024     HCT 38.1 2024      2024     MCV 95 2024        FERRITIN 121 2024      PELVIC US (24) =  Uterus 8 x 4  cm  EMS 3.1 mm  ROV 2 x 2  cm  LOV not seen     EMBX (15 AUG 2024)  Fragments of inactive endometrium, no atypical hyperplasia or malignancy identified.    EMBX (24)  SCANT FRAGMENTS OF ENDOMETRIUM WITH BREAKDOWN        Assessment and Plan:  44 y.o. doing well post-op.  Had uterine perforation and so did not complete endometrial ablation or Lx bilateral salpingectomy as planned.  She would like to reschedule.    Activity restrictions lifted  To be conservative will wait 6wks to allow uterine perforation to heal  Planned surgery: Novasure endometrial ablation, Lx bilateral salpingectomy  Informed consent obtained for surgery.  The patient wishes to proceed with the above listed procedure(s).  She unserstands risks  of surgery include bleeding, need for blood transfusion, infection, pain, scarring, damage to nearby organs or tissues, need for other surgery, inadequate diagnosis, inadequate treatment of her symptoms, stroke, heart attack, blood clots such as DVT or PE, complications from anesthesia, allergic reactions to medications or devices, and death.  Specific to this procedure the patient additionally understands the expectation is a reduction in menstrual flow, and with this expectation in mind there is a 90% satisfaction rate.  50-60% of women who have this procedure have complete amenorrhea at the 12-month marcos post-procedure.  There also is a reported 70-80% improvement in dysmenorrhea after this procedure.  10-15% of women who have an endometrial ablation ultimately have a hysterectomy (procedure failure).  Tubal-ablation pain syndrome is a rare complication that usually requires hysterectomy for definitive treatment.  Pregnancy is not recommended after an ablation.  Alternatives to the planned procedure include medication management, uterine artery embolization, hysterectomy, do nothing..  OR date: 11/8/2024  Anesthesia plan: GETA  Antibiotic prophylaxis: not indicated  Medication management: morning meds with a sip of water unless otherwise directed by hospital/anesthesia staff.  The patient is NOT taking any GLP-1 agonist medications, which can cause a delay in gastric emptying.  Postoperative expectations reviewed.  Pelvic rest, lifting and activity restrictions, driving restrictions, and medications.    MD PEARL    --------------------------------------------------------  GYNECOLOGIC HISTORY  PAP Hx: no h/o abnormals  Genital HSV: no  STD Hx: chlamydia     Menarche: 10 yoa  Period duration: 5 days  # Heavy Days: 3  Pad/tampon ? on heavy days: 30 minutes to an hour  Intermenstrual bleeding: No  Period frequency:regular every 28-30 days     Dysmenorrhea: typical or expected menstrual cramps  Non-menstrual pelvic  pressure/pain: No  Dyspareunia: No     Vasomotor Sxs: yes, typically 3 per day and yes, typically 2 per night  Vaginal dryness: yes  Poor libido on Abilify     OBSTETRIC HISTORY  Living children: 4  Vaginal deliveries: 4  IAB x 1  Plus 3 stepchildren     PAST MEDICAL HISTORY      -------------------------------------    Arthritis    Fibromyalgia    Gastroenteritis    GERD (gastroesophageal reflux disease)    Long-term use of Plaquenil   Thyroid disease  Depression / anxiety     PAST SURGICAL HISTORY   Colonoscopy     2016    Dilation and curettage of uterus     Miscarriage at age 25    Esophagogastroduodenoscopy     Procedure: EGD (ESOPHAGOGASTRODUODENOSCOPY);  Surgeon: Jacquie Guadalupe MD;  Location: [facility];  Service: Endoscopy;  Laterality: N/A;   Hx D&C with uterine perforation (9/23/24)     ALLERGIES  Review of patient's allergies indicates:  No Known Allergies     MEDICATIONS  Current Outpatient Medications   Medication Instructions    ARIPiprazole (ABILIFY) 15 mg, Oral, Daily    calcium carbonate 400 mg calcium (1,000 mg) Chew Take by mouth.    dicyclomine (BENTYL) 10 mg, As needed (PRN)    drospirenone-ethinyl estradioL (SARAH) 3-0.02 mg per tablet 1 tablet, Oral, Daily    FLUoxetine 40 mg, Oral, Daily    fluticasone propionate (FLONASE) 50 mcg, Each Nostril, Daily    hydroquinone 8 % Emul Compound hydroquinone 12% / kojic acid 6% / vitamin C 1% / niacinamide 2% cream. Apply a pea-sized amount to entire face qhs. Do not use for longer than 16 weeks in a row.    iron, carbonyl 25 mg iron Tab Take by mouth. Iron supplement    Lactobacillus acidophilus (PROBIOTIC ORAL) Take by mouth.    levothyroxine (SYNTHROID) 50 MCG tablet Take 1 tab Monday-Saturday and 2 tabs on Sunday    magnesium 30 mg Tab Once    multivitamin/iron/folic acid (MULTI COMPLETE WITH IRON ORAL) Take by mouth.    omeprazole (PRILOSEC) 40 mg, Oral, Daily    propranoloL (INDERAL) 20 mg, Oral, 2 times daily PRN    spironolactone (ALDACTONE) 50  MG tablet TAKE 1 TABLET BY MOUTH DAILY    tretinoin (RETIN-A) 0.025 % cream Topical (Top), Nightly    zolpidem (AMBIEN CR) 12.5 mg, Oral, Nightly PRN     SOCIAL HISTORY  Lives with:  and children  Smoker: non-smoker  Alcohol: denies  Drugs: denies  Domestic Violence: no  Occupation: recently started a new job     FAMILY HISTORY  BLEEDING or  CLOTTING DISORDERS: none  BREAST CA: none  UTERINE CA: none  OVARIAN CA: none  COLON CA: none

## 2024-10-09 NOTE — PROGRESS NOTES
Subjective:      Chief Complaint: No chief complaint on file.    HPI: Roopa Rivas is a 44 y.o. female who is having a follow-up evaluation for thyroid. Last seen 8/2023 with me.     The patient location is: at home  The chief complaint leading to consultation is: hypothyroidism     Visit type: audiovisual    Face to Face time with patient: 8 minutes  15 minutes of total time spent on the encounter, which includes face to face time and non-face to face time preparing to see the patient (eg, review of tests), Obtaining and/or reviewing separately obtained history, Documenting clinical information in the electronic or other health record, Independently interpreting results (not separately reported) and communicating results to the patient/family/caregiver, or Care coordination (not separately reported).    Each patient to whom he or she provides medical services by telemedicine is:  (1) informed of the relationship between the physician and patient and the respective role of any other health care provider with respect to management of the patient; and (2) notified that he or she may decline to receive medical services by telemedicine and may withdraw from such care at any time.    She was going to have tubal ablation but cancelled. She will have in November 2024.     Disease history:    FH of thyroid disease in her maternal aunt  Denies FH of thyroid cancer     Had labs checked on routine follow-up. TSH was elevated, free T4 normal. TSH 4.3 in 2019. TSH was 6.9 in 2/2020. Free T4 stayed normal. TPO negative 4/2020. TSH up as high as 9.2 from 3/2021     She is on levothyroxine 50 mcg daily and 2 tabs on Sunday -added around Nov 2022   Denies missing doses  She reports she is taking correctly -on empty stomach and waits 30 minutes before eating, drinking, or taking other medications.      TPO, thyroglobulin antibodies (7/2020) both normal.    Lab Results   Component Value Date    TSH 1.816 07/17/2024    [id number] 144  07/07/2020    FREET4 1.08 03/28/2023     Today, states she is feeling better overall.     Still having some fatigue. She is exercising daily   + cold intolerance -sleeps with heated blanket nightly   Denies constipation   + mental fog but improving; does feel overwhelmed.   Gained 10 pounds when she exercises   + palpitations every now and then   Denies tremors    Denies difficulty breathing or swallowing.   + voice changes -relates to throughout the day at the end of the day   Reports left side of her neck is more swollen then left  US in Aug 2023 was normal     LMP -- August 2024 but lasted one month ~8/20   On OCP   Will have ablation in Nov 2024    US 8/2023  COMPARISON:  None.     FINDINGS:  The right lobe measures 4.3 x 1 x 1.4 cm for a calculated volume of 3.2 cc.  The left lobe measures 3.7 x 0.9 x 1.4 cm for a calculated volume of 2.3 cc and a total thyroid volume of 5.5 cc.     The thyroid parenchyma is homogeneously echogenic.  No mass or cyst is seen.     Impression:     Small thyroid with homogeneous echotexture.  No mass or cyst is seen    Reviewed past medical, family, social history and updated as appropriate.    Review of Systems  As above    Objective:     There were no vitals filed for this visit.    Previous vitals:  BP Readings from Last 5 Encounters:   10/07/24 98/66   09/23/24 (!) 93/57   09/06/24 102/76   08/15/24 104/82   07/17/24 122/80     Physical Exam  Constitutional:       Appearance: Normal appearance.   Neurological:      Mental Status: She is alert.         Wt Readings from Last 10 Encounters:   10/07/24 1600 62.2 kg (137 lb 2 oz)   09/23/24 0626 63 kg (139 lb)   09/06/24 0928 63.3 kg (139 lb 7.1 oz)   08/15/24 1047 62.2 kg (137 lb 2 oz)   07/17/24 0950 61.8 kg (136 lb 5.7 oz)   06/06/24 1121 59 kg (130 lb)   04/03/24 1412 59.4 kg (130 lb 15.3 oz)   03/06/24 0904 59.7 kg (131 lb 9.8 oz)   12/05/23 1118 60.4 kg (133 lb 2.5 oz)   10/26/23 1053 60.7 kg (133 lb 13.1 oz)     Lab Results    Component Value Date    HGBA1C 5.2 09/06/2023     Lab Results   Component Value Date    CHOL 193 09/06/2023    HDL 50 09/06/2023    LDLCALC 115.4 09/06/2023    TRIG 138 09/06/2023    CHOLHDL 25.9 09/06/2023     Lab Results   Component Value Date     09/06/2023    K 4.8 09/06/2023     09/06/2023    CO2 20 (L) 09/06/2023    GLU 90 09/06/2023    BUN 12 09/06/2023    CREATININE 0.9 09/06/2023    CALCIUM 9.1 09/06/2023    PROT 7.4 09/06/2023    ALBUMIN 3.7 09/06/2023    BILITOT 0.2 09/06/2023    ALKPHOS 94 09/06/2023    AST 12 09/06/2023    ALT 19 09/06/2023    ANIONGAP 10 09/06/2023    ESTGFRAFRICA >60.0 04/06/2022    EGFRNONAA >60.0 04/06/2022    TSH 1.816 07/17/2024        Assessment/Plan:     1. Subclinical hypothyroidism  levothyroxine (SYNTHROID) 50 MCG tablet    TSH    CBC Auto Differential    Hemoglobin A1C    Lipid Panel    Comprehensive Metabolic Panel      2. Fatigue, unspecified type        3. Tachycardia          Subclinical hypothyroidism  Hx subclinical hypothyroidism for a while.  On LT4 50 mcg 1 tab Monday-Saturday and 2 tabs on Sunday.   Last TSH at goal in July 2024  She will have ablation in Nov 2024 and plans to stop OCP  Plan to check TSH 8 weeks later.   She will be seeing her PCP soon; would prefer her labs for PCP with Dec labs    Fatigue  Check CBC as above    Tachycardia  TSH not suppressed, likely unrelated to thyroid hormone replacement     Visit today included increased complexity associated with the care of episodic problems hypothyroidism and fatigue addressed and managing longitudinal care of the patient due to serious managed problems hypothyroidism and fatigue    Follow up in about 1 year (around 10/14/2025).

## 2024-10-14 ENCOUNTER — OFFICE VISIT (OUTPATIENT)
Dept: ENDOCRINOLOGY | Facility: CLINIC | Age: 44
End: 2024-10-14
Payer: COMMERCIAL

## 2024-10-14 ENCOUNTER — HOSPITAL ENCOUNTER (OUTPATIENT)
Dept: RADIOLOGY | Facility: CLINIC | Age: 44
Discharge: HOME | End: 2024-10-14
Attending: STUDENT IN AN ORGANIZED HEALTH CARE EDUCATION/TRAINING PROGRAM
Payer: COMMERCIAL

## 2024-10-14 DIAGNOSIS — R00.0 TACHYCARDIA: ICD-10-CM

## 2024-10-14 DIAGNOSIS — R53.83 FATIGUE, UNSPECIFIED TYPE: ICD-10-CM

## 2024-10-14 DIAGNOSIS — Z12.31 ENCOUNTER FOR SCREENING MAMMOGRAM FOR BREAST CANCER: ICD-10-CM

## 2024-10-14 DIAGNOSIS — E03.8 SUBCLINICAL HYPOTHYROIDISM: Primary | ICD-10-CM

## 2024-10-14 PROCEDURE — 1160F RVW MEDS BY RX/DR IN RCRD: CPT | Mod: CPTII,95,, | Performed by: NURSE PRACTITIONER

## 2024-10-14 PROCEDURE — 99214 OFFICE O/P EST MOD 30 MIN: CPT | Mod: 95,,, | Performed by: NURSE PRACTITIONER

## 2024-10-14 PROCEDURE — 77067 SCR MAMMO BI INCL CAD: CPT | Mod: 26,,, | Performed by: RADIOLOGY

## 2024-10-14 PROCEDURE — 77067 SCR MAMMO BI INCL CAD: CPT | Mod: TC,PO

## 2024-10-14 PROCEDURE — 77063 BREAST TOMOSYNTHESIS BI: CPT | Mod: TC,PO

## 2024-10-14 PROCEDURE — 1159F MED LIST DOCD IN RCRD: CPT | Mod: CPTII,95,, | Performed by: NURSE PRACTITIONER

## 2024-10-14 PROCEDURE — G2211 COMPLEX E/M VISIT ADD ON: HCPCS | Mod: 95,,, | Performed by: NURSE PRACTITIONER

## 2024-10-14 PROCEDURE — 77063 BREAST TOMOSYNTHESIS BI: CPT | Mod: 26,,, | Performed by: RADIOLOGY

## 2024-10-14 RX ORDER — LEVOTHYROXINE SODIUM 50 UG/1
TABLET ORAL
Qty: 102 TABLET | Refills: 3 | Status: SHIPPED | OUTPATIENT
Start: 2024-10-14

## 2024-10-14 NOTE — ASSESSMENT & PLAN NOTE
Hx subclinical hypothyroidism for a while.  On LT4 50 mcg 1 tab Monday-Saturday and 2 tabs on Sunday.   Last TSH at goal in July 2024  She will have ablation in Nov 2024 and plans to stop OCP  Plan to check TSH 8 weeks later.   She will be seeing her PCP soon; would prefer her labs for PCP with Dec labs

## 2024-10-28 ENCOUNTER — OFFICE VISIT (OUTPATIENT)
Dept: PSYCHIATRY | Facility: CLINIC | Age: 44
End: 2024-10-28
Payer: COMMERCIAL

## 2024-10-28 DIAGNOSIS — F41.1 GENERALIZED ANXIETY DISORDER: ICD-10-CM

## 2024-10-28 DIAGNOSIS — G47.00 INSOMNIA, UNSPECIFIED TYPE: ICD-10-CM

## 2024-10-28 DIAGNOSIS — F31.81 BIPOLAR II DISORDER: Primary | ICD-10-CM

## 2024-10-28 PROCEDURE — 1159F MED LIST DOCD IN RCRD: CPT | Mod: CPTII,95,, | Performed by: NURSE PRACTITIONER

## 2024-10-28 PROCEDURE — 99214 OFFICE O/P EST MOD 30 MIN: CPT | Mod: 95,,, | Performed by: NURSE PRACTITIONER

## 2024-10-28 PROCEDURE — 1160F RVW MEDS BY RX/DR IN RCRD: CPT | Mod: CPTII,95,, | Performed by: NURSE PRACTITIONER

## 2024-10-28 RX ORDER — ZOLPIDEM TARTRATE 12.5 MG/1
12.5 TABLET, FILM COATED, EXTENDED RELEASE ORAL NIGHTLY PRN
Qty: 30 TABLET | Refills: 3 | Status: SHIPPED | OUTPATIENT
Start: 2024-10-28 | End: 2025-02-25

## 2024-10-28 RX ORDER — PROPRANOLOL HYDROCHLORIDE 20 MG/1
20 TABLET ORAL 2 TIMES DAILY PRN
Qty: 180 TABLET | Refills: 0 | Status: SHIPPED | OUTPATIENT
Start: 2024-10-28 | End: 2025-01-26

## 2024-10-28 RX ORDER — FLUOXETINE HYDROCHLORIDE 40 MG/1
40 CAPSULE ORAL DAILY
Qty: 90 CAPSULE | Refills: 0 | Status: SHIPPED | OUTPATIENT
Start: 2024-10-28 | End: 2025-01-26

## 2024-10-28 RX ORDER — ARIPIPRAZOLE 15 MG/1
15 TABLET ORAL DAILY
Qty: 90 TABLET | Refills: 0 | Status: SHIPPED | OUTPATIENT
Start: 2024-10-28 | End: 2025-01-26

## 2024-11-05 ENCOUNTER — OFFICE VISIT (OUTPATIENT)
Dept: GASTROENTEROLOGY | Facility: CLINIC | Age: 44
End: 2024-11-05
Payer: COMMERCIAL

## 2024-11-05 VITALS
WEIGHT: 136.25 LBS | DIASTOLIC BLOOD PRESSURE: 63 MMHG | HEIGHT: 59 IN | SYSTOLIC BLOOD PRESSURE: 102 MMHG | HEART RATE: 77 BPM | BODY MASS INDEX: 27.47 KG/M2

## 2024-11-05 DIAGNOSIS — K21.9 GASTROESOPHAGEAL REFLUX DISEASE, UNSPECIFIED WHETHER ESOPHAGITIS PRESENT: ICD-10-CM

## 2024-11-05 DIAGNOSIS — R19.8 STRAINING WITH STOOLS: ICD-10-CM

## 2024-11-05 DIAGNOSIS — K80.20 GALLSTONES: ICD-10-CM

## 2024-11-05 DIAGNOSIS — R10.10 PAIN OF UPPER ABDOMEN: Primary | ICD-10-CM

## 2024-11-05 DIAGNOSIS — Z12.11 SCREENING FOR COLON CANCER: ICD-10-CM

## 2024-11-05 PROCEDURE — 99213 OFFICE O/P EST LOW 20 MIN: CPT | Mod: S$GLB,,,

## 2024-11-05 PROCEDURE — 3078F DIAST BP <80 MM HG: CPT | Mod: CPTII,S$GLB,,

## 2024-11-05 PROCEDURE — 3074F SYST BP LT 130 MM HG: CPT | Mod: CPTII,S$GLB,,

## 2024-11-05 PROCEDURE — 1159F MED LIST DOCD IN RCRD: CPT | Mod: CPTII,S$GLB,,

## 2024-11-05 PROCEDURE — 99999 PR PBB SHADOW E&M-EST. PATIENT-LVL IV: CPT | Mod: PBBFAC,,,

## 2024-11-05 PROCEDURE — 3008F BODY MASS INDEX DOCD: CPT | Mod: CPTII,S$GLB,,

## 2024-11-05 RX ORDER — DICYCLOMINE HYDROCHLORIDE 10 MG/1
10 CAPSULE ORAL 2 TIMES DAILY PRN
Qty: 120 CAPSULE | Refills: 0 | Status: SHIPPED | OUTPATIENT
Start: 2024-11-05 | End: 2025-01-04

## 2024-11-05 NOTE — PROGRESS NOTES
Subjective:       Patient ID: Roopa Rivas is a 44 y.o. female Body mass index is 27.52 kg/m².    Chief Complaint: Abdominal Pain    This patient is new to me.  Referring Provider: No ref. provider found for epigastric abdominal pain/diarrhea .       Gastroesophageal Reflux  She complains of abdominal pain. She reports no belching, no chest pain, no choking, no coughing, no dysphagia, no early satiety, no globus sensation, no heartburn, no hoarse voice, no nausea or no water brash. This is a chronic problem. The problem has been gradually improving (since starting on Prilosec 40 mg orally daily GERD symptoms have improved). The heartburn wakes her from sleep. The heartburn does not limit her activity. The symptoms are aggravated by lying down, certain foods and caffeine. Pertinent negatives include no anemia, fatigue, melena or weight loss (working out 5 days a week). Risk factors: denies intake of NSAIDS. She has tried a PPI (Currently taking omeprazole 40 mg orally daily) for the symptoms. The treatment provided moderate relief. Past procedures do not include an abdominal ultrasound, an EGD (Patient has not had an EGD in the past), esophageal manometry, esophageal pH monitoring, H. pylori antibody titer or a UGI.   Abdominal Pain  This is a chronic problem. The problem occurs every several days. Duration: occurs more in the evenings, stress worsens pain. The problem has been waxing and waning (Patient reports history of fibromyalgia). Pain location: starts in the chest area and radiates to upper abdomen. Pain scale: denies current pain. The quality of the pain is burning (feels like a spasm last about 15-20 minutes). The abdominal pain radiates to the epigastric region, LUQ and RUQ. Pertinent negatives include no belching, constipation, diarrhea (denies diarrhea or constipation, has a BM daily, rates stool type 4 through 5 on Whitman stool scale, no straining, feels empty, since starting on probiotics her bowel  habits have improved greatly), fever, flatus, hematochezia, hematuria, melena, nausea, vomiting or weight loss (working out 5 days a week). Associated symptoms comments: Ultrasound of the abdomen showed cholelithiasis, patient was referred to general surgery seen by Dr. Guillermo blackman,  Patient ultimately elected to postpone possible surgical intervention till after EGD.    -stomach biopsies are benign and do not show H.pylori.  Procedure: Upper GI endoscopy  Indications:           Epigastric abdominal pain  Attending MD: Jacquie Guadalupe MD  Impression:            - Normal esophagus.                         - Multiple gastric polyps. Resected and retrieved.                         - Erythematous mucosa in the antrum. Biopsied.                         - Normal examined duodenum.                         -Symptoms suspicious for gallbladder dysfunction    -patient was scheduled for a colonoscopy due to irregular bowel habits, patient reports bowel habits are normal denies any hematochezia, reports strains occasionally has a bm daily, denies any family history of colon cancer, would like to hold off on colonoscopy at the moment  -patient has had a colonoscopy in the past in 2016, denies any history of colon polyps    . The pain is aggravated by eating (eating in general, worsened pain and has noticed that eating bananas can worsened pain too). The pain is relieved by Certain positions (omeprazole and 10mg oral bentyl is helping with abdominal pain). She has tried proton pump inhibitors for the symptoms. The treatment provided moderate relief. Prior diagnostic workup includes ultrasound. Her past medical history is significant for GERD. There is no history of abdominal surgery, colon cancer, Crohn's disease, gallstones, irritable bowel syndrome, pancreatitis, PUD or ulcerative colitis. Patient's medical history does not include kidney stones and UTI.       Review of Systems   Constitutional:  Negative for  fatigue, fever, unexpected weight change and weight loss (working out 5 days a week).   HENT:  Negative for hoarse voice.    Respiratory:  Negative for cough and choking.    Cardiovascular:  Negative for chest pain.   Gastrointestinal:  Positive for abdominal pain. Negative for abdominal distention, anal bleeding, blood in stool, constipation, diarrhea (denies diarrhea or constipation, has a BM daily, rates stool type 4 through 5 on Delta stool scale, no straining, feels empty, since starting on probiotics her bowel habits have improved greatly), dysphagia, flatus, heartburn, hematochezia, melena, nausea, rectal pain and vomiting.   Genitourinary:  Negative for hematuria.         Patient's last menstrual period was 08/25/2024 (approximate).  Past Medical History:   Diagnosis Date    Arthritis     Fibromyalgia     Gastroenteritis     GERD (gastroesophageal reflux disease)     Long-term use of Plaquenil 12/07/2017     Past Surgical History:   Procedure Laterality Date    COLONOSCOPY      2016    DILATION AND CURETTAGE OF UTERUS      Miscarriage at age 25    DILATION AND CURETTAGE OF UTERUS N/A 09/23/2024    Procedure: DILATION AND CURETTAGE, UTERUS;  Surgeon: Olya Montaño MD;  Location: [facility];  Service: OB/GYN;  Laterality: N/A;    ESOPHAGOGASTRODUODENOSCOPY N/A 06/06/2024    Procedure: EGD (ESOPHAGOGASTRODUODENOSCOPY);  Surgeon: Jacquie Guadalupe MD;  Location: [facility];  Service: Endoscopy;  Laterality: N/A;    UPPER GASTROINTESTINAL ENDOSCOPY       Family History   Problem Relation Name Age of Onset    Arthritis Mother      No Known Problems Father      Thyroid disease Maternal Aunt      Stomach cancer Maternal Uncle      Arthritis Maternal Grandmother      Arthritis Maternal Grandfather      Throat cancer Paternal Grandfather      Melanoma Neg Hx      Colon cancer Neg Hx      Crohn's disease Neg Hx      Esophageal cancer Neg Hx      Liver cancer Neg Hx      Rectal cancer Neg Hx      Ulcerative colitis  Neg Hx       Social History     Tobacco Use    Smoking status: Never    Smokeless tobacco: Never   Substance Use Topics    Alcohol use: No    Drug use: No     Wt Readings from Last 10 Encounters:   11/05/24 61.8 kg (136 lb 3.9 oz)   10/07/24 62.2 kg (137 lb 2 oz)   09/23/24 63 kg (139 lb)   09/06/24 63.3 kg (139 lb 7.1 oz)   08/15/24 62.2 kg (137 lb 2 oz)   07/17/24 61.8 kg (136 lb 5.7 oz)   06/06/24 59 kg (130 lb)   04/03/24 59.4 kg (130 lb 15.3 oz)   03/06/24 59.7 kg (131 lb 9.8 oz)   12/05/23 60.4 kg (133 lb 2.5 oz)     Lab Results   Component Value Date    WBC 7.57 07/17/2024    HGB 11.9 (L) 07/17/2024    HCT 38.1 07/17/2024    MCV 95 07/17/2024     07/17/2024     CMP  Sodium   Date Value Ref Range Status   09/06/2023 137 136 - 145 mmol/L Final     Potassium   Date Value Ref Range Status   09/06/2023 4.8 3.5 - 5.1 mmol/L Final     Chloride   Date Value Ref Range Status   09/06/2023 107 95 - 110 mmol/L Final     CO2   Date Value Ref Range Status   09/06/2023 20 (L) 23 - 29 mmol/L Final     Glucose   Date Value Ref Range Status   09/06/2023 90 70 - 110 mg/dL Final     BUN   Date Value Ref Range Status   09/06/2023 12 6 - 20 mg/dL Final     Creatinine   Date Value Ref Range Status   09/06/2023 0.9 0.5 - 1.4 mg/dL Final     Calcium   Date Value Ref Range Status   09/06/2023 9.1 8.7 - 10.5 mg/dL Final     Total Protein   Date Value Ref Range Status   09/06/2023 7.4 6.0 - 8.4 g/dL Final     Albumin   Date Value Ref Range Status   09/06/2023 3.7 3.5 - 5.2 g/dL Final     Total Bilirubin   Date Value Ref Range Status   09/06/2023 0.2 0.1 - 1.0 mg/dL Final     Comment:     For infants and newborns, interpretation of results should be based  on gestational age, weight and in agreement with clinical  observations.    Premature Infant recommended reference ranges:  Up to 24 hours.............<8.0 mg/dL  Up to 48 hours............<12.0 mg/dL  3-5 days..................<15.0 mg/dL  6-29 days.................<15.0  "mg/dL       Alkaline Phosphatase   Date Value Ref Range Status   09/06/2023 94 55 - 135 U/L Final     AST   Date Value Ref Range Status   09/06/2023 12 10 - 40 U/L Final     ALT   Date Value Ref Range Status   09/06/2023 19 10 - 44 U/L Final     Anion Gap   Date Value Ref Range Status   09/06/2023 10 8 - 16 mmol/L Final     eGFR if    Date Value Ref Range Status   04/06/2022 >60.0 >60 mL/min/1.73 m^2 Final     eGFR if non    Date Value Ref Range Status   04/06/2022 >60.0 >60 mL/min/1.73 m^2 Final     Comment:     Calculation used to obtain the estimated glomerular filtration  rate (eGFR) is the CKD-EPI equation.        Lab Results   Component Value Date    LIPASE 17 09/07/2023     No results found for: "LIPASERES"  Lab Results   Component Value Date    TSH 1.816 07/17/2024       Reviewed prior medical records including radiology report of office visit FERNANDO Juan 9/6/23 , ultrasound abdomen 09/07/2023 & endoscopy history (see surgical history/procedures).    Objective:      Physical Exam  Vitals and nursing note reviewed.   Constitutional:       Appearance: Normal appearance. She is normal weight.   Cardiovascular:      Rate and Rhythm: Normal rate and regular rhythm.      Heart sounds: Normal heart sounds.   Pulmonary:      Breath sounds: Normal breath sounds.   Abdominal:      General: Bowel sounds are normal.      Palpations: Abdomen is soft.      Tenderness: There is no abdominal tenderness.   Skin:     General: Skin is warm and dry.      Coloration: Skin is not jaundiced.   Neurological:      Mental Status: She is alert and oriented to person, place, and time.   Psychiatric:         Mood and Affect: Mood normal.         Behavior: Behavior normal.         Assessment:       1. Pain of upper abdomen    2. Gallstones    3. Gastroesophageal reflux disease, unspecified whether esophagitis present    4. Straining with stools    5. Screening for colon cancer            Plan:       Pain " of upper abdomen  -   CONTINUE omeprazole (PRILOSEC) 40 MG capsule; Take 1 capsule (40 mg total) by mouth Daily.   Take PPI 30min-1hr before eating breakfast  -follow GERD lifestyle modifications  -continue Bentyl 10 mg as needed for pain  -Hida scan ordered recommend follow up with Gen sx to rule out gallbladder dysfunction  -CMP and CBC cc'd to me    Gastroesophageal reflux disease, unspecified whether esophagitis present  -Continue PPI  -Educated patient on lifestyle modifications to help control/reduce reflux/abdominal pain including: avoid large meals, avoid eating within 2-3 hours of bedtime (avoid late night eating & lying down soon after eating), elevate head of bed if nocturnal symptoms are present, smoking cessation (if current smoker), & weight loss (if overweight).   -Educated to avoid known foods which trigger reflux symptoms & to minimize/avoid high-fat foods, chocolate, caffeine, citrus, alcohol, & tomato products.  -Advised to avoid/limit use of NSAID's, since they can cause GI upset, bleeding, and/or ulcers. If needed, take with food.     Gallstones  - recommend low fat diet  - discussed diagnosis & that it can cause symptoms in some patients, while other patients with it can be asymptomatic; recommend continue recommendations as discussed during our visit and if symptoms persist after other testing has been completed, recommend to follow up with general surgery patient verbalized understanding    Straining with stools   -Recommend daily exercise as tolerated, adequate water intake (six 8-oz glasses of water daily), and high fiber diet. OTC fiber supplements are recommended if diet does not reach daily fiber goal (20-30 grams daily), such as Metamucil, Citrucel, or FiberCon (take as directed, separate from other oral medications by >2 hours).  -Recommend trying OTC MiraLax once daily (17g PO) as directed  -If no improvement with above recommendations, try intermittently dosed Dulcolax OTC as  directed (every 3-4 days) PRN to facilitate bowel movements  -If no relief with this, consider adding a emollient laxative (castor oil or mineral oil) +/- enema  -If still no improvement with these measures, call/follow-up    Screening for colon cancer   -Screening colonoscopy due on 3/2025   -if symptoms that require colonoscopy sooner a colonoscopy can be completed then for evaluation            Follow up if symptoms worsen or fail to improve.      If no improvement in symptoms or symptoms worsen, call/follow-up at clinic or go to ER.       [facility] GASTROENTEROLOGY  [facility], [place]     Dictation software program was used for this note. Please expect some simple typographical  errors in this note.    Encounter includes face to face time and non-face to face time preparing to see the patient (eg, review of tests), obtaining and/or reviewing separately obtained history, documenting clinical information in the electronic or other health record, independently interpreting results (not separately reported) and communicating results to the patient/family/caregiver, or care coordination (not separately reported).

## 2024-11-07 ENCOUNTER — ANESTHESIA EVENT (OUTPATIENT)
Dept: SURGERY | Facility: HOSPITAL | Age: 44
End: 2024-11-07
Payer: COMMERCIAL

## 2024-11-08 ENCOUNTER — HOSPITAL ENCOUNTER (OUTPATIENT)
Facility: HOSPITAL | Age: 44
Discharge: HOME | End: 2024-11-08
Attending: GENERAL PRACTICE | Admitting: GENERAL PRACTICE
Payer: COMMERCIAL

## 2024-11-08 ENCOUNTER — ANESTHESIA (OUTPATIENT)
Dept: SURGERY | Facility: HOSPITAL | Age: 44
End: 2024-11-08
Payer: COMMERCIAL

## 2024-11-08 DIAGNOSIS — Z30.09 CONSULTATION FOR FEMALE STERILIZATION: ICD-10-CM

## 2024-11-08 DIAGNOSIS — N92.0 MENORRHAGIA WITH REGULAR CYCLE: ICD-10-CM

## 2024-11-08 DIAGNOSIS — Z01.818 PREOP TESTING: ICD-10-CM

## 2024-11-08 LAB
B-HCG UR QL: NEGATIVE
BASOPHILS # BLD AUTO: 0.08 K/UL (ref 0–0.2)
BASOPHILS NFR BLD: 1.1 % (ref 0–1.9)
CTP QC/QA: YES
DIFFERENTIAL METHOD BLD: ABNORMAL
EOSINOPHIL # BLD AUTO: 0.2 K/UL (ref 0–0.5)
EOSINOPHIL NFR BLD: 2.2 % (ref 0–8)
ERYTHROCYTE [DISTWIDTH] IN BLOOD BY AUTOMATED COUNT: 13 % (ref 11.5–14.5)
HCT VFR BLD AUTO: 35.5 % (ref 37–48.5)
HGB BLD-MCNC: 11.9 G/DL (ref 12–16)
IMM GRANULOCYTES # BLD AUTO: 0.01 K/UL (ref 0–0.04)
IMM GRANULOCYTES NFR BLD AUTO: 0.1 % (ref 0–0.5)
LYMPHOCYTES # BLD AUTO: 2.1 K/UL (ref 1–4.8)
LYMPHOCYTES NFR BLD: 27.8 % (ref 18–48)
MCH RBC QN AUTO: 30.7 PG (ref 27–31)
MCHC RBC AUTO-ENTMCNC: 33.5 G/DL (ref 32–36)
MCV RBC AUTO: 92 FL (ref 82–98)
MONOCYTES # BLD AUTO: 0.4 K/UL (ref 0.3–1)
MONOCYTES NFR BLD: 5.7 % (ref 4–15)
NEUTROPHILS # BLD AUTO: 4.7 K/UL (ref 1.8–7.7)
NEUTROPHILS NFR BLD: 63.1 % (ref 38–73)
NRBC BLD-RTO: 0 /100 WBC
PLATELET # BLD AUTO: 366 K/UL (ref 150–450)
PMV BLD AUTO: 9.7 FL (ref 9.2–12.9)
RBC # BLD AUTO: 3.87 M/UL (ref 4–5.4)
WBC # BLD AUTO: 7.41 K/UL (ref 3.9–12.7)

## 2024-11-08 PROCEDURE — 58563 HYSTEROSCOPY ABLATION: CPT | Mod: 51,,, | Performed by: GENERAL PRACTICE

## 2024-11-08 PROCEDURE — 36000708 HC OR TIME LEV III 1ST 15 MIN: Performed by: GENERAL PRACTICE

## 2024-11-08 PROCEDURE — 71000015 HC POSTOP RECOV 1ST HR: Performed by: GENERAL PRACTICE

## 2024-11-08 PROCEDURE — 63600175 PHARM REV CODE 636 W HCPCS: Performed by: ANESTHESIOLOGY

## 2024-11-08 PROCEDURE — 37000008 HC ANESTHESIA 1ST 15 MINUTES: Performed by: GENERAL PRACTICE

## 2024-11-08 PROCEDURE — 25000003 PHARM REV CODE 250: Performed by: GENERAL PRACTICE

## 2024-11-08 PROCEDURE — 63600175 PHARM REV CODE 636 W HCPCS: Performed by: NURSE ANESTHETIST, CERTIFIED REGISTERED

## 2024-11-08 PROCEDURE — 36000709 HC OR TIME LEV III EA ADD 15 MIN: Performed by: GENERAL PRACTICE

## 2024-11-08 PROCEDURE — 25000003 PHARM REV CODE 250: Performed by: NURSE ANESTHETIST, CERTIFIED REGISTERED

## 2024-11-08 PROCEDURE — 37000009 HC ANESTHESIA EA ADD 15 MINS: Performed by: GENERAL PRACTICE

## 2024-11-08 PROCEDURE — 36415 COLL VENOUS BLD VENIPUNCTURE: CPT | Performed by: ANESTHESIOLOGY

## 2024-11-08 PROCEDURE — 71000039 HC RECOVERY, EACH ADD'L HOUR: Performed by: GENERAL PRACTICE

## 2024-11-08 PROCEDURE — 71000033 HC RECOVERY, INTIAL HOUR: Performed by: GENERAL PRACTICE

## 2024-11-08 PROCEDURE — 81025 URINE PREGNANCY TEST: CPT | Performed by: ANESTHESIOLOGY

## 2024-11-08 PROCEDURE — 58661 LAPAROSCOPY REMOVE ADNEXA: CPT | Mod: 50,,, | Performed by: GENERAL PRACTICE

## 2024-11-08 PROCEDURE — 25000003 PHARM REV CODE 250: Performed by: ANESTHESIOLOGY

## 2024-11-08 PROCEDURE — 85025 COMPLETE CBC W/AUTO DIFF WBC: CPT | Performed by: ANESTHESIOLOGY

## 2024-11-08 PROCEDURE — 27201423 OPTIME MED/SURG SUP & DEVICES STERILE SUPPLY: Performed by: GENERAL PRACTICE

## 2024-11-08 RX ORDER — PROMETHAZINE HYDROCHLORIDE 25 MG/ML
INJECTION, SOLUTION INTRAMUSCULAR; INTRAVENOUS
Status: DISCONTINUED | OUTPATIENT
Start: 2024-11-08 | End: 2024-11-08

## 2024-11-08 RX ORDER — FENTANYL CITRATE 50 UG/ML
INJECTION, SOLUTION INTRAMUSCULAR; INTRAVENOUS
Status: DISCONTINUED | OUTPATIENT
Start: 2024-11-08 | End: 2024-11-08

## 2024-11-08 RX ORDER — SODIUM CHLORIDE 9 MG/ML
INJECTION, SOLUTION INTRAVENOUS CONTINUOUS
Status: DISCONTINUED | OUTPATIENT
Start: 2024-11-08 | End: 2024-11-08 | Stop reason: HOSPADM

## 2024-11-08 RX ORDER — BUPIVACAINE HYDROCHLORIDE AND EPINEPHRINE 2.5; 5 MG/ML; UG/ML
INJECTION, SOLUTION EPIDURAL; INFILTRATION; INTRACAUDAL; PERINEURAL
Status: DISCONTINUED | OUTPATIENT
Start: 2024-11-08 | End: 2024-11-08 | Stop reason: HOSPADM

## 2024-11-08 RX ORDER — ROCURONIUM BROMIDE 10 MG/ML
INJECTION, SOLUTION INTRAVENOUS
Status: DISCONTINUED | OUTPATIENT
Start: 2024-11-08 | End: 2024-11-08

## 2024-11-08 RX ORDER — DIPHENHYDRAMINE HYDROCHLORIDE 50 MG/ML
12.5 INJECTION INTRAMUSCULAR; INTRAVENOUS EVERY 6 HOURS PRN
Status: DISCONTINUED | OUTPATIENT
Start: 2024-11-08 | End: 2024-11-08 | Stop reason: HOSPADM

## 2024-11-08 RX ORDER — DEXAMETHASONE SODIUM PHOSPHATE 4 MG/ML
INJECTION, SOLUTION INTRA-ARTICULAR; INTRALESIONAL; INTRAMUSCULAR; INTRAVENOUS; SOFT TISSUE
Status: DISCONTINUED | OUTPATIENT
Start: 2024-11-08 | End: 2024-11-08

## 2024-11-08 RX ORDER — GLUCAGON 1 MG
1 KIT INJECTION
Status: DISCONTINUED | OUTPATIENT
Start: 2024-11-08 | End: 2024-11-08 | Stop reason: HOSPADM

## 2024-11-08 RX ORDER — ONDANSETRON HYDROCHLORIDE 2 MG/ML
INJECTION, SOLUTION INTRAVENOUS
Status: DISCONTINUED | OUTPATIENT
Start: 2024-11-08 | End: 2024-11-08

## 2024-11-08 RX ORDER — LIDOCAINE HYDROCHLORIDE 20 MG/ML
INJECTION, SOLUTION EPIDURAL; INFILTRATION; INTRACAUDAL; PERINEURAL
Status: DISCONTINUED | OUTPATIENT
Start: 2024-11-08 | End: 2024-11-08

## 2024-11-08 RX ORDER — MIDAZOLAM HYDROCHLORIDE 1 MG/ML
INJECTION INTRAMUSCULAR; INTRAVENOUS
Status: DISCONTINUED | OUTPATIENT
Start: 2024-11-08 | End: 2024-11-08

## 2024-11-08 RX ORDER — HYDROMORPHONE HYDROCHLORIDE 2 MG/ML
0.2 INJECTION, SOLUTION INTRAMUSCULAR; INTRAVENOUS; SUBCUTANEOUS EVERY 5 MIN PRN
Status: DISCONTINUED | OUTPATIENT
Start: 2024-11-08 | End: 2024-11-08 | Stop reason: HOSPADM

## 2024-11-08 RX ORDER — ACETAMINOPHEN 10 MG/ML
INJECTION, SOLUTION INTRAVENOUS
Status: DISCONTINUED | OUTPATIENT
Start: 2024-11-08 | End: 2024-11-08

## 2024-11-08 RX ORDER — SCOLOPAMINE TRANSDERMAL SYSTEM 1 MG/1
1 PATCH, EXTENDED RELEASE TRANSDERMAL
Status: DISCONTINUED | OUTPATIENT
Start: 2024-11-08 | End: 2024-11-08 | Stop reason: HOSPADM

## 2024-11-08 RX ORDER — SODIUM CHLORIDE, SODIUM LACTATE, POTASSIUM CHLORIDE, CALCIUM CHLORIDE 600; 310; 30; 20 MG/100ML; MG/100ML; MG/100ML; MG/100ML
INJECTION, SOLUTION INTRAVENOUS CONTINUOUS
Status: DISCONTINUED | OUTPATIENT
Start: 2024-11-08 | End: 2024-11-08 | Stop reason: HOSPADM

## 2024-11-08 RX ORDER — KETOROLAC TROMETHAMINE 30 MG/ML
INJECTION, SOLUTION INTRAMUSCULAR; INTRAVENOUS
Status: DISCONTINUED | OUTPATIENT
Start: 2024-11-08 | End: 2024-11-08

## 2024-11-08 RX ORDER — PHENYLEPHRINE HCL IN 0.9% NACL 1 MG/10 ML
SYRINGE (ML) INTRAVENOUS
Status: DISCONTINUED | OUTPATIENT
Start: 2024-11-08 | End: 2024-11-08

## 2024-11-08 RX ORDER — ONDANSETRON HYDROCHLORIDE 2 MG/ML
4 INJECTION, SOLUTION INTRAVENOUS ONCE AS NEEDED
Status: DISCONTINUED | OUTPATIENT
Start: 2024-11-08 | End: 2024-11-08 | Stop reason: HOSPADM

## 2024-11-08 RX ORDER — PROPOFOL 10 MG/ML
VIAL (ML) INTRAVENOUS
Status: DISCONTINUED | OUTPATIENT
Start: 2024-11-08 | End: 2024-11-08

## 2024-11-08 RX ORDER — FENTANYL CITRATE 50 UG/ML
25 INJECTION, SOLUTION INTRAMUSCULAR; INTRAVENOUS EVERY 5 MIN PRN
Status: DISCONTINUED | OUTPATIENT
Start: 2024-11-08 | End: 2024-11-08 | Stop reason: HOSPADM

## 2024-11-08 RX ORDER — LIDOCAINE HYDROCHLORIDE 10 MG/ML
1 INJECTION, SOLUTION EPIDURAL; INFILTRATION; INTRACAUDAL; PERINEURAL ONCE
Status: DISCONTINUED | OUTPATIENT
Start: 2024-11-08 | End: 2024-11-08 | Stop reason: HOSPADM

## 2024-11-08 RX ORDER — OXYCODONE HYDROCHLORIDE 5 MG/1
5 TABLET ORAL
Status: DISCONTINUED | OUTPATIENT
Start: 2024-11-08 | End: 2024-11-08 | Stop reason: HOSPADM

## 2024-11-08 RX ORDER — MEPERIDINE HYDROCHLORIDE 50 MG/ML
12.5 INJECTION INTRAMUSCULAR; INTRAVENOUS; SUBCUTANEOUS ONCE AS NEEDED
Status: DISCONTINUED | OUTPATIENT
Start: 2024-11-08 | End: 2024-11-08 | Stop reason: HOSPADM

## 2024-11-08 RX ADMIN — ONDANSETRON 4 MG: 2 INJECTION INTRAMUSCULAR; INTRAVENOUS at 07:11

## 2024-11-08 RX ADMIN — GLYCOPYRROLATE 0.2 MG: 0.2 INJECTION INTRAMUSCULAR; INTRAVENOUS at 07:11

## 2024-11-08 RX ADMIN — ACETAMINOPHEN 1000 MG: 10 INJECTION, SOLUTION INTRAVENOUS at 07:11

## 2024-11-08 RX ADMIN — SUGAMMADEX 50 MG: 100 INJECTION, SOLUTION INTRAVENOUS at 09:11

## 2024-11-08 RX ADMIN — SODIUM CHLORIDE: 9 INJECTION, SOLUTION INTRAVENOUS at 07:11

## 2024-11-08 RX ADMIN — PROPOFOL 150 MG: 10 INJECTION, EMULSION INTRAVENOUS at 07:11

## 2024-11-08 RX ADMIN — SODIUM CHLORIDE, SODIUM GLUCONATE, SODIUM ACETATE, POTASSIUM CHLORIDE, MAGNESIUM CHLORIDE, SODIUM PHOSPHATE, DIBASIC, AND POTASSIUM PHOSPHATE: .53; .5; .37; .037; .03; .012; .00082 INJECTION, SOLUTION INTRAVENOUS at 08:11

## 2024-11-08 RX ADMIN — Medication 100 MCG: at 07:11

## 2024-11-08 RX ADMIN — SUGAMMADEX 50 MG: 100 INJECTION, SOLUTION INTRAVENOUS at 08:11

## 2024-11-08 RX ADMIN — PROMETHAZINE HYDROCHLORIDE 6.25 MG: 25 INJECTION INTRAMUSCULAR; INTRAVENOUS at 08:11

## 2024-11-08 RX ADMIN — Medication 100 MCG: at 08:11

## 2024-11-08 RX ADMIN — ROCURONIUM BROMIDE 30 MG: 10 INJECTION, SOLUTION INTRAVENOUS at 07:11

## 2024-11-08 RX ADMIN — LIDOCAINE HYDROCHLORIDE 100 MG: 20 INJECTION, SOLUTION EPIDURAL; INFILTRATION; INTRACAUDAL at 07:11

## 2024-11-08 RX ADMIN — SCOPOLAMINE 1 PATCH: 1.5 PATCH, EXTENDED RELEASE TRANSDERMAL at 07:11

## 2024-11-08 RX ADMIN — KETOROLAC TROMETHAMINE 30 MG: 30 INJECTION, SOLUTION INTRAMUSCULAR; INTRAVENOUS at 08:11

## 2024-11-08 RX ADMIN — Medication 200 MCG: at 07:11

## 2024-11-08 RX ADMIN — MIDAZOLAM HYDROCHLORIDE 2 MG: 1 INJECTION, SOLUTION INTRAMUSCULAR; INTRAVENOUS at 07:11

## 2024-11-08 RX ADMIN — FENTANYL CITRATE 100 MCG: 50 INJECTION, SOLUTION INTRAMUSCULAR; INTRAVENOUS at 07:11

## 2024-11-08 RX ADMIN — DEXAMETHASONE SODIUM PHOSPHATE 8 MG: 4 INJECTION, SOLUTION INTRA-ARTICULAR; INTRALESIONAL; INTRAMUSCULAR; INTRAVENOUS; SOFT TISSUE at 07:11

## 2024-11-08 RX ADMIN — FENTANYL CITRATE 25 MCG: 50 INJECTION, SOLUTION INTRAMUSCULAR; INTRAVENOUS at 10:11

## 2024-11-08 NOTE — OP NOTE
OPERATIVE NOTE  11/08/2024   PREOP DX = heavy menstrual bleeding, undesired fertility    POSTOP DX = same    PROCEDURE = Novasure Endometrial Ablation, laparoscopic bilateral salpingectomy    SURGEON = Olya Montaño MD     ASSISTANT = none    ANESTHESIA = GETA    COMPLICATIONS = none    EBL = minimal    FLUIDS = 1000cc crystalloid    UOP = 500cc    SPECIMENS = bilateral fallopian tubes    DRAINS = none    FINDINGS = normal anatomy, the prior injury to the uterus (perforation) was apparent on laparoscopic exam  uterine sound 9cm, cervical length 5.5cm, cavity length 4cm, cavity width 2.5cm    PROCEDURE IN DETAIL:    The patient was taken to the operating room where GETA was administered.  A surgical time-out was performed to ensure the proper patient, procedure, and site.  She was prepped and draped in the lithotomy position, and in/out catheterization of the bladder was performed.    A speculum was placed in the patient's vagina.  A tenaculum was placed to the anterior lip of the cervix.  First, cervical and uterine measurements were taken as per above findings.  The cervix was dilated to a #6 Hagaar dilator.  The NovaSure was placed inside the uterus, and the cavity width was taken.  The NovaSure generator and handheld devices were set in accordance with the patient's measurements.  The generator ran a cavity check, and the test was passed.  The ablation was performed, lasting 1.6 minutes at a power level of 55W.  The device was removed from the uterus, and julita was noted on the mesh. The tenaculum was removed, and hemostasis was observed.     Next attention was turned to laparoscopy.  The inferior skin of the umbilicus was infiltrated with 0.25% Marcaine.  A small vertical skin incision was placed to accommodate an 8mm trocar.  Under direct visualization, the abdomen was entered.  Once correct intraperitoneal placement was confirmed, the abdomen was insufflated with CO2.  Second and third 5mm trocars were placed  in a similar fashion in the left lower and right lower quadrants, under direct visualization.  Pelvic and abdominal survey demonstrated findings above.  The bladder was quite full and obscuring the surgical field, so a grayson catheter was placed by the circulating nurse.     First the left fallopian tube was grasped, and using the Ligasure device it was  longitudinally from the parametrium and then transected at the uterus for complete removal.  This was repeated on the right side.  Each tube was removed intact through the 8mm trocar.  Hemostasis was observed at the surgical margins.     The pneumoperitoneum was released, and the trocars were removed under direct visualization.  The skin incisions were closed with 4-0 monocryl and dressed with dermabond.  The patient was extubated uneventfully and taken to PACU in stable condition for recovery.    MD PEARL

## 2024-11-08 NOTE — ANESTHESIA PREPROCEDURE EVALUATION
11/08/2024  Roopa Rivas is a 44 y.o., female.      Pre-op Assessment    I have reviewed the NPO Status.   I have reviewed the Medications.     Review of Systems  Anesthesia Hx:  No problems with previous Anesthesia                Social:  Non-Smoker       Cardiovascular:  Exercise tolerance: good                                             Pulmonary:  Pulmonary Normal                       Renal/:   renal calculi               Hepatic/GI:     GERD, well controlled                Neurological:    Neuromuscular Disease,                                 Neuromuscular Disease   Endocrine:   Hypothyroidism          Psych:  Psychiatric History                  Physical Exam  General: Well nourished    Airway:  Mallampati: II / I  Mouth Opening: Normal  TM Distance: Normal  Tongue: Normal  Neck ROM: Normal ROM    Dental:  Intact    Chest/Lungs:  Clear to auscultation, Normal Respiratory Rate        Anesthesia Plan  Type of Anesthesia, risks & benefits discussed:    Anesthesia Type: Gen ETT  Intra-op Monitoring Plan: Standard ASA Monitors  Post Op Pain Control Plan: multimodal analgesia and IV/PO Opioids PRN  Induction:  IV  Airway Plan: Direct, Post-Induction  Informed Consent: Informed consent signed with the Patient and all parties understand the risks and agree with anesthesia plan.  All questions answered.   ASA Score: 2    Ready For Surgery From Anesthesia Perspective.     .

## 2024-11-08 NOTE — DISCHARGE INSTRUCTIONS
DISCHARGE MEDICATIONS:  Ibuprofen 800mg by mouth every 8 hours as needed  for mild to moderate pain  take with food to minimize stomach upset     Senna-docusate 8.6-50, 1 tablet by mouth daily as needed for constipation     Percocet 5/325 1 tablet by mouth every 4 hours as needed  for moderate to severe pain  do not drive or make important decisions while taking  likely to cause constipation, consider increasing your water and fiber intake     Continue your other home medications (if any) as previously prescribed.     PATIENT INSTRUCTIONS:  Take it easy and rest for a few days.  Return to most normal activity and work as tolerated.  It's safe to use stairs, but depending on the extent of your surgery you might want to limit use to 1-2 times per day at first.  Eat a normal diet.  Drink plenty of water.  Pelvic rest (no sex, no tampons, nothing in the vagina) for 2 week(s) or as long as you have vaginal bleeding, whichever is longer.  It's okay to shower, but no bathing, swimming, or soaking for 2 weeks or as long as you have vaginal bleeding, whichever is longer.  It's normal to have vaginal bleeding for several weeks after this procedure.  The amount should be the same as or less than a normal period.  No strenuous activity, heavy housework, or lifting greater than 10 pounds for 2 week(s) after the procedure.  No driving, operating heavy machinery, or making important decisions  for 24 hours after the procedure,  if you are taking narcotic pain pills (Percocet, Norco, etc.)  if your pain control doesn't allow it (Can you quickly slam on the brakes and twist to check your blind spot?)  Call or return for:  pain not controlled by your medications,  heavy bleeding,  problems with your incision(s),  fever (100.4 or higher),  foul-smelling vaginal discharge  24/7 [facility] Nurse Advice Line: [phone]     FOLLOW-UP:   Follow-up with Dr. Montaño as scheduled on 12/9/2024.       General Information:    1.  Do not drink  alcoholic beverages including beer for 24 hours or as long as you are on pain medication..  2.  Do not drive a motor vehicle, operate machinery or power tools, or signs legal papers for 24 hours or as long as you are on pain medication.   3.  You may experience light-headedness, dizziness, and sleepiness following surgery. Please do not stay alone. A responsible adult should be with you for this 24 hour period.  4.  Go home and rest.    5. Progress slowly to a normal diet unless instructed.  Otherwise, begin with liquids such as soft drinks, then soup and crackers working up to solid foods. Drink plenty of nonalcoholic fluids.  6.  Certain anesthetics and pain medications produce nausea and vomiting in certain       individuals. If nausea becomes a problem at home, call you doctor.    7. A nurse will be calling you sometime after surgery. Do not be alarmed. This is our way of finding out how you are doing.    8. Several times every hour while you are awake, take 2-3 deep breaths and cough. If you had stomach surgery hold a pillow or rolled towel firmly against your stomach before you cough. This will help with any pain the cough might cause.  9. Several times every hour while you are awake, pump and flex your feet 5-6 times and do foot circles. This will help prevent blood clots.    10.Call your doctor for severe pain, bleeding, fever, or signs or symptoms of infection (pain, swelling, redness, foul odor, drainage).      Using an Incentive Spirometer    An incentive spirometer is a device that helps you do deep breathing exercises. These exercises expand your lungs, aid in circulation, and help prevent pneumonia. Deep breathing exercises also help you breathe better and improve the function of your lungs by:  Keeping your lungs clear  Strengthening your breathing muscles  Helping prevent respiratory complications or problems  The incentive spirometer gives you a way to take an active part in recover. A nurse or  therapist will teach you breathing exercises. To do these exercises, you will breathe in through your mouth and not your nose. The incentive spirometer only works correctly if you breathe in through your mouth.  Steps to clear lungs  Step 1. Exhale normally. Then, inhale normally.  Relax and breathe out.  Step 2. Place your lips tightly around the mouthpiece.  Make sure the device is upright and not tilted.  Step 3. Inhale as much air as you can through the mouthpiece (don't breath through your nose).  Inhale slowly and deeply.  Hold your breath long enough to keep the balls or disk raised for at least 3 to 5 seconds, or as instructed by your healthcare provider.  Some spirometers have an indicator to let you know that you are breathing in too fast. If the indicator goes off, breathe in more slowly.  Step 4. Repeat the exercise regularly.  Do this exercise every hour while you're awake, or as instructed by your healthcare provider.  If you were taught deep breathing and coughing exercises, do them regularly as instructed by your healthcare provider.       We hope your stay was comfortable as you heal now, mend and rest.    For we have enjoyed taking care of you by giving your our best.    And as you get better, by regaining your health and strength;   We count it as a privilege to have served you and hope your time at [facility] was well spent.      Thank  You!!!

## 2024-11-08 NOTE — ANESTHESIA POSTPROCEDURE EVALUATION
Anesthesia Post Evaluation    Patient: Roopa Rivas    Procedure(s) Performed: Procedure(s) (LRB):  ABLATION, ENDOMETRIUM (N/A)  LIGATION, FALLOPIAN TUBE, LAPAROSCOPIC (Bilateral)    Final Anesthesia Type: general      Patient location during evaluation: PACU  Patient participation: Yes- Able to Participate  Level of consciousness: awake and alert and oriented  Post-procedure vital signs: reviewed and stable  Pain management: adequate  Airway patency: patent  SAEED mitigation strategies: Multimodal analgesia, Extubation while patient is awake, Verification of full reversal of neuromuscular block and Extubation and recovery carried out in lateral, semiupright, or other nonsupine position  PONV status at discharge: No PONV  Anesthetic complications: no      Cardiovascular status: blood pressure returned to baseline  Respiratory status: unassisted, spontaneous ventilation and room air  Hydration status: euvolemic  Follow-up not needed.              Vitals Value Taken Time   BP 94/58 11/08/24 1041   Temp 36.6 °C (97.8 °F) 11/08/24 1015   Pulse 88 11/08/24 1041   Resp 16 11/08/24 1041   SpO2 98 % 11/08/24 1041         Event Time   Out of Recovery 10:20:00         Pain/Ellen Score: Pain Rating Prior to Med Admin: 4 (11/8/2024 10:15 AM)  Pain Rating Post Med Admin: 3 (11/8/2024 10:15 AM)  Ellen Score: 10 (11/8/2024 10:50 AM)  Modified Ellen Score: 20 (11/8/2024 10:50 AM)

## 2024-11-08 NOTE — PLAN OF CARE
Pt voiding spontaneously without difficulty. Pt and spouse given discharge instructions. Prescriptions delivered to bedside by pharmacy. Educated on post anesthesia precautions, dressing care, and when to notify MD. All belongings returned to pt. Transferred to personal vehicle via wheelchair.

## 2024-11-08 NOTE — DISCHARGE SUMMARY
GYNECOLOGY DISCHARGE SUMMARY    HOSPITAL COURSE:  Admission Date: 2024   Discharge Date: 2024    Roopa Rivas is a 44 y.o.  who underwent uncomplicated Novasure endometrial ablation and laparoscopic bilateral salpingectomy.  Her recovery from the surgery was unremarkable, and she meets criteria to go home.    PENDING PATHOLOGY:  Bilateral fallopian tubes    DISCHARGE MEDICATIONS:  Ibuprofen 800mg by mouth every 8 hours as needed  for mild to moderate pain  take with food to minimize stomach upset    Senna-docusate 8.6-50, 1 tablet by mouth daily as needed for constipation    Percocet 5/325 1 tablet by mouth every 4 hours as needed  for moderate to severe pain  do not drive or make important decisions while taking  likely to cause constipation, consider increasing your water and fiber intake    Continue your other home medications (if any) as previously prescribed.    PATIENT INSTRUCTIONS:  Take it easy and rest for a few days.  Return to most normal activity and work as tolerated.  It's safe to use stairs, but depending on the extent of your surgery you might want to limit use to 1-2 times per day at first.  Eat a normal diet.  Drink plenty of water.  Pelvic rest (no sex, no tampons, nothing in the vagina) for 2 week(s) or as long as you have vaginal bleeding, whichever is longer.  It's okay to shower, but no bathing, swimming, or soaking for 2 weeks or as long as you have vaginal bleeding, whichever is longer.  It's normal to have vaginal bleeding for several weeks after this procedure.  The amount should be the same as or less than a normal period.  No strenuous activity, heavy housework, or lifting greater than 10 pounds for 2 week(s) after the procedure.  No driving, operating heavy machinery, or making important decisions  for 24 hours after the procedure,  if you are taking narcotic pain pills (Percocet, Norco, etc.)  if your pain control doesn't allow it (Can you quickly slam on the brakes  and twist to check your blind spot?)  Call or return for:  pain not controlled by your medications,  heavy bleeding,  problems with your incision(s),  fever (100.4 or higher),  foul-smelling vaginal discharge  [facility] Nurse Advice Line: [phone]    FOLLOW-UP:   Follow-up with Dr. Montaño as scheduled on 12/9/2024.     unk

## 2024-11-08 NOTE — H&P
Roopa Rivas is a 44 y.o.  who is here today for Novasure endometrial ablation and bilateral laparoscopic salpingectomy.  Her  Bebeto is present and supportive.  No questions or concerns this morning.    HCG neg    -to OR when team ready    MD Audery KANG's:   LMP: Patient's last menstrual period was 2024 (approximate).   Relationship:  15yrs years  Contraception: STEFFI and condoms  PAP Hx: no h/o abnormals  PAP: PAP neg / HPV neg / Date: 10/27/2022  MMG (10/06/23) = negative and next one scheduled      OBJECTIVE       Vitals:     24 0928   BP: 102/76   Resp: 16      GEN = alert/oriented, nad, pleasant  HEENT = sclera anicteric, EOM grossly normal  BREASTS = deferred, no concerns  CV = BP and HR as per vitals  PULM = normal respiratory effort  ABD = soft, nontender, nondistended   = (8/15/24) nefg, normal vagina/CVX, sounded to 8cm     LABS & RADS            Lab Results   Component Value Date     TSH 1.816 2024            Lab Results   Component Value Date     WBC 7.57 2024     HGB 11.9 (L) 2024     HCT 38.1 2024      2024     MCV 95 2024        FERRITIN 121 2024      PELVIC US (24) =  Uterus 8 x 4  cm  EMS 3.1 mm  ROV 2 x 2  cm  LOV not seen     EMBX (15 AUG 2024)  Fragments of inactive endometrium, no atypical hyperplasia or malignancy identified.      EMBX (24)  SCANT FRAGMENTS OF ENDOMETRIUM WITH BREAKDOWN          --------------------------     GYNECOLOGIC HISTORY  PAP Hx: no h/o abnormals  Genital HSV: no  STD Hx: chlamydia     Menarche: 10 yoa  Period duration: 5 days  # Heavy Days: 3  Pad/tampon ? on heavy days: 30 minutes to an hour  Intermenstrual bleeding: No  Period frequency:regular every 28-30 days     Dysmenorrhea: typical or expected menstrual cramps  Non-menstrual pelvic pressure/pain: No  Dyspareunia: No     Vasomotor Sxs: yes, typically 3 per day and yes, typically 2 per night  Vaginal dryness:  yes  Poor libido on Abilify     OBSTETRIC HISTORY  Living children: 4  Vaginal deliveries: 4  IAB x 1  Plus 3 stepchildren     PAST MEDICAL HISTORY      -------------------------------------    Arthritis    Fibromyalgia    Gastroenteritis    GERD (gastroesophageal reflux disease)    Long-term use of Plaquenil   Thyroid disease  Depression / anxiety     PAST SURGICAL HISTORY      ----------------------------    Colonoscopy     2016    Dilation and curettage of uterus     Miscarriage at age 25    Esophagogastroduodenoscopy     Procedure: EGD (ESOPHAGOGASTRODUODENOSCOPY);  Surgeon: Jacquie Guadalupe MD;  Location: [facility];  Service: Endoscopy;  Laterality: N/A;      ALLERGIES  Review of patient's allergies indicates:  No Known Allergies     MEDICATIONS       Current Outpatient Medications   Medication Instructions    acetylcysteine (N-ACETYL-L-CYSTEINE MISC) Misc.(Non-Drug; Combo Route)    ARIPiprazole (ABILIFY) 15 mg, Oral, Daily    calcium carbonate 400 mg calcium (1,000 mg) Chew Oral    cetirizine (ZYRTEC) 10 mg, Oral, Daily    clotrimazole-betamethasone 1-0.05% (LOTRISONE) cream Topical (Top), 2 times daily    dicyclomine (BENTYL) 10 mg, Oral, As needed (PRN)    drospirenone-ethinyl estradioL (SARAH) 3-0.02 mg per tablet 1 tablet, Oral, Daily    FLUoxetine 40 mg, Oral, Daily    fluticasone propionate (FLONASE) 50 mcg, Each Nostril, Daily    hydroquinone 8 % Emul Compound hydroquinone 12% / kojic acid 6% / vitamin C 1% / niacinamide 2% cream. Apply a pea-sized amount to entire face qhs. Do not use for longer than 16 weeks in a row.    ibuprofen (ADVIL,MOTRIN) 400 mg, Oral, Every 6 hours PRN    iron, carbonyl 25 mg iron Tab Oral, Iron supplement    Lactobacillus acidophilus (PROBIOTIC ORAL) Oral    levothyroxine (SYNTHROID) 50 MCG tablet Take 1 tab Monday-Saturday and 2 tabs on Sunday    magnesium 30 mg Tab Oral, Once    multivitamin/iron/folic acid (MULTI COMPLETE WITH IRON ORAL) Oral    omeprazole (PRILOSEC) 40  mg, Oral, Daily    propranoloL (INDERAL) 20 mg, Oral, 2 times daily PRN    spironolactone (ALDACTONE) 50 MG tablet TAKE 1 TABLET BY MOUTH DAILY    tretinoin (RETIN-A) 0.025 % cream Topical (Top), Nightly    zolpidem (AMBIEN CR) 12.5 mg, Oral, Nightly PRN      SOCIAL HISTORY  Lives with:  and children  Smoker: non-smoker  Alcohol: denies  Drugs: denies  Domestic Violence: no  Occupation: homemaker     FAMILY HISTORY  BLEEDING or  CLOTTING DISORDERS: none  BREAST CA: none  UTERINE CA: none  OVARIAN CA: none  COLON CA: none

## 2024-11-08 NOTE — PLAN OF CARE
Patient prepared for surgery, resting in bed, with spouse at bedside. Family signed up for text messaging. Belongings brought over to post op cabinet. IS teaching performed. Fall risk armband placed. SCDs on.

## 2024-11-08 NOTE — PLAN OF CARE
Released from Pacu per Anesthesia when criteria met pain controlled skin w+d No nausea No emesis dermabond on abd sleepy but ox4 encouraged deep breaths instr on IS encouraged use  Pt has all belongings in post op  has scope patch behind R ear and dermabond sites  on abd dry peripad on intact DUe to VOID

## 2024-11-08 NOTE — ANESTHESIA PROCEDURE NOTES
Intubation    Date/Time: 11/8/2024 7:40 AM    Performed by: Lela Barrett CRNA  Authorized by: Rogerio Croft MD    Intubation:     Induction:  Intravenous    Intubated:  Postinduction    Mask Ventilation:  Easy mask    Attempts:  1    Attempted By:  CRNA    Method of Intubation:  Video laryngoscopy    Blade:  Payan 3    Laryngeal View Grade: Grade I - full view of cords      Difficult Airway Encountered?: No      Complications:  None    Airway Device:  Oral endotracheal tube    Airway Device Size:  7.0    Style/Cuff Inflation:  Cuffed (inflated to minimal occlusive pressure)    Tube secured:  21    Secured at:  The lips    Placement Verified By:  Capnometry    Complicating Factors:  None    Findings Post-Intubation:  BS equal bilateral and atraumatic/condition of teeth unchanged

## 2024-11-08 NOTE — TRANSFER OF CARE
"Anesthesia Transfer of Care Note    Patient: Roopa Rivas    Procedure(s) Performed: Procedure(s) (LRB):  ABLATION, ENDOMETRIUM (N/A)  LIGATION, FALLOPIAN TUBE, LAPAROSCOPIC (Bilateral)    Patient location: PACU    Anesthesia Type: general    Transport from OR: Transported from OR on 2-3 L/min O2 by NC with adequate spontaneous ventilation    Post pain: adequate analgesia    Post assessment: no apparent anesthetic complications and tolerated procedure well    Post vital signs: stable    Level of consciousness: sedated and responds to stimulation    Nausea/Vomiting: no nausea/vomiting    Complications: none    Transfer of care protocol was followed      Last vitals: Visit Vitals  BP (!) 95/57 (BP Location: Right arm, Patient Position: Lying)   Pulse 81   Temp 37.1 °C (98.8 °F) (Skin)   Resp 18   Ht 4' 11" (1.499 m)   Wt 61.7 kg (136 lb)   LMP 09/16/2024 (Approximate)   SpO2 99%   Breastfeeding No   BMI 27.47 kg/m²     "

## 2024-11-11 VITALS
RESPIRATION RATE: 16 BRPM | HEART RATE: 88 BPM | DIASTOLIC BLOOD PRESSURE: 58 MMHG | TEMPERATURE: 98 F | HEIGHT: 59 IN | OXYGEN SATURATION: 98 % | SYSTOLIC BLOOD PRESSURE: 94 MMHG | WEIGHT: 136 LBS | BODY MASS INDEX: 27.42 KG/M2

## 2024-11-27 ENCOUNTER — HOSPITAL ENCOUNTER (OUTPATIENT)
Dept: RADIOLOGY | Facility: HOSPITAL | Age: 44
Discharge: HOME | End: 2024-11-27
Payer: COMMERCIAL

## 2024-11-27 ENCOUNTER — PATIENT MESSAGE (OUTPATIENT)
Dept: GASTROENTEROLOGY | Facility: CLINIC | Age: 44
End: 2024-11-27
Payer: COMMERCIAL

## 2024-11-27 VITALS — RESPIRATION RATE: 16 BRPM | BODY MASS INDEX: 27.47 KG/M2 | WEIGHT: 136 LBS

## 2024-11-27 DIAGNOSIS — R10.10 PAIN OF UPPER ABDOMEN: ICD-10-CM

## 2024-11-27 DIAGNOSIS — K80.10 CHRONIC CHOLECYSTITIS WITH CALCULUS: Primary | ICD-10-CM

## 2024-11-27 PROCEDURE — A9537 TC99M MEBROFENIN: HCPCS

## 2024-11-27 PROCEDURE — 63600175 PHARM REV CODE 636 W HCPCS: Performed by: RADIOLOGY

## 2024-11-27 PROCEDURE — 78226 HEPATOBILIARY SYSTEM IMAGING: CPT | Mod: TC

## 2024-11-27 PROCEDURE — 78226 HEPATOBILIARY SYSTEM IMAGING: CPT | Mod: 26,,, | Performed by: RADIOLOGY

## 2024-11-27 RX ORDER — MORPHINE SULFATE 4 MG/ML
2 INJECTION, SOLUTION INTRAMUSCULAR; INTRAVENOUS ONCE
Status: COMPLETED | OUTPATIENT
Start: 2024-11-27 | End: 2024-11-27

## 2024-11-27 RX ORDER — KIT FOR THE PREPARATION OF TECHNETIUM TC 99M MEBROFENIN 45 MG/10ML
8.1 INJECTION, POWDER, LYOPHILIZED, FOR SOLUTION INTRAVENOUS
Status: COMPLETED | OUTPATIENT
Start: 2024-11-27 | End: 2024-11-27

## 2024-11-27 RX ADMIN — MORPHINE SULFATE 2 MG: 4 INJECTION INTRAVENOUS at 09:11

## 2024-11-27 RX ADMIN — MEBROFENIN 8.1 MILLICURIE: 45 INJECTION, POWDER, LYOPHILIZED, FOR SOLUTION INTRAVENOUS at 07:11

## 2024-12-02 ENCOUNTER — OFFICE VISIT (OUTPATIENT)
Dept: SURGERY | Facility: CLINIC | Age: 44
End: 2024-12-02
Payer: COMMERCIAL

## 2024-12-02 VITALS
HEIGHT: 59 IN | SYSTOLIC BLOOD PRESSURE: 102 MMHG | HEART RATE: 97 BPM | BODY MASS INDEX: 27.42 KG/M2 | TEMPERATURE: 97 F | DIASTOLIC BLOOD PRESSURE: 73 MMHG | WEIGHT: 136 LBS

## 2024-12-02 DIAGNOSIS — K80.10 CHRONIC CHOLECYSTITIS WITH CALCULUS: Primary | ICD-10-CM

## 2024-12-02 PROCEDURE — 99999 PR PBB SHADOW E&M-EST. PATIENT-LVL V: CPT | Mod: PBBFAC,,, | Performed by: SURGERY

## 2024-12-03 ENCOUNTER — LAB VISIT (OUTPATIENT)
Dept: LAB | Facility: HOSPITAL | Age: 44
End: 2024-12-03
Attending: SURGERY
Payer: COMMERCIAL

## 2024-12-03 DIAGNOSIS — K80.10 CHRONIC CHOLECYSTITIS WITH CALCULUS: ICD-10-CM

## 2024-12-03 PROCEDURE — 80053 COMPREHEN METABOLIC PANEL: CPT | Performed by: SURGERY

## 2024-12-03 PROCEDURE — 85025 COMPLETE CBC W/AUTO DIFF WBC: CPT | Performed by: SURGERY

## 2024-12-03 PROCEDURE — 36415 COLL VENOUS BLD VENIPUNCTURE: CPT | Mod: PO | Performed by: SURGERY

## 2024-12-04 LAB
ALBUMIN SERPL BCP-MCNC: 3.3 G/DL (ref 3.5–5.2)
ALP SERPL-CCNC: 144 U/L (ref 40–150)
ALT SERPL W/O P-5'-P-CCNC: 65 U/L (ref 10–44)
ANION GAP SERPL CALC-SCNC: 10 MMOL/L (ref 8–16)
AST SERPL-CCNC: 23 U/L (ref 10–40)
BASOPHILS # BLD AUTO: 0.07 K/UL (ref 0–0.2)
BASOPHILS NFR BLD: 0.4 % (ref 0–1.9)
BILIRUB SERPL-MCNC: 0.2 MG/DL (ref 0.1–1)
BUN SERPL-MCNC: 13 MG/DL (ref 6–20)
CALCIUM SERPL-MCNC: 9.3 MG/DL (ref 8.7–10.5)
CHLORIDE SERPL-SCNC: 105 MMOL/L (ref 95–110)
CO2 SERPL-SCNC: 23 MMOL/L (ref 23–29)
CREAT SERPL-MCNC: 0.7 MG/DL (ref 0.5–1.4)
DIFFERENTIAL METHOD BLD: ABNORMAL
EOSINOPHIL # BLD AUTO: 0.1 K/UL (ref 0–0.5)
EOSINOPHIL NFR BLD: 0.4 % (ref 0–8)
ERYTHROCYTE [DISTWIDTH] IN BLOOD BY AUTOMATED COUNT: 13 % (ref 11.5–14.5)
EST. GFR  (NO RACE VARIABLE): >60 ML/MIN/1.73 M^2
GLUCOSE SERPL-MCNC: 97 MG/DL (ref 70–110)
HCT VFR BLD AUTO: 34.2 % (ref 37–48.5)
HGB BLD-MCNC: 10.7 G/DL (ref 12–16)
IMM GRANULOCYTES # BLD AUTO: 0.07 K/UL (ref 0–0.04)
IMM GRANULOCYTES NFR BLD AUTO: 0.4 % (ref 0–0.5)
LYMPHOCYTES # BLD AUTO: 2.7 K/UL (ref 1–4.8)
LYMPHOCYTES NFR BLD: 13.7 % (ref 18–48)
MCH RBC QN AUTO: 30.4 PG (ref 27–31)
MCHC RBC AUTO-ENTMCNC: 31.3 G/DL (ref 32–36)
MCV RBC AUTO: 97 FL (ref 82–98)
MONOCYTES # BLD AUTO: 1.2 K/UL (ref 0.3–1)
MONOCYTES NFR BLD: 6.2 % (ref 4–15)
NEUTROPHILS # BLD AUTO: 15.3 K/UL (ref 1.8–7.7)
NEUTROPHILS NFR BLD: 78.9 % (ref 38–73)
NRBC BLD-RTO: 0 /100 WBC
PLATELET # BLD AUTO: 401 K/UL (ref 150–450)
PMV BLD AUTO: 9.5 FL (ref 9.2–12.9)
POTASSIUM SERPL-SCNC: 4 MMOL/L (ref 3.5–5.1)
PROT SERPL-MCNC: 7.5 G/DL (ref 6–8.4)
RBC # BLD AUTO: 3.52 M/UL (ref 4–5.4)
SODIUM SERPL-SCNC: 138 MMOL/L (ref 136–145)
WBC # BLD AUTO: 19.38 K/UL (ref 3.9–12.7)

## 2024-12-04 NOTE — H&P (VIEW-ONLY)
GENERAL SURGERY  OUTPATIENT H&P    REASON FOR VISIT/CC: Cholelithiasis, cholecystitis    HPI: Roopa Rivas is a 44 y.o. female referred from GI for suspected symptomatic cholelithiasis and possible chronic cholecystitis. Has been evaluated by GI. She had symptoms reflux which was treated and improved with PPI. Also complained of right upper and mid abdominal pain it was seemed to be worse with eating and occasionally with stress. Described the sensation as it was spasm and pressure. Also can radiate the left has undergone ultrasound in the past which showed gallstones. Did undergo EGD which showed gastric polyps but no significant ulceration. Symptoms are felt to be related symptomatic cholelithiasis. Did undergo HIDA scan which showed concerns for chronic cholecystitis. Currently symptoms are rather severe.  She does report pain that awakes her from sleep around 2:00 a.m..    I have reviewed the patient's chart including prior progress notes, procedures and testing. Recently underwent uterine ablation and bilateral salpingectomy. No issues with anesthesia.    ROS:   Review of Systems    PROBLEM LIST:  Patient Active Problem List   Diagnosis    Arthritis, multiple joint involvement    History of gastritis    Insomnia due to medical condition    Fibromyalgia    Seasonal allergies    Tinea    Acne    Bipolar affective disorder, currently depressed, moderate    Memory change    Anxiety    Subclinical hypothyroidism    Tachycardia    Fatigue         HISTORY  Past Medical History:   Diagnosis Date    Arthritis     Bipolar disorder, unspecified     Fibromyalgia     Gastroenteritis     GERD (gastroesophageal reflux disease)     Kidney stones 2010    Long-term use of Plaquenil 12/07/2017    PONV (postoperative nausea and vomiting)     Thyroid disease 2022    hypothyroid       Past Surgical History:   Procedure Laterality Date    COLONOSCOPY      2016    DILATION AND CURETTAGE OF UTERUS      Miscarriage at age 25     DILATION AND CURETTAGE OF UTERUS N/A 09/23/2024    Procedure: DILATION AND CURETTAGE, UTERUS;  Surgeon: Olya Montaño MD;  Location: [facility] OR;  Service: OB/GYN;  Laterality: N/A;    ENDOMETRIAL ABLATION N/A 11/8/2024    Procedure: ABLATION, ENDOMETRIUM;  Surgeon: Olya Montaño MD;  Location: [facility] OR;  Service: OB/GYN;  Laterality: N/A;    ESOPHAGOGASTRODUODENOSCOPY N/A 06/06/2024    Procedure: EGD (ESOPHAGOGASTRODUODENOSCOPY);  Surgeon: Jacquie Guadalupe MD;  Location: [facility] ENDO;  Service: Endoscopy;  Laterality: N/A;    LAPAROSCOPIC LIGATION OF FALLOPIAN TUBE Bilateral 11/8/2024    Procedure: LIGATION, FALLOPIAN TUBE, LAPAROSCOPIC;  Surgeon: Olya Montaño MD;  Location: [facility] OR;  Service: OB/GYN;  Laterality: Bilateral;    UPPER GASTROINTESTINAL ENDOSCOPY         Social History     Tobacco Use    Smoking status: Never    Smokeless tobacco: Never   Substance Use Topics    Alcohol use: No    Drug use: No       Family History   Problem Relation Name Age of Onset    Arthritis Mother      No Known Problems Father      Thyroid disease Maternal Aunt      Stomach cancer Maternal Uncle      Arthritis Maternal Grandmother      Arthritis Maternal Grandfather      Throat cancer Paternal Grandfather      Melanoma Neg Hx      Colon cancer Neg Hx      Crohn's disease Neg Hx      Esophageal cancer Neg Hx      Liver cancer Neg Hx      Rectal cancer Neg Hx      Ulcerative colitis Neg Hx           MEDS:  Current Outpatient Medications on File Prior to Visit   Medication Sig Dispense Refill    ARIPiprazole (ABILIFY) 15 MG Tab Take 1 tablet (15 mg total) by mouth once daily. 90 tablet 0    dicyclomine (BENTYL) 10 MG capsule Take 1 capsule (10 mg total) by mouth 2 (two) times daily as needed (abdominal pain). 120 capsule 0    FLUoxetine 40 MG capsule Take 1 capsule (40 mg total) by mouth once daily. 90 capsule 0    fluticasone propionate (FLONASE) 50 mcg/actuation nasal spray 1 spray (50 mcg total) by Each Nostril route  once daily. 9.9 mL 1    hydroquinone 8 % Emul Compound hydroquinone 12% / kojic acid 6% / vitamin C 1% / niacinamide 2% cream. Apply a pea-sized amount to entire face qhs. Do not use for longer than 16 weeks in a row. 30 g 1    ibuprofen (ADVIL,MOTRIN) 800 MG tablet Take 1 tablet (800 mg total) by mouth 3 (three) times daily as needed (mild to moderate pain). 30 tablet 0    iron, carbonyl 25 mg iron Tab Take by mouth. Iron supplement      levothyroxine (SYNTHROID) 50 MCG tablet Take 1 tab Monday-Saturday and 2 tabs on Sunday 102 tablet 3    multivitamin/iron/folic acid (MULTI COMPLETE WITH IRON ORAL) Take by mouth.      omeprazole (PRILOSEC) 40 MG capsule Take 1 capsule (40 mg total) by mouth Daily. 90 capsule 1    oxyCODONE-acetaminophen (PERCOCET) 5-325 mg per tablet Take 1 tablet by mouth every 4 (four) hours as needed (moderate to severe pain). 14 tablet 0    propranoloL (INDERAL) 20 MG tablet Take 1 tablet (20 mg total) by mouth 2 (two) times daily as needed (anxiety). 180 tablet 0    senna-docusate 8.6-50 mg (SENNA WITH DOCUSATE SODIUM) 8.6-50 mg per tablet Take 1 tablet by mouth daily as needed for Constipation. 30 tablet 0    spironolactone (ALDACTONE) 50 MG tablet TAKE 1 TABLET BY MOUTH DAILY 90 tablet 3    tretinoin (RETIN-A) 0.025 % cream Apply topically every evening. 20 g 2    zolpidem (AMBIEN CR) 12.5 MG CR tablet Take 1 tablet (12.5 mg total) by mouth nightly as needed for Insomnia. 30 tablet 3    Lactobacillus acidophilus (PROBIOTIC ORAL) Take by mouth. (Patient not taking: Reported on 12/2/2024)      magnesium 30 mg Tab Take by mouth once. (Patient not taking: Reported on 12/2/2024)       No current facility-administered medications on file prior to visit.       ALLERGIES:  Review of patient's allergies indicates:  No Known Allergies      VITALS:  Vitals:    12/02/24 1528   BP: 102/73   Pulse: 97   Temp: 97.3 °F (36.3 °C)         PHYSICAL EXAM:  Physical Exam  Vitals reviewed.   Constitutional:       " General: She is not in acute distress.     Appearance: Normal appearance. She is well-developed.   HENT:      Head: Normocephalic and atraumatic.      Nose: Nose normal.   Eyes:      General: No scleral icterus.     Conjunctiva/sclera: Conjunctivae normal.   Neck:      Trachea: No tracheal tenderness or tracheal deviation.   Cardiovascular:      Rate and Rhythm: Normal rate and regular rhythm.      Pulses: Normal pulses.   Pulmonary:      Effort: Pulmonary effort is normal. No accessory muscle usage or respiratory distress.      Breath sounds: Normal breath sounds.   Abdominal:      General: There is no distension.      Palpations: Abdomen is soft.      Tenderness: There is abdominal tenderness.   Musculoskeletal:         General: No swelling or tenderness. Normal range of motion.      Cervical back: Normal range of motion and neck supple. No rigidity.   Skin:     General: Skin is warm and dry.      Coloration: Skin is not jaundiced.      Findings: No erythema.   Neurological:      General: No focal deficit present.      Mental Status: She is alert and oriented to person, place, and time.      Motor: No weakness or abnormal muscle tone.   Psychiatric:         Mood and Affect: Mood normal.         Behavior: Behavior normal.         Thought Content: Thought content normal.         Judgment: Judgment normal.           LABS:  Lab Results   Component Value Date    WBC 19.38 (H) 12/03/2024    RBC 3.52 (L) 12/03/2024    HGB 10.7 (L) 12/03/2024    HCT 34.2 (L) 12/03/2024     12/03/2024     Lab Results   Component Value Date    GLU 97 12/03/2024     12/03/2024    K 4.0 12/03/2024     12/03/2024    CO2 23 12/03/2024    BUN 13 12/03/2024    CREATININE 0.7 12/03/2024    CALCIUM 9.3 12/03/2024     Lab Results   Component Value Date    ALT 65 (H) 12/03/2024    AST 23 12/03/2024    ALKPHOS 144 12/03/2024    BILITOT 0.2 12/03/2024     No results found for: "MG", "PHOS"    STUDIES:  Images and reports were " personally reviewed.  Abdominal ultrasound 09/20/2023  FINDINGS:  The liver is normal in size and echogenicity measuring 14.3 cm.  The portal vein is identified and patent demonstrating normal flow by color and power Doppler.  No perihepatic fluid.     The gallbladder is normally distended with numerous partially calcified gallstones the largest measuring 11 mm.  No gallbladder wall thickening or pericholecystic fluid.  Negative sonographic Valentino's sign.  Common bile duct is normal measuring 0.40 cm.     The pancreas is obscured by overlying bowel gas.     Kidneys are normal in size and echogenicity measuring 11.1 cm on the right and 10.3 cm on left.  Both kidneys demonstrate normal flow by color Doppler.  No changes of hydronephrosis.     The spleen is normal in size and echogenicity measuring 8.5 cm.     Abdominal aorta is partially visualized.  IVC identified.     No ascites identified.     Impression:     Cholelithiasis without sonographic evidence to suggest acute cholecystitis      HIDA scan 11/27/2024  FINDINGS:  Radiopharmaceutical: 8.1 millicuries technetium 99 M mebrofenin IV     Following the intravenous administration of radiopharmaceutical, dynamic abdominal imaging was performed for 1 hour.     There is prompt uniform tracer accumulation within the liver.  Activity is noted within the common bile duct between 10 and 15 minutes, with activity in proximal small bowel loops at 15 minutes.  The gallbladder is not definitively visualized during the 1st hour of imaging.  There is normal tracer clearance from the liver.     As the gallbladder was not visualized during the 1st hour of imaging, 2 mg IV morphine was administered, and imaging for an additional 30 minutes performed.  There is prompt tracer accumulation within the gallbladder 5 minutes following morphine administration.     Impression:     1. Nonvisualization of the gallbladder during the 1st hour of imaging, with prompt gallbladder activity noted  following morphine administration.  Findings may indicate chronic calculus cholecystitis.  There is no evidence of acute cholecystitis/cystic duct obstruction.    ASSESSMENT & PLAN:  44 y.o. female with symptomatic cholelithiasis and suspected chronic cholecystitis  - patient was has have rather extensive workup and at this time I feel majority of her symptoms are related to symptomatic cholelithiasis and or chronic cholecystitis  - risks and benefits of cholecystectomy were reviewed.  Specifically we discussed the risk of pain, bleeding, scarring, infection, bile leak, injury to common bile duct, injury to surrounding organs, retained stone, pancreatitis, bile leak, failure to relieve symptoms, etc.  we also discussed the need for potential conversion to open procedure or need to perform intraoperative cholangiogram.  The patient expressed understanding of these risks and agreed proceed with surgical intervention.  -due to severity of symptoms will plan to schedule 12/06/2024 for laparoscopic cholecystectomy  -will obtain updated labs

## 2024-12-04 NOTE — H&P
GENERAL SURGERY  OUTPATIENT H&P    REASON FOR VISIT/CC: Cholelithiasis, cholecystitis    HPI: Roopa Rivas is a 44 y.o. female referred from GI for suspected symptomatic cholelithiasis and possible chronic cholecystitis. Has been evaluated by GI. She had symptoms reflux which was treated and improved with PPI. Also complained of right upper and mid abdominal pain it was seemed to be worse with eating and occasionally with stress. Described the sensation as it was spasm and pressure. Also can radiate the left has undergone ultrasound in the past which showed gallstones. Did undergo EGD which showed gastric polyps but no significant ulceration. Symptoms are felt to be related symptomatic cholelithiasis. Did undergo HIDA scan which showed concerns for chronic cholecystitis. Currently symptoms are rather severe.  She does report pain that awakes her from sleep around 2:00 a.m..    I have reviewed the patient's chart including prior progress notes, procedures and testing. Recently underwent uterine ablation and bilateral salpingectomy. No issues with anesthesia.    ROS:   Review of Systems    PROBLEM LIST:  Patient Active Problem List   Diagnosis    Arthritis, multiple joint involvement    History of gastritis    Insomnia due to medical condition    Fibromyalgia    Seasonal allergies    Tinea    Acne    Bipolar affective disorder, currently depressed, moderate    Memory change    Anxiety    Subclinical hypothyroidism    Tachycardia    Fatigue         HISTORY  Past Medical History:   Diagnosis Date    Arthritis     Bipolar disorder, unspecified     Fibromyalgia     Gastroenteritis     GERD (gastroesophageal reflux disease)     Kidney stones 2010    Long-term use of Plaquenil 12/07/2017    PONV (postoperative nausea and vomiting)     Thyroid disease 2022    hypothyroid       Past Surgical History:   Procedure Laterality Date    COLONOSCOPY      2016    DILATION AND CURETTAGE OF UTERUS      Miscarriage at age 25     DILATION AND CURETTAGE OF UTERUS N/A 09/23/2024    Procedure: DILATION AND CURETTAGE, UTERUS;  Surgeon: Olya Montaño MD;  Location: [facility] OR;  Service: OB/GYN;  Laterality: N/A;    ENDOMETRIAL ABLATION N/A 11/8/2024    Procedure: ABLATION, ENDOMETRIUM;  Surgeon: Olya Montaño MD;  Location: [facility] OR;  Service: OB/GYN;  Laterality: N/A;    ESOPHAGOGASTRODUODENOSCOPY N/A 06/06/2024    Procedure: EGD (ESOPHAGOGASTRODUODENOSCOPY);  Surgeon: Jacquie Guadalupe MD;  Location: [facility] ENDO;  Service: Endoscopy;  Laterality: N/A;    LAPAROSCOPIC LIGATION OF FALLOPIAN TUBE Bilateral 11/8/2024    Procedure: LIGATION, FALLOPIAN TUBE, LAPAROSCOPIC;  Surgeon: Olya Montaño MD;  Location: [facility] OR;  Service: OB/GYN;  Laterality: Bilateral;    UPPER GASTROINTESTINAL ENDOSCOPY         Social History     Tobacco Use    Smoking status: Never    Smokeless tobacco: Never   Substance Use Topics    Alcohol use: No    Drug use: No       Family History   Problem Relation Name Age of Onset    Arthritis Mother      No Known Problems Father      Thyroid disease Maternal Aunt      Stomach cancer Maternal Uncle      Arthritis Maternal Grandmother      Arthritis Maternal Grandfather      Throat cancer Paternal Grandfather      Melanoma Neg Hx      Colon cancer Neg Hx      Crohn's disease Neg Hx      Esophageal cancer Neg Hx      Liver cancer Neg Hx      Rectal cancer Neg Hx      Ulcerative colitis Neg Hx           MEDS:  Current Outpatient Medications on File Prior to Visit   Medication Sig Dispense Refill    ARIPiprazole (ABILIFY) 15 MG Tab Take 1 tablet (15 mg total) by mouth once daily. 90 tablet 0    dicyclomine (BENTYL) 10 MG capsule Take 1 capsule (10 mg total) by mouth 2 (two) times daily as needed (abdominal pain). 120 capsule 0    FLUoxetine 40 MG capsule Take 1 capsule (40 mg total) by mouth once daily. 90 capsule 0    fluticasone propionate (FLONASE) 50 mcg/actuation nasal spray 1 spray (50 mcg total) by Each Nostril route  once daily. 9.9 mL 1    hydroquinone 8 % Emul Compound hydroquinone 12% / kojic acid 6% / vitamin C 1% / niacinamide 2% cream. Apply a pea-sized amount to entire face qhs. Do not use for longer than 16 weeks in a row. 30 g 1    ibuprofen (ADVIL,MOTRIN) 800 MG tablet Take 1 tablet (800 mg total) by mouth 3 (three) times daily as needed (mild to moderate pain). 30 tablet 0    iron, carbonyl 25 mg iron Tab Take by mouth. Iron supplement      levothyroxine (SYNTHROID) 50 MCG tablet Take 1 tab Monday-Saturday and 2 tabs on Sunday 102 tablet 3    multivitamin/iron/folic acid (MULTI COMPLETE WITH IRON ORAL) Take by mouth.      omeprazole (PRILOSEC) 40 MG capsule Take 1 capsule (40 mg total) by mouth Daily. 90 capsule 1    oxyCODONE-acetaminophen (PERCOCET) 5-325 mg per tablet Take 1 tablet by mouth every 4 (four) hours as needed (moderate to severe pain). 14 tablet 0    propranoloL (INDERAL) 20 MG tablet Take 1 tablet (20 mg total) by mouth 2 (two) times daily as needed (anxiety). 180 tablet 0    senna-docusate 8.6-50 mg (SENNA WITH DOCUSATE SODIUM) 8.6-50 mg per tablet Take 1 tablet by mouth daily as needed for Constipation. 30 tablet 0    spironolactone (ALDACTONE) 50 MG tablet TAKE 1 TABLET BY MOUTH DAILY 90 tablet 3    tretinoin (RETIN-A) 0.025 % cream Apply topically every evening. 20 g 2    zolpidem (AMBIEN CR) 12.5 MG CR tablet Take 1 tablet (12.5 mg total) by mouth nightly as needed for Insomnia. 30 tablet 3    Lactobacillus acidophilus (PROBIOTIC ORAL) Take by mouth. (Patient not taking: Reported on 12/2/2024)      magnesium 30 mg Tab Take by mouth once. (Patient not taking: Reported on 12/2/2024)       No current facility-administered medications on file prior to visit.       ALLERGIES:  Review of patient's allergies indicates:  No Known Allergies      VITALS:  Vitals:    12/02/24 1528   BP: 102/73   Pulse: 97   Temp: 97.3 °F (36.3 °C)         PHYSICAL EXAM:  Physical Exam  Vitals reviewed.   Constitutional:       " General: She is not in acute distress.     Appearance: Normal appearance. She is well-developed.   HENT:      Head: Normocephalic and atraumatic.      Nose: Nose normal.   Eyes:      General: No scleral icterus.     Conjunctiva/sclera: Conjunctivae normal.   Neck:      Trachea: No tracheal tenderness or tracheal deviation.   Cardiovascular:      Rate and Rhythm: Normal rate and regular rhythm.      Pulses: Normal pulses.   Pulmonary:      Effort: Pulmonary effort is normal. No accessory muscle usage or respiratory distress.      Breath sounds: Normal breath sounds.   Abdominal:      General: There is no distension.      Palpations: Abdomen is soft.      Tenderness: There is abdominal tenderness.   Musculoskeletal:         General: No swelling or tenderness. Normal range of motion.      Cervical back: Normal range of motion and neck supple. No rigidity.   Skin:     General: Skin is warm and dry.      Coloration: Skin is not jaundiced.      Findings: No erythema.   Neurological:      General: No focal deficit present.      Mental Status: She is alert and oriented to person, place, and time.      Motor: No weakness or abnormal muscle tone.   Psychiatric:         Mood and Affect: Mood normal.         Behavior: Behavior normal.         Thought Content: Thought content normal.         Judgment: Judgment normal.           LABS:  Lab Results   Component Value Date    WBC 19.38 (H) 12/03/2024    RBC 3.52 (L) 12/03/2024    HGB 10.7 (L) 12/03/2024    HCT 34.2 (L) 12/03/2024     12/03/2024     Lab Results   Component Value Date    GLU 97 12/03/2024     12/03/2024    K 4.0 12/03/2024     12/03/2024    CO2 23 12/03/2024    BUN 13 12/03/2024    CREATININE 0.7 12/03/2024    CALCIUM 9.3 12/03/2024     Lab Results   Component Value Date    ALT 65 (H) 12/03/2024    AST 23 12/03/2024    ALKPHOS 144 12/03/2024    BILITOT 0.2 12/03/2024     No results found for: "MG", "PHOS"    STUDIES:  Images and reports were " personally reviewed.  Abdominal ultrasound 09/20/2023  FINDINGS:  The liver is normal in size and echogenicity measuring 14.3 cm.  The portal vein is identified and patent demonstrating normal flow by color and power Doppler.  No perihepatic fluid.     The gallbladder is normally distended with numerous partially calcified gallstones the largest measuring 11 mm.  No gallbladder wall thickening or pericholecystic fluid.  Negative sonographic Valentino's sign.  Common bile duct is normal measuring 0.40 cm.     The pancreas is obscured by overlying bowel gas.     Kidneys are normal in size and echogenicity measuring 11.1 cm on the right and 10.3 cm on left.  Both kidneys demonstrate normal flow by color Doppler.  No changes of hydronephrosis.     The spleen is normal in size and echogenicity measuring 8.5 cm.     Abdominal aorta is partially visualized.  IVC identified.     No ascites identified.     Impression:     Cholelithiasis without sonographic evidence to suggest acute cholecystitis      HIDA scan 11/27/2024  FINDINGS:  Radiopharmaceutical: 8.1 millicuries technetium 99 M mebrofenin IV     Following the intravenous administration of radiopharmaceutical, dynamic abdominal imaging was performed for 1 hour.     There is prompt uniform tracer accumulation within the liver.  Activity is noted within the common bile duct between 10 and 15 minutes, with activity in proximal small bowel loops at 15 minutes.  The gallbladder is not definitively visualized during the 1st hour of imaging.  There is normal tracer clearance from the liver.     As the gallbladder was not visualized during the 1st hour of imaging, 2 mg IV morphine was administered, and imaging for an additional 30 minutes performed.  There is prompt tracer accumulation within the gallbladder 5 minutes following morphine administration.     Impression:     1. Nonvisualization of the gallbladder during the 1st hour of imaging, with prompt gallbladder activity noted  following morphine administration.  Findings may indicate chronic calculus cholecystitis.  There is no evidence of acute cholecystitis/cystic duct obstruction.    ASSESSMENT & PLAN:  44 y.o. female with symptomatic cholelithiasis and suspected chronic cholecystitis  - patient was has have rather extensive workup and at this time I feel majority of her symptoms are related to symptomatic cholelithiasis and or chronic cholecystitis  - risks and benefits of cholecystectomy were reviewed.  Specifically we discussed the risk of pain, bleeding, scarring, infection, bile leak, injury to common bile duct, injury to surrounding organs, retained stone, pancreatitis, bile leak, failure to relieve symptoms, etc.  we also discussed the need for potential conversion to open procedure or need to perform intraoperative cholangiogram.  The patient expressed understanding of these risks and agreed proceed with surgical intervention.  -due to severity of symptoms will plan to schedule 12/06/2024 for laparoscopic cholecystectomy  -will obtain updated labs

## 2024-12-06 ENCOUNTER — HOSPITAL ENCOUNTER (OUTPATIENT)
Facility: HOSPITAL | Age: 44
Discharge: HOME | End: 2024-12-06
Attending: SURGERY | Admitting: SURGERY
Payer: COMMERCIAL

## 2024-12-06 ENCOUNTER — ANESTHESIA (OUTPATIENT)
Dept: SURGERY | Facility: HOSPITAL | Age: 44
End: 2024-12-06
Payer: COMMERCIAL

## 2024-12-06 ENCOUNTER — ANESTHESIA EVENT (OUTPATIENT)
Dept: SURGERY | Facility: HOSPITAL | Age: 44
End: 2024-12-06
Payer: COMMERCIAL

## 2024-12-06 VITALS
HEIGHT: 59 IN | HEART RATE: 89 BPM | TEMPERATURE: 98 F | BODY MASS INDEX: 27.42 KG/M2 | SYSTOLIC BLOOD PRESSURE: 99 MMHG | OXYGEN SATURATION: 98 % | DIASTOLIC BLOOD PRESSURE: 62 MMHG | RESPIRATION RATE: 16 BRPM | WEIGHT: 136 LBS

## 2024-12-06 DIAGNOSIS — K80.10 CHRONIC CHOLECYSTITIS WITH CALCULUS: ICD-10-CM

## 2024-12-06 DIAGNOSIS — Z01.818 PRE-OP TESTING: ICD-10-CM

## 2024-12-06 DIAGNOSIS — K81.0 EMPYEMA OF GALLBLADDER: Primary | ICD-10-CM

## 2024-12-06 LAB
B-HCG UR QL: NEGATIVE
CTP QC/QA: YES

## 2024-12-06 PROCEDURE — 81025 URINE PREGNANCY TEST: CPT | Performed by: SURGERY

## 2024-12-06 PROCEDURE — 37000008 HC ANESTHESIA 1ST 15 MINUTES: Performed by: SURGERY

## 2024-12-06 PROCEDURE — 87206 SMEAR FLUORESCENT/ACID STAI: CPT | Performed by: SURGERY

## 2024-12-06 PROCEDURE — 87070 CULTURE OTHR SPECIMN AEROBIC: CPT | Performed by: SURGERY

## 2024-12-06 PROCEDURE — 25000003 PHARM REV CODE 250: Performed by: ANESTHESIOLOGY

## 2024-12-06 PROCEDURE — 36000708 HC OR TIME LEV III 1ST 15 MIN: Performed by: SURGERY

## 2024-12-06 PROCEDURE — 87075 CULTR BACTERIA EXCEPT BLOOD: CPT | Performed by: SURGERY

## 2024-12-06 PROCEDURE — 63600175 PHARM REV CODE 636 W HCPCS: Performed by: SURGERY

## 2024-12-06 PROCEDURE — C9290 INJ, BUPIVACAINE LIPOSOME: HCPCS | Performed by: SURGERY

## 2024-12-06 PROCEDURE — 71000039 HC RECOVERY, EACH ADD'L HOUR: Performed by: SURGERY

## 2024-12-06 PROCEDURE — 36000709 HC OR TIME LEV III EA ADD 15 MIN: Performed by: SURGERY

## 2024-12-06 PROCEDURE — 27000673 HC TUBING BLOOD Y: Performed by: ANESTHESIOLOGY

## 2024-12-06 PROCEDURE — 27201107 HC STYLET, STANDARD: Performed by: ANESTHESIOLOGY

## 2024-12-06 PROCEDURE — 27201423 OPTIME MED/SURG SUP & DEVICES STERILE SUPPLY: Performed by: SURGERY

## 2024-12-06 PROCEDURE — 27202107 HC XP QUATRO SENSOR: Performed by: ANESTHESIOLOGY

## 2024-12-06 PROCEDURE — 25000003 PHARM REV CODE 250: Performed by: NURSE ANESTHETIST, CERTIFIED REGISTERED

## 2024-12-06 PROCEDURE — 63600175 PHARM REV CODE 636 W HCPCS: Performed by: NURSE ANESTHETIST, CERTIFIED REGISTERED

## 2024-12-06 PROCEDURE — 63600175 PHARM REV CODE 636 W HCPCS

## 2024-12-06 PROCEDURE — 71000015 HC POSTOP RECOV 1ST HR: Performed by: SURGERY

## 2024-12-06 PROCEDURE — 63600175 PHARM REV CODE 636 W HCPCS: Performed by: ANESTHESIOLOGY

## 2024-12-06 PROCEDURE — 87102 FUNGUS ISOLATION CULTURE: CPT | Performed by: SURGERY

## 2024-12-06 PROCEDURE — 87205 SMEAR GRAM STAIN: CPT | Performed by: SURGERY

## 2024-12-06 PROCEDURE — 87116 MYCOBACTERIA CULTURE: CPT | Performed by: SURGERY

## 2024-12-06 PROCEDURE — 37000009 HC ANESTHESIA EA ADD 15 MINS: Performed by: SURGERY

## 2024-12-06 PROCEDURE — 27000671 HC TUBING MICROBORE EXT: Performed by: ANESTHESIOLOGY

## 2024-12-06 PROCEDURE — 47562 LAPAROSCOPIC CHOLECYSTECTOMY: CPT | Mod: 22,,, | Performed by: SURGERY

## 2024-12-06 PROCEDURE — 71000033 HC RECOVERY, INTIAL HOUR: Performed by: SURGERY

## 2024-12-06 RX ORDER — DIPHENHYDRAMINE HYDROCHLORIDE 50 MG/ML
12.5 INJECTION INTRAMUSCULAR; INTRAVENOUS
Status: DISCONTINUED | OUTPATIENT
Start: 2024-12-06 | End: 2024-12-06 | Stop reason: HOSPADM

## 2024-12-06 RX ORDER — DEXMEDETOMIDINE HYDROCHLORIDE 100 UG/ML
INJECTION, SOLUTION INTRAVENOUS
Status: DISCONTINUED | OUTPATIENT
Start: 2024-12-06 | End: 2024-12-06

## 2024-12-06 RX ORDER — BUPIVACAINE 13.3 MG/ML
INJECTION, SUSPENSION, LIPOSOMAL INFILTRATION
Status: DISCONTINUED | OUTPATIENT
Start: 2024-12-06 | End: 2024-12-06 | Stop reason: HOSPADM

## 2024-12-06 RX ORDER — FENTANYL CITRATE 50 UG/ML
INJECTION, SOLUTION INTRAMUSCULAR; INTRAVENOUS
Status: DISCONTINUED | OUTPATIENT
Start: 2024-12-06 | End: 2024-12-06

## 2024-12-06 RX ORDER — ONDANSETRON HYDROCHLORIDE 2 MG/ML
4 INJECTION, SOLUTION INTRAVENOUS DAILY PRN
Status: DISCONTINUED | OUTPATIENT
Start: 2024-12-06 | End: 2024-12-06 | Stop reason: HOSPADM

## 2024-12-06 RX ORDER — MIDAZOLAM HYDROCHLORIDE 5 MG/ML
INJECTION INTRAMUSCULAR; INTRAVENOUS
Status: DISCONTINUED | OUTPATIENT
Start: 2024-12-06 | End: 2024-12-06

## 2024-12-06 RX ORDER — AMOXICILLIN AND CLAVULANATE POTASSIUM 875; 125 MG/1; MG/1
1 TABLET, FILM COATED ORAL EVERY 12 HOURS
Qty: 10 TABLET | Refills: 0 | Status: SHIPPED | OUTPATIENT
Start: 2024-12-06 | End: 2024-12-14

## 2024-12-06 RX ORDER — SCOLOPAMINE TRANSDERMAL SYSTEM 1 MG/1
1 PATCH, EXTENDED RELEASE TRANSDERMAL
Status: DISCONTINUED | OUTPATIENT
Start: 2024-12-06 | End: 2024-12-06 | Stop reason: HOSPADM

## 2024-12-06 RX ORDER — CEFOXITIN 2 G/1
2 INJECTION, POWDER, FOR SOLUTION INTRAVENOUS
Status: COMPLETED | OUTPATIENT
Start: 2024-12-06 | End: 2024-12-06

## 2024-12-06 RX ORDER — HYDROMORPHONE HYDROCHLORIDE 1 MG/ML
0.2 INJECTION, SOLUTION INTRAMUSCULAR; INTRAVENOUS; SUBCUTANEOUS EVERY 5 MIN PRN
Status: DISCONTINUED | OUTPATIENT
Start: 2024-12-06 | End: 2024-12-06 | Stop reason: HOSPADM

## 2024-12-06 RX ORDER — ROCURONIUM BROMIDE 10 MG/ML
INJECTION, SOLUTION INTRAVENOUS
Status: DISCONTINUED | OUTPATIENT
Start: 2024-12-06 | End: 2024-12-06

## 2024-12-06 RX ORDER — LIDOCAINE HYDROCHLORIDE 20 MG/ML
INJECTION INTRAVENOUS
Status: DISCONTINUED | OUTPATIENT
Start: 2024-12-06 | End: 2024-12-06

## 2024-12-06 RX ORDER — ONDANSETRON HYDROCHLORIDE 2 MG/ML
INJECTION, SOLUTION INTRAVENOUS
Status: DISCONTINUED | OUTPATIENT
Start: 2024-12-06 | End: 2024-12-06

## 2024-12-06 RX ORDER — MEPERIDINE HYDROCHLORIDE 50 MG/ML
12.5 INJECTION INTRAMUSCULAR; INTRAVENOUS; SUBCUTANEOUS EVERY 10 MIN PRN
Status: DISCONTINUED | OUTPATIENT
Start: 2024-12-06 | End: 2024-12-06 | Stop reason: HOSPADM

## 2024-12-06 RX ORDER — SUCCINYLCHOLINE CHLORIDE 20 MG/ML
INJECTION INTRAMUSCULAR; INTRAVENOUS
Status: DISCONTINUED | OUTPATIENT
Start: 2024-12-06 | End: 2024-12-06

## 2024-12-06 RX ORDER — PROPOFOL 10 MG/ML
VIAL (ML) INTRAVENOUS
Status: DISCONTINUED | OUTPATIENT
Start: 2024-12-06 | End: 2024-12-06

## 2024-12-06 RX ORDER — FAMOTIDINE 10 MG/ML
INJECTION INTRAVENOUS
Status: DISCONTINUED | OUTPATIENT
Start: 2024-12-06 | End: 2024-12-06

## 2024-12-06 RX ORDER — BUPIVACAINE HYDROCHLORIDE 2.5 MG/ML
INJECTION, SOLUTION EPIDURAL; INFILTRATION; INTRACAUDAL
Status: DISCONTINUED | OUTPATIENT
Start: 2024-12-06 | End: 2024-12-06 | Stop reason: HOSPADM

## 2024-12-06 RX ORDER — OXYCODONE HYDROCHLORIDE 5 MG/1
5 TABLET ORAL
Status: DISCONTINUED | OUTPATIENT
Start: 2024-12-06 | End: 2024-12-06 | Stop reason: HOSPADM

## 2024-12-06 RX ORDER — DEXAMETHASONE SODIUM PHOSPHATE 4 MG/ML
INJECTION, SOLUTION INTRA-ARTICULAR; INTRALESIONAL; INTRAMUSCULAR; INTRAVENOUS; SOFT TISSUE
Status: DISCONTINUED | OUTPATIENT
Start: 2024-12-06 | End: 2024-12-06

## 2024-12-06 RX ORDER — HYDROMORPHONE HYDROCHLORIDE 1 MG/ML
INJECTION, SOLUTION INTRAMUSCULAR; INTRAVENOUS; SUBCUTANEOUS
Status: COMPLETED
Start: 2024-12-06 | End: 2024-12-06

## 2024-12-06 RX ORDER — HYDROCODONE BITARTRATE AND ACETAMINOPHEN 5; 325 MG/1; MG/1
1 TABLET ORAL EVERY 6 HOURS PRN
Qty: 28 TABLET | Refills: 0 | Status: SHIPPED | OUTPATIENT
Start: 2024-12-06

## 2024-12-06 RX ORDER — GLUCAGON 1 MG
1 KIT INJECTION
Status: DISCONTINUED | OUTPATIENT
Start: 2024-12-06 | End: 2024-12-06 | Stop reason: HOSPADM

## 2024-12-06 RX ADMIN — ROCURONIUM BROMIDE 30 MG: 10 INJECTION, SOLUTION INTRAVENOUS at 02:12

## 2024-12-06 RX ADMIN — ROCURONIUM BROMIDE 5 MG: 10 INJECTION, SOLUTION INTRAVENOUS at 01:12

## 2024-12-06 RX ADMIN — LIDOCAINE HYDROCHLORIDE 75 MG: 20 INJECTION, SOLUTION INTRAVENOUS at 01:12

## 2024-12-06 RX ADMIN — HYDROMORPHONE HYDROCHLORIDE 0.2 MG: 1 INJECTION, SOLUTION INTRAMUSCULAR; INTRAVENOUS; SUBCUTANEOUS at 03:12

## 2024-12-06 RX ADMIN — CEFOXITIN 2 G: 2 INJECTION, POWDER, FOR SOLUTION INTRAVENOUS at 01:12

## 2024-12-06 RX ADMIN — OXYCODONE HYDROCHLORIDE 5 MG: 5 TABLET ORAL at 03:12

## 2024-12-06 RX ADMIN — FENTANYL CITRATE 100 MCG: 50 INJECTION, SOLUTION INTRAMUSCULAR; INTRAVENOUS at 01:12

## 2024-12-06 RX ADMIN — PROPOFOL 180 MG: 10 INJECTION, EMULSION INTRAVENOUS at 01:12

## 2024-12-06 RX ADMIN — SUGAMMADEX 200 MG: 100 INJECTION, SOLUTION INTRAVENOUS at 02:12

## 2024-12-06 RX ADMIN — FAMOTIDINE 20 MG: 10 INJECTION, SOLUTION INTRAVENOUS at 02:12

## 2024-12-06 RX ADMIN — DEXMEDETOMIDINE HYDROCHLORIDE 10 MCG: 100 INJECTION, SOLUTION INTRAVENOUS at 02:12

## 2024-12-06 RX ADMIN — SCOPOLAMINE 1 PATCH: 1.5 PATCH, EXTENDED RELEASE TRANSDERMAL at 01:12

## 2024-12-06 RX ADMIN — MIDAZOLAM HYDROCHLORIDE 2 MG: 5 INJECTION, SOLUTION INTRAMUSCULAR; INTRAVENOUS at 01:12

## 2024-12-06 RX ADMIN — DEXAMETHASONE SODIUM PHOSPHATE 8 MG: 4 INJECTION, SOLUTION INTRAMUSCULAR; INTRAVENOUS at 01:12

## 2024-12-06 RX ADMIN — SODIUM CHLORIDE, SODIUM LACTATE, POTASSIUM CHLORIDE, AND CALCIUM CHLORIDE: .6; .31; .03; .02 INJECTION, SOLUTION INTRAVENOUS at 01:12

## 2024-12-06 RX ADMIN — DEXMEDETOMIDINE HYDROCHLORIDE 10 MCG: 100 INJECTION, SOLUTION INTRAVENOUS at 01:12

## 2024-12-06 RX ADMIN — ONDANSETRON 4 MG: 2 INJECTION INTRAMUSCULAR; INTRAVENOUS at 01:12

## 2024-12-06 RX ADMIN — Medication 160 MG: at 01:12

## 2024-12-06 NOTE — DISCHARGE SUMMARY
[facility]  Discharge Note  Short Stay    Procedure(s) (LRB):  CHOLECYSTECTOMY, LAPAROSCOPIC (N/A)      OUTCOME: Patient tolerated treatment/procedure well without complication and is now ready for discharge.    DISPOSITION: Home or Self Care    FINAL DIAGNOSIS:  <principal problem not specified>    FOLLOWUP: In clinic    DISCHARGE INSTRUCTIONS:    Discharge Procedure Orders   Diet Adult Regular     Ice to affected area     Lifting restrictions   Order Comments: Please avoid lifting greater than 40 lb, straining, strenuous activity for four weeks.     Change dressing (specify)   Order Comments: Post-Operative Wound Care    A surgical glue has been placed over your incisions, please leave the glue in place and do not attempt to remove it.  It is ok to shower using mild soap and water over the incisions the day after your procedure. Pat dry your incisions. Do not soak in a bathtub or other body of water for 2 weeks or until cleared by your surgeon.     If you noticed redness, swelling, fever, increasing pain or significant drainage from your wound please call/message the office or the 24 hr nurse hotline after hours.     Notify your health care provider if you experience any of the following:  temperature >100.4     Notify your health care provider if you experience any of the following:  persistent nausea and vomiting or diarrhea     Notify your health care provider if you experience any of the following:  severe uncontrolled pain     Notify your health care provider if you experience any of the following:  redness, tenderness, or signs of infection (pain, swelling, redness, odor or green/yellow discharge around incision site)     Notify your health care provider if you experience any of the following:  worsening rash     Notify your health care provider if you experience any of the following:  increased confusion or weakness     Shower on day dressing removed (No bath)        TIME SPENT ON DISCHARGE:  10 minutes

## 2024-12-06 NOTE — ANESTHESIA PROCEDURE NOTES
Intubation    Date/Time: 12/6/2024 1:48 PM    Performed by: Abigail Gonzalez CRNA  Authorized by: Nathan Pro MD    Intubation:     Induction:  Intravenous    Intubated:  Postinduction    Mask Ventilation:  Easy mask    Attempts:  1    Attempted By:  YFN    Blade:  Payan 3    Laryngeal View Grade: Grade I - full view of cords      Difficult Airway Encountered?: No      Complications:  None    Airway Device:  Oral endotracheal tube    Airway Device Size:  7.5    Style/Cuff Inflation:  Cuffed    Tube secured:  22    Secured at:  The lips    Placement Verified By:  Capnometry    Complicating Factors:  None    Findings Post-Intubation:  BS equal bilateral

## 2024-12-06 NOTE — DISCHARGE INSTRUCTIONS
FOLLOW THE WRITTEN INSTRUCTIONS I REVIEWED WITH YOU AND A COPY GIVEN TO YOU.  DO NOT DRIVE, SIGN LEGAL DOCUMENTS OR SHOWER FOR THE NEXT 24 HOURS.  NO TUB BATHS OR SWIMMING UNTIL RELEASED BY THE DOCTOR.  CALL MONDAY TO SCHEDULE A POST OP APPOINTMENT.

## 2024-12-06 NOTE — TRANSFER OF CARE
"Anesthesia Transfer of Care Note    Patient: Roopa Rivas    Procedure(s) Performed: Procedure(s) (LRB):  CHOLECYSTECTOMY, LAPAROSCOPIC (N/A)    Patient location: PACU    Anesthesia Type: general    Transport from OR: Transported from OR on room air with adequate spontaneous ventilation    Post pain: adequate analgesia    Post assessment: no apparent anesthetic complications    Post vital signs: stable    Level of consciousness: awake and alert    Nausea/Vomiting: no nausea/vomiting    Complications: none    Transfer of care protocol was followed    Last vitals: Visit Vitals  /69   Pulse 98   Temp 36.8 °C (98.3 °F) (Oral)   Resp 16   Ht 4' 11" (1.499 m)   Wt 61.7 kg (136 lb)   LMP 09/16/2024 (Approximate)   SpO2 98%   Breastfeeding No   BMI 27.47 kg/m²     "

## 2024-12-06 NOTE — OP NOTE
DATE OF PROCEDURE: 12/06/2024    PREOPERATIVE DIAGNOSIS: Acute cholecystitis    POSTOPERATIVE DIAGNOSIS: Acute cholecystitis with empyema    PROCEDURE: Laparoscopic cholecystectomy -modifier 22    SURGEON: Tico March M.D    ASSISTANT: On    ANESTHESIA: General endotracheal    ESTIMATED BLOOD LOSS: 100 cc    SPECIMEN: Gallbladder    CONDITION: Stable    COMPLICATIONS: None    FINDINGS:   1. Acutely inflamed gallbladder with thick inflammatory rind, needle aspiration remove purulent material consistent with empyema, cultures taken  2. Critical view of safety achieved  3. Gallbladder wall very friable and multiple holes created during retraction, visualization difficult, spillage of purulent bile but no stones     INDICATIONS: The patient is a 44 y.o. female who presented to clinic with a  history of right upper quadrant epigastric pain suggestive of biliary etiology. The patient was counseled on their surgical options and desired surgical intervention. The risks of the procedure were described to the patient including pain, infection, bleeding, scarring, wound complications, injury to local structures such as bile duct, liver or intestine, warranting more extensive surgery, retained common bile duct stone or need for further intervention. The patient demonstrated understanding of these risks and a consent form was obtained.    PROCEDURE IN DETAIL: PROCEDURE IN DETAIL: The patient was identified in the Preoperative Unit and taken back to the Operating Room and laid supine on the operating room table. IV antibiotics were administered and general anesthesia was induced without complication. The patient was then prepped and draped in the standard sterile fashion. A timeout was performed in accordance with hospital protocol.  A Veress needle was introduced into the abdominal cavity and insufflation was attached. We had appropriate initial pressures and pneumoperitoneum was achieved. Local anesthetic was injected  then a stab incision was sharply made just above the umbilicus. A 5 mm Optiview trocar was introduced into the peritoneal cavity under direct visualization.  We examined the trocar and Veress needle insertion sites and no obvious injury was identified. An 11 mm trocar was then placed in subxiphoid region under direct visualization after injecting local anesthetic.  Two additional 5 mm trocars were placed in the right mid and right lateral abdomen under direct visualization again after injecting local anesthetic. The gallbladder was identified and found to be inflamed and covered with omentum. Combination of blunt and sharp dissection was used to take the omentum off of the gallbladder. There was a thick inflammatory rind in the gallbladder was so tense and could not be grasped.  Needle decompression was performed removing thick purulent material consistent with empyema.  Cultures were taken. Despite the aspiration of the gallbladder was still very difficult to retract and mobilize due to the thickness of the inflammatory rind. The gallbladder fundus was grasped and retracted into the right upper quadrant and the infundibulum retracted laterally. The peritoneum over the infundibulum and cystic structures was then gently stripped until the cystic duct and artery were identified and the critical view of safety was achieved.The cystic duct and artery were doubly clipped proximally and once distally and then divided. The gallbladder was then dissected off the gallbladder fossa using electrocautery. During dissection retraction was very difficult again due the inflammation.  Multiple holes were created from retraction injuries. Purulent bile was spilled but suctioned cleaned.  No stones spilled. Once dissection was completed, the gallbladder was placed into an EndoCatch bag and removed through the subxiphoid port. The right upper quadrant was then irrigated and then suctioned. The dissection field was inspected for  hemostasis, bile leak and to confirmed clips were in place. Two pieces of Surgicel were utilized to help control hemostasis.  One in the gallbladder fossa and 1 along the right lateral aspect of the liver where a tear in the liver capsule occurred. Additional local anesthetic was injected around the port sites under direct visualization.  The subxiphoid fascial incision was closed with a 0 Vicryl suture. The abdomen was desufflated and ports removed. Additional local anesthetic was injected and all the skin incisions were closed using a 4-0 Monocryl subcuticular stitch. Dermabond was then applied. The patient was awakened from general anesthesia without complication and returned to the Postoperative Recovery Unit in stable condition. At the end of the case, sponge, instrument and needle counts were correct on 2 occasions. I was present and scrubbed throughout the entirety of the case.    Due to the difficulties with retraction and visualization this case was significantly more difficult than a typical cholecystectomy should qualify for modifier 22.

## 2024-12-06 NOTE — ANESTHESIA PREPROCEDURE EVALUATION
12/06/2024  Roopa Rivas is a 44 y.o., female.      Results for orders placed or performed in visit on 09/30/20   IN OFFICE EKG 12-LEAD (to [place])    Collection Time: 09/30/20 11:14 AM    Narrative    Test Reason : R00.0,    Vent. Rate : 104 BPM     Atrial Rate : 104 BPM     P-R Int : 098 ms          QRS Dur : 070 ms      QT Int : 338 ms       P-R-T Axes : 057 046 022 degrees     QTc Int : 444 ms    Sinus tachycardia with short DC  Otherwise normal ECG  No previous ECGs available  Confirmed by Kimani Casanova MD ([id number]) on 10/5/2020 6:55:26 AM    Referred By: PATRICIA SWANN           Confirmed By:Kimani Casanova MD             Lab Results   Component Value Date    WBC 19.38 (H) 12/03/2024    HGB 10.7 (L) 12/03/2024    HCT 34.2 (L) 12/03/2024    MCV 97 12/03/2024     12/03/2024     BMP  Lab Results   Component Value Date     12/03/2024    K 4.0 12/03/2024     12/03/2024    CO2 23 12/03/2024    BUN 13 12/03/2024    CREATININE 0.7 12/03/2024    CALCIUM 9.3 12/03/2024    ANIONGAP 10 12/03/2024    GLU 97 12/03/2024    GLU 90 09/06/2023    GLU 92 04/26/2023       Results for orders placed during the hospital encounter of 10/08/20    Echo Color Flow Doppler? Yes    Interpretation Summary  · The left ventricle is normal in size with hyperdynamic systolic function. The estimated ejection fraction is 70%.  · Normal left ventricular diastolic function.  · Normal right ventricular systolic function.  · Normal central venous pressure (3 mmHg).         Pre-op Assessment    I have reviewed the Patient Summary Reports.     I have reviewed the Nursing Notes. I have reviewed the NPO Status.   I have reviewed the Medications.     Review of Systems  Anesthesia Hx:  No problems with previous Anesthesia             Denies Family Hx of Anesthesia complications.   Personal Hx of Anesthesia complications                     Social:  No Alcohol Use, Non-Smoker       Hematology/Oncology:    Oncology Normal    -- Anemia:                                  EENT/Dental:  EENT/Dental Normal           Cardiovascular:         Dysrhythmias (History of tachyarrhythmias.  Currently on no beta-blocker)          ECG has been reviewed.  Patient on beta blockers                          Pulmonary:  Pulmonary Normal                       Renal/:   renal calculi  History of Gastroenteritis             Hepatic/GI:     GERD, well controlled   History of gastritis             Musculoskeletal:  Arthritis     Muscle Disorders: Fibromyalgia           Neurological:    Neuromuscular Disease, (Fibromyalgia)                                   Endocrine:   Hypothyroidism          Psych:  Psychiatric History  depression Sleep Disorder and Insomnia.      Psychotic Disorder and Bipolar disorder.  Sleep Disorder and Insomnia.        Physical Exam  General: Well nourished, Cooperative, Alert and Oriented    Airway:  Mallampati: II / I  Mouth Opening: Normal  TM Distance: Normal  Tongue: Normal  Neck ROM: Normal ROM    Dental:  Intact    Chest/Lungs:  Clear to auscultation, Normal Respiratory Rate    Heart:  Rate: Normal  Rhythm: Regular Rhythm        Anesthesia Plan  Type of Anesthesia, risks & benefits discussed:    Anesthesia Type: Gen ETT  Intra-op Monitoring Plan: Standard ASA Monitors  Post Op Pain Control Plan: multimodal analgesia and IV/PO Opioids PRN  Induction:  IV  Airway Plan: Video, Post-Induction  Informed Consent: Informed consent signed with the Patient and all parties understand the risks and agree with anesthesia plan.  All questions answered.   ASA Score: 2  Anesthesia Plan Notes: No tylenol  BIS, history of walking up during anesthesia  GETA  Benadryl 6.25mg iv, , iv Zofran 4mg iv, Pepcid 20mg iv, Scopolamine Patch   Precedex iv, Decadron 8mg and local for multimodal   Sugammadex        Ready For Surgery From Anesthesia Perspective.      .

## 2024-12-07 NOTE — ANESTHESIA POSTPROCEDURE EVALUATION
Anesthesia Post Evaluation    Patient: Roopa Rivas    Procedure(s) Performed: Procedure(s) (LRB):  CHOLECYSTECTOMY, LAPAROSCOPIC (N/A)    Final Anesthesia Type: general      Patient location during evaluation: PACU  Patient participation: Yes- Able to Participate  Level of consciousness: awake and alert  Post-procedure vital signs: reviewed and stable  Pain management: adequate  Airway patency: patent    PONV status at discharge: No PONV  Anesthetic complications: no      Cardiovascular status: blood pressure returned to baseline and stable  Respiratory status: unassisted and room air  Hydration status: euvolemic  Follow-up not needed.              Vitals Value Taken Time   BP 99/62 12/06/24 1700   Temp 36.6 °C (97.9 °F) 12/06/24 1700   Pulse 89 12/06/24 1700   Resp 16 12/06/24 1700   SpO2 98 % 12/06/24 1700         Event Time   Out of Recovery 16:19:00         Pain/Ellen Score: Pain Rating Prior to Med Admin: 4 (12/6/2024  3:52 PM)  Ellen Score: 10 (12/6/2024  5:12 PM)

## 2024-12-08 LAB
GRAM STN SPEC: NORMAL
GRAM STN SPEC: NORMAL

## 2024-12-09 LAB
BACTERIA SPEC AEROBE CULT: ABNORMAL
BACTERIA SPEC ANAEROBE CULT: NORMAL

## 2024-12-12 ENCOUNTER — LAB VISIT (OUTPATIENT)
Dept: LAB | Facility: HOSPITAL | Age: 44
End: 2024-12-12
Attending: NURSE PRACTITIONER
Payer: COMMERCIAL

## 2024-12-12 ENCOUNTER — PATIENT MESSAGE (OUTPATIENT)
Dept: GASTROENTEROLOGY | Facility: CLINIC | Age: 44
End: 2024-12-12
Payer: COMMERCIAL

## 2024-12-12 DIAGNOSIS — E03.8 SUBCLINICAL HYPOTHYROIDISM: ICD-10-CM

## 2024-12-12 LAB
ALBUMIN SERPL BCP-MCNC: 2.7 G/DL (ref 3.5–5.2)
ALP SERPL-CCNC: 210 U/L (ref 40–150)
ALT SERPL W/O P-5'-P-CCNC: 78 U/L (ref 10–44)
ANION GAP SERPL CALC-SCNC: 9 MMOL/L (ref 8–16)
AST SERPL-CCNC: 34 U/L (ref 10–40)
BASOPHILS # BLD AUTO: 0.06 K/UL (ref 0–0.2)
BASOPHILS NFR BLD: 0.6 % (ref 0–1.9)
BILIRUB SERPL-MCNC: 0.2 MG/DL (ref 0.1–1)
BUN SERPL-MCNC: 10 MG/DL (ref 6–20)
CALCIUM SERPL-MCNC: 8.9 MG/DL (ref 8.7–10.5)
CHLORIDE SERPL-SCNC: 106 MMOL/L (ref 95–110)
CHOLEST SERPL-MCNC: 124 MG/DL (ref 120–199)
CHOLEST/HDLC SERPL: 4 {RATIO} (ref 2–5)
CO2 SERPL-SCNC: 26 MMOL/L (ref 23–29)
CREAT SERPL-MCNC: 0.7 MG/DL (ref 0.5–1.4)
DIFFERENTIAL METHOD BLD: ABNORMAL
EOSINOPHIL # BLD AUTO: 0.3 K/UL (ref 0–0.5)
EOSINOPHIL NFR BLD: 2.7 % (ref 0–8)
ERYTHROCYTE [DISTWIDTH] IN BLOOD BY AUTOMATED COUNT: 13.2 % (ref 11.5–14.5)
EST. GFR  (NO RACE VARIABLE): >60 ML/MIN/1.73 M^2
ESTIMATED AVG GLUCOSE: 103 MG/DL (ref 68–131)
GLUCOSE SERPL-MCNC: 89 MG/DL (ref 70–110)
HBA1C MFR BLD: 5.2 % (ref 4–5.6)
HCT VFR BLD AUTO: 29.9 % (ref 37–48.5)
HDLC SERPL-MCNC: 31 MG/DL (ref 40–75)
HDLC SERPL: 25 % (ref 20–50)
HGB BLD-MCNC: 9 G/DL (ref 12–16)
IMM GRANULOCYTES # BLD AUTO: 0.04 K/UL (ref 0–0.04)
IMM GRANULOCYTES NFR BLD AUTO: 0.4 % (ref 0–0.5)
LDLC SERPL CALC-MCNC: 70 MG/DL (ref 63–159)
LYMPHOCYTES # BLD AUTO: 2.9 K/UL (ref 1–4.8)
LYMPHOCYTES NFR BLD: 27 % (ref 18–48)
MCH RBC QN AUTO: 29.9 PG (ref 27–31)
MCHC RBC AUTO-ENTMCNC: 30.1 G/DL (ref 32–36)
MCV RBC AUTO: 99 FL (ref 82–98)
MONOCYTES # BLD AUTO: 0.6 K/UL (ref 0.3–1)
MONOCYTES NFR BLD: 5.3 % (ref 4–15)
NEUTROPHILS # BLD AUTO: 6.8 K/UL (ref 1.8–7.7)
NEUTROPHILS NFR BLD: 64 % (ref 38–73)
NONHDLC SERPL-MCNC: 93 MG/DL
NRBC BLD-RTO: 0 /100 WBC
PLATELET # BLD AUTO: 707 K/UL (ref 150–450)
PMV BLD AUTO: 9.2 FL (ref 9.2–12.9)
POTASSIUM SERPL-SCNC: 4.3 MMOL/L (ref 3.5–5.1)
PROT SERPL-MCNC: 6.8 G/DL (ref 6–8.4)
RBC # BLD AUTO: 3.01 M/UL (ref 4–5.4)
SODIUM SERPL-SCNC: 141 MMOL/L (ref 136–145)
TRIGL SERPL-MCNC: 115 MG/DL (ref 30–150)
TSH SERPL DL<=0.005 MIU/L-ACNC: 1.74 UIU/ML (ref 0.4–4)
WBC # BLD AUTO: 10.63 K/UL (ref 3.9–12.7)

## 2024-12-12 PROCEDURE — 80053 COMPREHEN METABOLIC PANEL: CPT | Performed by: NURSE PRACTITIONER

## 2024-12-12 PROCEDURE — 83036 HEMOGLOBIN GLYCOSYLATED A1C: CPT | Performed by: NURSE PRACTITIONER

## 2024-12-12 PROCEDURE — 84443 ASSAY THYROID STIM HORMONE: CPT | Performed by: NURSE PRACTITIONER

## 2024-12-12 PROCEDURE — 85025 COMPLETE CBC W/AUTO DIFF WBC: CPT | Performed by: NURSE PRACTITIONER

## 2024-12-12 PROCEDURE — 80061 LIPID PANEL: CPT | Performed by: NURSE PRACTITIONER

## 2024-12-13 LAB
ACID FAST MOD KINY STN SPEC: NORMAL

## 2024-12-14 LAB — FUNGUS SPEC CULT: NORMAL

## 2024-12-22 DIAGNOSIS — F31.81 BIPOLAR II DISORDER: ICD-10-CM

## 2024-12-23 ENCOUNTER — OFFICE VISIT (OUTPATIENT)
Dept: SURGERY | Facility: CLINIC | Age: 44
End: 2024-12-23
Payer: COMMERCIAL

## 2024-12-23 VITALS
TEMPERATURE: 98 F | DIASTOLIC BLOOD PRESSURE: 72 MMHG | WEIGHT: 136 LBS | HEIGHT: 59 IN | SYSTOLIC BLOOD PRESSURE: 108 MMHG | BODY MASS INDEX: 27.42 KG/M2 | HEART RATE: 97 BPM

## 2024-12-23 DIAGNOSIS — Z90.49 S/P LAPAROSCOPIC CHOLECYSTECTOMY: Primary | ICD-10-CM

## 2024-12-23 PROCEDURE — 3044F HG A1C LEVEL LT 7.0%: CPT | Mod: CPTII,S$GLB,, | Performed by: SURGERY

## 2024-12-23 PROCEDURE — 99999 PR PBB SHADOW E&M-EST. PATIENT-LVL IV: CPT | Mod: PBBFAC,,, | Performed by: SURGERY

## 2024-12-23 PROCEDURE — 3078F DIAST BP <80 MM HG: CPT | Mod: CPTII,S$GLB,, | Performed by: SURGERY

## 2024-12-23 PROCEDURE — 3074F SYST BP LT 130 MM HG: CPT | Mod: CPTII,S$GLB,, | Performed by: SURGERY

## 2024-12-23 PROCEDURE — 1159F MED LIST DOCD IN RCRD: CPT | Mod: CPTII,S$GLB,, | Performed by: SURGERY

## 2024-12-23 PROCEDURE — 99024 POSTOP FOLLOW-UP VISIT: CPT | Mod: S$GLB,,, | Performed by: SURGERY

## 2024-12-23 RX ORDER — ARIPIPRAZOLE 15 MG/1
15 TABLET ORAL DAILY
Qty: 90 TABLET | Refills: 0 | Status: SHIPPED | OUTPATIENT
Start: 2024-12-23 | End: 2025-03-23

## 2024-12-23 NOTE — PROGRESS NOTES
GENERAL SURGERY  POST-OP PROGRESS NOTE    HPI: Roopa Rivas is a 44 y.o. female status post laparoscopic cholecystectomy for acute on chronic cholecystitis with empyema. Overall patient is doing well.  Initially had some right flank pain and epigastric pain.  Right flank pain has resolved, very mild epigastric discomfort at the site of the substernal incision.  Tolerating diet.  No nausea or vomiting.  No fevers or chills. Preoperative symptoms have significantly improved/resolved.    VITALS:  Vitals:    12/23/24 1320   BP: 108/72   Pulse: 97   Temp: 97.8 °F (36.6 °C)       PHYSICAL EXAM:  All abdominal port sites well healed without signs of infection or breakdown, expected postoperative changes under subxiphoid incision which are very minimal    PATHOLOGY:  GALLBLADDER:     - CHRONIC AND ACUTE CHOLECYSTITIS.     - CHOLELITHIASIS.     - NEGATIVE FOR DYSPLASIA OR MALIGNANCY     ASSESSMENT & PLAN:  44 y.o. female s/p laparoscopic cholecystectomy  -overall doing well  -symptoms have resolved and patient is feeling much better  -continue diet as tolerated  -had mild elevation in alkaline phosphatase on postoperative labs, will repeat today  -otherwise return to clinic p.r.n.

## 2024-12-24 ENCOUNTER — LAB VISIT (OUTPATIENT)
Dept: LAB | Facility: HOSPITAL | Age: 44
End: 2024-12-24
Attending: SURGERY
Payer: COMMERCIAL

## 2024-12-24 DIAGNOSIS — Z90.49 S/P LAPAROSCOPIC CHOLECYSTECTOMY: ICD-10-CM

## 2024-12-24 LAB
ALBUMIN SERPL BCP-MCNC: 3.8 G/DL (ref 3.5–5.2)
ALP SERPL-CCNC: 159 U/L (ref 40–150)
ALT SERPL W/O P-5'-P-CCNC: 30 U/L (ref 10–44)
AST SERPL-CCNC: 19 U/L (ref 10–40)
BILIRUB DIRECT SERPL-MCNC: 0.1 MG/DL (ref 0.1–0.3)
BILIRUB SERPL-MCNC: 0.3 MG/DL (ref 0.1–1)
ERYTHROCYTE [DISTWIDTH] IN BLOOD BY AUTOMATED COUNT: 13.1 % (ref 11.5–14.5)
HCT VFR BLD AUTO: 34.8 % (ref 37–48.5)
HGB BLD-MCNC: 10.9 G/DL (ref 12–16)
MCH RBC QN AUTO: 29.6 PG (ref 27–31)
MCHC RBC AUTO-ENTMCNC: 31.3 G/DL (ref 32–36)
MCV RBC AUTO: 95 FL (ref 82–98)
PLATELET # BLD AUTO: 517 K/UL (ref 150–450)
PMV BLD AUTO: 10.7 FL (ref 9.2–12.9)
PROT SERPL-MCNC: 8.1 G/DL (ref 6–8.4)
RBC # BLD AUTO: 3.68 M/UL (ref 4–5.4)
WBC # BLD AUTO: 6.48 K/UL (ref 3.9–12.7)

## 2024-12-24 PROCEDURE — 36415 COLL VENOUS BLD VENIPUNCTURE: CPT | Mod: PO | Performed by: SURGERY

## 2024-12-24 PROCEDURE — 85027 COMPLETE CBC AUTOMATED: CPT | Performed by: SURGERY

## 2024-12-24 PROCEDURE — 80076 HEPATIC FUNCTION PANEL: CPT | Performed by: SURGERY

## 2024-12-27 NOTE — PATIENT INSTRUCTIONS
Acne:  1. OTC salacylic acid cleanser. (smaller bumps) (ect. Neutrogena, clean and clear cleansers, look for sal acid in the ingredients)    2. Continue aldactone 50mg daily.    3. Klaron lotion 1-2 times daily.    4. Bigger bumps: more sulfur bar soap (rotate with sal acid cleanser)      Follow up 3 months      Diet:  Avoid all sweetners/sugars, try organic stevia.   Limit dairy products, white/wheat flour.  Limit alcohol  Limit animal protein    (Game changers on [employer])       Pt here for B/p check at 120/68 pulse 88-regular. Pt is feeling good, baby is active.   Blood pressure readings with be forwarded to Dr. Pérez and Dr. Donohue. Pt is encouraged to call with any concerns.

## 2024-12-30 ENCOUNTER — OFFICE VISIT (OUTPATIENT)
Dept: FAMILY MEDICINE | Facility: CLINIC | Age: 44
End: 2024-12-30
Payer: COMMERCIAL

## 2024-12-30 VITALS
OXYGEN SATURATION: 99 % | BODY MASS INDEX: 28.43 KG/M2 | DIASTOLIC BLOOD PRESSURE: 76 MMHG | SYSTOLIC BLOOD PRESSURE: 120 MMHG | HEIGHT: 59 IN | TEMPERATURE: 98 F | WEIGHT: 141 LBS | HEART RATE: 74 BPM

## 2024-12-30 DIAGNOSIS — F31.9 BIPOLAR AFFECTIVE DISORDER, REMISSION STATUS UNSPECIFIED: ICD-10-CM

## 2024-12-30 DIAGNOSIS — G47.01 INSOMNIA DUE TO MEDICAL CONDITION: ICD-10-CM

## 2024-12-30 DIAGNOSIS — Z98.51 HISTORY OF BILATERAL TUBAL LIGATION: ICD-10-CM

## 2024-12-30 DIAGNOSIS — E03.9 HYPOTHYROIDISM, UNSPECIFIED TYPE: Primary | ICD-10-CM

## 2024-12-30 DIAGNOSIS — D64.9 ANEMIA, UNSPECIFIED TYPE: ICD-10-CM

## 2024-12-30 DIAGNOSIS — E61.1 IRON DEFICIENCY: ICD-10-CM

## 2024-12-30 DIAGNOSIS — M17.0 OSTEOARTHRITIS OF BOTH KNEES, UNSPECIFIED OSTEOARTHRITIS TYPE: ICD-10-CM

## 2024-12-30 DIAGNOSIS — Z90.49 S/P CHOLECYSTECTOMY: ICD-10-CM

## 2024-12-30 DIAGNOSIS — M16.0 OSTEOARTHRITIS OF BOTH HIPS, UNSPECIFIED OSTEOARTHRITIS TYPE: ICD-10-CM

## 2024-12-30 DIAGNOSIS — E55.9 VITAMIN D DEFICIENCY: ICD-10-CM

## 2024-12-30 DIAGNOSIS — M79.7 FIBROMYALGIA: ICD-10-CM

## 2024-12-30 PROCEDURE — 99999 PR PBB SHADOW E&M-EST. PATIENT-LVL IV: CPT | Mod: PBBFAC,,, | Performed by: FAMILY MEDICINE

## 2024-12-31 ENCOUNTER — LAB VISIT (OUTPATIENT)
Dept: LAB | Facility: HOSPITAL | Age: 44
End: 2024-12-31
Attending: FAMILY MEDICINE
Payer: COMMERCIAL

## 2024-12-31 DIAGNOSIS — M17.0 OSTEOARTHRITIS OF BOTH KNEES, UNSPECIFIED OSTEOARTHRITIS TYPE: ICD-10-CM

## 2024-12-31 DIAGNOSIS — E55.9 VITAMIN D DEFICIENCY: ICD-10-CM

## 2024-12-31 DIAGNOSIS — M16.0 OSTEOARTHRITIS OF BOTH HIPS, UNSPECIFIED OSTEOARTHRITIS TYPE: ICD-10-CM

## 2024-12-31 PROBLEM — Z90.49 S/P CHOLECYSTECTOMY: Status: ACTIVE | Noted: 2024-12-31

## 2024-12-31 PROBLEM — F31.32 BIPOLAR AFFECTIVE DISORDER, CURRENTLY DEPRESSED, MODERATE: Status: RESOLVED | Noted: 2020-05-24 | Resolved: 2024-12-31

## 2024-12-31 PROBLEM — E61.1 IRON DEFICIENCY: Status: ACTIVE | Noted: 2024-12-31

## 2024-12-31 PROBLEM — F31.9 BIPOLAR DISORDER: Status: ACTIVE | Noted: 2024-12-31

## 2024-12-31 PROBLEM — Z98.51 HISTORY OF BILATERAL TUBAL LIGATION: Status: ACTIVE | Noted: 2024-12-31

## 2024-12-31 LAB — 25(OH)D3+25(OH)D2 SERPL-MCNC: 10 NG/ML (ref 30–96)

## 2024-12-31 PROCEDURE — 82306 VITAMIN D 25 HYDROXY: CPT | Performed by: FAMILY MEDICINE

## 2024-12-31 PROCEDURE — 86431 RHEUMATOID FACTOR QUANT: CPT | Performed by: FAMILY MEDICINE

## 2024-12-31 PROCEDURE — 36415 COLL VENOUS BLD VENIPUNCTURE: CPT | Mod: PO | Performed by: FAMILY MEDICINE

## 2024-12-31 NOTE — PROGRESS NOTES
Subjective:       Patient ID: Roopa Rivas is a 44 y.o. female.    Chief Complaint: Establish Care    New patient visit.  Was seeing Dr. Kaba previously.      Social history nonsmoker no alcohol intake of significance some exercise but has decrease this due to work.  .      Family history osteoarthritis.  Throat cancer in paternal grandfather.  Mother type 2 diabetes.  Dad  in an accident.  Maternal uncle stomach cancer.    Immunizations current.    Past medical history.  BMI of 28.5.  Status post cholecystectomy.  Recent.  Tubal ligation.   6 para 4 A/B 2.  Fibromyalgia.  No medications for this.  Was seen by Dr. Sherie spears.  Osteoarthritis of knees hips elbows.  Vitamin-D deficiency.  Iron deficiency.  Hypothyroidism.  Uses levothyroxine 50 mcg 6 days a week and 100 mcg 1 day a week TSH 1.74.  Insomnia on Ambien.  Bipolar disorder on Abilify and Prozac see Psychiatry.  Anemia with a hemoglobin of 10.9 MCV of 95.      Review of Systems   Constitutional: Negative.    HENT: Negative.     Eyes: Negative.    Respiratory: Negative.     Cardiovascular: Negative.    Gastrointestinal: Negative.    Endocrine: Negative.    Genitourinary: Negative.    Musculoskeletal:  Positive for arthralgias.   Skin: Negative.    Allergic/Immunologic: Negative.    Neurological: Negative.    Hematological: Negative.    Psychiatric/Behavioral: Negative.     All other systems reviewed and are negative.      Objective:      Physical Exam  Vitals and nursing note reviewed.   Constitutional:       Appearance: Normal appearance. She is well-developed and normal weight.   HENT:      Head: Normocephalic and atraumatic.      Right Ear: Tympanic membrane normal.      Left Ear: Tympanic membrane normal.      Nose: Nose normal.      Mouth/Throat:      Mouth: Mucous membranes are moist.   Eyes:      Conjunctiva/sclera: Conjunctivae normal.      Pupils: Pupils are equal, round, and reactive to light.   Neck:       Vascular: No carotid bruit.   Cardiovascular:      Rate and Rhythm: Normal rate and regular rhythm.      Pulses: Normal pulses.      Heart sounds: Normal heart sounds. No murmur heard.     No gallop.   Pulmonary:      Effort: Pulmonary effort is normal.      Breath sounds: Normal breath sounds.   Abdominal:      General: Bowel sounds are normal.      Palpations: Abdomen is soft.      Tenderness: There is no abdominal tenderness.   Musculoskeletal:         General: Normal range of motion.      Cervical back: Normal range of motion.      Right lower leg: No edema.      Left lower leg: No edema.   Lymphadenopathy:      Cervical: No cervical adenopathy.   Skin:     General: Skin is warm and dry.   Neurological:      General: No focal deficit present.      Mental Status: She is alert and oriented to person, place, and time.   Psychiatric:         Behavior: Behavior normal.         Thought Content: Thought content normal.         Judgment: Judgment normal.         Assessment:       1. Hypothyroidism, unspecified type    2. Iron deficiency    3. Vitamin D deficiency    4. Fibromyalgia    5. S/P cholecystectomy    6. BMI 28.0-28.9,adult    7. History of bilateral tubal ligation    8. Osteoarthritis of both hips, unspecified osteoarthritis type    9. Osteoarthritis of both knees, unspecified osteoarthritis type    10. Insomnia due to medical condition    11. Anemia, unspecified type    12. Bipolar affective disorder, remission status unspecified        Plan:       Hypothyroidism, unspecified type    Iron deficiency    Vitamin D deficiency  -     Vitamin D; Future; Expected date: 12/30/2024    Fibromyalgia    S/P cholecystectomy    BMI 28.0-28.9,adult    History of bilateral tubal ligation    Osteoarthritis of both hips, unspecified osteoarthritis type  -     Rheumatoid Factor; Future; Expected date: 12/30/2024    Osteoarthritis of both knees, unspecified osteoarthritis type  -     Rheumatoid Factor; Future; Expected date:  12/30/2024    Insomnia due to medical condition    Anemia, unspecified type    Bipolar affective disorder, remission status unspecified    Rheumatoid factor vitamin-D level.  Continue iron.  Colonoscopy at 45.  Follow-up in 6 months with CBC CMP lipids and TSH.

## 2025-01-02 LAB — RHEUMATOID FACT SERPL-ACNC: <13 IU/ML (ref 0–15)

## 2025-01-03 DIAGNOSIS — E55.9 VITAMIN D DEFICIENCY: Primary | ICD-10-CM

## 2025-01-03 RX ORDER — ERGOCALCIFEROL 1.25 MG/1
50000 CAPSULE ORAL
Qty: 12 CAPSULE | Refills: 1 | Status: SHIPPED | OUTPATIENT
Start: 2025-01-03

## 2025-01-03 NOTE — TELEPHONE ENCOUNTER
No care due was identified.  [facility] Embedded Care Due Messages. Reference number: [id number].   1/03/2025 10:55:05 AM CST

## 2025-01-10 DIAGNOSIS — E03.9 HYPOTHYROIDISM, UNSPECIFIED TYPE: Primary | ICD-10-CM

## 2025-01-10 DIAGNOSIS — E61.1 IRON DEFICIENCY: ICD-10-CM

## 2025-01-26 DIAGNOSIS — G47.00 INSOMNIA, UNSPECIFIED TYPE: ICD-10-CM

## 2025-01-26 DIAGNOSIS — F41.1 GENERALIZED ANXIETY DISORDER: ICD-10-CM

## 2025-01-26 DIAGNOSIS — F31.81 BIPOLAR II DISORDER: ICD-10-CM

## 2025-01-26 DIAGNOSIS — E03.8 SUBCLINICAL HYPOTHYROIDISM: ICD-10-CM

## 2025-01-27 RX ORDER — PROPRANOLOL HYDROCHLORIDE 20 MG/1
20 TABLET ORAL 2 TIMES DAILY PRN
Qty: 60 TABLET | Refills: 0 | Status: SHIPPED | OUTPATIENT
Start: 2025-01-27 | End: 2025-02-26

## 2025-01-27 RX ORDER — ARIPIPRAZOLE 15 MG/1
15 TABLET ORAL DAILY
Qty: 30 TABLET | Refills: 0 | Status: SHIPPED | OUTPATIENT
Start: 2025-01-27 | End: 2025-02-26

## 2025-01-27 RX ORDER — ZOLPIDEM TARTRATE 12.5 MG/1
12.5 TABLET, FILM COATED, EXTENDED RELEASE ORAL NIGHTLY PRN
Qty: 30 TABLET | Refills: 0 | Status: SHIPPED | OUTPATIENT
Start: 2025-01-27 | End: 2025-02-26

## 2025-01-27 RX ORDER — LEVOTHYROXINE SODIUM 50 UG/1
TABLET ORAL
Qty: 102 TABLET | Refills: 3 | Status: SHIPPED | OUTPATIENT
Start: 2025-01-27

## 2025-02-03 ENCOUNTER — OFFICE VISIT (OUTPATIENT)
Dept: OBSTETRICS AND GYNECOLOGY | Facility: CLINIC | Age: 45
End: 2025-02-03
Payer: COMMERCIAL

## 2025-02-03 VITALS
RESPIRATION RATE: 18 BRPM | BODY MASS INDEX: 28.69 KG/M2 | HEIGHT: 59 IN | WEIGHT: 142.31 LBS | DIASTOLIC BLOOD PRESSURE: 76 MMHG | SYSTOLIC BLOOD PRESSURE: 104 MMHG

## 2025-02-03 DIAGNOSIS — Z09 POSTOPERATIVE EXAMINATION: Primary | ICD-10-CM

## 2025-02-03 PROCEDURE — 99499 UNLISTED E&M SERVICE: CPT | Mod: S$GLB,,, | Performed by: GENERAL PRACTICE

## 2025-02-03 PROCEDURE — 99999 PR PBB SHADOW E&M-EST. PATIENT-LVL III: CPT | Mod: PBBFAC,,, | Performed by: GENERAL PRACTICE

## 2025-02-03 NOTE — PROGRESS NOTES
ASSESSMENT AND PLAN     2025  Roopa Rivas is a 44 y.o. about 3 months post-op, doing well.  Op note pasted below.    Pathology reviewed; surgical pictures reviewed and given to the patient  Activity restrictions lifted  Cervical Cancer screening: next cervical CA screen (PAP+HPV) due OCT 2027  Mammogram: up to date  Return to clinic: for periodic GYN wellness exam, every 1-3 years per patient preference and comfort level      SUBJECTIVE     2025    SURGERY DATE = 2024   PREOP DX = heavy menstrual bleeding, undesired fertility   PROCEDURE = Novasure Endometrial Ablation, laparoscopic bilateral salpingectomy  FINDINGS = normal anatomy, the prior injury to the uterus (perforation) was apparent on laparoscopic exam  uterine sound 9cm, cervical length 5.5cm, cavity length 4cm, cavity width 2.5cm    A couple weeks after surgery with me Roopa had Lx cholecystectomy with Dr. March.  Feels much better with her gallbladder out.    She reports:  Vaginal bleeding: 3 days of spotting 2nd week of  - had done lots of walking - with wiping only - no bleeding otherwise  Resumed sex: Yes  Bowel function normal: Yes  Bladder function normal: Yes  Trouble with incisions: No     G'sP's:   LMP: No LMP recorded. Patient has had an ablation.    Relationship:  15yrs years  Contraception: bilateral salpingectomy  PAP Hx: no h/o abnormals  PAP: PAP neg / HPV neg / Date: 10/27/2022  MMG (10/14/24) = negative     OBJECTIVE     PHYSICAL EXAM  Vitals:    25 1601   BP: 104/76   Resp: 18     GEN = alert/oriented, nad, pleasant and cheerful  HEENT = sclera anicteric, EOM grossly normal  CV = BP and HR as per vitals  PULM = normal respiratory effort  ABD = soft, nontender, nondistended, incision(s) well-healed   = deferred, no concerns      LABS AND RADS    PATHOLOGY (24) =   1. LEFT FALLOPIAN TUBE:   - COMPLETE CROSS SECTION FROM FIMBRIATED SEGMENT OF FALLOPIAN TUBE WITH NO HISTOPATHOLOGIC  ABNORMALITY.     2. RIGHT FALLOPIAN TUBE:   - COMPLETE CROSS SECTION FROM FIMBRIATED SEGMENT OF FALLOPIAN TUBE WITH NO HISTOPATHOLOGIC ABNORMALITY.     Olya Montaño MD    ----------------------------------------------    OPERATIVE NOTE  11/08/2024   PREOP DX = heavy menstrual bleeding, undesired fertility     POSTOP DX = same     PROCEDURE = Novasure Endometrial Ablation, laparoscopic bilateral salpingectomy     SURGEON = Olya Montaño MD      ASSISTANT = none     ANESTHESIA = GETA     COMPLICATIONS = none     EBL = minimal     FLUIDS = 1000cc crystalloid     UOP = 500cc     SPECIMENS = bilateral fallopian tubes     DRAINS = none     FINDINGS = normal anatomy, the prior injury to the uterus (perforation) was apparent on laparoscopic exam  uterine sound 9cm, cervical length 5.5cm, cavity length 4cm, cavity width 2.5cm     PROCEDURE IN DETAIL:     The patient was taken to the operating room where GETA was administered.  A surgical time-out was performed to ensure the proper patient, procedure, and site.  She was prepped and draped in the lithotomy position, and in/out catheterization of the bladder was performed.     A speculum was placed in the patient's vagina.  A tenaculum was placed to the anterior lip of the cervix.  First, cervical and uterine measurements were taken as per above findings.  The cervix was dilated to a #6 Hagaar dilator.  The NovaSure was placed inside the uterus, and the cavity width was taken.  The NovaSure generator and handheld devices were set in accordance with the patient's measurements.  The generator ran a cavity check, and the test was passed.  The ablation was performed, lasting 1.6 minutes at a power level of 55W.  The device was removed from the uterus, and julita was noted on the mesh. The tenaculum was removed, and hemostasis was observed.     Next attention was turned to laparoscopy.  The inferior skin of the umbilicus was infiltrated with 0.25% Marcaine.  A small vertical skin  incision was placed to accommodate an 8mm trocar.  Under direct visualization, the abdomen was entered.  Once correct intraperitoneal placement was confirmed, the abdomen was insufflated with CO2.  Second and third 5mm trocars were placed in a similar fashion in the left lower and right lower quadrants, under direct visualization.  Pelvic and abdominal survey demonstrated findings above.  The bladder was quite full and obscuring the surgical field, so a grayson catheter was placed by the circulating nurse.     First the left fallopian tube was grasped, and using the Ligasure device it was  longitudinally from the parametrium and then transected at the uterus for complete removal.  This was repeated on the right side.  Each tube was removed intact through the 8mm trocar.  Hemostasis was observed at the surgical margins.     The pneumoperitoneum was released, and the trocars were removed under direct visualization.  The skin incisions were closed with 4-0 monocryl and dressed with dermabond.  The patient was extubated uneventfully and taken to PACU in stable condition for recovery.     MD PEARL       GYNECOLOGIC HISTORY  PAP Hx: no h/o abnormals  Genital HSV: no  STD Hx: chlamydia     Menarche: 10 yoa  Period duration: 5 days  # Heavy Days: 3  Pad/tampon ? on heavy days: 30 minutes to an hour  Intermenstrual bleeding: No  Period frequency:regular every 28-30 days     Dysmenorrhea: typical or expected menstrual cramps  Non-menstrual pelvic pressure/pain: No  Dyspareunia: No     Vasomotor Sxs: yes, typically 3 per day and yes, typically 2 per night  Vaginal dryness: yes  Poor libido on Abilify     OBSTETRIC HISTORY  Living children: 4  Vaginal deliveries: 4  IAB x 1  Plus 3 stepchildren     PAST MEDICAL HISTORY        -------------------------------------    Arthritis    Fibromyalgia    Gastroenteritis    GERD (gastroesophageal reflux disease)    Long-term use of Plaquenil   Thyroid disease  Depression /  anxiety     PAST SURGICAL HISTORY   Colonoscopy     2016    Dilation and curettage of uterus     Miscarriage at age 25    Esophagogastroduodenoscopy     Procedure: EGD (ESOPHAGOGASTRODUODENOSCOPY);  Surgeon: Jacquie Guadalupe MD;  Location: [facility];  Service: Endoscopy;  Laterality: N/A;   Hx D&C with uterine perforation (9/23/24)      ALLERGIES  Review of patient's allergies indicates:  No Known Allergies     MEDICATIONS       Current Outpatient Medications   Medication Instructions    ARIPiprazole (ABILIFY) 15 mg, Oral, Daily    calcium carbonate 400 mg calcium (1,000 mg) Chew Take by mouth.    dicyclomine (BENTYL) 10 mg, As needed (PRN)    drospirenone-ethinyl estradioL (SARAH) 3-0.02 mg per tablet 1 tablet, Oral, Daily    FLUoxetine 40 mg, Oral, Daily    fluticasone propionate (FLONASE) 50 mcg, Each Nostril, Daily    hydroquinone 8 % Emul Compound hydroquinone 12% / kojic acid 6% / vitamin C 1% / niacinamide 2% cream. Apply a pea-sized amount to entire face qhs. Do not use for longer than 16 weeks in a row.    iron, carbonyl 25 mg iron Tab Take by mouth. Iron supplement    Lactobacillus acidophilus (PROBIOTIC ORAL) Take by mouth.    levothyroxine (SYNTHROID) 50 MCG tablet Take 1 tab Monday-Saturday and 2 tabs on Sunday    magnesium 30 mg Tab Once    multivitamin/iron/folic acid (MULTI COMPLETE WITH IRON ORAL) Take by mouth.    omeprazole (PRILOSEC) 40 mg, Oral, Daily    propranoloL (INDERAL) 20 mg, Oral, 2 times daily PRN    spironolactone (ALDACTONE) 50 MG tablet TAKE 1 TABLET BY MOUTH DAILY    tretinoin (RETIN-A) 0.025 % cream Topical (Top), Nightly    zolpidem (AMBIEN CR) 12.5 mg, Oral, Nightly PRN      SOCIAL HISTORY  Lives with:  and children  Smoker: non-smoker  Alcohol: denies  Drugs: denies  Domestic Violence: no  Occupation: recently started a new job     FAMILY HISTORY  BLEEDING or  CLOTTING DISORDERS: none  BREAST CA: none  UTERINE CA: none  OVARIAN CA: none  COLON CA: none

## 2025-02-17 ENCOUNTER — OFFICE VISIT (OUTPATIENT)
Dept: DERMATOLOGY | Facility: CLINIC | Age: 45
End: 2025-02-17
Payer: COMMERCIAL

## 2025-02-17 DIAGNOSIS — L81.1 MELASMA: Primary | ICD-10-CM

## 2025-02-17 DIAGNOSIS — F41.1 GENERALIZED ANXIETY DISORDER: ICD-10-CM

## 2025-02-17 DIAGNOSIS — L70.0 ACNE VULGARIS: ICD-10-CM

## 2025-02-17 DIAGNOSIS — F31.81 BIPOLAR II DISORDER: ICD-10-CM

## 2025-02-17 RX ORDER — SPIRONOLACTONE 50 MG/1
TABLET, FILM COATED ORAL
Qty: 90 TABLET | Refills: 3 | Status: SHIPPED | OUTPATIENT
Start: 2025-02-17

## 2025-02-17 RX ORDER — TRETINOIN 0.5 MG/G
CREAM TOPICAL
Qty: 20 G | Refills: 5 | Status: SHIPPED | OUTPATIENT
Start: 2025-02-17

## 2025-02-17 NOTE — PROGRESS NOTES
Patient Information  Name: Roopa Rivas  : 1980  MRN: [id number]     Referring Physician:  Dr. Spears ref. provider found   Primary Care Physician:  Reginaldo Barrera III, MD   Date of Visit: 2025      Subjective:       Roopa Rivas is a 44 y.o. female who presents for No chief complaint on file.      Last seen 10/2023  Acne vulgaris  -     spironolactone (ALDACTONE) 50 MG tablet; TAKE 1 TABLET BY MOUTH DAILY  Dispense: 90 tablet; Refill: 3  -     tretinoin (RETIN-A) 0.025 % cream; Apply topically every evening.  Dispense: 20 g; Refill: 2  Controlled with above regimen, continue  Melasma  -     hydroquinone 8 % Emul; Compound hydroquinone 12% / kojic acid 6% / vitamin C 1% / niacinamide 2% cream. Apply a pea-sized amount to entire face qhs. Do not use for longer than 16 weeks in a row.  Dispense: 30 g; Refill: 1  Not at goal  Add tretinoin, may layer or alternate use    Has been out of spironolactone since 2024      Current Outpatient Medications:   ·  ARIPiprazole (ABILIFY) 15 MG Tab, Take 1 tablet (15 mg total) by mouth once daily., Disp: 30 tablet, Rfl: 0  ·  ergocalciferol (ERGOCALCIFEROL) 50,000 unit Cap, Take 1 capsule (50,000 Units total) by mouth every 7 days., Disp: 12 capsule, Rfl: 1  ·  FLUoxetine 40 MG capsule, Take 1 capsule (40 mg total) by mouth once daily., Disp: 90 capsule, Rfl: 0  ·  fluticasone propionate (FLONASE) 50 mcg/actuation nasal spray, 1 spray (50 mcg total) by Each Nostril route once daily., Disp: 9.9 mL, Rfl: 1  ·  HYDROcodone-acetaminophen (NORCO) 5-325 mg per tablet, Take 1 tablet by mouth every 6 (six) hours as needed for Pain., Disp: 28 tablet, Rfl: 0  ·  ibuprofen (ADVIL,MOTRIN) 800 MG tablet, Take 1 tablet (800 mg total) by mouth 3 (three) times daily as needed (mild to moderate pain)., Disp: 30 tablet, Rfl: 0  ·  Lactobacillus acidophilus (PROBIOTIC ORAL), Take 1 tablet by mouth once daily., Disp: , Rfl:   ·  levothyroxine (SYNTHROID) 50 MCG tablet, Take 1  tab Monday-Saturday and 2 tabs on Sunday, Disp: 102 tablet, Rfl: 3  ·  multivitamin/iron/folic acid (MULTI COMPLETE WITH IRON ORAL), Take 1 tablet by mouth once daily., Disp: , Rfl:   ·  omeprazole (PRILOSEC) 40 MG capsule, Take 1 capsule (40 mg total) by mouth Daily., Disp: 90 capsule, Rfl: 1  ·  propranoloL (INDERAL) 20 MG tablet, Take 1 tablet (20 mg total) by mouth 2 (two) times daily as needed (anxiety)., Disp: 60 tablet, Rfl: 0  ·  spironolactone (ALDACTONE) 50 MG tablet, TAKE 1 TABLET BY MOUTH DAILY, Disp: 90 tablet, Rfl: 3  ·  tretinoin (RETIN-A) 0.025 % cream, Apply topically every evening., Disp: 20 g, Rfl: 2  ·  zolpidem (AMBIEN CR) 12.5 MG CR tablet, Take 1 tablet (12.5 mg total) by mouth nightly as needed for Insomnia., Disp: 30 tablet, Rfl: 0                  Patient was last seen in Dermatology: 10/26/2023.    Prior notes by myself reviewed.   Clinical documentation obtained by nursing staff reviewed.    Review of Systems   Constitutional:  Negative for fever, chills and fatigue.   Respiratory:  Negative for cough and shortness of breath.    Gastrointestinal:  Negative for nausea and vomiting.   Skin:  Positive for daily sunscreen use and activity-related sunscreen use.        Objective:    Physical Exam       Diagram Legend     Erythematous scaling macule/papule c/w actinic keratosis       Vascular papule c/w angioma      Pigmented verrucoid papule/plaque c/w seborrheic keratosis      Yellow umbilicated papule c/w sebaceous hyperplasia      Irregularly shaped tan macule c/w lentigo     1-2 mm smooth white papules consistent with Milia      Movable subcutaneous cyst with punctum c/w epidermal inclusion cyst      Subcutaneous movable cyst c/w pilar cyst      Firm pink to brown papule c/w dermatofibroma      Pedunculated fleshy papule(s) c/w skin tag(s)      Evenly pigmented macule c/w junctional nevus     Mildly variegated pigmented, slightly irregular-bordered macule c/w mildly atypical nevus       Flesh colored to evenly pigmented papule c/w intradermal nevus       Pink pearly papule/plaque c/w basal cell carcinoma      Erythematous hyperkeratotic cursted plaque c/w SCC      Surgical scar with no sign of skin cancer recurrence      Open and closed comedones      Inflammatory papules and pustules      Verrucoid papule consistent consistent with wart     Erythematous eczematous patches and plaques     Dystrophic onycholytic nail with subungual debris c/w onychomycosis     Umbilicated papule    Erythematous-base heme-crusted tan verrucoid plaque consistent with inflamed seborrheic keratosis     Erythematous Silvery Scaling Plaque c/w Psoriasis     See annotation              [] Data reviewed    [] Prior external notes reviewed    [] Independent review of test    [] Management discussed with another provider    [] Independent historian    Assessment / Plan:        Melasma  -     hydroquinone 8 % Emul; Compound hydroquinone 12% / kojic acid 6% / vitamin C 1% / niacinamide 2% cream. Apply a pea-sized amount to entire face qhs. Do not use for longer than 16 weeks in a row.  Dispense: 30 g; Refill: 5    Acne vulgaris  -     spironolactone (ALDACTONE) 50 MG tablet; TAKE 1 TABLET BY MOUTH DAILY  Dispense: 90 tablet; Refill: 3  -     tretinoin (RETIN-A) 0.05 % cream; Thin film to entire face at bedtime  Dispense: 20 g; Refill: 5      Acne flaring when not taking spironolactone   Strict sun protection (uses [id number] tinted SPF 45)           The patient location is: home  The chief complaint leading to consultation is: acne and melasma    Visit type: audiovisual    Face to Face time with patient: 10 minutes  14 minutes of total time spent on the encounter, which includes face to face time and non-face to face time preparing to see the patient (eg, review of tests), Obtaining and/or reviewing separately obtained history, Documenting clinical information in the electronic or other health record, Independently interpreting  results (not separately reported) and communicating results to the patient/family/caregiver, or Care coordination (not separately reported).         Each patient to whom he or she provides medical services by telemedicine is:  (1) informed of the relationship between the physician and patient and the respective role of any other health care provider with respect to management of the patient; and (2) notified that he or she may decline to receive medical services by telemedicine and may withdraw from such care at any time.          LOS NUMBER AND COMPLEXITY OF PROBLEMS    COMPLEXITY OF DATA RISK TOTAL TIME (m)   47212  [id number] [] 1 self-limited or minor problem [] Minimal to none [] No treatment recommended or patient to monitor. Reassurance.  15-29  10-19   [id number]  [id number] Low  [] 2 or more self limited or minor problems  [] 1 stable chronic illness  [] 1 acute, uncomplicated illness or injury Limited (2)  [] Prior external notes from each unique source  [] Review result of each unique test  [] Order each unique test  OR [] Independent historian Low  []  OTC medications   []  Discussed/Decision for minor skin surgery (no risk factors) 30-44  20-29   90394  [id number] Moderate  [x]  1 or more chronic unstable illness (not at goal or progression or exacerbation) or SE of treatment  []  2 or more stable chronic illnesses  []  1 acute illness with systemic symptoms  []  1 acute complicated injury  []  1 undiagnosed new problem with uncertain prognosis Moderate (1/3 below)  []  3 or more data items        *Now includes independent historian  []  Independent interpretation of a test  []  Discuss management/test with another provider Moderate  [x]  Prescription drug mgmt  []  Discussed/Decision for Minor surgery with risk factors  []  Mgmt limited by social determinates 45-59  30-39   63830  [id number] High  []  1 or more chronic illness with severe exacerbation, progression or SE of treatment  []  1 acute or chronic illness/injury that  poses a threat to life or bodily function Extensive (2/3 below)  []  3 or more data items        *Now includes independent historian.  []  Independent interpretation of a test  []  Discuss management/test with another provider High  []  Major surgery with risk discussed  []  Drug therapy requiring intensive monitoring for toxicity  []  Hospitalization  []  Decision for DNR 60-74  40-54

## 2025-02-18 RX ORDER — FLUOXETINE HYDROCHLORIDE 40 MG/1
40 CAPSULE ORAL DAILY
Qty: 90 CAPSULE | Refills: 0 | Status: SHIPPED | OUTPATIENT
Start: 2025-02-18 | End: 2025-02-20 | Stop reason: SDUPTHER

## 2025-02-19 DIAGNOSIS — R10.13 EPIGASTRIC ABDOMINAL PAIN: ICD-10-CM

## 2025-02-19 DIAGNOSIS — R10.10 PAIN OF UPPER ABDOMEN: ICD-10-CM

## 2025-02-19 DIAGNOSIS — K21.9 GASTROESOPHAGEAL REFLUX DISEASE, UNSPECIFIED WHETHER ESOPHAGITIS PRESENT: ICD-10-CM

## 2025-02-19 RX ORDER — OMEPRAZOLE 40 MG/1
40 CAPSULE, DELAYED RELEASE ORAL DAILY
Qty: 90 CAPSULE | Refills: 1 | Status: SHIPPED | OUTPATIENT
Start: 2025-02-19 | End: 2025-08-18

## 2025-02-20 ENCOUNTER — OFFICE VISIT (OUTPATIENT)
Dept: PSYCHIATRY | Facility: CLINIC | Age: 45
End: 2025-02-20
Payer: COMMERCIAL

## 2025-02-20 VITALS
BODY MASS INDEX: 29.03 KG/M2 | DIASTOLIC BLOOD PRESSURE: 77 MMHG | SYSTOLIC BLOOD PRESSURE: 115 MMHG | WEIGHT: 143.75 LBS | HEART RATE: 83 BPM

## 2025-02-20 DIAGNOSIS — F31.81 BIPOLAR II DISORDER: ICD-10-CM

## 2025-02-20 DIAGNOSIS — G47.00 INSOMNIA, UNSPECIFIED TYPE: ICD-10-CM

## 2025-02-20 DIAGNOSIS — F41.1 GENERALIZED ANXIETY DISORDER: ICD-10-CM

## 2025-02-20 RX ORDER — FLUOXETINE HYDROCHLORIDE 40 MG/1
40 CAPSULE ORAL DAILY
Qty: 90 CAPSULE | Refills: 0 | Status: SHIPPED | OUTPATIENT
Start: 2025-02-20 | End: 2025-05-21

## 2025-02-20 RX ORDER — PROPRANOLOL HYDROCHLORIDE 20 MG/1
20 TABLET ORAL 2 TIMES DAILY PRN
Qty: 60 TABLET | Refills: 3 | Status: SHIPPED | OUTPATIENT
Start: 2025-02-20 | End: 2025-06-20

## 2025-02-20 RX ORDER — ZOLPIDEM TARTRATE 12.5 MG/1
12.5 TABLET, FILM COATED, EXTENDED RELEASE ORAL NIGHTLY PRN
Qty: 30 TABLET | Refills: 3 | Status: SHIPPED | OUTPATIENT
Start: 2025-03-05 | End: 2025-07-03

## 2025-02-20 RX ORDER — ARIPIPRAZOLE 15 MG/1
15 TABLET ORAL DAILY
Qty: 90 TABLET | Refills: 0 | Status: SHIPPED | OUTPATIENT
Start: 2025-02-20 | End: 2025-05-21

## 2025-02-20 NOTE — PROGRESS NOTES
Outpatient Psychiatry Follow-Up Visit (MD/NP) - Telemedicine Visit    2/20/2025 11:39 AM  Roopa Rivas  1980  [id number]      The patient location is: Patient reported that their location at the time of this visit was in the [facility]         Crisis Disclaimer: Patient was informed that due to the virtual nature of the visit, that if a crisis develops, protocols will be implemented to ensure patient safety, including but not limited to: 1) Initiating a welfare check with local Law Enforcement, 2) Calling 1/National Crisis Hotline, and/or 3) Initiating PEC/CEC procedures.      Visit type: audiovisual    Each patient to whom he or she provides medical services by telemedicine is:  (1) informed of the relationship between the physician and patient and the respective role of any other health care provider with respect to management of the patient; and (2) notified that he or she may decline to receive medical services by telemedicine and may withdraw from such care at any time.      Clinical Status of Patient:  Outpatient (Ambulatory)    Chief Complaint:  Roopa Rivas, a 44 y.o. female,who presents today for follow up of mood swings and anxiety.  Met with patient.        Interval History/Subjective Report/Content of Current Session:     She states that work is going better. Less stressful. Playing soothing music while at work helps. Learning to be less stressed. Meditating.  Still complains of fatigue but denies depression. Denies depressed mood and anhedonia. Denies sxs of rufus and hypomania.  Saw PCP and is anemic with low vit D.  Holidays went really well with step daughters coming into town.  Uses propranolol occasionally - doesn't want to rely on it.  Was seeing a new therapist and now that she feels comfortable asking for time off from work, she plans to resume sessions.    ASEs from psych meds: denies    Pt is future oriented and denies recurrent thoughts of death and denies SI/HI. Denies AVH,  paranoia and delusions. No objective s/sx of psychosis or rufus.     Patient verbalized motivation for compliance with medications and all other elements of treatment plan.     Denies any EPS/TD and none observed.      Psychotherapy:  Target symptoms: anxiety , mood swings  Why chosen therapy is appropriate versus another modality: relevant to diagnosis, patient responds to this modality  Outcome monitoring methods: self-report, observation  Therapeutic intervention type: supportive psychotherapy  Topics discussed/themes: work stress, building skills sets for symptom management  The patient's response to the intervention is accepting. The patient's progress toward treatment goals is good.   Duration of intervention: 9 minutes.      Psychotropic medication review  Previous Trials-  Seroquel - severe dry eyes, hurt to blink, no peripheral vision, memory loss  Lexapro - early 2000s, stopped taking due to insurance  Cymbalta      Current meds-  Prozac  Abilify  Propranolol  Ambien CR    History:       Past Medical, Psychiatric, Family and Social History: The patient's past medical, psychiatric, family and social history, allergies, current medications, past surgical history, and problem list have been reviewed and updated as appropriate within the electronic medical record.         Additional historical information includes:   Past psych hospitalizations: denies  Past suicide attempts: denies    Seizure: denies  Head trauma/TBI: denies  Access to a firearm: yes -  Pt was instructed to keep the firearm in a safe, locked container and to store the bullets separately.      Review of Systems       Review of Systems   Constitutional:  Negative for chills, fever and malaise/fatigue.   Respiratory:  Negative for cough and shortness of breath.    Cardiovascular:  Negative for chest pain and palpitations.   Gastrointestinal:  Negative for abdominal pain, diarrhea and vomiting.   Genitourinary:  Negative for dysuria and  hematuria.   Musculoskeletal:  Negative for falls and myalgias.   Skin:  Negative for rash.   Neurological:  Negative for tremors, seizures and headaches.   Psychiatric/Behavioral:          See HPI         Compliance: yes        Risk Parameters:  Patient reports no suicidal ideation  Patient reports no homicidal ideation  Patient reports no self-injurious behavior  Patient reports no violent behavior    Exam (detailed: at least 9 elements; comprehensive: all 15 elements)     Constitutional  Vitals:  Most recent vital signs, dated less than 90 days prior to this appointment, were reviewed.   Vitals:    02/20/25 1607   BP: 115/77   Pulse: 83   Weight: 65.2 kg (143 lb 11.8 oz)            Musculoskeletal  Muscle Strength/Tone:  no dyskinesia, no dystonia, no tremor, no tic   Gait & Station:  non-ataxic     Psychiatric      Appearance:  unremarkable, age appropriate, well nourished, casually dressed, neatly groomed, overweight   Behavior:  normal, friendly and cooperative, eye contact normal     Speech:  no latency; no press   Mood & Affect:  euthymic  congruent and appropriate   Thought Process:  normal and logical   Associations:  intact   Thought Content:  normal, no suicidality, no homicidality, delusions, or paranoia   Insight:  intact, has awareness of illness   Judgement: behavior is adequate to circumstances, age appropriate   Orientation:  grossly intact   Memory: intact for content of interview   Language: grossly intact   Attention Span & Concentration:  able to focus   Fund of Knowledge:  intact and appropriate to age and level of education       Medications:  Outpatient Encounter Medications as of 2/20/2025   Medication Sig Dispense Refill    ARIPiprazole (ABILIFY) 15 MG Tab Take 1 tablet (15 mg total) by mouth once daily. 30 tablet 0    ergocalciferol (ERGOCALCIFEROL) 50,000 unit Cap Take 1 capsule (50,000 Units total) by mouth every 7 days. 12 capsule 1    FLUoxetine 40 MG capsule Take 1 capsule (40 mg  total) by mouth once daily. 90 capsule 0    fluticasone propionate (FLONASE) 50 mcg/actuation nasal spray 1 spray (50 mcg total) by Each Nostril route once daily. 9.9 mL 1    HYDROcodone-acetaminophen (NORCO) 5-325 mg per tablet Take 1 tablet by mouth every 6 (six) hours as needed for Pain. 28 tablet 0    hydroquinone 8 % Emul Compound hydroquinone 12% / kojic acid 6% / vitamin C 1% / niacinamide 2% cream. Apply a pea-sized amount to entire face qhs. Do not use for longer than 16 weeks in a row. 30 g 5    ibuprofen (ADVIL,MOTRIN) 800 MG tablet Take 1 tablet (800 mg total) by mouth 3 (three) times daily as needed (mild to moderate pain). 30 tablet 0    Lactobacillus acidophilus (PROBIOTIC ORAL) Take 1 tablet by mouth once daily.      levothyroxine (SYNTHROID) 50 MCG tablet Take 1 tab Monday-Saturday and 2 tabs on Sunday 102 tablet 3    multivitamin/iron/folic acid (MULTI COMPLETE WITH IRON ORAL) Take 1 tablet by mouth once daily.      omeprazole (PRILOSEC) 40 MG capsule Take 1 capsule (40 mg total) by mouth Daily. 90 capsule 1    propranoloL (INDERAL) 20 MG tablet Take 1 tablet (20 mg total) by mouth 2 (two) times daily as needed (anxiety). 60 tablet 0    spironolactone (ALDACTONE) 50 MG tablet TAKE 1 TABLET BY MOUTH DAILY 90 tablet 3    tretinoin (RETIN-A) 0.05 % cream Thin film to entire face at bedtime 20 g 5    zolpidem (AMBIEN CR) 12.5 MG CR tablet Take 1 tablet (12.5 mg total) by mouth nightly as needed for Insomnia. 30 tablet 0    [DISCONTINUED] ARIPiprazole (ABILIFY) 15 MG Tab Take 1 tablet (15 mg total) by mouth once daily. 90 tablet 0    [DISCONTINUED] FLUoxetine 40 MG capsule Take 1 capsule (40 mg total) by mouth once daily. 90 capsule 0    [DISCONTINUED] levothyroxine (SYNTHROID) 50 MCG tablet Take 1 tab Monday-Saturday and 2 tabs on Sunday (Patient taking differently: Take 50 mcg by mouth before breakfast. Take 1 tab Monday-Saturday and 2 tabs on Sunday) 102 tablet 3    [DISCONTINUED] omeprazole  (PRILOSEC) 40 MG capsule Take 1 capsule (40 mg total) by mouth Daily. 90 capsule 1    [DISCONTINUED] propranoloL (INDERAL) 20 MG tablet Take 1 tablet (20 mg total) by mouth 2 (two) times daily as needed (anxiety). 180 tablet 0    [DISCONTINUED] spironolactone (ALDACTONE) 50 MG tablet TAKE 1 TABLET BY MOUTH DAILY 90 tablet 3    [DISCONTINUED] tretinoin (RETIN-A) 0.025 % cream Apply topically every evening. 20 g 2    [DISCONTINUED] zolpidem (AMBIEN CR) 12.5 MG CR tablet Take 1 tablet (12.5 mg total) by mouth nightly as needed for Insomnia. 30 tablet 3     No facility-administered encounter medications on file as of 2/20/2025.       Allergy:  Review of patient's allergies indicates:  No Known Allergies      Assessment and Diagnosis   Status/Progress: Based on the examination today, the patient's problem(s) is/are well controlled.  New problems have not been presented today.   Co-morbidities are not complicating management of the primary condition.        General Impression:         ICD-10-CM ICD-9-CM   1. Bipolar II disorder  F31.81 296.89   2. Generalized anxiety disorder  F41.1 300.02   3. Insomnia, unspecified type  G47.00 780.52               R/O  OCPD  Borderline personality d/o  OCD  ADHD      Intervention/Counseling/Treatment Plan     Medication Management:  Continue Abilify 15 mg by mouth once daily  Continue Prozac 40 mg by mouth once daily  Continue propranolol 20 mg by mouth twice daily as needed for anxiety related to driving  Continue Ambien CR 12.5 mg by mouth once nightly prn insomnia. Pt was told not drive or to take this med with ETOH or other sedating meds/substance. Pt verbalized understanding.  Continue individual therapy  AIMS today: 0  Labs: reviewed most recent  Antipsychotics: I discussed with the patient the risks of extrapyramidal side effects (dystonia, akathisia, parkinsonism), metabolic syndrome (inlcuding hyperglycemia and hyperlipidemia), Neuroleptic Malignant Syndrome (fever, muscle  rigidity, autonomic instability), orthostatic hypotension, and tardive dyskinesia with antipsychotic use. Pt was told to report these symptoms right away. Pt was notified that it is possible for these movements to become permanent. The risks and benefits of medication were discussed with the patient. The patient expresses understanding of the above and displays the capacity to agree with this treatment given said understanding. Patient also agrees that, currently, the benefits outweigh the risks and would like to pursue treatment at this time.  The treatment plan and follow up plan were reviewed with the patient.  Discussed with patient informed consent, risks vs. benefits, alternative treatments, side effect profile and the inherent unpredictability of individual responses to these treatments. The patient expresses understanding of the above and displays the capacity to agree with this current plan and had no other questions.  Encouraged Patient to keep future appointments.   Take medications as prescribed and abstain from substance abuse.   Pt was told to present to ED or call 911 for SI/HI plan or intent, psychosis, or other psychiatric or medical emergency, and pt agrees to this and verbalized understanding.      Return to Clinic: 3 months, or sooner if needed        Face to Face time with patient: 31 minutes  Total time: 40 minutes of total time spent on the encounter, which includes face to face time and non-face to face time preparing to see the patient (eg, review of tests), Obtaining and/or reviewing separately obtained history, Documenting clinical information in the electronic or other health record, Independently interpreting results (not separately reported) and communicating results to the patient/family/caregiver, or Care coordination (not separately reported).       Linda Stephens, MSN, APRN, PMHNP-BC [facility]

## 2025-03-10 ENCOUNTER — OFFICE VISIT (OUTPATIENT)
Dept: FAMILY MEDICINE | Facility: CLINIC | Age: 45
End: 2025-03-10
Payer: COMMERCIAL

## 2025-03-10 DIAGNOSIS — L30.9 DERMATITIS: Primary | ICD-10-CM

## 2025-03-10 PROCEDURE — 98005 SYNCH AUDIO-VIDEO EST LOW 20: CPT | Mod: 95,,, | Performed by: PHYSICIAN ASSISTANT

## 2025-03-10 PROCEDURE — 1159F MED LIST DOCD IN RCRD: CPT | Mod: CPTII,95,, | Performed by: PHYSICIAN ASSISTANT

## 2025-03-10 PROCEDURE — 1160F RVW MEDS BY RX/DR IN RCRD: CPT | Mod: CPTII,95,, | Performed by: PHYSICIAN ASSISTANT

## 2025-03-10 RX ORDER — TRIAMCINOLONE ACETONIDE 1 MG/G
OINTMENT TOPICAL 2 TIMES DAILY
Qty: 80 G | Refills: 0 | Status: SHIPPED | OUTPATIENT
Start: 2025-03-10

## 2025-03-10 NOTE — PROGRESS NOTES
Subjective:       Patient ID: Roopa Rivas is a 45 y.o. female.    Chief Complaint: No chief complaint on file.    The patient location is: [place]  The chief complaint leading to consultation is: rash    Visit type: audiovisual    Face to Face time with patient: 5 min  15 minutes of total time spent on the encounter, which includes face to face time and non-face to face time preparing to see the patient (eg, review of tests), Obtaining and/or reviewing separately obtained history, Documenting clinical information in the electronic or other health record, Independently interpreting results (not separately reported) and communicating results to the patient/family/caregiver, or Care coordination (not separately reported).         Each patient to whom he or she provides medical services by telemedicine is:  (1) informed of the relationship between the physician and patient and the respective role of any other health care provider with respect to management of the patient; and (2) notified that he or she may decline to receive medical services by telemedicine and may withdraw from such care at any time.    Notes:   Patient presents via telemedicine with complaints of rash that started on her neck yesterday. She reports that his has worsened in the last day. It is itchy and it stings when creams are applied. The patient denies fever, chills, abdominal pain, cough, sore throat. The patient denies using new products or medications.         Rash  This is a new problem. The current episode started yesterday. The problem has been rapidly worsening since onset. The affected locations include the neck. The rash is characterized by burning, dryness, redness, itchiness and peeling. She was exposed to nothing. Pertinent negatives include no anorexia, congestion, cough, eye pain, facial edema, fatigue, fever, joint pain, rhinorrhea, shortness of breath, sore throat or vomiting. Past treatments include nothing. Her past medical  history is significant for varicella. There is no history of allergies, asthma or eczema.     Review of patient's allergies indicates:  No Known Allergies    Current Medications[1]    Lab Results   Component Value Date    WBC 6.48 12/24/2024    HGB 10.9 (L) 12/24/2024    HCT 34.8 (L) 12/24/2024     (H) 12/24/2024    CHOL 124 12/12/2024    TRIG 115 12/12/2024    HDL 31 (L) 12/12/2024    ALT 30 12/24/2024    AST 19 12/24/2024     12/12/2024    K 4.3 12/12/2024     12/12/2024    CREATININE 0.7 12/12/2024    BUN 10 12/12/2024    CO2 26 12/12/2024    TSH 1.739 12/12/2024    HGBA1C 5.2 12/12/2024       Review of Systems   Constitutional:  Negative for activity change, appetite change, fatigue and fever.   HENT:  Negative for congestion, ear pain, postnasal drip, rhinorrhea and sore throat.    Eyes:  Negative for pain, itching and visual disturbance.   Respiratory:  Negative for cough, shortness of breath and wheezing.    Cardiovascular:  Negative for chest pain.   Gastrointestinal:  Negative for abdominal distention, abdominal pain, anorexia, constipation, nausea and vomiting.   Genitourinary:  Negative for difficulty urinating, dysuria, frequency, hematuria and urgency.   Musculoskeletal:  Negative for arthralgias, back pain, joint pain, myalgias and neck pain.   Skin:  Positive for rash. Negative for color change and pallor.   Neurological:  Negative for dizziness, syncope and headaches.   Hematological:  Negative for adenopathy.   Psychiatric/Behavioral:  Negative for behavioral problems. The patient is not nervous/anxious.        Objective:      Physical Exam  Constitutional:       General: She is not in acute distress.     Appearance: Normal appearance.   HENT:      Head: Normocephalic and atraumatic.   Pulmonary:      Effort: Pulmonary effort is normal. No respiratory distress.   Skin:     Findings: Erythema and rash present.      Comments: Erythematous maculopapular rash present on left anterior  neck. Appears more erythematous in neck skin folds.   Neurological:      Mental Status: She is alert and oriented to person, place, and time.      Cranial Nerves: No cranial nerve deficit.   Psychiatric:         Behavior: Behavior normal.       Assessment:       1. Dermatitis        Plan:       Diagnoses and all orders for this visit:    Dermatitis  -     triamcinolone acetonide 0.1% (KENALOG) 0.1 % ointment; Apply topically 2 (two) times daily.  Apply aquaphor  Apply cool compresses  Send photo update on Friday  If symptoms worsen, follow up sooner.                [1]    Current Outpatient Medications:     ARIPiprazole (ABILIFY) 15 MG Tab, Take 1 tablet (15 mg total) by mouth once daily., Disp: 90 tablet, Rfl: 0    ergocalciferol (ERGOCALCIFEROL) 50,000 unit Cap, Take 1 capsule (50,000 Units total) by mouth every 7 days., Disp: 12 capsule, Rfl: 1    FLUoxetine 40 MG capsule, Take 1 capsule (40 mg total) by mouth once daily., Disp: 90 capsule, Rfl: 0    fluticasone propionate (FLONASE) 50 mcg/actuation nasal spray, 1 spray (50 mcg total) by Each Nostril route once daily., Disp: 9.9 mL, Rfl: 1    HYDROcodone-acetaminophen (NORCO) 5-325 mg per tablet, Take 1 tablet by mouth every 6 (six) hours as needed for Pain., Disp: 28 tablet, Rfl: 0    hydroquinone 8 % Emul, Compound hydroquinone 12% / kojic acid 6% / vitamin C 1% / niacinamide 2% cream. Apply a pea-sized amount to entire face qhs. Do not use for longer than 16 weeks in a row., Disp: 30 g, Rfl: 5    ibuprofen (ADVIL,MOTRIN) 800 MG tablet, Take 1 tablet (800 mg total) by mouth 3 (three) times daily as needed (mild to moderate pain)., Disp: 30 tablet, Rfl: 0    Lactobacillus acidophilus (PROBIOTIC ORAL), Take 1 tablet by mouth once daily., Disp: , Rfl:     levothyroxine (SYNTHROID) 50 MCG tablet, Take 1 tab Monday-Saturday and 2 tabs on Sunday, Disp: 102 tablet, Rfl: 3    multivitamin/iron/folic acid (MULTI COMPLETE WITH IRON ORAL), Take 1 tablet by mouth  once daily., Disp: , Rfl:     omeprazole (PRILOSEC) 40 MG capsule, Take 1 capsule (40 mg total) by mouth Daily., Disp: 90 capsule, Rfl: 1    propranoloL (INDERAL) 20 MG tablet, Take 1 tablet (20 mg total) by mouth 2 (two) times daily as needed (anxiety)., Disp: 60 tablet, Rfl: 3    spironolactone (ALDACTONE) 50 MG tablet, TAKE 1 TABLET BY MOUTH DAILY, Disp: 90 tablet, Rfl: 3    tretinoin (RETIN-A) 0.05 % cream, Thin film to entire face at bedtime, Disp: 20 g, Rfl: 5    triamcinolone acetonide 0.1% (KENALOG) 0.1 % ointment, Apply topically 2 (two) times daily., Disp: 80 g, Rfl: 0    zolpidem (AMBIEN CR) 12.5 MG CR tablet, Take 1 tablet (12.5 mg total) by mouth nightly as needed for Insomnia., Disp: 30 tablet, Rfl: 3

## 2025-04-03 ENCOUNTER — PATIENT OUTREACH (OUTPATIENT)
Dept: ADMINISTRATIVE | Facility: HOSPITAL | Age: 45
End: 2025-04-03
Payer: COMMERCIAL

## 2025-04-07 DIAGNOSIS — G47.00 INSOMNIA, UNSPECIFIED TYPE: ICD-10-CM

## 2025-04-08 RX ORDER — ZOLPIDEM TARTRATE 12.5 MG/1
12.5 TABLET, FILM COATED, EXTENDED RELEASE ORAL NIGHTLY PRN
Qty: 30 TABLET | Refills: 1 | Status: SHIPPED | OUTPATIENT
Start: 2025-04-08 | End: 2025-06-07

## 2025-04-09 ENCOUNTER — LAB VISIT (OUTPATIENT)
Dept: LAB | Facility: HOSPITAL | Age: 45
End: 2025-04-09
Attending: FAMILY MEDICINE
Payer: COMMERCIAL

## 2025-04-09 DIAGNOSIS — E03.9 HYPOTHYROIDISM, UNSPECIFIED TYPE: ICD-10-CM

## 2025-04-09 DIAGNOSIS — E61.1 IRON DEFICIENCY: ICD-10-CM

## 2025-04-09 DIAGNOSIS — E55.9 VITAMIN D DEFICIENCY: ICD-10-CM

## 2025-04-09 LAB
25(OH)D3+25(OH)D2 SERPL-MCNC: 25 NG/ML (ref 30–96)
ABSOLUTE EOSINOPHIL (OHS): 0.11 K/UL
ABSOLUTE MONOCYTE (OHS): 0.5 K/UL (ref 0.3–1)
ABSOLUTE NEUTROPHIL COUNT (OHS): 6.67 K/UL (ref 1.8–7.7)
ALBUMIN SERPL BCP-MCNC: 4 G/DL (ref 3.5–5.2)
ALP SERPL-CCNC: 128 UNIT/L (ref 40–150)
ALT SERPL W/O P-5'-P-CCNC: 24 UNIT/L (ref 10–44)
ANION GAP (OHS): 10 MMOL/L (ref 8–16)
AST SERPL-CCNC: 19 UNIT/L (ref 11–45)
BASOPHILS # BLD AUTO: 0.08 K/UL
BASOPHILS NFR BLD AUTO: 0.8 %
BILIRUB SERPL-MCNC: 0.2 MG/DL (ref 0.1–1)
BUN SERPL-MCNC: 13 MG/DL (ref 6–20)
CALCIUM SERPL-MCNC: 9.8 MG/DL (ref 8.7–10.5)
CHLORIDE SERPL-SCNC: 104 MMOL/L (ref 95–110)
CHOLEST SERPL-MCNC: 185 MG/DL (ref 120–199)
CHOLEST/HDLC SERPL: 3.3 {RATIO} (ref 2–5)
CO2 SERPL-SCNC: 24 MMOL/L (ref 23–29)
CREAT SERPL-MCNC: 0.7 MG/DL (ref 0.5–1.4)
ERYTHROCYTE [DISTWIDTH] IN BLOOD BY AUTOMATED COUNT: 13.4 % (ref 11.5–14.5)
GFR SERPLBLD CREATININE-BSD FMLA CKD-EPI: >60 ML/MIN/1.73/M2
GLUCOSE SERPL-MCNC: 81 MG/DL (ref 70–110)
HCT VFR BLD AUTO: 38.4 % (ref 37–48.5)
HDLC SERPL-MCNC: 56 MG/DL (ref 40–75)
HDLC SERPL: 30.3 % (ref 20–50)
HGB BLD-MCNC: 12.2 GM/DL (ref 12–16)
IMM GRANULOCYTES # BLD AUTO: 0.01 K/UL (ref 0–0.04)
IMM GRANULOCYTES NFR BLD AUTO: 0.1 % (ref 0–0.5)
LDLC SERPL CALC-MCNC: 106.4 MG/DL (ref 63–159)
LYMPHOCYTES # BLD AUTO: 2.32 K/UL (ref 1–4.8)
MCH RBC QN AUTO: 29.2 PG (ref 27–31)
MCHC RBC AUTO-ENTMCNC: 31.8 G/DL (ref 32–36)
MCV RBC AUTO: 92 FL (ref 82–98)
NONHDLC SERPL-MCNC: 129 MG/DL
NUCLEATED RBC (/100WBC) (OHS): 0 /100 WBC
PLATELET # BLD AUTO: 376 K/UL (ref 150–450)
PMV BLD AUTO: 9.9 FL (ref 9.2–12.9)
POTASSIUM SERPL-SCNC: 4.8 MMOL/L (ref 3.5–5.1)
PROT SERPL-MCNC: 7.8 GM/DL (ref 6–8.4)
RBC # BLD AUTO: 4.18 M/UL (ref 4–5.4)
RELATIVE EOSINOPHIL (OHS): 1.1 %
RELATIVE LYMPHOCYTE (OHS): 23.9 % (ref 18–48)
RELATIVE MONOCYTE (OHS): 5.2 % (ref 4–15)
RELATIVE NEUTROPHIL (OHS): 68.9 % (ref 38–73)
SODIUM SERPL-SCNC: 138 MMOL/L (ref 136–145)
TRIGL SERPL-MCNC: 113 MG/DL (ref 30–150)
TSH SERPL-ACNC: 2.31 UIU/ML (ref 0.4–4)
WBC # BLD AUTO: 9.69 K/UL (ref 3.9–12.7)

## 2025-04-09 PROCEDURE — 82306 VITAMIN D 25 HYDROXY: CPT

## 2025-04-09 PROCEDURE — 80053 COMPREHEN METABOLIC PANEL: CPT

## 2025-04-09 PROCEDURE — 36415 COLL VENOUS BLD VENIPUNCTURE: CPT | Mod: PO

## 2025-04-09 PROCEDURE — 84443 ASSAY THYROID STIM HORMONE: CPT

## 2025-04-09 PROCEDURE — 85025 COMPLETE CBC W/AUTO DIFF WBC: CPT

## 2025-04-09 PROCEDURE — 80061 LIPID PANEL: CPT

## 2025-04-10 ENCOUNTER — RESULTS FOLLOW-UP (OUTPATIENT)
Dept: FAMILY MEDICINE | Facility: CLINIC | Age: 45
End: 2025-04-10
Payer: COMMERCIAL

## 2025-04-16 DIAGNOSIS — E55.9 VITAMIN D DEFICIENCY: Primary | ICD-10-CM

## 2025-04-16 RX ORDER — ERGOCALCIFEROL 1.25 MG/1
50000 CAPSULE ORAL
Qty: 12 CAPSULE | Refills: 1 | Status: SHIPPED | OUTPATIENT
Start: 2025-04-16

## 2025-04-16 NOTE — TELEPHONE ENCOUNTER
----- Message from Lilibeth sent at 4/11/2025  4:05 PM CDT -----  Type:  Patient Returning CallWho Called:pt Who Left Message for Patient:unsure Does the patient know what this is regarding?:lab test results Would the patient rather a call back or a response via [facility]? Please call Best Call Back Number:[phone] Information:     Please call back to advise. Thanks!

## 2025-04-16 NOTE — TELEPHONE ENCOUNTER
No care due was identified.  [facility] Embedded Care Due Messages. Reference number: [id number].   4/16/2025 9:31:40 AM CDT

## 2025-05-08 ENCOUNTER — TELEPHONE (OUTPATIENT)
Dept: FAMILY MEDICINE | Facility: CLINIC | Age: 45
End: 2025-05-08
Payer: COMMERCIAL

## 2025-05-08 NOTE — TELEPHONE ENCOUNTER
----- Message from Reginaldo Campoverde MD sent at 4/10/2025  9:55 PM CDT -----  Continue vitamin-D 50,000 weekly and repeat level in 3 months.  FOLLOW-UP AS RECOMMENDED  ----- Message -----  From: Lab, Background User  Sent: 4/9/2025   9:38 PM CDT  To: Reginaldo Campoverde III, MD

## 2025-05-11 DIAGNOSIS — L70.0 ACNE VULGARIS: ICD-10-CM

## 2025-05-11 DIAGNOSIS — G47.00 INSOMNIA, UNSPECIFIED TYPE: ICD-10-CM

## 2025-05-12 RX ORDER — SPIRONOLACTONE 50 MG/1
TABLET, FILM COATED ORAL
Qty: 90 TABLET | Refills: 3 | Status: SHIPPED | OUTPATIENT
Start: 2025-05-12

## 2025-05-12 RX ORDER — ERGOCALCIFEROL 1.25 MG/1
50000 CAPSULE ORAL
Qty: 12 CAPSULE | Refills: 0 | Status: SHIPPED | OUTPATIENT
Start: 2025-05-12

## 2025-05-12 RX ORDER — ZOLPIDEM TARTRATE 12.5 MG/1
12.5 TABLET, FILM COATED, EXTENDED RELEASE ORAL NIGHTLY PRN
Qty: 30 TABLET | Refills: 0 | Status: SHIPPED | OUTPATIENT
Start: 2025-05-12 | End: 2025-06-11

## 2025-05-12 RX ORDER — TRETINOIN 0.5 MG/G
CREAM TOPICAL
Qty: 20 G | Refills: 5 | Status: SHIPPED | OUTPATIENT
Start: 2025-05-12

## 2025-05-12 NOTE — TELEPHONE ENCOUNTER
No care due was identified.  [facility] Embedded Care Due Messages. Reference number: [id number].   5/11/2025 10:27:05 PM CDT

## 2025-05-20 ENCOUNTER — OFFICE VISIT (OUTPATIENT)
Dept: PSYCHIATRY | Facility: CLINIC | Age: 45
End: 2025-05-20
Payer: COMMERCIAL

## 2025-05-20 DIAGNOSIS — G47.00 INSOMNIA, UNSPECIFIED TYPE: ICD-10-CM

## 2025-05-20 DIAGNOSIS — F31.81 BIPOLAR II DISORDER: Primary | ICD-10-CM

## 2025-05-20 DIAGNOSIS — F41.1 GENERALIZED ANXIETY DISORDER: ICD-10-CM

## 2025-05-20 PROCEDURE — 90833 PSYTX W PT W E/M 30 MIN: CPT | Mod: 95,,, | Performed by: NURSE PRACTITIONER

## 2025-05-20 PROCEDURE — 1159F MED LIST DOCD IN RCRD: CPT | Mod: CPTII,95,, | Performed by: NURSE PRACTITIONER

## 2025-05-20 PROCEDURE — 1160F RVW MEDS BY RX/DR IN RCRD: CPT | Mod: CPTII,95,, | Performed by: NURSE PRACTITIONER

## 2025-05-20 PROCEDURE — 98006 SYNCH AUDIO-VIDEO EST MOD 30: CPT | Mod: 95,,, | Performed by: NURSE PRACTITIONER

## 2025-05-20 RX ORDER — ARIPIPRAZOLE 15 MG/1
15 TABLET ORAL DAILY
Qty: 90 TABLET | Refills: 0 | Status: SHIPPED | OUTPATIENT
Start: 2025-05-20 | End: 2025-08-18

## 2025-05-20 RX ORDER — FLUOXETINE HYDROCHLORIDE 40 MG/1
40 CAPSULE ORAL DAILY
Qty: 90 CAPSULE | Refills: 0 | Status: SHIPPED | OUTPATIENT
Start: 2025-05-20 | End: 2025-08-18

## 2025-05-20 RX ORDER — ZOLPIDEM TARTRATE 12.5 MG/1
12.5 TABLET, FILM COATED, EXTENDED RELEASE ORAL NIGHTLY PRN
Qty: 30 TABLET | Refills: 1 | Status: SHIPPED | OUTPATIENT
Start: 2025-05-20 | End: 2025-07-19

## 2025-05-20 NOTE — PROGRESS NOTES
Outpatient Psychiatry Follow-Up Visit (MD/NP) - Telemedicine Visit    5/20/2025 11:39 AM  Roopa Rivas  1980  [id number]      The patient location is: Patient reported that their location at the time of this visit was in the [facility]       Crisis Disclaimer: Patient was informed that due to the virtual nature of the visit, that if a crisis develops, protocols will be implemented to ensure patient safety, including but not limited to: 1) Initiating a welfare check with local Law Enforcement, 2) Calling 1/National Crisis Hotline, and/or 3) Initiating PEC/CEC procedures.      Visit type: audiovisual    Each patient to whom he or she provides medical services by telemedicine is:  (1) informed of the relationship between the physician and patient and the respective role of any other health care provider with respect to management of the patient; and (2) notified that he or she may decline to receive medical services by telemedicine and may withdraw from such care at any time.      Clinical Status of Patient:  Outpatient (Ambulatory)    Chief Complaint:  Roopa Rivas, a 45 y.o. female,who presents today for follow up of mood swings and anxiety.  Met with patient.        Interval History/Subjective Report/Content of Current Session:     States that ever since she stopped taking her birth control bills, her sex drive has been significantly increased. States she is worried she will have indiscriminate sex and thinks she may have a sex addiction. States this happened to her prior to getting on birth control. She specifically denies any sxs of rufus or hypomania.  We discussed her seeing her GYN re her hormones and a counselor if she feels she has an addiction.  Denies depressed mood and anhedonia.     Uses propranolol occasionally - doesn't want to rely on it.    ASEs from psych meds: denies    Pt is future oriented and denies recurrent thoughts of death and denies SI/HI. Denies AVH, paranoia and delusions.  No objective s/sx of psychosis or rufus.     Patient verbalized motivation for compliance with medications and all other elements of treatment plan.     Denies any EPS/TD and none observed.    I discussed with the pt that I will be moving to [place] at the end of June. We discussed the federal law that requires telehealth pts who are receiving rxs for controlled meds to be seen in person at least once every 12 months. I explained that I will be coming into the office periodically to see patients in person. I explained that the pt will be notified when I am coming in and for the pt to ensure that the pt schedules an appt with me for an in person visit at least once every 12 months. I explained that I will not be able to continue to prescribe the controlled med unless the pt is seen by me in person at least once every 12 months. The pt verbalized understanding. We discussed the possibility of having her PCP take over prescribing her Ambien. She will ask him the next time she sees him.        Psychotherapy:  Target symptoms: anxiety , mood swings  Why chosen therapy is appropriate versus another modality: relevant to diagnosis, patient responds to this modality  Outcome monitoring methods: self-report, observation  Therapeutic intervention type: supportive psychotherapy  Topics discussed/themes: building skills sets for symptom management  The patient's response to the intervention is accepting. The patient's progress toward treatment goals is good.   Duration of intervention: 19 minutes.      Psychotropic medication review  Previous Trials-  Seroquel - severe dry eyes, hurt to blink, no peripheral vision, memory loss  Lexapro - early 2000s, stopped taking due to insurance  Cymbalta      Current meds-  Prozac  Abilify  Propranolol  Ambien CR    History:       Past Medical, Psychiatric, Family and Social History: The patient's past medical, psychiatric, family and social history, allergies, current medications, past  surgical history, and problem list have been reviewed and updated as appropriate within the electronic medical record.         Additional historical information includes:   Past psych hospitalizations: denies  Past suicide attempts: denies    Seizure: denies  Head trauma/TBI: denies  Access to a firearm: yes -  Pt was instructed to keep the firearm in a safe, locked container and to store the bullets separately.      Review of Systems       Review of Systems   Constitutional:  Negative for chills, fever and malaise/fatigue.   Respiratory:  Negative for cough and shortness of breath.    Cardiovascular:  Negative for chest pain and palpitations.   Gastrointestinal:  Negative for abdominal pain, diarrhea and vomiting.   Genitourinary:  Negative for dysuria and hematuria.   Musculoskeletal:  Negative for falls and myalgias.   Skin:  Negative for rash.   Neurological:  Negative for tremors, seizures and headaches.   Psychiatric/Behavioral:          See HPI         Compliance: yes        Risk Parameters:  Patient reports no suicidal ideation  Patient reports no homicidal ideation  Patient reports no self-injurious behavior  Patient reports no violent behavior    Exam (detailed: at least 9 elements; comprehensive: all 15 elements)     Constitutional  Vitals:  Most recent vital signs, dated less than 90 days prior to this appointment, were reviewed.   There were no vitals filed for this visit.           Musculoskeletal  Muscle Strength/Tone:  not examined - TEN due to virtual visit   Gait & Station:  TEN due to virtual visit     Psychiatric      Appearance:  unremarkable, age appropriate, well nourished, casually dressed, neatly groomed, overweight   Behavior:  normal, friendly and cooperative, eye contact normal     Speech:  no latency; no press   Mood & Affect:  euthymic  congruent and appropriate   Thought Process:  normal and logical   Associations:  intact   Thought Content:  normal, no suicidality, no homicidality,  delusions, or paranoia   Insight:  intact, has awareness of illness   Judgement: behavior is adequate to circumstances, age appropriate   Orientation:  grossly intact   Memory: intact for content of interview   Language: grossly intact   Attention Span & Concentration:  able to focus   Fund of Knowledge:  intact and appropriate to age and level of education       Medications:  Outpatient Encounter Medications as of 5/20/2025   Medication Sig Dispense Refill    ARIPiprazole (ABILIFY) 15 MG Tab Take 1 tablet (15 mg total) by mouth once daily. 90 tablet 0    ergocalciferol (ERGOCALCIFEROL) 50,000 unit Cap Take 1 capsule (50,000 Units total) by mouth every 7 days. 12 capsule 0    FLUoxetine 40 MG capsule Take 1 capsule (40 mg total) by mouth once daily. 90 capsule 0    fluticasone propionate (FLONASE) 50 mcg/actuation nasal spray 1 spray (50 mcg total) by Each Nostril route once daily. 9.9 mL 1    HYDROcodone-acetaminophen (NORCO) 5-325 mg per tablet Take 1 tablet by mouth every 6 (six) hours as needed for Pain. 28 tablet 0    hydroquinone 8 % Emul Compound hydroquinone 12% / kojic acid 6% / vitamin C 1% / niacinamide 2% cream. Apply a pea-sized amount to entire face qhs. Do not use for longer than 16 weeks in a row. 30 g 5    ibuprofen (ADVIL,MOTRIN) 800 MG tablet Take 1 tablet (800 mg total) by mouth 3 (three) times daily as needed (mild to moderate pain). 30 tablet 0    Lactobacillus acidophilus (PROBIOTIC ORAL) Take 1 tablet by mouth once daily.      levothyroxine (SYNTHROID) 50 MCG tablet Take 1 tab Monday-Saturday and 2 tabs on Sunday 102 tablet 3    multivitamin/iron/folic acid (MULTI COMPLETE WITH IRON ORAL) Take 1 tablet by mouth once daily.      omeprazole (PRILOSEC) 40 MG capsule Take 1 capsule (40 mg total) by mouth Daily. 90 capsule 1    propranoloL (INDERAL) 20 MG tablet Take 1 tablet (20 mg total) by mouth 2 (two) times daily as needed (anxiety). 60 tablet 3    spironolactone (ALDACTONE) 50 MG tablet TAKE  1 TABLET BY MOUTH DAILY 90 tablet 3    tretinoin (RETIN-A) 0.05 % cream Thin film to entire face at bedtime 20 g 5    triamcinolone acetonide 0.1% (KENALOG) 0.1 % ointment Apply topically 2 (two) times daily. 80 g 0    zolpidem (AMBIEN CR) 12.5 MG CR tablet Take 1 tablet (12.5 mg total) by mouth nightly as needed for Insomnia. 30 tablet 0    [DISCONTINUED] ergocalciferol (ERGOCALCIFEROL) 50,000 unit Cap Take 1 capsule (50,000 Units total) by mouth every 7 days. 12 capsule 1    [DISCONTINUED] spironolactone (ALDACTONE) 50 MG tablet TAKE 1 TABLET BY MOUTH DAILY 90 tablet 3    [DISCONTINUED] tretinoin (RETIN-A) 0.05 % cream Thin film to entire face at bedtime 20 g 5    [DISCONTINUED] zolpidem (AMBIEN CR) 12.5 MG CR tablet Take 1 tablet (12.5 mg total) by mouth nightly as needed for Insomnia. 30 tablet 1     No facility-administered encounter medications on file as of 5/20/2025.       Allergy:  Review of patient's allergies indicates:  No Known Allergies      Assessment and Diagnosis   Status/Progress: Based on the examination today, the patient's problem(s) is/are well controlled.  New problems have not been presented today.   Co-morbidities are not complicating management of the primary condition.        General Impression:         ICD-10-CM ICD-9-CM   1. Bipolar II disorder  F31.81 296.89   2. Generalized anxiety disorder  F41.1 300.02   3. Insomnia, unspecified type  G47.00 780.52         R/O  OCPD  Borderline personality d/o  OCD  ADHD      Intervention/Counseling/Treatment Plan     Medication Management:  Continue Abilify 15 mg by mouth once daily  Continue Prozac 40 mg by mouth once daily  Continue propranolol 20 mg by mouth twice daily as needed for anxiety related to driving  Continue Ambien CR 12.5 mg by mouth once nightly prn insomnia. Pt was told not drive or to take this med with ETOH or other sedating meds/substance. Pt verbalized understanding.  Labs: reviewed most recent  Sedative hypnotic: Pt was  informed of the Black Box Warning on medications such as eszopiclone (Lunesta), zaleplon (Sonata), and zolpidem (Ambien, Ambien CR, Edluar, Intermezzo, Zolpimist) than other prescription medicines used for sleep. Pt was also informed of the following:  The Food and Drug Administration (FDA) is advising that rare but serious injuries have happened with certain common prescription insomnia medicines because of sleep behaviors, including sleepwalking, sleep driving, and engaging in other activities while not fully awake. These complex sleep behaviors have also resulted in deaths.   Serious injuries and death from complex sleep behaviors have occurred in patients with and without a history of such behaviors, even at the lowest recommended doses, and the behaviors can occur after just one dose. These behaviors can occur after taking these medicines with or without alcohol or other central nervous system depressants that may be sedating such as tranquilizers, opioids, and anti-anxiety medicines. Other possible side effects of these medications include they may cause some people, especially older persons, to become drowsy, dizzy, lightheaded, clumsy or unsteady, or less alert than they are normally, which may lead to falls. Even though these meds are taken at bedtime, they may cause some people to feel drowsy or less alert on arising. Also, this medicine may cause double vision or other vision problems, or severe injuries (eg, hip fractures, severe bleeding in the head).  Antipsychotics: I discussed with the patient the risks of extrapyramidal side effects (dystonia, akathisia, parkinsonism), metabolic syndrome (inlcuding hyperglycemia and hyperlipidemia), Neuroleptic Malignant Syndrome (fever, muscle rigidity, autonomic instability), orthostatic hypotension, and tardive dyskinesia with antipsychotic use. Pt was told to report these symptoms right away. Pt was notified that it is possible for these movements to become  permanent. The risks and benefits of medication were discussed with the patient. The patient expresses understanding of the above and displays the capacity to agree with this treatment given said understanding. Patient also agrees that, currently, the benefits outweigh the risks and would like to pursue treatment at this time.  The treatment plan and follow up plan were reviewed with the patient.  Discussed with patient informed consent, risks vs. benefits, alternative treatments, side effect profile and the inherent unpredictability of individual responses to these treatments. The patient expresses understanding of the above and displays the capacity to agree with this current plan and had no other questions.  Encouraged Patient to keep future appointments.   Take medications as prescribed and abstain from substance abuse.   Pt was told to present to ED or call 911 for SI/HI plan or intent, psychosis, or other psychiatric or medical emergency, and pt agrees to this and verbalized understanding.      Return to Clinic: 3 months, or sooner if needed        Face to Face time with patient: 42 minutes  Total time: 50 minutes of total time spent on the encounter, which includes face to face time and non-face to face time preparing to see the patient (eg, review of tests), Obtaining and/or reviewing separately obtained history, Documenting clinical information in the electronic or other health record, Independently interpreting results (not separately reported) and communicating results to the patient/family/caregiver, or Care coordination (not separately reported).       Linda Stephens, MSN, APRN, PMHNP-BC [facility]

## 2025-06-02 ENCOUNTER — TELEPHONE (OUTPATIENT)
Dept: FAMILY MEDICINE | Facility: CLINIC | Age: 45
End: 2025-06-02
Payer: COMMERCIAL

## 2025-06-03 ENCOUNTER — OFFICE VISIT (OUTPATIENT)
Dept: FAMILY MEDICINE | Facility: CLINIC | Age: 45
End: 2025-06-03
Payer: COMMERCIAL

## 2025-06-03 VITALS
SYSTOLIC BLOOD PRESSURE: 110 MMHG | WEIGHT: 149.81 LBS | HEART RATE: 90 BPM | DIASTOLIC BLOOD PRESSURE: 70 MMHG | HEIGHT: 59 IN | BODY MASS INDEX: 30.2 KG/M2 | TEMPERATURE: 98 F

## 2025-06-03 DIAGNOSIS — R53.83 FATIGUE, UNSPECIFIED TYPE: ICD-10-CM

## 2025-06-03 DIAGNOSIS — R61 NIGHT SWEATS: ICD-10-CM

## 2025-06-03 DIAGNOSIS — Z12.11 ENCOUNTER FOR SCREENING COLONOSCOPY: Primary | ICD-10-CM

## 2025-06-03 DIAGNOSIS — E61.1 IRON DEFICIENCY: ICD-10-CM

## 2025-06-03 DIAGNOSIS — R00.2 PALPITATIONS: ICD-10-CM

## 2025-06-03 DIAGNOSIS — Z12.11 SCREENING FOR COLON CANCER: ICD-10-CM

## 2025-06-03 DIAGNOSIS — R11.0 NAUSEA: ICD-10-CM

## 2025-06-03 LAB
B-HCG UR QL: NEGATIVE
BILIRUBIN, UA POC OHS: NEGATIVE
BLOOD, UA POC OHS: ABNORMAL
CLARITY, UA POC OHS: CLEAR
COLOR, UA POC OHS: YELLOW
CTP QC/QA: YES
GLUCOSE, UA POC OHS: NEGATIVE
KETONES, UA POC OHS: NEGATIVE
LEUKOCYTES, UA POC OHS: NEGATIVE
NITRITE, UA POC OHS: NEGATIVE
PH, UA POC OHS: 6.5
PROTEIN, UA POC OHS: NEGATIVE
SPECIFIC GRAVITY, UA POC OHS: 1.02
UROBILINOGEN, UA POC OHS: 0.2

## 2025-06-03 PROCEDURE — 93005 ELECTROCARDIOGRAM TRACING: CPT | Mod: S$GLB,,, | Performed by: NURSE PRACTITIONER

## 2025-06-03 PROCEDURE — 3078F DIAST BP <80 MM HG: CPT | Mod: CPTII,S$GLB,, | Performed by: NURSE PRACTITIONER

## 2025-06-03 PROCEDURE — 3008F BODY MASS INDEX DOCD: CPT | Mod: CPTII,S$GLB,, | Performed by: NURSE PRACTITIONER

## 2025-06-03 PROCEDURE — 81025 URINE PREGNANCY TEST: CPT | Mod: S$GLB,,, | Performed by: NURSE PRACTITIONER

## 2025-06-03 PROCEDURE — 81003 URINALYSIS AUTO W/O SCOPE: CPT | Mod: QW,S$GLB,, | Performed by: NURSE PRACTITIONER

## 2025-06-03 PROCEDURE — 3074F SYST BP LT 130 MM HG: CPT | Mod: CPTII,S$GLB,, | Performed by: NURSE PRACTITIONER

## 2025-06-03 PROCEDURE — 99214 OFFICE O/P EST MOD 30 MIN: CPT | Mod: S$GLB,,, | Performed by: NURSE PRACTITIONER

## 2025-06-03 PROCEDURE — 1159F MED LIST DOCD IN RCRD: CPT | Mod: CPTII,S$GLB,, | Performed by: NURSE PRACTITIONER

## 2025-06-03 PROCEDURE — 99999 PR PBB SHADOW E&M-EST. PATIENT-LVL IV: CPT | Mod: PBBFAC,,, | Performed by: NURSE PRACTITIONER

## 2025-06-03 PROCEDURE — 93010 ELECTROCARDIOGRAM REPORT: CPT | Mod: S$GLB,,, | Performed by: INTERNAL MEDICINE

## 2025-06-03 RX ORDER — ONDANSETRON 4 MG/1
4 TABLET, ORALLY DISINTEGRATING ORAL EVERY 8 HOURS PRN
Qty: 30 TABLET | Refills: 0 | Status: SHIPPED | OUTPATIENT
Start: 2025-06-03

## 2025-06-04 ENCOUNTER — LAB VISIT (OUTPATIENT)
Dept: LAB | Facility: HOSPITAL | Age: 45
End: 2025-06-04
Attending: NURSE PRACTITIONER
Payer: COMMERCIAL

## 2025-06-04 ENCOUNTER — PATIENT MESSAGE (OUTPATIENT)
Dept: GASTROENTEROLOGY | Facility: CLINIC | Age: 45
End: 2025-06-04
Payer: COMMERCIAL

## 2025-06-04 DIAGNOSIS — E61.1 IRON DEFICIENCY: ICD-10-CM

## 2025-06-04 DIAGNOSIS — R61 NIGHT SWEATS: ICD-10-CM

## 2025-06-04 DIAGNOSIS — R53.83 FATIGUE, UNSPECIFIED TYPE: ICD-10-CM

## 2025-06-04 LAB
ABSOLUTE EOSINOPHIL (OHS): 0.26 K/UL
ABSOLUTE MONOCYTE (OHS): 0.63 K/UL (ref 0.3–1)
ABSOLUTE NEUTROPHIL COUNT (OHS): 4.79 K/UL (ref 1.8–7.7)
ALBUMIN SERPL BCP-MCNC: 4 G/DL (ref 3.5–5.2)
ALP SERPL-CCNC: 114 UNIT/L (ref 40–150)
ALT SERPL W/O P-5'-P-CCNC: 23 UNIT/L (ref 10–44)
ANION GAP (OHS): 7 MMOL/L (ref 8–16)
AST SERPL-CCNC: 17 UNIT/L (ref 11–45)
BASOPHILS # BLD AUTO: 0.07 K/UL
BASOPHILS NFR BLD AUTO: 0.9 %
BILIRUB SERPL-MCNC: 0.2 MG/DL (ref 0.1–1)
BUN SERPL-MCNC: 16 MG/DL (ref 6–20)
CALCIUM SERPL-MCNC: 9.1 MG/DL (ref 8.7–10.5)
CHLORIDE SERPL-SCNC: 106 MMOL/L (ref 95–110)
CO2 SERPL-SCNC: 24 MMOL/L (ref 23–29)
CREAT SERPL-MCNC: 0.6 MG/DL (ref 0.5–1.4)
ERYTHROCYTE [DISTWIDTH] IN BLOOD BY AUTOMATED COUNT: 13.7 % (ref 11.5–14.5)
GFR SERPLBLD CREATININE-BSD FMLA CKD-EPI: >60 ML/MIN/1.73/M2
GLUCOSE SERPL-MCNC: 101 MG/DL (ref 70–110)
HCT VFR BLD AUTO: 36.4 % (ref 37–48.5)
HGB BLD-MCNC: 11.8 GM/DL (ref 12–16)
IMM GRANULOCYTES # BLD AUTO: 0.01 K/UL (ref 0–0.04)
IMM GRANULOCYTES NFR BLD AUTO: 0.1 % (ref 0–0.5)
LYMPHOCYTES # BLD AUTO: 2.43 K/UL (ref 1–4.8)
MAGNESIUM SERPL-MCNC: 1.8 MG/DL (ref 1.6–2.6)
MCH RBC QN AUTO: 30.2 PG (ref 27–31)
MCHC RBC AUTO-ENTMCNC: 32.4 G/DL (ref 32–36)
MCV RBC AUTO: 93 FL (ref 82–98)
NUCLEATED RBC (/100WBC) (OHS): 0 /100 WBC
PLATELET # BLD AUTO: 329 K/UL (ref 150–450)
PMV BLD AUTO: 10.1 FL (ref 9.2–12.9)
POTASSIUM SERPL-SCNC: 4.8 MMOL/L (ref 3.5–5.1)
PROGEST SERPL-MCNC: 1.5 NG/ML
PROLACTIN SERPL IA-MCNC: 21.6 NG/ML (ref 5.2–26.5)
PROT SERPL-MCNC: 7.1 GM/DL (ref 6–8.4)
RBC # BLD AUTO: 3.91 M/UL (ref 4–5.4)
RELATIVE EOSINOPHIL (OHS): 3.2 %
RELATIVE LYMPHOCYTE (OHS): 29.7 % (ref 18–48)
RELATIVE MONOCYTE (OHS): 7.7 % (ref 4–15)
RELATIVE NEUTROPHIL (OHS): 58.4 % (ref 38–73)
SODIUM SERPL-SCNC: 137 MMOL/L (ref 136–145)
T3FREE SERPL-MCNC: 130 NG/DL (ref 60–180)
T4 FREE SERPL-MCNC: 1.08 NG/DL (ref 0.71–1.51)
TSH SERPL-ACNC: 3.8 UIU/ML (ref 0.4–4)
VIT B12 SERPL-MCNC: 576 PG/ML (ref 210–950)
WBC # BLD AUTO: 8.19 K/UL (ref 3.9–12.7)

## 2025-06-04 PROCEDURE — 84480 ASSAY TRIIODOTHYRONINE (T3): CPT

## 2025-06-04 PROCEDURE — 82607 VITAMIN B-12: CPT

## 2025-06-04 PROCEDURE — 83735 ASSAY OF MAGNESIUM: CPT

## 2025-06-04 PROCEDURE — 84443 ASSAY THYROID STIM HORMONE: CPT

## 2025-06-04 PROCEDURE — 85025 COMPLETE CBC W/AUTO DIFF WBC: CPT

## 2025-06-04 PROCEDURE — 84439 ASSAY OF FREE THYROXINE: CPT

## 2025-06-04 PROCEDURE — 80053 COMPREHEN METABOLIC PANEL: CPT

## 2025-06-04 PROCEDURE — 36415 COLL VENOUS BLD VENIPUNCTURE: CPT | Mod: PO

## 2025-06-04 PROCEDURE — 84146 ASSAY OF PROLACTIN: CPT

## 2025-06-04 PROCEDURE — 84144 ASSAY OF PROGESTERONE: CPT

## 2025-06-05 ENCOUNTER — RESULTS FOLLOW-UP (OUTPATIENT)
Dept: FAMILY MEDICINE | Facility: CLINIC | Age: 45
End: 2025-06-05

## 2025-06-05 ENCOUNTER — TELEPHONE (OUTPATIENT)
Dept: FAMILY MEDICINE | Facility: CLINIC | Age: 45
End: 2025-06-05
Payer: COMMERCIAL

## 2025-06-05 NOTE — PROGRESS NOTES
Your labs look normal.  No abnormalities that would explain your symptoms. Follow up as previously discussed.

## 2025-06-06 LAB
OHS QRS DURATION: 86 MS
OHS QTC CALCULATION: 445 MS

## 2025-06-23 ENCOUNTER — OFFICE VISIT (OUTPATIENT)
Dept: FAMILY MEDICINE | Facility: CLINIC | Age: 45
End: 2025-06-23
Payer: COMMERCIAL

## 2025-06-23 VITALS
OXYGEN SATURATION: 99 % | BODY MASS INDEX: 30.73 KG/M2 | HEART RATE: 71 BPM | TEMPERATURE: 98 F | WEIGHT: 152.44 LBS | DIASTOLIC BLOOD PRESSURE: 60 MMHG | SYSTOLIC BLOOD PRESSURE: 100 MMHG | HEIGHT: 59 IN

## 2025-06-23 DIAGNOSIS — F31.9 BIPOLAR AFFECTIVE DISORDER, REMISSION STATUS UNSPECIFIED: ICD-10-CM

## 2025-06-23 DIAGNOSIS — D64.9 ANEMIA, UNSPECIFIED TYPE: ICD-10-CM

## 2025-06-23 DIAGNOSIS — N95.1 MENOPAUSAL SYNDROME: ICD-10-CM

## 2025-06-23 DIAGNOSIS — Z98.890 S/P ENDOMETRIAL ABLATION: ICD-10-CM

## 2025-06-23 DIAGNOSIS — R63.5 WEIGHT GAIN: ICD-10-CM

## 2025-06-23 DIAGNOSIS — E03.9 HYPOTHYROIDISM, UNSPECIFIED TYPE: Primary | ICD-10-CM

## 2025-06-23 DIAGNOSIS — G47.01 INSOMNIA DUE TO MEDICAL CONDITION: ICD-10-CM

## 2025-06-23 PROCEDURE — 3078F DIAST BP <80 MM HG: CPT | Mod: CPTII,S$GLB,, | Performed by: FAMILY MEDICINE

## 2025-06-23 PROCEDURE — 1160F RVW MEDS BY RX/DR IN RCRD: CPT | Mod: CPTII,S$GLB,, | Performed by: FAMILY MEDICINE

## 2025-06-23 PROCEDURE — 3008F BODY MASS INDEX DOCD: CPT | Mod: CPTII,S$GLB,, | Performed by: FAMILY MEDICINE

## 2025-06-23 PROCEDURE — 1159F MED LIST DOCD IN RCRD: CPT | Mod: CPTII,S$GLB,, | Performed by: FAMILY MEDICINE

## 2025-06-23 PROCEDURE — 99999 PR PBB SHADOW E&M-EST. PATIENT-LVL V: CPT | Mod: PBBFAC,,, | Performed by: FAMILY MEDICINE

## 2025-06-23 PROCEDURE — 99214 OFFICE O/P EST MOD 30 MIN: CPT | Mod: S$GLB,,, | Performed by: FAMILY MEDICINE

## 2025-06-23 PROCEDURE — 3074F SYST BP LT 130 MM HG: CPT | Mod: CPTII,S$GLB,, | Performed by: FAMILY MEDICINE

## 2025-06-23 RX ORDER — LEVOTHYROXINE SODIUM 75 UG/1
75 TABLET ORAL
Qty: 90 TABLET | Refills: 1 | Status: SHIPPED | OUTPATIENT
Start: 2025-06-23

## 2025-06-23 NOTE — PROGRESS NOTES
SCRIBE #1 NOTE: I, Soren Antonio, am scribing for, and in the presence of,  Reginaldo Campoverde III, MD. I have scribed the entire note.     Subjective:       Patient ID: Roopa Rivas is a 45 y.o. female.    Chief Complaint: Follow-up (3 month )    Ms. Rivas is here for a follow up with her last appointment being here on Rsavanthi 3, 2025 with SHUKRI Anaya. Menopausal syndrome. Reports she was having some dizziness and nausea, but she believes this is due to her hormones. S/P endometrial ablation in November 2024. States she has been having a  diet, but she is still gaining weight. S/P cholecystectomy. No diarrhea. BMI of 30.79. She was 145 pounds, then 130 pounds, and she is now 152 pounds. Cardiovascular good. No chest pain. No palpitations. Blood pressure is 100/60. Hypothyroidism. On Levothyroxine 50mcg. TSH is 3.8. Anemia. Hemoglobin is 11.8. Bipolar affective disorder. On Abilify. Sees psychiatry. Insomnia. On Ambien. Colonoscopy on July 11, 2025 with Dr. Guadalupe. Mammogram is current as of October 2024. B12 is 576.     Review of Systems   Constitutional:  Positive for unexpected weight change. Negative for chills and fever.   HENT:  Negative for congestion and sore throat.    Eyes:  Negative for visual disturbance.   Respiratory:  Negative for chest tightness and shortness of breath.    Cardiovascular:  Negative for chest pain.   Gastrointestinal:  Negative for nausea.   Endocrine: Negative for polydipsia and polyuria.   Genitourinary:  Negative for dysuria and flank pain.   Musculoskeletal:  Negative for back pain, neck pain and neck stiffness.   Skin:  Negative for rash.   Neurological:  Negative for weakness.   Hematological:  Does not bruise/bleed easily.   Psychiatric/Behavioral:  Negative for behavioral problems.    All other systems reviewed and are negative.      Objective:      Physical examination: Vital signs noted. No acute distress. No carotid bruit. Regular heart rate and rhythm. Lungs clear to  auscultation bilaterally. Abdomen bowel sounds positive soft and nontender. Extremities without edema. 2+ pedal pulses.      Assessment:       1. Hypothyroidism, unspecified type    2. Insomnia due to medical condition    3. Bipolar affective disorder, remission status unspecified    4. Menopausal syndrome    5. Anemia, unspecified type    6. Weight gain    7. BMI 30.0-30.9,adult    8. S/P endometrial ablation        Plan:       Hypothyroidism, unspecified type  -     TSH; Future; Expected date: 09/23/2025    Insomnia due to medical condition    Bipolar affective disorder, remission status unspecified    Menopausal syndrome    Anemia, unspecified type    Weight gain    BMI 30.0-30.9,adult    S/P endometrial ablation    Other orders  -     levothyroxine (SYNTHROID) 75 MCG tablet; Take 1 tablet (75 mcg total) by mouth before breakfast.  Dispense: 90 tablet; Refill: 1    Colonoscopy on July 11th.  By Dr. Corbett.  Mammogram in October.  Increase levothyroxine to 75 daily 90 with 1 refill.  TSH and three-month follow-up.  Follow hemoglobin.  All care gaps addressed or discussed.     I, Reginaldo Campoverde III, MD, personally performed the services described in this documentation. All medical record entries made by the scribe were at my direction and in my presence. I have reviewed the chart and agree that the record reflects my personal performance and is accurate and complete.

## 2025-07-21 ENCOUNTER — PATIENT MESSAGE (OUTPATIENT)
Dept: GASTROENTEROLOGY | Facility: CLINIC | Age: 45
End: 2025-07-21
Payer: COMMERCIAL

## 2025-07-25 ENCOUNTER — TELEPHONE (OUTPATIENT)
Dept: GASTROENTEROLOGY | Facility: CLINIC | Age: 45
End: 2025-07-25
Payer: COMMERCIAL

## 2025-08-04 PROBLEM — Z98.890 S/P ENDOMETRIAL ABLATION: Status: RESOLVED | Noted: 2025-06-23 | Resolved: 2025-08-04

## 2025-08-15 ENCOUNTER — OFFICE VISIT (OUTPATIENT)
Dept: PSYCHIATRY | Facility: CLINIC | Age: 45
End: 2025-08-15
Payer: COMMERCIAL

## 2025-08-15 ENCOUNTER — PATIENT MESSAGE (OUTPATIENT)
Dept: PSYCHIATRY | Facility: CLINIC | Age: 45
End: 2025-08-15
Payer: COMMERCIAL

## 2025-08-15 DIAGNOSIS — G47.00 INSOMNIA, UNSPECIFIED TYPE: ICD-10-CM

## 2025-08-15 DIAGNOSIS — F31.81 BIPOLAR II DISORDER: Primary | ICD-10-CM

## 2025-08-15 DIAGNOSIS — F41.1 GENERALIZED ANXIETY DISORDER: ICD-10-CM

## 2025-08-15 RX ORDER — LAMOTRIGINE 25 MG/1
TABLET ORAL
Qty: 42 TABLET | Refills: 0 | Status: SHIPPED | OUTPATIENT
Start: 2025-08-15 | End: 2025-09-12

## 2025-08-15 RX ORDER — ZOLPIDEM TARTRATE 12.5 MG/1
12.5 TABLET, FILM COATED, EXTENDED RELEASE ORAL NIGHTLY PRN
Qty: 30 TABLET | Refills: 1 | Status: SHIPPED | OUTPATIENT
Start: 2025-08-15 | End: 2025-10-14

## 2025-08-15 RX ORDER — ARIPIPRAZOLE 15 MG/1
15 TABLET ORAL DAILY
Qty: 90 TABLET | Refills: 0 | Status: SHIPPED | OUTPATIENT
Start: 2025-08-15 | End: 2025-11-13

## 2025-08-15 RX ORDER — FLUOXETINE HYDROCHLORIDE 40 MG/1
40 CAPSULE ORAL DAILY
Qty: 90 CAPSULE | Refills: 0 | Status: SHIPPED | OUTPATIENT
Start: 2025-08-15 | End: 2025-11-13

## 2025-08-29 ENCOUNTER — LAB VISIT (OUTPATIENT)
Dept: LAB | Facility: HOSPITAL | Age: 45
End: 2025-08-29
Attending: FAMILY MEDICINE
Payer: COMMERCIAL

## 2025-08-29 DIAGNOSIS — E55.9 VITAMIN D DEFICIENCY: ICD-10-CM

## 2025-08-29 DIAGNOSIS — E03.9 HYPOTHYROIDISM, UNSPECIFIED TYPE: ICD-10-CM

## 2025-08-29 PROCEDURE — 82306 VITAMIN D 25 HYDROXY: CPT

## 2025-08-29 PROCEDURE — 84443 ASSAY THYROID STIM HORMONE: CPT

## 2025-08-29 PROCEDURE — 36415 COLL VENOUS BLD VENIPUNCTURE: CPT | Mod: PO

## 2025-08-30 LAB
25(OH)D3+25(OH)D2 SERPL-MCNC: 19 NG/ML (ref 30–96)
TSH SERPL-ACNC: 0.96 UIU/ML (ref 0.4–4)

## (undated) DEVICE — DRAPE UINDERBUT GRAD PCH

## (undated) DEVICE — KIT ANTIFOG W/SPONG & FLUID

## (undated) DEVICE — GLOVE SENSICARE PI ALOE 6

## (undated) DEVICE — BAG TISS RETRV MONARCH 10MM

## (undated) DEVICE — GLOVE SENSICARE PI ALOE 7

## (undated) DEVICE — GLOVE SENSICARE PI GRN 6

## (undated) DEVICE — BLADE SURG CARBON STEEL SZ11

## (undated) DEVICE — GLOVE SENSICARE PI ALOE 6.5

## (undated) DEVICE — CANNULA ENDOPATH XCEL 5X100MM

## (undated) DEVICE — NDL SAFETY 21G X 1 1/2 ECLPSE

## (undated) DEVICE — SCRUB DYNA-HEX LIQ 4% CHG 4OZ

## (undated) DEVICE — TROCAR ENDOPATH XCEL 5X100MM

## (undated) DEVICE — SUT MCRYL PLUS 4-0 PS2 27IN

## (undated) DEVICE — GLOVE SENSICARE PI SURG 7

## (undated) DEVICE — TOWEL OR DISP STRL BLUE 4/PK

## (undated) DEVICE — NDL PNEUMOPERITONEUM 150MM

## (undated) DEVICE — SEALER LIGASURE MARYLAND 37CM

## (undated) DEVICE — ADHESIVE DERMABOND ADVANCED

## (undated) DEVICE — SCISSOR 5MMX35CM DIRECT DRIVE

## (undated) DEVICE — TUBING SUC UNIV W/CONN 12FT

## (undated) DEVICE — GOWN POLY REINF BRTH SLV LG

## (undated) DEVICE — SYR 10CC LUER LOCK

## (undated) DEVICE — TROCAR KII FIOS ZTHREAD 12X100

## (undated) DEVICE — PAD OB HS-77 STRL PREPACK 12S

## (undated) DEVICE — GOWN POLY REINF BRTH SLV XL

## (undated) DEVICE — Device

## (undated) DEVICE — ELECTRODE REM PLYHSV RETURN 9

## (undated) DEVICE — IRRIGATOR SUCTION W/TIP

## (undated) DEVICE — APPLICATOR CHLORAPREP ORN 26ML

## (undated) DEVICE — PACK CUSTOM ENDO CHOLO SLI

## (undated) DEVICE — APPLIER CLIP ENDO LIGAMAX 5MM

## (undated) DEVICE — SYS SEE SHARP SCOPE ANTIFOG

## (undated) DEVICE — TRAY SKIN SCRUB DRY PREMIUM

## (undated) DEVICE — DRESSING TELFA N ADH 3X8

## (undated) DEVICE — SLEEVE SCD EXPRESS KNEE MEDIUM

## (undated) DEVICE — ELECTRODE L HOOK 13IN 33M

## (undated) DEVICE — COVER LIGHT HANDLE FLEX GRN

## (undated) DEVICE — LEGGING CLEAR POLY 2/PACK

## (undated) DEVICE — SOL IRR NACL .9% 3000ML

## (undated) DEVICE — TROCAR ENDO Z THREAD KII 5X100

## (undated) DEVICE — SUT VICRYL+ 27 UR-6 VIOL

## (undated) DEVICE — COVER TABLE 44X90 STERILE

## (undated) DEVICE — CORD MPLR STR PLUG DISP 10FT

## (undated) DEVICE — TRAY CATH 1-LYR URIMTR 16FR

## (undated) DEVICE — TROCAR ENDOPATH XCEL 8MM 10CM

## (undated) DEVICE — SUT MONOCRYL 4-0 PS-2

## (undated) DEVICE — JELLY SURGILUBE LUBE TUBE 2OZ

## (undated) DEVICE — HEMOSTAT SURGICEL 4X8IN

## (undated) DEVICE — TROCAR ENDOPATH XCEL 5MM 7.5CM

## (undated) DEVICE — DISSECTOR KITTNER 5MM T 45CM S

## (undated) DEVICE — TROCAR KII FIOS 5MM X 100MM

## (undated) DEVICE — KIT PROCEDURE STER INLET CLOSU

## (undated) DEVICE — SOL NACL IRR 1000ML BTL

## (undated) DEVICE — COVER CAMERA OPERATING ROOM

## (undated) DEVICE — GLOVE SENSICARE PI GRN 6.5

## (undated) DEVICE — STRAP OR TABLE 5IN X 72IN

## (undated) DEVICE — LINER SUCTION 3000CC

## (undated) DEVICE — SEE MEDLINE ITEM 154981

## (undated) DEVICE — DRESSING GAUZE PETROLATUM 1X8

## (undated) DEVICE — TRAY GENERAL LAPAROSCOPY SMH

## (undated) DEVICE — PACK FLUENT DISPOSABLE

## (undated) DEVICE — DRAPE TIBURON 36X104X123IN

## (undated) DEVICE — KIT NOVASURE V5 ENDOMET ABLAT

## (undated) DEVICE — SOL NACL IRR 3000ML

## (undated) DEVICE — CATH URETHRAL RED 16FR

## (undated) DEVICE — COVER LIGHT HANDLE 80/CA

## (undated) DEVICE — SET TUBE PNEUMOCLEAR SE HI FLO

## (undated) DEVICE — CLIPPER BLADE MOD 4406 (CAREF)

## (undated) DEVICE — SUT COATED VICRYL 4/0 27IN